# Patient Record
Sex: MALE | Race: WHITE | NOT HISPANIC OR LATINO | Employment: UNEMPLOYED | URBAN - METROPOLITAN AREA
[De-identification: names, ages, dates, MRNs, and addresses within clinical notes are randomized per-mention and may not be internally consistent; named-entity substitution may affect disease eponyms.]

---

## 2017-03-23 RX ORDER — AMLODIPINE BESYLATE 10 MG/1
TABLET ORAL
Qty: 30 TABLET | Refills: 2 | Status: SHIPPED | OUTPATIENT
Start: 2017-03-23 | End: 2017-08-20 | Stop reason: SDUPTHER

## 2017-04-10 ENCOUNTER — HOSPITAL ENCOUNTER (OUTPATIENT)
Dept: RADIOLOGY | Facility: HOSPITAL | Age: 57
Discharge: HOME | End: 2017-04-10
Attending: FAMILY MEDICINE
Payer: COMMERCIAL

## 2017-04-10 ENCOUNTER — OFFICE VISIT (OUTPATIENT)
Dept: INTERNAL MEDICINE | Facility: CLINIC | Age: 57
End: 2017-04-10
Payer: COMMERCIAL

## 2017-04-10 VITALS
BODY MASS INDEX: 25.83 KG/M2 | HEART RATE: 57 BPM | OXYGEN SATURATION: 98 % | DIASTOLIC BLOOD PRESSURE: 74 MMHG | WEIGHT: 190.69 LBS | HEIGHT: 72 IN | SYSTOLIC BLOOD PRESSURE: 124 MMHG | TEMPERATURE: 97 F

## 2017-04-10 DIAGNOSIS — M25.562 PAIN IN BOTH KNEES, UNSPECIFIED CHRONICITY: ICD-10-CM

## 2017-04-10 DIAGNOSIS — M77.11 RIGHT TENNIS ELBOW: ICD-10-CM

## 2017-04-10 DIAGNOSIS — K21.9 GASTROESOPHAGEAL REFLUX DISEASE WITHOUT ESOPHAGITIS: ICD-10-CM

## 2017-04-10 DIAGNOSIS — Z00.00 WELLNESS EXAMINATION: ICD-10-CM

## 2017-04-10 DIAGNOSIS — M25.562 PAIN IN BOTH KNEES, UNSPECIFIED CHRONICITY: Primary | ICD-10-CM

## 2017-04-10 DIAGNOSIS — M25.561 PAIN IN BOTH KNEES, UNSPECIFIED CHRONICITY: Primary | ICD-10-CM

## 2017-04-10 DIAGNOSIS — M25.561 PAIN IN BOTH KNEES, UNSPECIFIED CHRONICITY: ICD-10-CM

## 2017-04-10 PROCEDURE — 3074F SYST BP LT 130 MM HG: CPT | Mod: S$GLB,,, | Performed by: PHYSICIAN ASSISTANT

## 2017-04-10 PROCEDURE — 3078F DIAST BP <80 MM HG: CPT | Mod: S$GLB,,, | Performed by: PHYSICIAN ASSISTANT

## 2017-04-10 PROCEDURE — 90715 TDAP VACCINE 7 YRS/> IM: CPT | Mod: S$GLB,,, | Performed by: PHYSICIAN ASSISTANT

## 2017-04-10 PROCEDURE — 73562 X-RAY EXAM OF KNEE 3: CPT | Mod: 26,50,, | Performed by: RADIOLOGY

## 2017-04-10 PROCEDURE — 90471 IMMUNIZATION ADMIN: CPT | Mod: S$GLB,,, | Performed by: INTERNAL MEDICINE

## 2017-04-10 PROCEDURE — 90472 IMMUNIZATION ADMIN EACH ADD: CPT | Mod: S$GLB,,, | Performed by: PHYSICIAN ASSISTANT

## 2017-04-10 PROCEDURE — 73080 X-RAY EXAM OF ELBOW: CPT | Mod: TC,RT

## 2017-04-10 PROCEDURE — 73562 X-RAY EXAM OF KNEE 3: CPT | Mod: TC,50

## 2017-04-10 PROCEDURE — 73080 X-RAY EXAM OF ELBOW: CPT | Mod: 26,RT,, | Performed by: RADIOLOGY

## 2017-04-10 PROCEDURE — 99214 OFFICE O/P EST MOD 30 MIN: CPT | Mod: 25,S$GLB,, | Performed by: PHYSICIAN ASSISTANT

## 2017-04-10 PROCEDURE — 90688 IIV4 VACCINE SPLT 0.5 ML IM: CPT | Mod: S$GLB,,, | Performed by: INTERNAL MEDICINE

## 2017-04-10 PROCEDURE — 99999 PR PBB SHADOW E&M-EST. PATIENT-LVL IV: CPT | Mod: PBBFAC,,, | Performed by: PHYSICIAN ASSISTANT

## 2017-04-10 PROCEDURE — 1160F RVW MEDS BY RX/DR IN RCRD: CPT | Mod: S$GLB,,, | Performed by: PHYSICIAN ASSISTANT

## 2017-04-10 RX ORDER — ESOMEPRAZOLE MAGNESIUM 40 MG/1
40 CAPSULE, DELAYED RELEASE ORAL
Qty: 90 CAPSULE | Refills: 3 | Status: SHIPPED | OUTPATIENT
Start: 2017-04-10 | End: 2018-03-09

## 2017-04-10 RX ORDER — CELECOXIB 200 MG/1
200 CAPSULE ORAL 2 TIMES DAILY
Qty: 60 CAPSULE | Refills: 2 | Status: SHIPPED | OUTPATIENT
Start: 2017-04-10 | End: 2018-04-04

## 2017-04-10 NOTE — PATIENT INSTRUCTIONS
Hypertension Goal Care Plan        Controlling High Blood Pressure  High blood pressure (hypertension) is often called the silent killer. This is because many people who have it dont know it. High blood pressure is defined as 140/90 mm Hg or higher. Know your blood pressure and remember to check it regularly. Doing so can save your life. Here are some things you can do to help control your blood pressure.    Choose heart-healthy foods  · Select low-salt, low-fat foods. Limit sodium intake to 2,400 mg per day or the amount suggested by your healthcare provider.  · Limit canned, dried, cured, packaged, and fast foods. These can contain a lot of salt.  · Eat 8 to 10 servings of fruits and vegetables every day.  · Choose lean meats, fish, or chicken.  · Eat whole-grain pasta, brown rice, and beans.  · Eat 2 to 3 servings of low-fat or fat-free dairy products.  · Ask your doctor about the DASH eating plan. This plan helps reduce blood pressure.  · When you go to a restaurant, ask that your meal be prepared with no added salt.  Maintain a healthy weight  · Ask your healthcare provider how many calories to eat a day. Then stick to that number.  · Ask your healthcare provider what weight range is healthiest for you. If you are overweight, a weight loss of only 3% to 5% of your body weight can help lower blood pressure. Generally, a good weight loss goal is to lose 10% of your body weight in a year.  · Limit snacks and sweets.  · Get regular exercise.  Get up and get active  · Choose activities you enjoy. Find ones you can do with friends or family. This includes bicycling, dancing, walking, and jogging.  · Park farther away from building entrances.  · Use stairs instead of the elevator.  · When you can, walk or bike instead of driving.  · Epping leaves, garden, or do household repairs.  · Be active at a moderate to vigorous level of physical activity for at least 40 minutes for a minimum of 3 to 4 days a week.   Manage  stress  · Make time to relax and enjoy life. Find time to laugh.  · Communicate your concerns with your loved ones and your healthcare provider.  · Visit with family and friends, and keep up with hobbies.  Limit alcohol and quit smoking  · Men should have no more than 2 drinks per day.  · Women should have no more than 1 drink per day.  · Talk with your healthcare provider about quitting smoking. Smoking significantly increases your risk for heart disease and stroke. Ask your healthcare provider about community smoking cessation programs and other options.  Medicines  If lifestyle changes arent enough, your healthcare provider may prescribe high blood pressure medicine. Take all medicines as prescribed. If you have any questions about your medicines, ask your healthcare provider before stopping or changing them.   © 7880-1887 [employer]. [address] [place] [address], [place] [address]. All rights reserved. This information is not intended as a substitute for professional medical care. Always follow your healthcare professional's instructions.          Long-Term Complications of Diabetes  Diabetes can cause health problems over time. These are called complications. They are more likely to occur if your blood sugar is often too high. Over time, high blood sugar can damage blood vessels in your body. It is important to keep your blood sugar in your target range. This can help prevent or delay complications from diabetes.    Possible complications  Complications of diabetes include:  · Eye problems, including damage to the blood vessels in the eyes (retinopathy), pressure in the eye (glaucoma), and clouding of the eyes lens (a cataract). Eye problems can eventually lead to irreversible blindness.   · Tooth and gum problems (periodontal disease), causing loss of teeth and bone  · Blood vessel (vascular) disease leading to circulation problems, heart attack or stroke, or a need for amputation of a  limb   · Problems with sexual function leading to erectile dysfunction in men and sexual discomfort in women   · Kidney disease (nephropathy) can eventually lead to kidney failure, which may require dialysis or kidney transplant   · Nerve problems (neuropathy), causing pain or loss of feeling in your feet and other parts of your body, potentially leading to an amputation of a limb   · High blood pressure (hypertension), putting strain on your heart and blood vessels  · Serious infections, possibly leading to loss of toes, feet, or limbs  How to avoid complications  The serious consequences of these complications are completely avoidable for most people with diabetes by managing your blood glucose, blood pressure, and cholesterol levels. This can help you feel better and stay healthy. You can manage diabetes by tracking your blood sugar. You can also eat healthy and exercise. And you should take medication if directed by your health care provider.  © 9948-0483 [employer]. [address] [place] [address], [place] [address]. All rights reserved. This information is not intended as a substitute for professional medical care. Always follow your healthcare professional's instructions.

## 2017-04-10 NOTE — PROGRESS NOTES
Subjective:       Patient ID: Vishnu Dougherty Jr. is a 57 y.o. male.    Chief Complaint: Knee Pain and right elbow pain    Knee Pain    The incident occurred more than 1 week ago. The incident occurred at work. There was no injury mechanism. The pain is present in the left knee and right knee. The quality of the pain is described as aching and shooting. The pain is moderate. The pain has been fluctuating since onset. Associated symptoms include a loss of motion. Pertinent negatives include no inability to bear weight, loss of sensation, muscle weakness, numbness or tingling. The symptoms are aggravated by weight bearing, palpation and movement. He has tried NSAIDs and acetaminophen for the symptoms. Improvement on treatment: Can not tolerate NSAIDS due to GI up set.    Elbow Injury   This is a new problem. The current episode started more than 1 month ago. The problem occurs daily. The problem has been waxing and waning. Associated symptoms include arthralgias, joint swelling and weakness. Pertinent negatives include no abdominal pain, chest pain, chills, coughing, fatigue, fever, headaches, nausea, numbness or vomiting. The symptoms are aggravated by twisting (working at his job as a [employer] ). He has tried acetaminophen and NSAIDs for the symptoms. The treatment provided no relief.     Past Medical History:   Diagnosis Date    Dilated cardiomyopathy 9/24/2015    Enlarged prostate     Essential hypertension 9/24/2015    Gastroesophageal reflux disease without esophagitis 10/7/2015    Shortness of breath 9/24/2015       Past Surgical History:   Procedure Laterality Date    COLONOSCOPY N/A 5/25/2016    Procedure: COLONOSCOPY;  Surgeon: Demetrio Spicer MD;  Location: [facility];  Service: Endoscopy;  Laterality: N/A;       History reviewed. No pertinent family history.    Social History     Social History    Marital status:      Spouse name: N/A    Number of children: N/A    Years of education: N/A      Occupational History    Not on file.     Social History Main Topics    Smoking status: Former Smoker     Quit date: 9/24/1995    Smokeless tobacco: Never Used    Alcohol use No    Drug use: No    Sexual activity: Not Currently     Other Topics Concern    Not on file     Social History Narrative       Review of patient's allergies indicates:  No Known Allergies      Current Outpatient Prescriptions:     amlodipine (NORVASC) 10 MG tablet, TAKE 1 TABLET (10 MG TOTAL) BY MOUTH ONCE DAILY., Disp: 30 tablet, Rfl: 2    escitalopram oxalate (LEXAPRO) 20 MG tablet, TAKE 1 TABLET (20 MG TOTAL) BY MOUTH ONCE DAILY., Disp: 30 tablet, Rfl: 5    celecoxib (CELEBREX) 200 MG capsule, Take 1 capsule (200 mg total) by mouth 2 (two) times daily., Disp: 60 capsule, Rfl: 2    esomeprazole (NEXIUM) 40 MG capsule, Take 1 capsule (40 mg total) by mouth before breakfast., Disp: 90 capsule, Rfl: 3    /74 (BP Location: Right arm, Patient Position: Sitting, BP Method: Manual)  Pulse (!) 57  Temp 96.8 °F (36 °C) (Tympanic)   Ht 6' (1.829 m)  Wt 86.5 kg (190 lb 11.2 oz)  SpO2 98%  BMI 25.86 kg/m2  Review of Systems   Constitutional: Negative for chills, fatigue and fever.   HENT: Negative.    Eyes: Negative.    Respiratory: Negative for cough, chest tightness, shortness of breath and wheezing.    Cardiovascular: Negative for chest pain, palpitations and leg swelling.   Gastrointestinal: Negative for abdominal pain, blood in stool, constipation, nausea and vomiting.   Endocrine: Negative.    Genitourinary: Negative.    Musculoskeletal: Positive for arthralgias, gait problem and joint swelling. Negative for back pain.   Skin: Negative.    Neurological: Positive for weakness. Negative for dizziness, tingling, seizures, numbness and headaches.   Hematological: Negative for adenopathy. Does not bruise/bleed easily.   Psychiatric/Behavioral: Negative.        Objective:      Physical Exam   Constitutional: He is oriented to  person, place, and time. He appears well-developed and well-nourished. No distress.   HENT:   Head: Normocephalic and atraumatic.   Eyes: Pupils are equal, round, and reactive to light.   Neck: Neck supple. No thyromegaly present.   Cardiovascular: Normal rate, regular rhythm and normal heart sounds.  Exam reveals no gallop and no friction rub.    No murmur heard.  Pulmonary/Chest: Effort normal and breath sounds normal. No respiratory distress. He has no wheezes. He has no rales. He exhibits no tenderness.   Abdominal: Soft. He exhibits no distension and no mass. There is no tenderness. There is no rebound and no guarding. No hernia.   Musculoskeletal:        Right elbow: Tenderness found. Lateral epicondyle tenderness noted.        Right knee: He exhibits decreased range of motion. Tenderness found.        Left knee: He exhibits decreased range of motion. Tenderness found.   Lymphadenopathy:     He has no cervical adenopathy.   Neurological: He is alert and oriented to person, place, and time.   Skin: Skin is warm and dry. He is not diaphoretic.   Psychiatric: He has a normal mood and affect.   Nursing note and vitals reviewed.      Assessment:       1. Pain in both knees, unspecified chronicity    2. Right tennis elbow    3. Gastroesophageal reflux disease without esophagitis    4. Wellness examination        Plan:       Pain in both knees, unspecified chronicity  -     celecoxib (CELEBREX) 200 MG capsule; Take 1 capsule (200 mg total) by mouth 2 (two) times daily.  Dispense: 60 capsule; Refill: 2  -     Cancel: X-Ray Knee 3 View Bilateral; Future; Expected date: 4/10/17  -     Ambulatory referral to Orthopedics  -     X-ray Knee Ortho Bilateral; Future; Expected date: 4/10/17    Right tennis elbow  -     celecoxib (CELEBREX) 200 MG capsule; Take 1 capsule (200 mg total) by mouth 2 (two) times daily.  Dispense: 60 capsule; Refill: 2  -     X-Ray Elbow Complete 3 views Right; Future; Expected date: 4/10/17  -      Ambulatory referral to Orthopedics    Gastroesophageal reflux disease without esophagitis  -     esomeprazole (NEXIUM) 40 MG capsule; Take 1 capsule (40 mg total) by mouth before breakfast.  Dispense: 90 capsule; Refill: 3  -     celecoxib (CELEBREX) 200 MG capsule; Take 1 capsule (200 mg total) by mouth 2 (two) times daily.  Dispense: 60 capsule; Refill: 2    Wellness examination  -     Tdap Vaccine  -     Comprehensive metabolic panel; Future; Expected date: 4/10/17  -     CBC auto differential; Future; Expected date: 4/10/17  -     TSH; Future; Expected date: 4/10/17  -     Lipid panel; Future; Expected date: 4/10/17  -     HEPATITIS C ANTIBODY; Future; Expected date: 4/10/17  -     PSA, Screening; Future; Expected date: 4/10/17    Other orders  -     Influenza - Quadrivalent

## 2017-04-10 NOTE — MR AVS SNAPSHOT
[facility]  [address]  [place] [address]  Phone: [phone]  Fax: [phone]                  Vishnu Dougherty Jr.   4/10/2017 10:00 AM   Office Visit    Description:  Male : 1960   Provider:  Ant Mejia III, PA-C   [facility]           Reason for Visit     Knee Pain     right elbow pain           Diagnoses this Visit        Comments    Pain in both knees, unspecified chronicity    -  Primary     Right tennis elbow         Gastroesophageal reflux disease without esophagitis         Wellness examination                To Do List           Future Appointments        Provider Department Dept Phone    2017 8:30 AM Kirill Rosario PA-C [facility] Orth[facility] [phone]    2017 10:10 AM LABORATORY, [address] [facility] [phone]      Goals (5 Years of Data)     None       These Medications        Disp Refills Start End    esomeprazole (NEXIUM) 40 MG capsule 90 capsule 3 4/10/2017 4/10/2018    Take 1 capsule (40 mg total) by mouth before breakfast. - Oral    Pharmacy: [facility]/pharmacy #[id number] - [place] - [address] AT [facility] Ph #: [phone]       celecoxib (CELEBREX) 200 MG capsule 60 capsule 2 4/10/2017     Take 1 capsule (200 mg total) by mouth 2 (two) times daily. - Oral    Pharmacy: [facility]/pharmacy #[id number] - [place], [place] - [address] AT [facility] Ph #: [phone]         [facility]     [facility] Nurse Care Line - 24/ Assistance  Unless otherwise directed by your provider, please contact [facility], our nurse care line that is available for 24/7 assistance.     Registered nurses in the [facility] provide: appointment scheduling, clinical advisement, health education, and other advisory services.  Call: [phone] (toll free)               Medications           Message regarding  Medications     Verify the changes and/or additions to your medication regime listed below are the same as discussed with your clinician today.  If any of these changes or additions are incorrect, please notify your healthcare provider.        START taking these NEW medications        Refills    celecoxib (CELEBREX) 200 MG capsule 2    Sig: Take 1 capsule (200 mg total) by mouth 2 (two) times daily.    Class: Normal    Route: Oral      CHANGE how you are taking these medications     Start Taking Instead of    esomeprazole (NEXIUM) 40 MG capsule esomeprazole (NEXIUM) 20 MG capsule    Dosage:  Take 1 capsule (40 mg total) by mouth before breakfast. Dosage:  Take 2 capsules (40 mg total) by mouth before breakfast.    Reason for Change:  Reorder       STOP taking these medications     polyethylene glycol (GLYCOLAX) 17 gram PwPk Take 17 g by mouth once daily.    docusate sodium (COLACE) 100 MG capsule Take 1 capsule (100 mg total) by mouth 2 (two) times daily.    alprazolam (XANAX) 0.5 MG tablet Take 1 tablet (0.5 mg total) by mouth daily as needed for Anxiety.           Verify that the below list of medications is an accurate representation of the medications you are currently taking.  If none reported, the list may be blank. If incorrect, please contact your healthcare provider. Carry this list with you in case of emergency.           Current Medications     amlodipine (NORVASC) 10 MG tablet TAKE 1 TABLET (10 MG TOTAL) BY MOUTH ONCE DAILY.    escitalopram oxalate (LEXAPRO) 20 MG tablet TAKE 1 TABLET (20 MG TOTAL) BY MOUTH ONCE DAILY.    celecoxib (CELEBREX) 200 MG capsule Take 1 capsule (200 mg total) by mouth 2 (two) times daily.    esomeprazole (NEXIUM) 40 MG capsule Take 1 capsule (40 mg total) by mouth before breakfast.           Clinical Reference Information           Your Vitals Were     BP Pulse Temp Height    124/74 (BP Location: Right arm, Patient Position: Sitting, BP Method: Manual) 57 96.8 °F (36 °C)  (Tympanic) 6' (1.829 m)    Weight SpO2 BMI    86.5 kg (190 lb 11.2 oz) 98% 25.86 kg/m2      Blood Pressure          Most Recent Value    BP  124/74      Allergies as of 4/10/2017     No Known Allergies      Immunizations Administered on Date of Encounter - 4/10/2017     Name Date Dose VIS Date Route    TDAP  Incomplete 0.5 mL 2/24/2015 Intramuscular      Orders Placed During Today's Visit      Normal Orders This Visit    Ambulatory referral to Orthopedics     Tdap Vaccine     Future Labs/Procedures Expected by Expires    CBC auto differential  4/10/2017 6/9/2018    Comprehensive metabolic panel  4/10/2017 6/9/2018    HEPATITIS C ANTIBODY  4/10/2017 6/9/2018    Lipid panel  4/10/2017 6/9/2018    PSA, Screening  4/10/2017 6/9/2018    TSH  4/10/2017 6/9/2018    X-Ray Elbow Complete 3 views Right  4/10/2017 4/10/2018    X-ray Knee Ortho Bilateral  4/10/2017 4/10/2018      Instructions    Hypertension Goal Care Plan        Controlling High Blood Pressure  High blood pressure (hypertension) is often called the silent killer. This is because many people who have it dont know it. High blood pressure is defined as 140/90 mm Hg or higher. Know your blood pressure and remember to check it regularly. Doing so can save your life. Here are some things you can do to help control your blood pressure.    Choose heart-healthy foods  · Select low-salt, low-fat foods. Limit sodium intake to 2,400 mg per day or the amount suggested by your healthcare provider.  · Limit canned, dried, cured, packaged, and fast foods. These can contain a lot of salt.  · Eat 8 to 10 servings of fruits and vegetables every day.  · Choose lean meats, fish, or chicken.  · Eat whole-grain pasta, brown rice, and beans.  · Eat 2 to 3 servings of low-fat or fat-free dairy products.  · Ask your doctor about the DASH eating plan. This plan helps reduce blood pressure.  · When you go to a restaurant, ask that your meal be prepared with no added salt.  Maintain a  healthy weight  · Ask your healthcare provider how many calories to eat a day. Then stick to that number.  · Ask your healthcare provider what weight range is healthiest for you. If you are overweight, a weight loss of only 3% to 5% of your body weight can help lower blood pressure. Generally, a good weight loss goal is to lose 10% of your body weight in a year.  · Limit snacks and sweets.  · Get regular exercise.  Get up and get active  · Choose activities you enjoy. Find ones you can do with friends or family. This includes bicycling, dancing, walking, and jogging.  · Park farther away from building entrances.  · Use stairs instead of the elevator.  · When you can, walk or bike instead of driving.  · Sproul leaves, garden, or do household repairs.  · Be active at a moderate to vigorous level of physical activity for at least 40 minutes for a minimum of 3 to 4 days a week.   Manage stress  · Make time to relax and enjoy life. Find time to laugh.  · Communicate your concerns with your loved ones and your healthcare provider.  · Visit with family and friends, and keep up with hobbies.  Limit alcohol and quit smoking  · Men should have no more than 2 drinks per day.  · Women should have no more than 1 drink per day.  · Talk with your healthcare provider about quitting smoking. Smoking significantly increases your risk for heart disease and stroke. Ask your healthcare provider about community smoking cessation programs and other options.  Medicines  If lifestyle changes arent enough, your healthcare provider may prescribe high blood pressure medicine. Take all medicines as prescribed. If you have any questions about your medicines, ask your healthcare provider before stopping or changing them.   © 1795-9219 [employer]. [address] [place] [address], [place] [address]. All rights reserved. This information is not intended as a substitute for professional medical care. Always follow your healthcare professional's  instructions.          Long-Term Complications of Diabetes  Diabetes can cause health problems over time. These are called complications. They are more likely to occur if your blood sugar is often too high. Over time, high blood sugar can damage blood vessels in your body. It is important to keep your blood sugar in your target range. This can help prevent or delay complications from diabetes.    Possible complications  Complications of diabetes include:  · Eye problems, including damage to the blood vessels in the eyes (retinopathy), pressure in the eye (glaucoma), and clouding of the eyes lens (a cataract). Eye problems can eventually lead to irreversible blindness.   · Tooth and gum problems (periodontal disease), causing loss of teeth and bone  · Blood vessel (vascular) disease leading to circulation problems, heart attack or stroke, or a need for amputation of a limb   · Problems with sexual function leading to erectile dysfunction in men and sexual discomfort in women   · Kidney disease (nephropathy) can eventually lead to kidney failure, which may require dialysis or kidney transplant   · Nerve problems (neuropathy), causing pain or loss of feeling in your feet and other parts of your body, potentially leading to an amputation of a limb   · High blood pressure (hypertension), putting strain on your heart and blood vessels  · Serious infections, possibly leading to loss of toes, feet, or limbs  How to avoid complications  The serious consequences of these complications are completely avoidable for most people with diabetes by managing your blood glucose, blood pressure, and cholesterol levels. This can help you feel better and stay healthy. You can manage diabetes by tracking your blood sugar. You can also eat healthy and exercise. And you should take medication if directed by your health care provider.  © 1176-1241 [employer]. [address] [place] [address], [place] [address]. All rights reserved.  This information is not intended as a substitute for professional medical care. Always follow your healthcare professional's instructions.         Language Assistance Services     ATTENTION: Language assistance services are available, free of charge. Please call [phone].      ATENCIÓN: Si aleena nowak, tiene a navarro disposición servicios gratuitos de asistencia lingüística. Llame al [phone].     CHÚ Ý: N?u b?n nói Ti?ng Vi?t, có các d?ch v? h? tr? ngôn ng? mi?n phí dành cho b?n. G?i s? [phone].         [facility] complies with applicable Federal civil rights laws and does not discriminate on the basis of race, color, national origin, age, disability, or sex.

## 2017-04-12 ENCOUNTER — HOSPITAL ENCOUNTER (OUTPATIENT)
Dept: RADIOLOGY | Facility: HOSPITAL | Age: 57
Discharge: HOME | End: 2017-04-12
Attending: ORTHOPAEDIC SURGERY
Payer: COMMERCIAL

## 2017-04-12 ENCOUNTER — OFFICE VISIT (OUTPATIENT)
Dept: ORTHOPEDICS | Facility: CLINIC | Age: 57
End: 2017-04-12
Payer: COMMERCIAL

## 2017-04-12 ENCOUNTER — TELEPHONE (OUTPATIENT)
Dept: INTERNAL MEDICINE | Facility: CLINIC | Age: 57
End: 2017-04-12

## 2017-04-12 VITALS
DIASTOLIC BLOOD PRESSURE: 81 MMHG | WEIGHT: 189.5 LBS | HEIGHT: 72 IN | BODY MASS INDEX: 25.67 KG/M2 | SYSTOLIC BLOOD PRESSURE: 162 MMHG | HEART RATE: 55 BPM

## 2017-04-12 DIAGNOSIS — M79.18 MYOFACIAL MUSCLE PAIN: ICD-10-CM

## 2017-04-12 DIAGNOSIS — M77.11 LATERAL EPICONDYLITIS OF RIGHT ELBOW: ICD-10-CM

## 2017-04-12 DIAGNOSIS — M62.89 HAMSTRING TIGHTNESS, UNSPECIFIED LATERALITY: ICD-10-CM

## 2017-04-12 DIAGNOSIS — M62.81 MUSCLE WEAKNESS OF EXTREMITY: ICD-10-CM

## 2017-04-12 DIAGNOSIS — M17.0 PRIMARY OSTEOARTHRITIS OF BOTH KNEES: Primary | ICD-10-CM

## 2017-04-12 PROCEDURE — 3077F SYST BP >= 140 MM HG: CPT | Mod: S$GLB,,, | Performed by: PHYSICIAN ASSISTANT

## 2017-04-12 PROCEDURE — 72110 X-RAY EXAM L-2 SPINE 4/>VWS: CPT | Mod: TC

## 2017-04-12 PROCEDURE — 1160F RVW MEDS BY RX/DR IN RCRD: CPT | Mod: S$GLB,,, | Performed by: PHYSICIAN ASSISTANT

## 2017-04-12 PROCEDURE — 99999 PR PBB SHADOW E&M-EST. PATIENT-LVL III: CPT | Mod: PBBFAC,,, | Performed by: PHYSICIAN ASSISTANT

## 2017-04-12 PROCEDURE — 3079F DIAST BP 80-89 MM HG: CPT | Mod: S$GLB,,, | Performed by: PHYSICIAN ASSISTANT

## 2017-04-12 PROCEDURE — 99204 OFFICE O/P NEW MOD 45 MIN: CPT | Mod: S$GLB,,, | Performed by: PHYSICIAN ASSISTANT

## 2017-04-12 PROCEDURE — 72110 X-RAY EXAM L-2 SPINE 4/>VWS: CPT | Mod: 26,,, | Performed by: RADIOLOGY

## 2017-04-12 NOTE — LETTER
April 12, 2017      Luna Fuller MD  [address] [place] [address] [address] [place] [id number]           [facility]  [address] [place] [address] [address] [place] [address]  Phone: [phone]  Fax: [phone]          Patient: Vishnu Dougherty Jr.   MR Number: [id number]   YOB: 1960   Date of Visit: 4/12/2017       Dear Dr. Luna Fuller:    Thank you for referring Vishnu Dougherty to me for evaluation. Attached you will find relevant portions of my assessment and plan of care.    If you have questions, please do not hesitate to call me. I look forward to following Vishnu Dougherty along with you.    Sincerely,    Kirill Rosario PA-C    Enclosure  CC:  No Recipients    If you would like to receive this communication electronically, please contact [email or web address] or [phone] to request more information on [employer] access.    For providers and/or their staff who would like to refer a patient to [facility], please contact us through our one-stop-shop provider referral line, [facility], at [phone].    If you feel you have received this communication in error or would no longer like to receive these types of communications, please e-mail [email or web address]

## 2017-04-12 NOTE — TELEPHONE ENCOUNTER
----- Message from Kirill Rosario PA-C sent at 4/12/2017  9:48 AM CDT -----  See my note on him. I'm a bit perplexed by his presentation- I hope this is very simple-- muscle deconditioning in the presence of mild OA. The fear in the back of my mind is that it is more complex than it appears, though. I hope I'm wrong.   Kirill

## 2017-04-12 NOTE — PROGRESS NOTES
Subjective:      Patient ID: Vishnu Dougherty Jr. is a 57 y.o. male.    Chief Complaint: Pain of the Left Knee and Pain of the Right Knee    HPI Comments: Body part: Bilateral Knee    Occupation: / [employer]    Dominant hand: Right    Referred by: Luna Fuller MD    Date of Injury: None    Patient's visit goal: To find out what is wrong    Problem Description: Bilateral Knee Pain since January 2017.  He knows of no injury.  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  There is no extreme heavy lifting.  At times, he does have to pull on a mechanical crane.  He has no change in shoe wear, but he does wear steel toed boots.  He primarily has pain from midday going forward.  There is no swelling.  He describes it as a mild toothache that worsens through the day.  He is unable to take high doses of prescriptive NSAIDs.  He was just started on Celebrex and this appears to be helping some.  He has tried no bracing, injections, or PT.  Eyes any back or hip discomfort.  No numbness or tingling.          Pain   This is a new problem. The current episode started more than 1 month ago (pain since January 2017). The problem occurs constantly. The problem has been gradually worsening. Associated symptoms include abdominal pain. Pertinent negatives include no chest pain, chills, congestion, coughing, fever, joint swelling, nausea, numbness, rash or vomiting. The symptoms are aggravated by standing. He has tried rest for the symptoms. The treatment provided no relief.       Review of Systems   Constitution: Negative for chills, fever and weight loss.   HENT: Negative for congestion and hearing loss.    Eyes: Negative for double vision and pain.   Cardiovascular: Negative for chest pain and irregular heartbeat.   Respiratory: Positive for shortness of breath. Negative for cough.    Endocrine: Positive for cold intolerance and heat intolerance. Negative for  polyuria.   Hematologic/Lymphatic: Does not bruise/bleed easily.   Skin: Negative for poor wound healing, rash and suspicious lesions.   Musculoskeletal: Positive for joint pain. Negative for arthritis and joint swelling.   Gastrointestinal: Positive for abdominal pain. Negative for nausea and vomiting.   Genitourinary: Negative for bladder incontinence and frequency.   Neurological: Positive for tremors. Negative for loss of balance, numbness, paresthesias and sensory change.   Psychiatric/Behavioral: Positive for depression. The patient is nervous/anxious.    Allergic/Immunologic: Negative for hives.         Objective:            General    Nursing note and vitals reviewed.  Constitutional: He is oriented to person, place, and time. He appears well-developed and well-nourished. No distress.   Neck: Normal range of motion.   Neurological: He is alert and oriented to person, place, and time.   Psychiatric: He has a normal mood and affect. His behavior is normal. Judgment and thought content normal.     General Musculoskeletal Exam   Gait: normal   Pelvic Obliquity: mild    Right Ankle/Foot Exam     Range of Motion   The patient has normal right ankle ROM.    Alignment   Knee Alignment: varus    Left Ankle/Foot Exam     Range of Motion   The patient has normal left ankle ROM.     Alignment   Knee Alignment: varus    Right Knee Exam     Inspection   Erythema: absent  Scars: absent  Swelling: absent  Effusion: effusion  Deformity: deformity  Bruising: absent    Tenderness   The patient is experiencing no tenderness.         Range of Motion   The patient has normal right knee ROM.    Tests   Meniscus   Jeffrey:  Medial - negative Lateral - negative  Ligament Examination   MCL - Valgus: normal (0 to 2mm)  LCL - Varus: normal  Patella   Patellar Apprehension: negative  Patellar Grind: negative    Other   Muscle Tightness: hamstring tightness and quadriceps tightness  Sensation: normal    Left Knee Exam     Inspection    Erythema: absent  Scars: absent  Swelling: absent  Effusion: absent  Deformity: deformity  Bruising: absent    Tenderness   The patient is experiencing no tenderness.         Range of Motion   The patient has normal left knee ROM.    Tests   Meniscus   Jeffrey:  Medial - negative Lateral - negative  Stability   MCL - Valgus: normal (0 to 2mm)  LCL - Varus: normal (0 to 2mm)  Patella   Patellar Apprehension: negative  Patellar Grind: negative    Other   Muscle Tightness: hamstring tightness and quadriceps tightness  Sensation: normal    Comments:  He stands slouched and slightly hunched.    Right Hip Exam     Range of Motion   Flexion: abnormal   Internal Rotation: abnormal   External Rotation: abnormal   Abduction: abnormal     Tests   Pain w/ forced internal rotation (GABBY): absent  Pain w/ forced external rotation (FADIR): absent  Left Hip Exam     Range of Motion   Flexion: abnormal   Internal Rotation: abnormal   External Rotation: abnormal   Abduction: abnormal     Tests   Pain w/ forced internal rotation (GABBY): absent  Pain w/ forced external rotation (FADIR): absent      Back (L-Spine & T-Spine) / Neck (C-Spine) Exam     Back (L-Spine & T-Spine) Range of Motion   The patient has abnormal back ROM.  Extension: normal   Flexion: abnormal   Lateral Bend Right: normal   Lateral Bend Left: normal   Rotation Right: normal   Rotation Left: normal     Neck (C-Spine) Range of Motion   Flexion:     Normal  Extension: Normal  Right Lateral Bend: normal  Left Lateral Bend: normal  Right Rotation: normal  Left Rotation: normal      Muscle Strength   Right Lower Extremity   Hip Abduction: 4/5   Hip Adduction: 4/5   Hip Flexion: 4/5 (-)   Quadriceps:  4/5   Hamstrin/5 (-)   EHL:  5/5  FDL: 5/5  EDL: 5/5  FHL: 5/5  Left Lower Extremity   Hip Abduction: 4/5   Hip Adduction: 4/5   Hip Flexion: 4/5 (-)   Hamstrin/5 (-)   EHL:  5/5  FDL: 5/5  EDL: 5/5  FHL: 5/5    Reflexes     Left Side  Quadriceps:   3+  Achilles:  2+    Right Side   Quadriceps:  3+  Achilles:  2+    Vascular Exam       Capillary Refill  Right Hand: normal capillary refill  Left Hand: normal capillary refill    Edema  Right Lower Leg: absent  Left Lower Leg: absent      I have reviewed the films and report. I agree with the radiologist interpretation of the radiographic findings:  Minimal bilateral degenerative change without fracture, dislocation or definite effusion.  Bilateral vascular calcifications noted.  There slight increased narrowing of the medial compartments bilaterally.        Assessment:       Encounter Diagnoses   Name Primary?    Primary osteoarthritis of both knees Yes    Muscle weakness of extremity- core, hips, knees     Hamstring tightness, unspecified laterality     Myofacial muscle pain     Lateral epicondylitis of right elbow           Plan:       Vishnu was seen today for pain and pain.    Diagnoses and all orders for this visit:    Primary osteoarthritis of both knees  -     Ambulatory Referral to Physical/Occupational Therapy    Muscle weakness of extremity  -     X-Ray Lumbar Spine Complete 5 View; Future  -     Ambulatory Referral to Physical/Occupational Therapy    Hamstring tightness, unspecified laterality  -     Ambulatory Referral to Physical/Occupational Therapy    Myofacial muscle pain  -     X-Ray Lumbar Spine Complete 5 View; Future  -     Ambulatory Referral to Physical/Occupational Therapy    Lateral epicondylitis of right elbow  -     Ambulatory Referral to Physical/Occupational Therapy    He will participate in physical therapy at [facility] to work on core/hip, knee, and lower extremity conditioning.  He will also address back range of motion and the tennis elbow. [place] recommended.  He was surprised today at the limited motion and flexibility that he currently has as well as the limited/reduced strengths of the lower extremities.  While he is slightly hyperreflexic, there is no sign of  neurologic compromise on examination today.  We may consider further lumbar workup in the future given his vague symptomology and minimal degenerative findings on x-ray.  He will have an lumbar x-ray completed today.  Blood work is still in progress from primary care.  He is currently on Celebrex and does feel somewhat better with this.    I will see him in 7 weeks for reevaluation.  At that time, we will assess for degenerative knee symptomology and findings versus consider further lumbar versus rheumatologic/neurologic evaluation.    The patient understands, chooses and consents to this plan and accepts all   the risks which include but are not limited to the risks mentioned above.   Pt understands the alternative of having no testing, intervention or       treatment at this time. Pt left content and without questions.     Disclaimer: This note was prepared using a voice recognition system and is likely to have sound alike errors within the text.

## 2017-04-18 RX ORDER — ESCITALOPRAM OXALATE 20 MG/1
TABLET ORAL
Qty: 30 TABLET | Refills: 5 | Status: SHIPPED | OUTPATIENT
Start: 2017-04-18 | End: 2017-11-21 | Stop reason: SDUPTHER

## 2017-05-26 ENCOUNTER — TELEPHONE (OUTPATIENT)
Dept: ORTHOPEDICS | Facility: CLINIC | Age: 57
End: 2017-05-26

## 2017-05-26 NOTE — TELEPHONE ENCOUNTER
Phoned patient to reschedule their appointment due to Kirill Rosario PA-C leaving the clinic. No voicemail to leave a message for patient to call back to reschedule

## 2017-05-29 ENCOUNTER — TELEPHONE (OUTPATIENT)
Dept: ORTHOPEDICS | Facility: CLINIC | Age: 57
End: 2017-05-29

## 2017-05-30 ENCOUNTER — TELEPHONE (OUTPATIENT)
Dept: ORTHOPEDICS | Facility: CLINIC | Age: 57
End: 2017-05-30

## 2017-06-15 ENCOUNTER — PATIENT OUTREACH (OUTPATIENT)
Dept: ADMINISTRATIVE | Facility: HOSPITAL | Age: 57
End: 2017-06-15

## 2017-06-15 NOTE — PROGRESS NOTES
I have attempted without success to contact this patient by phone to schedule annual exam. Patient not available, unable to leave voicemail.

## 2017-06-20 ENCOUNTER — PATIENT OUTREACH (OUTPATIENT)
Dept: ADMINISTRATIVE | Facility: HOSPITAL | Age: 57
End: 2017-06-20

## 2017-07-21 ENCOUNTER — PATIENT OUTREACH (OUTPATIENT)
Dept: ADMINISTRATIVE | Facility: HOSPITAL | Age: 57
End: 2017-07-21

## 2017-08-21 RX ORDER — AMLODIPINE BESYLATE 10 MG/1
TABLET ORAL
Qty: 30 TABLET | Refills: 2 | Status: SHIPPED | OUTPATIENT
Start: 2017-08-21 | End: 2017-09-19 | Stop reason: SDUPTHER

## 2017-09-19 RX ORDER — AMLODIPINE BESYLATE 10 MG/1
10 TABLET ORAL DAILY
Qty: 90 TABLET | Refills: 2 | Status: SHIPPED | OUTPATIENT
Start: 2017-09-19 | End: 2018-03-16 | Stop reason: SDUPTHER

## 2017-11-06 RX ORDER — ALPRAZOLAM 0.5 MG/1
TABLET ORAL
Qty: 30 TABLET | Refills: 3 | Status: SHIPPED | OUTPATIENT
Start: 2017-11-06 | End: 2017-11-17

## 2017-11-09 ENCOUNTER — TELEPHONE (OUTPATIENT)
Dept: INTERNAL MEDICINE | Facility: CLINIC | Age: 57
End: 2017-11-09

## 2017-11-09 NOTE — TELEPHONE ENCOUNTER
Spoke with sister. She states that patient's boss called the family last night and stated that his memory loss has worsened and is affecting his job. He is almost unable to work because he can not remember how to get to work some days. Sister also states that nodules were found in his lungs sometime in the past though she can not remember what doctor was caring for him when they were found. She would like advice on what she should do to handle this situation.

## 2017-11-09 NOTE — TELEPHONE ENCOUNTER
----- Message from Pepe Mayfield sent at 11/9/2017  8:02 AM CST -----  Contact: [phone]  Would like to consult with nurse about memory loss of brother; unable to remember things at work; forgets his name; she needs to know what to do. Please call shaylee at [phone]. Thx lj

## 2017-11-16 ENCOUNTER — OFFICE VISIT (OUTPATIENT)
Dept: PULMONOLOGY | Facility: CLINIC | Age: 57
End: 2017-11-16
Payer: COMMERCIAL

## 2017-11-16 VITALS
HEIGHT: 72 IN | WEIGHT: 180.31 LBS | HEART RATE: 57 BPM | BODY MASS INDEX: 24.42 KG/M2 | OXYGEN SATURATION: 97 % | RESPIRATION RATE: 19 BRPM | SYSTOLIC BLOOD PRESSURE: 124 MMHG | DIASTOLIC BLOOD PRESSURE: 72 MMHG

## 2017-11-16 DIAGNOSIS — R06.02 SOB (SHORTNESS OF BREATH): ICD-10-CM

## 2017-11-16 DIAGNOSIS — R91.1 LUNG NODULE: Primary | ICD-10-CM

## 2017-11-16 PROCEDURE — 99999 PR PBB SHADOW E&M-EST. PATIENT-LVL III: CPT | Mod: PBBFAC,,, | Performed by: NURSE PRACTITIONER

## 2017-11-16 PROCEDURE — 99214 OFFICE O/P EST MOD 30 MIN: CPT | Mod: S$GLB,,, | Performed by: NURSE PRACTITIONER

## 2017-11-16 NOTE — PROGRESS NOTES
Subjective:      Patient ID: Vishnu Dougherty Jr. is a 57 y.o. male.    Chief Complaint: review ct    Patient presents to the office today for evaluation of lung nodules.  Patient canceled his most recent CT scan 8/4/16.  He states he is short of breath.  No wheezing.  No cough.  He is having difficulty performing job duties. 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  He has to stop and rest more often.  He does not feel as physically strongly more as well.  He quit smoking over 20 years ago.  Smoked for 20 years.  No wheezing.  No chest pain.    He has been having some short term memory loss and has appt scheduled with Mr. Mejia.     Patient Active Problem List:     Essential hypertension     Dilated cardiomyopathy     Gastroesophageal reflux disease without esophagitis     Anxiety                    /72   Pulse (!) 57   Resp 19   Ht 6' (1.829 m)   Wt 81.8 kg (180 lb 5.4 oz)   SpO2 97%   BMI 24.46 kg/m²   Body mass index is 24.46 kg/m².    Review of Systems   Constitutional: Negative.    HENT: Negative.    Respiratory: Positive for dyspnea on extertion.    Cardiovascular: Negative.    Genitourinary: Negative.    Musculoskeletal: Negative.    Gastrointestinal: Negative.    Psychiatric/Behavioral: The patient is nervous/anxious.      Objective:      Physical Exam   Constitutional: He is oriented to person, place, and time. He appears well-developed and well-nourished.   HENT:   Head: Normocephalic and atraumatic.   Nose: Nose normal.   Mouth/Throat: Uvula is midline and oropharynx is clear and moist.   Neck: Trachea normal and normal range of motion. Neck supple. No thyroid mass and no thyromegaly present.   Cardiovascular: Normal rate, regular rhythm and normal heart sounds.    Pulmonary/Chest: Effort normal and breath sounds normal. He has no wheezes. He has no rhonchi. He has no rales. Chest wall is not dull to percussion.   Abdominal: Soft. He  exhibits no mass. There is no hepatosplenomegaly or splenomegaly. There is no tenderness.   Musculoskeletal: Normal range of motion. He exhibits no edema.   Neurological: He is alert and oriented to person, place, and time.   Skin: Skin is warm and dry.   Psychiatric: He has a normal mood and affect.     Personal Diagnostic Review  10/2015  6-mm pleural-based nodule involving the left lower lung corresponding to the finding on the recent CT scan.  There is a 3-mm pleural based nodule within the right upper lobe.  Biapical pleural and septal thickening probably due to scarring.  4-mm pleural-based nodule in the right lower lobe.  A 3.5-mm nodular opacity along the right major fissure.  Dependent changes within the right lower lobe which may be due to atelectasis or fibrosis.  Lungs otherwise appear clear.    Assessment:       1. Lung nodule    2. SOB (shortness of breath)        Outpatient Encounter Prescriptions as of 11/16/2017   Medication Sig Dispense Refill    amlodipine (NORVASC) 10 MG tablet Take 1 tablet (10 mg total) by mouth once daily. 90 tablet 2    escitalopram oxalate (LEXAPRO) 20 MG tablet TAKE 1 TABLET (20 MG TOTAL) BY MOUTH ONCE DAILY. 30 tablet 5    ALPRAZolam (XANAX) 0.5 MG tablet TAKE 1 TABLET EVERY DAY AS NEEDED FOR ANXIETY 30 tablet 3    celecoxib (CELEBREX) 200 MG capsule Take 1 capsule (200 mg total) by mouth 2 (two) times daily. 60 capsule 2    esomeprazole (NEXIUM) 40 MG capsule Take 1 capsule (40 mg total) by mouth before breakfast. 90 capsule 3     No facility-administered encounter medications on file as of 11/16/2017.      Orders Placed This Encounter   Procedures    CT Chest Without Contrast     Standing Status:   Future     Standing Expiration Date:   11/16/2018     Order Specific Question:   May the Radiologist modify the order per protocol to meet the clinical needs of the patient?     Answer:   Yes    Complete PFT with bronchodilator     Standing Status:   Future     Standing  Expiration Date:   11/16/2018     Plan:      CT of chest.  PFT and follow-up.

## 2017-11-17 ENCOUNTER — LAB VISIT (OUTPATIENT)
Dept: LAB | Facility: HOSPITAL | Age: 57
End: 2017-11-17
Attending: INTERNAL MEDICINE
Payer: COMMERCIAL

## 2017-11-17 ENCOUNTER — OFFICE VISIT (OUTPATIENT)
Dept: INTERNAL MEDICINE | Facility: CLINIC | Age: 57
End: 2017-11-17
Payer: COMMERCIAL

## 2017-11-17 VITALS
SYSTOLIC BLOOD PRESSURE: 122 MMHG | OXYGEN SATURATION: 99 % | DIASTOLIC BLOOD PRESSURE: 76 MMHG | WEIGHT: 181 LBS | HEART RATE: 60 BPM | HEIGHT: 72 IN | BODY MASS INDEX: 24.52 KG/M2 | TEMPERATURE: 96 F

## 2017-11-17 DIAGNOSIS — F41.9 ANXIETY: ICD-10-CM

## 2017-11-17 DIAGNOSIS — R41.3 MEMORY LOSS: ICD-10-CM

## 2017-11-17 DIAGNOSIS — R41.3 MEMORY LOSS: Primary | ICD-10-CM

## 2017-11-17 LAB
T3FREE SERPL-MCNC: 2.4 PG/ML
T4 FREE SERPL-MCNC: 1.09 NG/DL
TSH SERPL DL<=0.005 MIU/L-ACNC: 1.28 UIU/ML
VIT B12 SERPL-MCNC: 225 PG/ML

## 2017-11-17 PROCEDURE — 36415 COLL VENOUS BLD VENIPUNCTURE: CPT

## 2017-11-17 PROCEDURE — 82607 VITAMIN B-12: CPT

## 2017-11-17 PROCEDURE — 84439 ASSAY OF FREE THYROXINE: CPT

## 2017-11-17 PROCEDURE — 99214 OFFICE O/P EST MOD 30 MIN: CPT | Mod: S$GLB,,, | Performed by: PHYSICIAN ASSISTANT

## 2017-11-17 PROCEDURE — 84443 ASSAY THYROID STIM HORMONE: CPT

## 2017-11-17 PROCEDURE — 99999 PR PBB SHADOW E&M-EST. PATIENT-LVL IV: CPT | Mod: PBBFAC,,, | Performed by: PHYSICIAN ASSISTANT

## 2017-11-17 PROCEDURE — 84481 FREE ASSAY (FT-3): CPT

## 2017-11-17 RX ORDER — CLONAZEPAM 0.5 MG/1
0.5 TABLET ORAL NIGHTLY
Qty: 30 TABLET | Refills: 3 | Status: SHIPPED | OUTPATIENT
Start: 2017-11-17 | End: 2018-03-16

## 2017-11-17 NOTE — PROGRESS NOTES
Subjective:       Patient ID: Vishnu Dougherty Jr. is a 57 y.o. male.    Chief Complaint: Memory Loss    Neurologic Problem   The patient's primary symptoms include memory loss. This is a new problem. Episode onset: 6 months. The neurological problem developed insidiously. The problem has been gradually worsening since onset. There was no focality noted. Pertinent negatives include no abdominal pain, chest pain, confusion, dizziness, fatigue, fever, headaches, light-headedness or shortness of breath. Past treatments include nothing.     Past Medical History:   Diagnosis Date    Dilated cardiomyopathy 9/24/2015    Enlarged prostate     Essential hypertension 9/24/2015    Gastroesophageal reflux disease without esophagitis 10/7/2015    Shortness of breath 9/24/2015       Past Surgical History:   Procedure Laterality Date    COLONOSCOPY N/A 5/25/2016    Procedure: COLONOSCOPY;  Surgeon: Demetrio Spicer MD;  Location: [facility];  Service: Endoscopy;  Laterality: N/A;       History reviewed. No pertinent family history.    Social History     Social History    Marital status:      Spouse name: N/A    Number of children: N/A    Years of education: N/A     Occupational History    Not on file.     Social History Main Topics    Smoking status: Former Smoker     Quit date: 9/24/1995    Smokeless tobacco: Never Used    Alcohol use No    Drug use: No    Sexual activity: Not Currently     Other Topics Concern    Not on file     Social History Narrative    No narrative on file       Review of patient's allergies indicates:  No Known Allergies      Current Outpatient Prescriptions:     amlodipine (NORVASC) 10 MG tablet, Take 1 tablet (10 mg total) by mouth once daily., Disp: 90 tablet, Rfl: 2    escitalopram oxalate (LEXAPRO) 20 MG tablet, TAKE 1 TABLET (20 MG TOTAL) BY MOUTH ONCE DAILY., Disp: 30 tablet, Rfl: 5    celecoxib (CELEBREX) 200 MG capsule, Take 1 capsule (200 mg total) by mouth 2 (two) times  daily., Disp: 60 capsule, Rfl: 2    clonazePAM (KLONOPIN) 0.5 MG tablet, Take 1 tablet (0.5 mg total) by mouth every evening., Disp: 30 tablet, Rfl: 3    esomeprazole (NEXIUM) 40 MG capsule, Take 1 capsule (40 mg total) by mouth before breakfast., Disp: 90 capsule, Rfl: 3    /76 (BP Location: Left arm, Patient Position: Sitting, BP Method: Medium (Manual))   Pulse 60   Temp 96.4 °F (35.8 °C) (Tympanic)   Ht 6' (1.829 m)   Wt 82.1 kg (181 lb)   SpO2 99%   BMI 24.55 kg/m²   Review of Systems   Constitutional: Negative for chills, fatigue and fever.   HENT: Negative.    Respiratory: Negative for chest tightness and shortness of breath.    Cardiovascular: Negative for chest pain.   Gastrointestinal: Negative for abdominal pain.   Neurological: Negative for dizziness, light-headedness and headaches.   Psychiatric/Behavioral: Positive for memory loss. Negative for confusion.       Objective:      Physical Exam   Constitutional: He is oriented to person, place, and time. He appears well-developed and well-nourished. No distress.   Cardiovascular: Normal rate and regular rhythm.    Pulmonary/Chest: Effort normal and breath sounds normal.   Abdominal: Soft. There is no tenderness.   Neurological: He is alert and oriented to person, place, and time. He displays normal reflexes. No cranial nerve deficit or sensory deficit. He exhibits normal muscle tone. Coordination normal.   Skin: He is not diaphoretic.   Psychiatric: His speech is normal and behavior is normal. Thought content normal. His mood appears not anxious. His affect is not angry, not blunt, not labile and not inappropriate. He is not actively hallucinating. He exhibits a depressed mood. He is attentive.   Nursing note and vitals reviewed.      Assessment:       1. Memory loss    2. Anxiety        Plan:       Memory loss  -     Ambulatory referral to Neurology  -     Vitamin B12; Future; Expected date: 11/17/2017  -     T3, free; Future; Expected date:  11/17/2017  -     T4, free; Future; Expected date: 11/17/2017  -     TSH; Future; Expected date: 11/17/2017    Anxiety    Other orders  -     clonazePAM (KLONOPIN) 0.5 MG tablet; Take 1 tablet (0.5 mg total) by mouth every evening.  Dispense: 30 tablet; Refill: 3

## 2017-11-20 ENCOUNTER — TELEPHONE (OUTPATIENT)
Dept: INTERNAL MEDICINE | Facility: CLINIC | Age: 57
End: 2017-11-20

## 2017-11-20 NOTE — TELEPHONE ENCOUNTER
----- Message from Ant Mejia III, PA-C sent at 11/20/2017  7:39 AM CST -----  The blood work looks ok

## 2017-11-21 ENCOUNTER — TELEPHONE (OUTPATIENT)
Dept: RADIOLOGY | Facility: HOSPITAL | Age: 57
End: 2017-11-21

## 2017-11-21 RX ORDER — ESCITALOPRAM OXALATE 20 MG/1
20 TABLET ORAL DAILY
Qty: 30 TABLET | Refills: 5 | Status: SHIPPED | OUTPATIENT
Start: 2017-11-21 | End: 2017-11-27 | Stop reason: SDUPTHER

## 2017-11-22 ENCOUNTER — HOSPITAL ENCOUNTER (OUTPATIENT)
Dept: RADIOLOGY | Facility: HOSPITAL | Age: 57
Discharge: HOME | End: 2017-11-22
Attending: NURSE PRACTITIONER
Payer: COMMERCIAL

## 2017-11-22 ENCOUNTER — PROCEDURE VISIT (OUTPATIENT)
Dept: PULMONOLOGY | Facility: CLINIC | Age: 57
End: 2017-11-22
Payer: COMMERCIAL

## 2017-11-22 DIAGNOSIS — R91.1 LUNG NODULE: ICD-10-CM

## 2017-11-22 DIAGNOSIS — R06.02 SOB (SHORTNESS OF BREATH): ICD-10-CM

## 2017-11-22 LAB
POST FEF 25 75: 2.89 L/S (ref 2.29–3.92)
POST FET 100: 11.71 S
POST FEV1 FVC: 77 %
POST FEV1: 3.05 L (ref 3.35–4.16)
POST FIF 50: 5.23 L/S
POST FVC: 3.95 L (ref 4.47–5.43)
POST PEF: 5.6 L/S (ref 8.3–10.68)
PRE DLCO: 22.68 ML/MMHG/MIN (ref 20.02–28.31)
PRE ERV: 1.48 L
PRE FEF 25 75: 2.33 L/S (ref 2.29–3.92)
PRE FET 100: 13.95 S
PRE FEV1 FVC: 75 %
PRE FEV1: 2.9 L (ref 3.35–4.16)
PRE FIF 50: 2.77 L/S
PRE FRC PL: 4.27 L (ref 3.28–4.49)
PRE FVC: 3.86 L (ref 4.47–5.43)
PRE KROGHS K: 3.64 1/MIN
PRE PEF: 5.61 L/S (ref 8.3–10.68)
PRE RV: 2.67 L (ref 2.03–2.82)
PRE SVC: 3.89 L
PRE TLC: 6.56 L (ref 6.41–7.41)
PREDICTED DLCO: 24.16 ML/MMHG/MIN (ref 20.02–28.31)
PREDICTED FEV1 FVC: 75.88 % (ref 71.04–80.72)
PREDICTED FEV1: 3.75 L (ref 3.35–4.16)
PREDICTED FRC N2: 3.89 L (ref 3.28–4.49)
PREDICTED FRC PL: 3.89 L (ref 3.28–4.49)
PREDICTED FVC: 4.95 L (ref 4.47–5.43)
PREDICTED RV: 2.42 L (ref 2.03–2.82)
PREDICTED SVC: 4.65 L
PREDICTED TLC: 6.91 L (ref 6.41–7.41)
PROVOCATION PROTOCOL: ABNORMAL

## 2017-11-22 PROCEDURE — 94726 PLETHYSMOGRAPHY LUNG VOLUMES: CPT | Mod: S$GLB,,, | Performed by: INTERNAL MEDICINE

## 2017-11-22 PROCEDURE — 71250 CT THORAX DX C-: CPT | Mod: 26,,, | Performed by: RADIOLOGY

## 2017-11-22 PROCEDURE — 94729 DIFFUSING CAPACITY: CPT | Mod: S$GLB,,, | Performed by: INTERNAL MEDICINE

## 2017-11-22 PROCEDURE — 71250 CT THORAX DX C-: CPT | Mod: TC,PO

## 2017-11-22 PROCEDURE — 94060 EVALUATION OF WHEEZING: CPT | Mod: S$GLB,,, | Performed by: INTERNAL MEDICINE

## 2017-11-27 ENCOUNTER — OFFICE VISIT (OUTPATIENT)
Dept: PULMONOLOGY | Facility: CLINIC | Age: 57
End: 2017-11-27
Payer: COMMERCIAL

## 2017-11-27 VITALS
OXYGEN SATURATION: 98 % | WEIGHT: 185.44 LBS | HEIGHT: 72 IN | RESPIRATION RATE: 17 BRPM | DIASTOLIC BLOOD PRESSURE: 84 MMHG | SYSTOLIC BLOOD PRESSURE: 136 MMHG | HEART RATE: 64 BPM | BODY MASS INDEX: 25.12 KG/M2

## 2017-11-27 DIAGNOSIS — R06.02 SOB (SHORTNESS OF BREATH): Primary | ICD-10-CM

## 2017-11-27 DIAGNOSIS — R91.1 LUNG NODULE: ICD-10-CM

## 2017-11-27 PROCEDURE — 99214 OFFICE O/P EST MOD 30 MIN: CPT | Mod: 25,S$GLB,, | Performed by: NURSE PRACTITIONER

## 2017-11-27 PROCEDURE — 99999 PR PBB SHADOW E&M-EST. PATIENT-LVL III: CPT | Mod: PBBFAC,,, | Performed by: NURSE PRACTITIONER

## 2017-11-27 RX ORDER — ESCITALOPRAM OXALATE 20 MG/1
20 TABLET ORAL DAILY
Qty: 90 TABLET | Refills: 1 | Status: SHIPPED | OUTPATIENT
Start: 2017-11-27 | End: 2018-03-16

## 2017-11-27 NOTE — PROGRESS NOTES
Subjective:      Patient ID: Vishnu Dougherty Jr. is a 57 y.o. male.    Chief Complaint: lung nodule    Patient presents to the office today for evaluation of lung nodules.  He states he is short of breath.  No wheezing.  No cough.  He is having difficulty performing job duties. 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  He has to stop and rest more often.  He does not feel as physically strongly anymore as well.  He quit smoking over 20 years ago.  Smoked for 20 years.  No wheezing.  No chest pain.    Patient Active Problem List:     Essential hypertension     Dilated cardiomyopathy     Gastroesophageal reflux disease without esophagitis     Anxiety                    /84   Pulse 64   Resp 17   Ht 6' (1.829 m)   Wt 84.1 kg (185 lb 6.5 oz)   SpO2 98%   BMI 25.15 kg/m²   Body mass index is 25.15 kg/m².    Review of Systems   Constitutional: Positive for fatigue.   HENT: Negative.    Respiratory: Positive for dyspnea on extertion.    Cardiovascular: Negative.    Musculoskeletal: Negative.    Gastrointestinal: Negative.    Neurological: Negative.    Psychiatric/Behavioral: Negative.      Objective:      Physical Exam   Constitutional: He is oriented to person, place, and time. He appears well-developed and well-nourished.   HENT:   Head: Normocephalic and atraumatic.   Neck: Normal range of motion. Neck supple.   Cardiovascular: Normal rate and regular rhythm.    Pulmonary/Chest: Effort normal.   Musculoskeletal: Normal range of motion. He exhibits no edema.   Neurological: He is alert and oriented to person, place, and time.   Skin: Skin is warm and dry.   Psychiatric: He has a normal mood and affect.     Personal Diagnostic Review  Reviewed CT with patient. Lung nodules less prominent.   PFT normal    Assessment:       1. SOB (shortness of breath)    2. Lung nodule        Outpatient Encounter Prescriptions as of 11/27/2017   Medication Sig Dispense Refill     amlodipine (NORVASC) 10 MG tablet Take 1 tablet (10 mg total) by mouth once daily. 90 tablet 2    celecoxib (CELEBREX) 200 MG capsule Take 1 capsule (200 mg total) by mouth 2 (two) times daily. 60 capsule 2    clonazePAM (KLONOPIN) 0.5 MG tablet Take 1 tablet (0.5 mg total) by mouth every evening. 30 tablet 3    escitalopram oxalate (LEXAPRO) 20 MG tablet Take 1 tablet (20 mg total) by mouth once daily. 30 tablet 5    esomeprazole (NEXIUM) 40 MG capsule Take 1 capsule (40 mg total) by mouth before breakfast. 90 capsule 3     No facility-administered encounter medications on file as of 11/27/2017.      No orders of the defined types were placed in this encounter.    Plan:      Patient feels rejuvenated after a vacation. He is ready to return to work. PFT normal and reassured patient.  No follow up needed for lung nodules.

## 2018-03-08 ENCOUNTER — TELEPHONE (OUTPATIENT)
Dept: INTERNAL MEDICINE | Facility: CLINIC | Age: 58
End: 2018-03-08

## 2018-03-08 NOTE — TELEPHONE ENCOUNTER
Patient request appt with Mr. Mejia for continued memory loss. Appt booked for 1 pm tomorrow 3/9/18 at 1 pm.

## 2018-03-08 NOTE — TELEPHONE ENCOUNTER
----- Message from Grace Magana sent at 3/8/2018  2:45 PM CST -----  Contact: pt  Calling  with health concerns and questions and please advise [phone] (home)

## 2018-03-09 ENCOUNTER — OFFICE VISIT (OUTPATIENT)
Dept: INTERNAL MEDICINE | Facility: CLINIC | Age: 58
End: 2018-03-09
Payer: COMMERCIAL

## 2018-03-09 VITALS
BODY MASS INDEX: 24.79 KG/M2 | TEMPERATURE: 98 F | SYSTOLIC BLOOD PRESSURE: 146 MMHG | HEART RATE: 63 BPM | OXYGEN SATURATION: 98 % | HEIGHT: 72 IN | DIASTOLIC BLOOD PRESSURE: 82 MMHG | WEIGHT: 183 LBS

## 2018-03-09 DIAGNOSIS — R41.3 MEMORY LOSS, SHORT TERM: Primary | ICD-10-CM

## 2018-03-09 PROCEDURE — 3074F SYST BP LT 130 MM HG: CPT | Mod: S$GLB,,, | Performed by: PHYSICIAN ASSISTANT

## 2018-03-09 PROCEDURE — 99214 OFFICE O/P EST MOD 30 MIN: CPT | Mod: S$GLB,,, | Performed by: PHYSICIAN ASSISTANT

## 2018-03-09 PROCEDURE — 3079F DIAST BP 80-89 MM HG: CPT | Mod: S$GLB,,, | Performed by: PHYSICIAN ASSISTANT

## 2018-03-09 PROCEDURE — 99999 PR PBB SHADOW E&M-EST. PATIENT-LVL IV: CPT | Mod: PBBFAC,,, | Performed by: PHYSICIAN ASSISTANT

## 2018-03-09 NOTE — MEDICAL/APP STUDENT
Subjective:       Patient ID: Vishnu Dougherty Jr. is a 58 y.o. male.    Chief Complaint: Memory Loss    57 yo male with PMHx of HTN that presents to the clinic with c/o short-term memory loss. Patient states that it is a chronic problem that has been going on for about a year that affects his ADL and work. He has trouble remembering to take his medications, even using the pill organizer. He is having to make more lists to remember things and is more stressed at work due to this problem. He is scared that he may get fired as his boss has started noticing and asking him to go to the doctor for the memory loss. He states he is not ready to retire because he cannot afford to. Patient states that his father and paternal grandmother had memory problems. Patient states that he can remember faces and has a good long term memory. Patient is taking clonazepam for depression. Patient denies hx of CVA, Mi, chest pain, shortness of breath, neurologic changes. Patient was seen previously for this problem and was referred to neurology in January; however, he forgot his appointment.      Review of Systems   Constitutional: Negative for chills, diaphoresis, fatigue and fever.   Respiratory: Negative for shortness of breath and wheezing.    Cardiovascular: Negative for chest pain and palpitations.   Gastrointestinal: Positive for diarrhea (small amount 3x yesterday. non-bloody, no mucus. attributes to stress). Negative for abdominal distention, abdominal pain, nausea and vomiting.   Neurological: Negative for dizziness, numbness and headaches.       Past Medical History:   Diagnosis Date    Dilated cardiomyopathy 9/24/2015    Enlarged prostate     Essential hypertension 9/24/2015    Gastroesophageal reflux disease without esophagitis 10/7/2015    Shortness of breath 9/24/2015       Past Surgical History:   Procedure Laterality Date    COLONOSCOPY N/A 5/25/2016    Procedure: COLONOSCOPY;  Surgeon: Demetrio Spicer MD;  Location: [facility]  ENDO;  Service: Endoscopy;  Laterality: N/A;       History reviewed. No pertinent family history.    Social History     Social History    Marital status:      Spouse name: N/A    Number of children: N/A    Years of education: N/A     Occupational History    Not on file.     Social History Main Topics    Smoking status: Former Smoker     Quit date: 9/24/1995    Smokeless tobacco: Never Used    Alcohol use No    Drug use: No    Sexual activity: Not Currently     Other Topics Concern    Not on file     Social History Narrative    No narrative on file       Review of patient's allergies indicates:  No Known Allergies      Current Outpatient Prescriptions:     amlodipine (NORVASC) 10 MG tablet, Take 1 tablet (10 mg total) by mouth once daily., Disp: 90 tablet, Rfl: 2    clonazePAM (KLONOPIN) 0.5 MG tablet, Take 1 tablet (0.5 mg total) by mouth every evening., Disp: 30 tablet, Rfl: 3    escitalopram oxalate (LEXAPRO) 20 MG tablet, Take 1 tablet (20 mg total) by mouth once daily., Disp: 90 tablet, Rfl: 1    celecoxib (CELEBREX) 200 MG capsule, Take 1 capsule (200 mg total) by mouth 2 (two) times daily., Disp: 60 capsule, Rfl: 2    BP (!) 146/82 (BP Location: Right arm, Patient Position: Sitting, BP Method: Medium (Manual))   Pulse 63   Temp 97.5 °F (36.4 °C) (Tympanic)   Ht 6' (1.829 m)   Wt 83 kg (182 lb 15.7 oz)   SpO2 98%   BMI 24.82 kg/m²       Objective:      Physical Exam   Constitutional: He is oriented to person, place, and time. He appears well-developed and well-nourished. No distress.   HENT:   Head: Normocephalic and atraumatic.   Right Ear: External ear normal.   Left Ear: External ear normal.   Eyes: Conjunctivae and EOM are normal.   Neck: Normal range of motion.   Cardiovascular: Normal rate and regular rhythm.  Exam reveals no gallop and no friction rub.    Murmur (systolic murmur heard best at right sternal border) heard.  Pulmonary/Chest: Effort normal and breath sounds  normal. No respiratory distress. He has no wheezes.   Abdominal: Soft. Bowel sounds are normal. He exhibits no distension. There is no tenderness. There is no guarding.   Neurological: He is alert and oriented to person, place, and time. GCS eye subscore is 4. GCS verbal subscore is 5. GCS motor subscore is 6.   Mini Mental Exam:     3 word recall: can recall directly after telling patient words. After about 5 minutes, remembered 0/3 words. After about 15 minutes remembered 2/3 words.    Clock: able to draw clock with all numbers and hands correctly pointing at 3:00   Skin: Skin is warm and dry.   Psychiatric: He has a normal mood and affect. His behavior is normal. Judgment and thought content normal.   Slightly depressed       Assessment:       1. Memory loss, short term        Plan:       - consult neurology  - will obtain influenza and shingles vaccine today  - RTC in April for wellness check    Scribed by AZIZA DuvalS for Ant Mejia PA-C

## 2018-03-09 NOTE — PROGRESS NOTES
Patient ID: Vishnu Dougherty Jr. is a 58 y.o. male.     Chief Complaint: Memory Loss     59 yo male with PMHx of HTN that presents to the clinic with c/o short-term memory loss. Patient states that it is a chronic problem that has been going on for about a year that affects his ADL and work. He has trouble remembering to take his medications, even using the pill organizer. He is having to make more lists to remember things and is more stressed at work due to this problem. He is scared that he may get fired as his boss has started noticing and asking him to go to the doctor for the memory loss. He states he is not ready to retire because he cannot afford to. Patient states that his father and paternal grandmother had memory problems. Patient states that he can remember faces and has a good long term memory. Patient is taking clonazepam for depression. Patient denies hx of CVA, Mi, chest pain, shortness of breath, neurologic changes. Patient was seen previously for this problem and was referred to neurology in January; however, he forgot his appointment.     Review of Systems   Constitutional: Negative for chills, diaphoresis, fatigue and fever.   Respiratory: Negative for shortness of breath and wheezing.    Cardiovascular: Negative for chest pain and palpitations.   Gastrointestinal: Positive for diarrhea (small amount 3x yesterday. non-bloody, no mucus. attributes to stress). Negative for abdominal distention, abdominal pain, nausea and vomiting.   Neurological: Negative for dizziness, numbness and headaches.            Past Medical History:   Diagnosis Date    Dilated cardiomyopathy 9/24/2015    Enlarged prostate      Essential hypertension 9/24/2015    Gastroesophageal reflux disease without esophagitis 10/7/2015    Shortness of breath 9/24/2015               Past Surgical History:   Procedure Laterality Date    COLONOSCOPY N/A 5/25/2016     Procedure: COLONOSCOPY;  Surgeon: Demetrio Spicer MD;  Location: [facility]  ENDO;  Service: Endoscopy;  Laterality: N/A;         History reviewed. No pertinent family history.     Social History   Social History            Social History    Marital status:        Spouse name: N/A    Number of children: N/A    Years of education: N/A          Occupational History    Not on file.            Social History Main Topics    Smoking status: Former Smoker       Quit date: 9/24/1995    Smokeless tobacco: Never Used    Alcohol use No    Drug use: No    Sexual activity: Not Currently           Other Topics Concern    Not on file          Social History Narrative    No narrative on file            Review of patient's allergies indicates:  No Known Allergies        Current Outpatient Prescriptions:     amlodipine (NORVASC) 10 MG tablet, Take 1 tablet (10 mg total) by mouth once daily., Disp: 90 tablet, Rfl: 2    clonazePAM (KLONOPIN) 0.5 MG tablet, Take 1 tablet (0.5 mg total) by mouth every evening., Disp: 30 tablet, Rfl: 3    escitalopram oxalate (LEXAPRO) 20 MG tablet, Take 1 tablet (20 mg total) by mouth once daily., Disp: 90 tablet, Rfl: 1    celecoxib (CELEBREX) 200 MG capsule, Take 1 capsule (200 mg total) by mouth 2 (two) times daily., Disp: 60 capsule, Rfl: 2     BP (!) 146/82 (BP Location: Right arm, Patient Position: Sitting, BP Method: Medium (Manual))   Pulse 63   Temp 97.5 °F (36.4 °C) (Tympanic)   Ht 6' (1.829 m)   Wt 83 kg (182 lb 15.7 oz)   SpO2 98%   BMI 24.82 kg/m²         Objective:   Physical Exam   Constitutional: He is oriented to person, place, and time. He appears well-developed and well-nourished. No distress.   HENT:   Head: Normocephalic and atraumatic.   Right Ear: External ear normal.   Left Ear: External ear normal.   Eyes: Conjunctivae and EOM are normal.   Neck: Normal range of motion.   Cardiovascular: Normal rate and regular rhythm.  Exam reveals no gallop and no friction rub.    Murmur (systolic murmur heard best at right sternal border)  heard.  Pulmonary/Chest: Effort normal and breath sounds normal. No respiratory distress. He has no wheezes.   Abdominal: Soft. Bowel sounds are normal. He exhibits no distension. There is no tenderness. There is no guarding.   Neurological: He is alert and oriented to person, place, and time. GCS eye subscore is 4. GCS verbal subscore is 5. GCS motor subscore is 6.   Mini Mental Exam:     3 word recall: can recall directly after telling patient words. After about 5 minutes, remembered 0/3 words. After about 15 minutes remembered 2/3 words.    Clock: able to draw clock with all numbers and hands correctly pointing at 3:00   Skin: Skin is warm and dry.   Psychiatric: He has a normal mood and affect. His behavior is normal. Judgment and thought content normal.   Slightly depressed       Assessment:       1. Memory loss, short term        Plan:       - consult neurology  - will obtain influenza and shingles vaccine today  - RTC in April for wellness check   Given flu and shingrx today

## 2018-03-13 ENCOUNTER — TELEPHONE (OUTPATIENT)
Dept: INTERNAL MEDICINE | Facility: CLINIC | Age: 58
End: 2018-03-13

## 2018-03-13 NOTE — TELEPHONE ENCOUNTER
----- Message from Ammy Mohamud sent at 3/13/2018 12:18 PM CDT -----  Contact: Patient  Patient called to speak with the nurse; he stated he needs to speak about his memory loss. He can be contacted at [phone].    Thanks,  Ammy

## 2018-03-15 ENCOUNTER — TELEPHONE (OUTPATIENT)
Dept: INTERNAL MEDICINE | Facility: CLINIC | Age: 58
End: 2018-03-15

## 2018-03-15 NOTE — TELEPHONE ENCOUNTER
----- Message from Enrique Bains sent at 3/15/2018  9:44 AM CDT -----  Contact: [phone]   Pt would like to consult with the nurse about Rx medication.  Please call back at [phone].  Thx-

## 2018-03-16 ENCOUNTER — OFFICE VISIT (OUTPATIENT)
Dept: INTERNAL MEDICINE | Facility: CLINIC | Age: 58
End: 2018-03-16
Payer: COMMERCIAL

## 2018-03-16 VITALS
HEIGHT: 72 IN | DIASTOLIC BLOOD PRESSURE: 86 MMHG | WEIGHT: 183 LBS | BODY MASS INDEX: 24.79 KG/M2 | TEMPERATURE: 98 F | OXYGEN SATURATION: 98 % | HEART RATE: 65 BPM | SYSTOLIC BLOOD PRESSURE: 140 MMHG

## 2018-03-16 DIAGNOSIS — R41.3 MEMORY LOSS OF UNKNOWN CAUSE: ICD-10-CM

## 2018-03-16 DIAGNOSIS — I10 ESSENTIAL HYPERTENSION: Primary | ICD-10-CM

## 2018-03-16 PROCEDURE — 3079F DIAST BP 80-89 MM HG: CPT | Mod: CPTII,S$GLB,, | Performed by: PHYSICIAN ASSISTANT

## 2018-03-16 PROCEDURE — 99999 PR PBB SHADOW E&M-EST. PATIENT-LVL III: CPT | Mod: PBBFAC,,, | Performed by: PHYSICIAN ASSISTANT

## 2018-03-16 PROCEDURE — 3077F SYST BP >= 140 MM HG: CPT | Mod: CPTII,S$GLB,, | Performed by: PHYSICIAN ASSISTANT

## 2018-03-16 PROCEDURE — 99214 OFFICE O/P EST MOD 30 MIN: CPT | Mod: S$GLB,,, | Performed by: PHYSICIAN ASSISTANT

## 2018-03-16 RX ORDER — ZOSTER VACCINE RECOMBINANT, ADJUVANTED 50 MCG/0.5
KIT INTRAMUSCULAR
COMMUNITY
Start: 2018-03-09 | End: 2018-04-04

## 2018-03-16 RX ORDER — AMLODIPINE BESYLATE 10 MG/1
10 TABLET ORAL DAILY
Qty: 90 TABLET | Refills: 2 | Status: SHIPPED | OUTPATIENT
Start: 2018-03-16 | End: 2018-04-18

## 2018-03-16 NOTE — PROGRESS NOTES
"Patient ID: Vishnu Dougherty Jr. is a 58 y.o. male.     Chief Complaint: Memory Loss     Patient with PMHx of memory loss presents today after throwing all his medications in the trash about 1 week ago. Patient previously came to the office on 3/9/18 for memory loss and a neurology appointment was set up for 5/9/18. Since, people in his life have researched the side effects of his current medication and found that it could induce memory loss. He got upset and threw all medications away including his blood pressure medications. Patient states he has not had a change in mood but is still upset about his memory loss and worried about his job status as his boss is putting pressure on him to get his memory fixed. Patient states his family is trying to help by bringing over the grandkids more often and spending time with him. Patient admits to some "shakes" since stopping medication.      Review of Systems   Constitutional: Negative for activity change, appetite change, fatigue and fever.   Respiratory: Negative for shortness of breath.    Cardiovascular: Negative for chest pain.   Neurological:        Memory loss            Past Medical History:   Diagnosis Date    Dilated cardiomyopathy 9/24/2015    Enlarged prostate      Essential hypertension 9/24/2015    Gastroesophageal reflux disease without esophagitis 10/7/2015    Shortness of breath 9/24/2015               Past Surgical History:   Procedure Laterality Date    COLONOSCOPY N/A 5/25/2016     Procedure: COLONOSCOPY;  Surgeon: Demetrio Spicer MD;  Location: [facility];  Service: Endoscopy;  Laterality: N/A;         History reviewed. No pertinent family history.     Social History   Social History            Social History    Marital status:        Spouse name: N/A    Number of children: N/A    Years of education: N/A          Occupational History    Not on file.            Social History Main Topics    Smoking status: Former Smoker       Quit date: " 9/24/1995    Smokeless tobacco: Never Used    Alcohol use No    Drug use: No    Sexual activity: Not Currently           Other Topics Concern    Not on file          Social History Narrative    No narrative on file            Review of patient's allergies indicates:  No Known Allergies        Current Outpatient Prescriptions:     amLODIPine (NORVASC) 10 MG tablet, Take 1 tablet (10 mg total) by mouth once daily., Disp: 90 tablet, Rfl: 2    celecoxib (CELEBREX) 200 MG capsule, Take 1 capsule (200 mg total) by mouth 2 (two) times daily., Disp: 60 capsule, Rfl: 2    FLUZONE QUAD [id number], PF, 60 mcg (15 mcg x 4)/0.5 mL vaccine, , Disp: , Rfl:     SHINGRIX, PF, 50 mcg/0.5 mL injection, , Disp: , Rfl:      BP (!) 140/86 (BP Location: Left arm, Patient Position: Sitting, BP Method: Medium (Manual))   Pulse 65   Temp 97.8 °F (36.6 °C) (Tympanic)   Ht 6' (1.829 m)   Wt 83 kg (182 lb 15.7 oz)   SpO2 98%   BMI 24.82 kg/m²         Objective:   Physical Exam   Constitutional: He is oriented to person, place, and time. He appears well-developed and well-nourished.   HENT:   Head: Normocephalic and atraumatic.   Right Ear: External ear normal.   Left Ear: External ear normal.   Eyes: Conjunctivae and EOM are normal.   Cardiovascular: Normal rate, regular rhythm and normal heart sounds.    Pulmonary/Chest: Effort normal and breath sounds normal.   Neurological: He is alert and oriented to person, place, and time.   Psychiatric: He has a normal mood and affect. His behavior is normal. Judgment and thought content normal.       Assessment:       No diagnosis found.    Plan:       Memory Loss  HTN  - refilled amlodipine 10 mg for blood pressure  - did not refill celecoxib because patient states he did not throw it away and has some more at home to take prn  - Did not refill clonazepam 0.5 or Lexapro 20mg because patient wished to stop these medications secondary to their side effect of memory loss. Patient has  exhibited no side effects of withdrawal of medications. Patient states his mood has not changed.   - if patient does have more anxiety/depression, consider adding clonazepam 0.5 mg back  - encouraged patient to join a gym to release some stress and to stay busy with family.   - RTC in 3 weeks.  - will continue to call neurology to see if there is an earlier appointment. Neuro appointment now at 5/9/18.

## 2018-03-16 NOTE — MEDICAL/APP STUDENT
"Subjective:       Patient ID: Vishnu Dougherty Jr. is a 58 y.o. male.    Chief Complaint: Memory Loss    Patient with PMHx of memory loss presents today after throwing all his medications in the trash about 1 week ago. Patient previously came to the office on 3/9/18 for memory loss and a neurology appointment was set up for 5/9/18. Since, people in his life have researched the side effects of his current medication and found that it could induce memory loss. He got upset and threw all medications away including his blood pressure medications. Patient states he has not had a change in mood but is still upset about his memory loss and worried about his job status as his boss is putting pressure on him to get his memory fixed. Patient states his family is trying to help by bringing over the grandkids more often and spending time with him. Patient admits to some "shakes" since stopping medication.       Review of Systems   Constitutional: Negative for activity change, appetite change, fatigue and fever.   Respiratory: Negative for shortness of breath.    Cardiovascular: Negative for chest pain.   Neurological:        Memory loss       Past Medical History:   Diagnosis Date    Dilated cardiomyopathy 9/24/2015    Enlarged prostate     Essential hypertension 9/24/2015    Gastroesophageal reflux disease without esophagitis 10/7/2015    Shortness of breath 9/24/2015       Past Surgical History:   Procedure Laterality Date    COLONOSCOPY N/A 5/25/2016    Procedure: COLONOSCOPY;  Surgeon: Demetrio Spicer MD;  Location: [facility];  Service: Endoscopy;  Laterality: N/A;       History reviewed. No pertinent family history.    Social History     Social History    Marital status:      Spouse name: N/A    Number of children: N/A    Years of education: N/A     Occupational History    Not on file.     Social History Main Topics    Smoking status: Former Smoker     Quit date: 9/24/1995    Smokeless tobacco: Never Used    " Alcohol use No    Drug use: No    Sexual activity: Not Currently     Other Topics Concern    Not on file     Social History Narrative    No narrative on file       Review of patient's allergies indicates:  No Known Allergies      Current Outpatient Prescriptions:     amLODIPine (NORVASC) 10 MG tablet, Take 1 tablet (10 mg total) by mouth once daily., Disp: 90 tablet, Rfl: 2    celecoxib (CELEBREX) 200 MG capsule, Take 1 capsule (200 mg total) by mouth 2 (two) times daily., Disp: 60 capsule, Rfl: 2    FLUZONE QUAD [id number], PF, 60 mcg (15 mcg x 4)/0.5 mL vaccine, , Disp: , Rfl:     SHINGRIX, PF, 50 mcg/0.5 mL injection, , Disp: , Rfl:     BP (!) 140/86 (BP Location: Left arm, Patient Position: Sitting, BP Method: Medium (Manual))   Pulse 65   Temp 97.8 °F (36.6 °C) (Tympanic)   Ht 6' (1.829 m)   Wt 83 kg (182 lb 15.7 oz)   SpO2 98%   BMI 24.82 kg/m²       Objective:      Physical Exam   Constitutional: He is oriented to person, place, and time. He appears well-developed and well-nourished.   HENT:   Head: Normocephalic and atraumatic.   Right Ear: External ear normal.   Left Ear: External ear normal.   Eyes: Conjunctivae and EOM are normal.   Cardiovascular: Normal rate, regular rhythm and normal heart sounds.    Pulmonary/Chest: Effort normal and breath sounds normal.   Neurological: He is alert and oriented to person, place, and time.   Psychiatric: He has a normal mood and affect. His behavior is normal. Judgment and thought content normal.       Assessment:       No diagnosis found.    Plan:       Memory Loss  HTN  - refilled amlodipine 10 mg for blood pressure  - did not refill celecoxib because patient states he did not throw it away and has some more at home to take prn  - Did not refill clonazepam 0.5 or Lexapro 20mg because patient wished to stop these medications secondary to their side effect of memory loss. Patient has exhibited no side effects of withdrawal of medications. Patient states  his mood has not changed.   - if patient does have more anxiety/depression, consider adding clonazepam 0.5 mg back  - encouraged patient to join a gym to release some stress and to stay busy with family.   - RTC in 3 weeks.  - will continue to call neurology to see if there is an earlier appointment. Neuro appointment now at 5/9/18.

## 2018-03-26 ENCOUNTER — TELEPHONE (OUTPATIENT)
Dept: INTERNAL MEDICINE | Facility: CLINIC | Age: 58
End: 2018-03-26

## 2018-03-26 NOTE — TELEPHONE ENCOUNTER
----- Message from Rach Bradshaw sent at 3/26/2018  9:13 AM CDT -----  Contact: pt  Pt concerned about his memory loss.

## 2018-03-26 NOTE — TELEPHONE ENCOUNTER
msg left earliest appt with Dr. Clemente is after his in May. Call if he needs to talk about this.

## 2018-03-26 NOTE — TELEPHONE ENCOUNTER
----- Message from Chito Foreman sent at 3/26/2018 12:06 PM CDT -----  Contact: pt  He's calling in regards to his scheduled appt, [phone] (home)

## 2018-03-27 ENCOUNTER — PATIENT OUTREACH (OUTPATIENT)
Dept: ADMINISTRATIVE | Facility: HOSPITAL | Age: 58
End: 2018-03-27

## 2018-03-27 ENCOUNTER — TELEPHONE (OUTPATIENT)
Dept: INTERNAL MEDICINE | Facility: CLINIC | Age: 58
End: 2018-03-27

## 2018-03-27 NOTE — TELEPHONE ENCOUNTER
Pt called stating he needs a sooner appointment with dr. Clemente due to his memory loss. Pt states his memory loss is affecting his work and needs something to be done. Pt states he does not want disability because he needs to work but when he does work he forgets things and his chest gets tight and arm numb. Advised pt I would forward that to Mr. Mejia. Advised pt provider is out of office this week and will be back Monday. Pt vocalized understanding.

## 2018-04-02 ENCOUNTER — OFFICE VISIT (OUTPATIENT)
Dept: INTERNAL MEDICINE | Facility: CLINIC | Age: 58
End: 2018-04-02
Payer: COMMERCIAL

## 2018-04-02 VITALS
SYSTOLIC BLOOD PRESSURE: 146 MMHG | HEIGHT: 72 IN | DIASTOLIC BLOOD PRESSURE: 82 MMHG | HEART RATE: 66 BPM | TEMPERATURE: 98 F | WEIGHT: 182.31 LBS | BODY MASS INDEX: 24.69 KG/M2 | OXYGEN SATURATION: 99 %

## 2018-04-02 DIAGNOSIS — R41.3 MEMORY LOSS OF UNKNOWN CAUSE: Primary | ICD-10-CM

## 2018-04-02 DIAGNOSIS — R07.89 ATYPICAL CHEST PAIN: ICD-10-CM

## 2018-04-02 PROCEDURE — 99999 PR PBB SHADOW E&M-EST. PATIENT-LVL III: CPT | Mod: PBBFAC,,, | Performed by: PHYSICIAN ASSISTANT

## 2018-04-02 PROCEDURE — 99213 OFFICE O/P EST LOW 20 MIN: CPT | Mod: S$GLB,,, | Performed by: PHYSICIAN ASSISTANT

## 2018-04-02 PROCEDURE — 3079F DIAST BP 80-89 MM HG: CPT | Mod: CPTII,S$GLB,, | Performed by: PHYSICIAN ASSISTANT

## 2018-04-02 PROCEDURE — 3077F SYST BP >= 140 MM HG: CPT | Mod: CPTII,S$GLB,, | Performed by: PHYSICIAN ASSISTANT

## 2018-04-02 RX ORDER — DONEPEZIL HYDROCHLORIDE 10 MG/1
10 TABLET, FILM COATED ORAL NIGHTLY
Qty: 30 TABLET | Refills: 11 | Status: SHIPPED | OUTPATIENT
Start: 2018-04-02 | End: 2019-04-02 | Stop reason: SDUPTHER

## 2018-04-02 NOTE — PROGRESS NOTES
Subjective:       Patient ID: Vishnu Dougherty Jr. is a 58 y.o. male.    Chief Complaint: Memory Loss    Patient is here for a follow up on memory loss. We stopped the lexapro and klonopin 3 weeks ago. His employer at [employer]  is concerned that his is not any better. He desires to work, but he feels that his memory loss could jeopardized his job and the company.       Past Medical History:   Diagnosis Date    Dilated cardiomyopathy 9/24/2015    Enlarged prostate     Essential hypertension 9/24/2015    Gastroesophageal reflux disease without esophagitis 10/7/2015    Shortness of breath 9/24/2015       Past Surgical History:   Procedure Laterality Date    COLONOSCOPY N/A 5/25/2016    Procedure: COLONOSCOPY;  Surgeon: Demetrio Spicer MD;  Location: [facility];  Service: Endoscopy;  Laterality: N/A;       History reviewed. No pertinent family history.    Social History     Social History    Marital status:      Spouse name: N/A    Number of children: N/A    Years of education: N/A     Occupational History    Not on file.     Social History Main Topics    Smoking status: Former Smoker     Quit date: 9/24/1995    Smokeless tobacco: Never Used    Alcohol use No    Drug use: No    Sexual activity: Not Currently     Other Topics Concern    Not on file     Social History Narrative    No narrative on file       Review of patient's allergies indicates:  No Known Allergies      Current Outpatient Prescriptions:     amLODIPine (NORVASC) 10 MG tablet, Take 1 tablet (10 mg total) by mouth once daily., Disp: 90 tablet, Rfl: 2    celecoxib (CELEBREX) 200 MG capsule, Take 1 capsule (200 mg total) by mouth 2 (two) times daily., Disp: 60 capsule, Rfl: 2    donepezil (ARICEPT) 10 MG tablet, Take 1 tablet (10 mg total) by mouth every evening., Disp: 30 tablet, Rfl: 11    FLUZONE QUAD [id number], PF, 60 mcg (15 mcg x 4)/0.5 mL vaccine, , Disp: , Rfl:     SHINGRIX, PF, 50 mcg/0.5 mL injection, , Disp: , Rfl:     BP  (!) 146/82 (BP Location: Left arm, Patient Position: Sitting, BP Method: Medium (Manual))   Pulse 66   Temp 97.9 °F (36.6 °C) (Tympanic)   Ht 6' (1.829 m)   Wt 82.7 kg (182 lb 5.1 oz)   SpO2 99%   BMI 24.73 kg/m²   Review of Systems   Constitutional: Negative for chills and fever.   Respiratory: Negative for chest tightness and shortness of breath.    Cardiovascular: Negative for chest pain.   Gastrointestinal: Negative for abdominal pain.   Neurological: Negative for dizziness, tremors, seizures, syncope, facial asymmetry, speech difficulty, weakness, light-headedness, numbness and headaches.   Psychiatric/Behavioral: Positive for confusion. Negative for agitation, dysphoric mood, sleep disturbance and suicidal ideas.       Objective:      Physical Exam   Constitutional: He appears well-developed and well-nourished. No distress.   Neck: Neck supple.   Cardiovascular: Normal rate and regular rhythm.  Exam reveals no gallop and no friction rub.    No murmur heard.  Pulmonary/Chest: Effort normal and breath sounds normal. No respiratory distress. He has no wheezes. He has no rales. He exhibits no tenderness.   Lymphadenopathy:     He has no cervical adenopathy.   Skin: He is not diaphoretic.   Nursing note and vitals reviewed.      Assessment:       1. Memory loss of unknown cause        Plan:       Memory loss of unknown cause    Other orders  -     donepezil (ARICEPT) 10 MG tablet; Take 1 tablet (10 mg total) by mouth every evening.  Dispense: 30 tablet; Refill: 11       Patient is not seeing neurology until may 9th. I will give him a work excuse until may 15th.

## 2018-04-04 ENCOUNTER — OFFICE VISIT (OUTPATIENT)
Dept: INTERNAL MEDICINE | Facility: CLINIC | Age: 58
End: 2018-04-04
Payer: COMMERCIAL

## 2018-04-04 VITALS
BODY MASS INDEX: 24.63 KG/M2 | HEIGHT: 72 IN | OXYGEN SATURATION: 97 % | DIASTOLIC BLOOD PRESSURE: 86 MMHG | SYSTOLIC BLOOD PRESSURE: 140 MMHG | WEIGHT: 181.88 LBS | TEMPERATURE: 97 F | HEART RATE: 63 BPM

## 2018-04-04 DIAGNOSIS — F32.A DEPRESSION, UNSPECIFIED DEPRESSION TYPE: ICD-10-CM

## 2018-04-04 DIAGNOSIS — R41.3 MEMORY LOSS OF UNKNOWN CAUSE: Primary | ICD-10-CM

## 2018-04-04 DIAGNOSIS — G30.9 DEMENTIA IN ALZHEIMER'S DISEASE: ICD-10-CM

## 2018-04-04 DIAGNOSIS — F02.80 DEMENTIA IN ALZHEIMER'S DISEASE: ICD-10-CM

## 2018-04-04 DIAGNOSIS — I10 ESSENTIAL HYPERTENSION: ICD-10-CM

## 2018-04-04 PROCEDURE — 3079F DIAST BP 80-89 MM HG: CPT | Mod: CPTII,S$GLB,, | Performed by: PHYSICIAN ASSISTANT

## 2018-04-04 PROCEDURE — 3077F SYST BP >= 140 MM HG: CPT | Mod: CPTII,S$GLB,, | Performed by: PHYSICIAN ASSISTANT

## 2018-04-04 PROCEDURE — 99999 PR PBB SHADOW E&M-EST. PATIENT-LVL IV: CPT | Mod: PBBFAC,,, | Performed by: PHYSICIAN ASSISTANT

## 2018-04-04 PROCEDURE — 99214 OFFICE O/P EST MOD 30 MIN: CPT | Mod: S$GLB,,, | Performed by: PHYSICIAN ASSISTANT

## 2018-04-04 RX ORDER — BUPROPION HYDROCHLORIDE 150 MG/1
150 TABLET, EXTENDED RELEASE ORAL 2 TIMES DAILY
Qty: 60 TABLET | Refills: 5 | Status: SHIPPED | OUTPATIENT
Start: 2018-04-04 | End: 2018-07-28 | Stop reason: SDUPTHER

## 2018-04-04 RX ORDER — LISINOPRIL 10 MG/1
10 TABLET ORAL DAILY
Qty: 30 TABLET | Refills: 5 | Status: SHIPPED | OUTPATIENT
Start: 2018-04-04 | End: 2018-07-28 | Stop reason: SDUPTHER

## 2018-04-04 NOTE — PATIENT INSTRUCTIONS
Hypertension Goals/Individual Care Plan    Hypertension Goal Care Plan    1. Blood pressure goal is to be below 140/90. Normal Blood pressure is 120/80.  Patient's blood pressure is at goal today. (No)    2. Goal for self management is to check Blood Pressure daily. Record on Individual log. Patient is agreeable to self management plan. declined.   Patient will bring logs at next office visit, or communicate to /Care Management team for review for interval follow up.     3. Moderately intense physical activity, such as brisk walking, is beneficial when done regularly. Aim for a total of 40 minutes of moderate to vigorous activity three to four times a week to help lower blood pressure. Exercise of patients choice was recommended at this office visit. walking    4. Eating Healthy Diet is important in Blood Pressure control. As its name implies, the DASH (Dietary Approaches to Stop Hypertension) eating plan is designed to help you manage blood pressure. Emphasizing healthy food sources, it also limits:  Red meat   Sodium (salt)   Sweets, added sugars and sugar-containing beverages.     5. Barriers to goals reviewed and addressed with patient at visit. (Yes)    6. Adherence and Medication Side effects discussed (Yes)

## 2018-04-04 NOTE — PROGRESS NOTES
Subjective:       Patient ID: Vishnu Dougherty Jr. is a 58 y.o. male.    Chief Complaint: Hospital Follow Up    Patient is here for a ER follow up . He presented to [facility] ER for chest pain. His work up was negative, but he is scheduled to see cardiology. His family is here who are concerned about his memory loss. The CT performed at the ER was normal. He is scheduled to see Neurology within the month.     Neurologic Problem   The patient's primary symptoms include memory loss. The patient's pertinent negatives include no syncope or weakness. This is a new problem. The current episode started more than 1 month ago. The neurological problem developed insidiously. The problem has been gradually worsening since onset. There was no focality noted. Associated symptoms include confusion. Pertinent negatives include no abdominal pain, chest pain, dizziness, fatigue, fever, headaches, light-headedness or shortness of breath. Past treatments include nothing. There is no history of a CVA, head trauma or seizures.     Past Medical History:   Diagnosis Date    Dilated cardiomyopathy 9/24/2015    Enlarged prostate     Essential hypertension 9/24/2015    Gastroesophageal reflux disease without esophagitis 10/7/2015    Shortness of breath 9/24/2015       Past Surgical History:   Procedure Laterality Date    COLONOSCOPY N/A 5/25/2016    Procedure: COLONOSCOPY;  Surgeon: Demetrio Spicer MD;  Location: [facility];  Service: Endoscopy;  Laterality: N/A;       History reviewed. No pertinent family history.    Social History     Social History    Marital status:      Spouse name: N/A    Number of children: N/A    Years of education: N/A     Occupational History    Not on file.     Social History Main Topics    Smoking status: Former Smoker     Quit date: 9/24/1995    Smokeless tobacco: Never Used    Alcohol use No    Drug use: No    Sexual activity: Not Currently     Other Topics Concern    Not on file     Social History  Narrative    No narrative on file       Review of patient's allergies indicates:  No Known Allergies      Current Outpatient Prescriptions:     amLODIPine (NORVASC) 10 MG tablet, Take 1 tablet (10 mg total) by mouth once daily., Disp: 90 tablet, Rfl: 2    donepezil (ARICEPT) 10 MG tablet, Take 1 tablet (10 mg total) by mouth every evening., Disp: 30 tablet, Rfl: 11    buPROPion (WELLBUTRIN SR) 150 MG TBSR 12 hr tablet, Take 1 tablet (150 mg total) by mouth 2 (two) times daily., Disp: 60 tablet, Rfl: 5    lisinopril 10 MG tablet, Take 1 tablet (10 mg total) by mouth once daily., Disp: 30 tablet, Rfl: 5    BP (!) 140/86 (BP Location: Left arm, Patient Position: Sitting, BP Method: Medium (Manual))   Pulse 63   Temp 96.9 °F (36.1 °C) (Tympanic)   Ht 6' (1.829 m)   Wt 82.5 kg (181 lb 14.1 oz)   SpO2 97%   BMI 24.67 kg/m²   Review of Systems   Constitutional: Negative for chills, fatigue and fever.   HENT: Negative for congestion.    Respiratory: Negative for chest tightness and shortness of breath.    Cardiovascular: Negative for chest pain.   Gastrointestinal: Negative for abdominal pain.   Neurological: Negative for dizziness, tremors, seizures, syncope, facial asymmetry, speech difficulty, weakness, light-headedness, numbness and headaches.   Psychiatric/Behavioral: Positive for confusion and memory loss.       Objective:      Physical Exam   Constitutional: He appears well-developed and well-nourished. No distress.   HENT:   Head: Normocephalic and atraumatic.   Neck: Neck supple.   Cardiovascular: Normal rate and regular rhythm.  Exam reveals no gallop and no friction rub.    No murmur heard.  Pulmonary/Chest: Effort normal and breath sounds normal. No respiratory distress. He has no wheezes. He has no rales. He exhibits no tenderness.   Abdominal: Soft. There is no tenderness.   Musculoskeletal: Normal range of motion. He exhibits no edema or deformity.   Lymphadenopathy:     He has no cervical  adenopathy.   Neurological: He is alert.   Patient was asking repeating questions during  the exam.    Skin: He is not diaphoretic.   Nursing note and vitals reviewed.      Assessment:       1. Memory loss of unknown cause    2. Depression, unspecified depression type    3. Dementia in Alzheimer's disease    4. Essential hypertension        Plan:       Memory loss of unknown cause  -     Ambulatory referral to Psychiatry    Depression, unspecified depression type  -     Ambulatory referral to Psychiatry    Dementia in Alzheimer's disease  -     MRI Brain W WO Contrast; Future; Expected date: 04/04/2018  -     Ambulatory referral to Psychiatry    Essential hypertension  -     Creatinine, serum; Future; Expected date: 04/04/2018    Other orders  -     lisinopril 10 MG tablet; Take 1 tablet (10 mg total) by mouth once daily.  Dispense: 30 tablet; Refill: 5  -     buPROPion (WELLBUTRIN SR) 150 MG TBSR 12 hr tablet; Take 1 tablet (150 mg total) by mouth 2 (two) times daily.  Dispense: 60 tablet; Refill: 5

## 2018-04-10 ENCOUNTER — TELEPHONE (OUTPATIENT)
Dept: INTERNAL MEDICINE | Facility: CLINIC | Age: 58
End: 2018-04-10

## 2018-04-10 ENCOUNTER — HOSPITAL ENCOUNTER (OUTPATIENT)
Dept: RADIOLOGY | Facility: HOSPITAL | Age: 58
Discharge: HOME | End: 2018-04-10
Attending: PHYSICIAN ASSISTANT
Payer: COMMERCIAL

## 2018-04-10 DIAGNOSIS — G30.9 DEMENTIA IN ALZHEIMER'S DISEASE: Primary | ICD-10-CM

## 2018-04-10 DIAGNOSIS — G30.9 DEMENTIA IN ALZHEIMER'S DISEASE: ICD-10-CM

## 2018-04-10 DIAGNOSIS — F02.80 DEMENTIA IN ALZHEIMER'S DISEASE: ICD-10-CM

## 2018-04-10 DIAGNOSIS — F02.80 DEMENTIA IN ALZHEIMER'S DISEASE: Primary | ICD-10-CM

## 2018-04-10 PROCEDURE — 70553 MRI BRAIN STEM W/O & W/DYE: CPT | Mod: TC

## 2018-04-10 PROCEDURE — 25500020 PHARM REV CODE 255: Performed by: PHYSICIAN ASSISTANT

## 2018-04-10 PROCEDURE — A9585 GADOBUTROL INJECTION: HCPCS | Performed by: PHYSICIAN ASSISTANT

## 2018-04-10 RX ORDER — GADOBUTROL 604.72 MG/ML
8 INJECTION INTRAVENOUS
Status: COMPLETED | OUTPATIENT
Start: 2018-04-10 | End: 2018-04-10

## 2018-04-10 RX ADMIN — GADOBUTROL 8 ML: 604.72 INJECTION INTRAVENOUS at 02:04

## 2018-04-10 NOTE — TELEPHONE ENCOUNTER
Daughter spoke with his insurance and they told her disability is not in his insurance plan. What is next? Should they maybe call Social Security Disability or do you have another route for them to go?

## 2018-04-10 NOTE — TELEPHONE ENCOUNTER
----- Message from Xena Marroquin sent at 4/10/2018  2:25 PM CDT -----  Contact: Pt daughter - margret martinez   States she's calling rg having some questions about pt's disability paperwork and can be reached at [phone]//ector/wing

## 2018-04-11 ENCOUNTER — TELEPHONE (OUTPATIENT)
Dept: INTERNAL MEDICINE | Facility: CLINIC | Age: 58
End: 2018-04-11

## 2018-04-11 NOTE — TELEPHONE ENCOUNTER
Results and recommendations to daughter with verbalized understanding. Reviewed appt with cardio on 4/17/18 and Dr. Clemente on 5/1/18

## 2018-04-11 NOTE — TELEPHONE ENCOUNTER
----- Message from Gallo Stewart sent at 4/11/2018  1:36 PM CDT -----  Contact: pt daughter  Call pt at [phone] (home)   Regarding MRI results

## 2018-04-11 NOTE — TELEPHONE ENCOUNTER
----- Message from Tomasa Marcelo sent at 4/11/2018  3:46 PM CDT -----  Contact: Pt/Daughter ( margret)  Please give Margret a call at [phone] regarding the pt MRI results.

## 2018-04-17 ENCOUNTER — OFFICE VISIT (OUTPATIENT)
Dept: CARDIOLOGY | Facility: CLINIC | Age: 58
End: 2018-04-17
Payer: COMMERCIAL

## 2018-04-17 ENCOUNTER — LAB VISIT (OUTPATIENT)
Dept: LAB | Facility: HOSPITAL | Age: 58
End: 2018-04-17
Attending: PHYSICIAN ASSISTANT
Payer: COMMERCIAL

## 2018-04-17 VITALS
WEIGHT: 178 LBS | HEART RATE: 51 BPM | BODY MASS INDEX: 24.11 KG/M2 | DIASTOLIC BLOOD PRESSURE: 80 MMHG | HEIGHT: 72 IN | SYSTOLIC BLOOD PRESSURE: 160 MMHG

## 2018-04-17 DIAGNOSIS — R07.89 OTHER CHEST PAIN: Primary | ICD-10-CM

## 2018-04-17 DIAGNOSIS — E78.5 DYSLIPIDEMIA: ICD-10-CM

## 2018-04-17 DIAGNOSIS — I50.32 CHRONIC DIASTOLIC HF (HEART FAILURE): ICD-10-CM

## 2018-04-17 DIAGNOSIS — I10 ESSENTIAL HYPERTENSION: ICD-10-CM

## 2018-04-17 DIAGNOSIS — R07.89 OTHER CHEST PAIN: ICD-10-CM

## 2018-04-17 DIAGNOSIS — R06.09 DYSPNEA ON EXERTION: Primary | ICD-10-CM

## 2018-04-17 LAB
CREAT SERPL-MCNC: 0.9 MG/DL
EST. GFR  (AFRICAN AMERICAN): >60 ML/MIN/1.73 M^2
EST. GFR  (NON AFRICAN AMERICAN): >60 ML/MIN/1.73 M^2

## 2018-04-17 PROCEDURE — 99214 OFFICE O/P EST MOD 30 MIN: CPT | Mod: S$GLB,,, | Performed by: NUCLEAR MEDICINE

## 2018-04-17 PROCEDURE — 99999 PR PBB SHADOW E&M-EST. PATIENT-LVL III: CPT | Mod: PBBFAC,,, | Performed by: NUCLEAR MEDICINE

## 2018-04-17 PROCEDURE — 82565 ASSAY OF CREATININE: CPT

## 2018-04-17 PROCEDURE — 3077F SYST BP >= 140 MM HG: CPT | Mod: CPTII,S$GLB,, | Performed by: NUCLEAR MEDICINE

## 2018-04-17 PROCEDURE — 36415 COLL VENOUS BLD VENIPUNCTURE: CPT

## 2018-04-17 PROCEDURE — 3079F DIAST BP 80-89 MM HG: CPT | Mod: CPTII,S$GLB,, | Performed by: NUCLEAR MEDICINE

## 2018-04-17 PROCEDURE — 93000 ELECTROCARDIOGRAM COMPLETE: CPT | Mod: S$GLB,,, | Performed by: INTERNAL MEDICINE

## 2018-04-17 NOTE — LETTER
April 17, 2018      Ant Mejia III, PA-C  [address] [place] [address] [address] [place] [address]           [facility]  [address] [place] [address] [address] [place] [address]  Phone: [phone]  Fax: [phone]          Patient: Vishnu Dougherty Jr.   MR Number: [id number]   YOB: 1960   Date of Visit: 4/17/2018       Dear Ant Mejia III:    Thank you for referring Vishnu Dougherty to me for evaluation. Attached you will find relevant portions of my assessment and plan of care.    If you have questions, please do not hesitate to call me. I look forward to following Vishnu Dougherty along with you.    Sincerely,    Ernesto Tejeda MD    Enclosure  CC:  No Recipients    If you would like to receive this communication electronically, please contact [email or web address] or [phone] to request more information on [employer] access.    For providers and/or their staff who would like to refer a patient to [facility], please contact us through our one-stop-shop provider referral line, [facility], at [phone].    If you feel you have received this communication in error or would no longer like to receive these types of communications, please e-mail [email or web address]

## 2018-04-17 NOTE — PROGRESS NOTES
Subjective:   Patient ID:  Vishnu Dougherty Jr. is a 58 y.o. male who presents for follow-up of Hypertension and Congestive Heart Failure (HFPEF- DD)      HPI PRESENTS TO [facility], LAST SEEN BY   2015-  HX OF ESSENTIAL HTN WITH HFPEF- DD,  AND DYSLIPIDEMIA  FAMILY MEMBERS HAVE NOTICED FOR SEVERAL MONTHS, PROGRESSIVE CHANGES IN HIS MENTAL STATUS- LACK OF MEMORY,  NO COMPLIANCE WITH HIS CARD MED- HOW LONG ?  ACCORDING TO PATIENT HIS MAIN COMPLAINS ARE- PROGRESSIVE WORSENING OF ENERGY, INCREASE LUGO WITH MODERATE EXERTION. GENERAL MALAISE AND WEAKNESS  NO CHEST DISCOMFORT. NO PALPITATIONS. NO EDEMA. NO INTERMITTENT CLAUDICATION  NO ORTHOPNEA OR PND  NO FOCAL CNS SYMPTOMS OR SIGNS TO SUGGEST TIA OR STROKE  ECG - TODAY- SR, NS ST T CHANGES  LAST LIPID LEVELS  ABOUT ONE YEAR AGO  PATIENT WAS RECENTLY STARTED ON ACE AND CCBS    Review of Systems   Constitution: Positive for weakness and malaise/fatigue. Negative for chills, fever, night sweats, weight gain and weight loss.   HENT: Negative for nosebleeds.    Eyes: Negative for blurred vision, double vision and visual disturbance.   Cardiovascular: Positive for dyspnea on exertion. Negative for chest pain, irregular heartbeat, leg swelling, orthopnea, palpitations, paroxysmal nocturnal dyspnea and syncope.   Respiratory: Negative for cough, hemoptysis and wheezing.    Endocrine: Negative for polydipsia and polyuria.   Hematologic/Lymphatic: Does not bruise/bleed easily.   Skin: Negative for rash.   Musculoskeletal: Negative for joint pain, joint swelling, muscle weakness and myalgias.   Gastrointestinal: Negative for abdominal pain, hematemesis, jaundice and melena.   Genitourinary: Negative for dysuria, hematuria and nocturia.   Neurological: Negative for dizziness, focal weakness, headaches and sensory change.   Psychiatric/Behavioral: Positive for memory loss. Negative for depression. The patient does not have insomnia and is not nervous/anxious.       History reviewed. No pertinent family history.  Past Medical History:   Diagnosis Date    Dilated cardiomyopathy 9/24/2015    Enlarged prostate     Essential hypertension 9/24/2015    Gastroesophageal reflux disease without esophagitis 10/7/2015    Hyperlipidemia     Shortness of breath 9/24/2015     Current Outpatient Prescriptions on File Prior to Visit   Medication Sig Dispense Refill    buPROPion (WELLBUTRIN SR) 150 MG TBSR 12 hr tablet Take 1 tablet (150 mg total) by mouth 2 (two) times daily. 60 tablet 5    donepezil (ARICEPT) 10 MG tablet Take 1 tablet (10 mg total) by mouth every evening. 30 tablet 11    lisinopril 10 MG tablet Take 1 tablet (10 mg total) by mouth once daily. 30 tablet 5    amLODIPine (NORVASC) 10 MG tablet Take 1 tablet (10 mg total) by mouth once daily. 90 tablet 2     No current facility-administered medications on file prior to visit.      Review of patient's allergies indicates:  No Known Allergies    Objective:     Physical Exam   Constitutional: He is oriented to person, place, and time. He appears well-developed. No distress.   HENT:   Head: Normocephalic.   Eyes: Conjunctivae are normal. Pupils are equal, round, and reactive to light.   Neck: Neck supple. No JVD present. No thyromegaly present.   Cardiovascular: Normal rate, regular rhythm, normal heart sounds and intact distal pulses.  Exam reveals no gallop and no friction rub.    No murmur heard.  Pulses:       Carotid pulses are 2+ on the right side, and 2+ on the left side.       Radial pulses are 2+ on the right side, and 2+ on the left side.        Femoral pulses are 2+ on the right side, and 2+ on the left side.       Popliteal pulses are 2+ on the right side, and 2+ on the left side.        Dorsalis pedis pulses are 2+ on the right side, and 2+ on the left side.        Posterior tibial pulses are 2+ on the right side, and 2+ on the left side.   Pulmonary/Chest: Breath sounds normal. He has no wheezes. He has  no rales. He exhibits no tenderness.   Abdominal: Soft. Bowel sounds are normal. He exhibits no mass. There is no hepatosplenomegaly. There is no tenderness.   Musculoskeletal: He exhibits no edema or tenderness.        Cervical back: Normal.        Thoracic back: Normal.        Lumbar back: Normal.   Lymphadenopathy:     He has no cervical adenopathy.     He has no axillary adenopathy.        Right: No supraclavicular adenopathy present.        Left: No supraclavicular adenopathy present.   Neurological: He is alert and oriented to person, place, and time. He has normal strength. No sensory deficit. Gait normal.   Skin: Skin is warm. No cyanosis. No pallor. Nails show no clubbing.   Psychiatric: He has a normal mood and affect. His speech is normal and behavior is normal. Cognition and memory are normal.       Assessment:     1. Dyspnea on exertion    2. Essential hypertension    3. Chronic diastolic HF (heart failure)    4. Dyslipidemia      WE NEED TO EVALUATE FOR STRUCTURAL AND FUNCTIONAL HEART STATUS AND PRESENCE OF CAD  AND LIPID LEVELS  Plan:     Dyspnea on exertion  -     2D echo with color flow doppler; Future  -     Lipid panel; Future; Expected date: 04/17/2018  -     NM Myocardial Perfusion Spect Multi Pharmacologic; Future; Expected date: 04/17/2018  -     NM Multi Pharm Stress Cardiac Component; Future    Essential hypertension  -     2D echo with color flow doppler; Future  -     Lipid panel; Future; Expected date: 04/17/2018  -     NM Myocardial Perfusion Spect Multi Pharmacologic; Future; Expected date: 04/17/2018  -     NM Multi Pharm Stress Cardiac Component; Future    Chronic diastolic HF (heart failure)  -     2D echo with color flow doppler; Future  -     Lipid panel; Future; Expected date: 04/17/2018  -     NM Myocardial Perfusion Spect Multi Pharmacologic; Future; Expected date: 04/17/2018  -     NM Multi Pharm Stress Cardiac Component; Future    Dyslipidemia  -     Lipid panel; Future;  Expected date: 04/17/2018    1- PHONE CALL TO REVIEW RESULTS AND FURTHER RECOMMENDATIONS

## 2018-04-18 ENCOUNTER — LAB VISIT (OUTPATIENT)
Dept: LAB | Facility: HOSPITAL | Age: 58
End: 2018-04-18
Attending: NUCLEAR MEDICINE
Payer: COMMERCIAL

## 2018-04-18 ENCOUNTER — OFFICE VISIT (OUTPATIENT)
Dept: NEUROLOGY | Facility: CLINIC | Age: 58
End: 2018-04-18
Payer: COMMERCIAL

## 2018-04-18 ENCOUNTER — CLINICAL SUPPORT (OUTPATIENT)
Dept: INTERNAL MEDICINE | Facility: CLINIC | Age: 58
End: 2018-04-18
Payer: COMMERCIAL

## 2018-04-18 VITALS — SYSTOLIC BLOOD PRESSURE: 122 MMHG | DIASTOLIC BLOOD PRESSURE: 76 MMHG

## 2018-04-18 VITALS
RESPIRATION RATE: 20 BRPM | SYSTOLIC BLOOD PRESSURE: 120 MMHG | BODY MASS INDEX: 24.13 KG/M2 | WEIGHT: 178.13 LBS | HEART RATE: 60 BPM | HEIGHT: 72 IN | DIASTOLIC BLOOD PRESSURE: 70 MMHG

## 2018-04-18 DIAGNOSIS — E78.5 DYSLIPIDEMIA: ICD-10-CM

## 2018-04-18 DIAGNOSIS — G31.84 MILD COGNITIVE IMPAIRMENT WITH MEMORY LOSS: Primary | ICD-10-CM

## 2018-04-18 DIAGNOSIS — I10 ESSENTIAL HYPERTENSION: ICD-10-CM

## 2018-04-18 DIAGNOSIS — R06.09 DYSPNEA ON EXERTION: ICD-10-CM

## 2018-04-18 DIAGNOSIS — I50.32 CHRONIC DIASTOLIC HF (HEART FAILURE): ICD-10-CM

## 2018-04-18 PROCEDURE — 99999 PR PBB SHADOW E&M-EST. PATIENT-LVL III: CPT | Mod: PBBFAC,,, | Performed by: PSYCHIATRY & NEUROLOGY

## 2018-04-18 PROCEDURE — 99215 OFFICE O/P EST HI 40 MIN: CPT | Mod: S$GLB,,, | Performed by: PSYCHIATRY & NEUROLOGY

## 2018-04-18 PROCEDURE — 99999 PR PBB SHADOW E&M-EST. PATIENT-LVL I: CPT | Mod: PBBFAC,,,

## 2018-04-18 PROCEDURE — 80061 LIPID PANEL: CPT

## 2018-04-18 PROCEDURE — 3078F DIAST BP <80 MM HG: CPT | Mod: CPTII,S$GLB,, | Performed by: PSYCHIATRY & NEUROLOGY

## 2018-04-18 PROCEDURE — 3074F SYST BP LT 130 MM HG: CPT | Mod: CPTII,S$GLB,, | Performed by: PSYCHIATRY & NEUROLOGY

## 2018-04-18 PROCEDURE — 36415 COLL VENOUS BLD VENIPUNCTURE: CPT

## 2018-04-18 NOTE — LETTER
April 18, 2018      Ant Mejia III, PA-C  [address] [place] [address] [address] [place] [id number]           [facility]  [address] [place] [address] [address] [place] [address]  Phone: [phone]  Fax: [phone]          Patient: Vishnu Dougherty Jr.   MR Number: [id number]   YOB: 1960   Date of Visit: 4/18/2018       Dear Ant Mejia III:    Thank you for referring Vishnu Dougherty to me for evaluation. Attached you will find relevant portions of my assessment and plan of care.    If you have questions, please do not hesitate to call me. I look forward to following Vishnu Dougherty along with you.    Sincerely,    Bernabe Clemente MD    Enclosure  CC:  No Recipients    If you would like to receive this communication electronically, please contact [email or web address] or [phone] to request more information on [employer] Link access.    For providers and/or their staff who would like to refer a patient to [facility], please contact us through our one-stop-shop provider referral line, [facility] , at [phone].    If you feel you have received this communication in error or would no longer like to receive these types of communications, please e-mail [email or web address]

## 2018-04-18 NOTE — PROGRESS NOTES
2 week blood pressure check. His pressure is great today  122/76. They say it was good for Dr. Clemente appt today and cardio appt yesterday. No problems and feeling okay. Will continue with the current plan unless they hear differently from Mr. Mejia.

## 2018-04-18 NOTE — PROGRESS NOTES
This is a 58-year-old right-handed patient who is accompanied to the office today by his son who is able to provide history as well as from the patient.    According to the son, the patient and the family have noted the patient to be experiencing a loss of recent memory which is been present and slowly progressing over the past 2 years.  The patient's family has noted for example that he will call and ask the same questions repetitively or that he will make comments repetitively.  The patient's son states that he is taking calls multiple times with the patient asking the same question over and over again.    The patient is aware of the fact that his recent memory is impaired.  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  The patient states that he'll have to think very hard to remember if he is perform certain activities while working.  The patient states that he frequently misplaces his tools when working.  However, he is living alone and independently.  The patient states that he is able to do simple meal preparation and to keep his home clean as well as performing simple household tasks without difficulty.  The patient's son indicates that he is managing his own financial affairs, paying his bills effectively.    However, the patient states that he has been working in small engine repair, but the patient's son indicates that he has not gone to work for the past month and that he only does simple work around the house and in his garage.    The patient's family has not noticed there to be any obvious paralysis of the arms or legs.  He seems to have intact speech function as he is able to converse his basic wants and needs without difficulty.  The family has not noticed any word finding difficulty or word blocking.  The patient denies any change in vision.  Family has noted a very slight weight loss although this is not been quantified.  The patient  denies any headache or dizziness.  He is not had any seizure or unexplained loss of consciousness.  The patient and the family have not noticed any active hallucinations of any type.    The patient has had diagnostic workup including MRI of the brain, B12 level, and thyroid functions.  It should be noted that his thyroid functions are normal but the B12 level is somewhat low.      ROS:  GENERAL: No fever, chills, fatigability   SKIN: No rashes, itching or changes in color or texture of skin.  HEAD: No headaches or recent head trauma.  EYES: Visual acuity fine. No photophobia, ocular pain or diplopia.  EARS: Denies ear pain, discharge or vertigo.  NOSE: No loss of smell, no epistaxis or postnasal drip.  MOUTH & THROAT: No hoarseness or change in voice. No excessive gum bleeding.  NODES: Denies swollen glands.  CHEST: Denies LUGO, cyanosis, wheezing, cough and sputum production.  CARDIOVASCULAR: Denies chest pain, PND, orthopnea or reduced exercise tolerance.  ABDOMEN: Appetite fine. No weight loss. Denies diarrhea, abdominal pain, hematemesis or blood in stool.  URINARY: No flank pain, dysuria or hematuria.  PERIPHERAL VASCULAR: No claudication or cyanosis.  MUSCULOSKELETAL: No joint stiffness or swelling. Denies back pain.  NEUROLOGIC: No history of seizures, paralysis, alteration of gait or coordination.    PAST HISTORY:  Surgery: Tonsillectomy colonoscopy  Medical: Essential hypertension, hyperlipidemia, GERD  ALLERGIES: NO KNOWN DRUG ALLERGIES    FAMILY HISTORY:  The patient's father  in his 70s of a brain tumor.  His mother is living in her late 70s and in good health.  He has 3 sisters who are in good health.  There is no known family history of dementia.    SOCIAL HISTORY:  The patient is a former smoker who quit smoking about 25 years ago.  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  He denies alcohol use.  He is living alone and independently.    PE:   VITAL SIGNS: Blood pressure 120/70,  pulse 68 and regular, weight 80.8 kg, height 6 foot, BMI 24.16  APPEARANCE: Well nourished, well developed, in no acute distress.    HEAD: Normocephalic, atraumatic.  EYES: PERRL. EOMI.  Non-icteric sclerae.    EARS: TM's intact. Light reflex normal. No retraction or perforation.    NOSE: Mucosa pink. Airway clear.   MOUTH & THROAT: No tonsillar enlargement. No pharyngeal erythema or exudate. No stridor.  NECK: Supple. No bruits.  CHEST: Lungs clear to auscultation.  CARDIOVASCULAR: Regular rhythm without significant murmurs.  ABDOMEN: Bowel sounds normal. Not distended. Soft. No tenderness or masses.  MUSCULOSKELETAL:  No bony deformity seen.  Muscle tone and muscle mass are normal in both upper and both lower extremities.  NEUROLOGIC:   Mental Status:  The patient is well oriented to person, time, place, and situation.  The patient scored 22 out of 30 on the [place] cognitive assessment.  He lost 5 points on delayed recall.  He lost one point in visual spatial function.  He lost one point in language function.  He was able to perform serial 7 subtractions.  He was able to repeat 5 digits forwards and 3 digits backwards.  The patient is attentive to the environment and cooperative for the exam.  Cranial Nerves: II-XII grossly intact. Fundoscopic exam is normal.  No hemorrhage, exudate or papilledema is present. The extraocular muscles are intact in the cardinal directions of gaze.  No ptosis is present. Facial features are symmetrical.  Speech is normal in fluency, diction, and phrasing.  Tongue protrudes in the midline.    Gait and Station:  Romberg is negative.  Good alternate armswing with normal gait.  Motor:  No downdrift of either arm when held at shoulder level.  Manual muscle testing of proximal and distal muscles of both upper and lower extremities is normal.   Sensory:  Intact both upper and lower extremities to pin prick, touch, and vibration.  Cerebellar:  Finger to nose done well.  Alternating  movements intact.  No involuntary movements or tremor seen.  Reflexes:  Stretch reflexes are 2+ both upper and lower extremities.  Plantar stimulation is flexor bilaterally and no pathological reflexes are seen     ASSESSMENT:  1.  Mild cognitive impairment with memory loss    DISCUSSION:  The patient has had very adequate workup to this point.  His MRI was reviewed with him today and demonstrated to him personally.  The MRI is really normal.  He is normal thyroid functions but has a slightly low B12 level.  He is already on Aricept at therapeutic levels.    RECOMMENDATIONS:  1.  I would suggest adding B12 to his treatment regimen.  This should be the sublingual form of the medication or if this is not reliable, 1000 µg of B12 once a month intramuscularly.  2.  Discussion was held with the patient's son and the patient regarding future management including preparation of will, power of , medical power of  etc.  3.  Return to neurology as needed.     This note is generated with speech recognition software and is subject to transcription error and sound alike phrases that may be missed by proofreading.    This was a 60 minute visit with the patient and his son with over 50% of time spent counseling the patient and his son regarding the diagnosis of minimal cognitive impairment and its relationship to development of dementia.

## 2018-04-19 LAB
CHOLEST SERPL-MCNC: 189 MG/DL
CHOLEST/HDLC SERPL: 4.4 {RATIO}
HDLC SERPL-MCNC: 43 MG/DL
HDLC SERPL: 22.8 %
LDLC SERPL CALC-MCNC: 124.8 MG/DL
NONHDLC SERPL-MCNC: 146 MG/DL
TRIGL SERPL-MCNC: 106 MG/DL

## 2018-04-27 ENCOUNTER — HOSPITAL ENCOUNTER (OUTPATIENT)
Dept: RADIOLOGY | Facility: HOSPITAL | Age: 58
Discharge: HOME | End: 2018-04-27
Attending: NUCLEAR MEDICINE
Payer: COMMERCIAL

## 2018-04-27 ENCOUNTER — CLINICAL SUPPORT (OUTPATIENT)
Dept: CARDIOLOGY | Facility: CLINIC | Age: 58
End: 2018-04-27
Attending: NUCLEAR MEDICINE
Payer: COMMERCIAL

## 2018-04-27 DIAGNOSIS — I50.32 CHRONIC DIASTOLIC HF (HEART FAILURE): ICD-10-CM

## 2018-04-27 DIAGNOSIS — R06.09 DYSPNEA ON EXERTION: ICD-10-CM

## 2018-04-27 DIAGNOSIS — I10 ESSENTIAL HYPERTENSION: ICD-10-CM

## 2018-04-27 LAB
DIASTOLIC DYSFUNCTION: NO
ESTIMATED PA SYSTOLIC PRESSURE: 25.6
RETIRED EF AND QEF - SEE NOTES: 60 (ref 55–65)

## 2018-04-27 PROCEDURE — 93015 CV STRESS TEST SUPVJ I&R: CPT | Mod: S$GLB,,, | Performed by: INTERNAL MEDICINE

## 2018-04-27 PROCEDURE — 78452 HT MUSCLE IMAGE SPECT MULT: CPT | Mod: 26,,, | Performed by: INTERNAL MEDICINE

## 2018-04-27 PROCEDURE — 93306 TTE W/DOPPLER COMPLETE: CPT | Mod: S$GLB,,, | Performed by: INTERNAL MEDICINE

## 2018-04-27 PROCEDURE — A9502 TC99M TETROFOSMIN: HCPCS | Mod: PO

## 2018-04-28 LAB — DIASTOLIC DYSFUNCTION: NO

## 2018-05-04 ENCOUNTER — TELEPHONE (OUTPATIENT)
Dept: INTERNAL MEDICINE | Facility: CLINIC | Age: 58
End: 2018-05-04

## 2018-05-04 NOTE — TELEPHONE ENCOUNTER
----- Message from Juliet Bailey sent at 5/4/2018  2:36 PM CDT -----  Contact: Faviola Conrad/daughter  States she's returning a nurse call, she's not sure who called. Please call Faviloa Conrad at [phone]. Thank you

## 2018-05-04 NOTE — TELEPHONE ENCOUNTER
----- Message from Gallo Stewart sent at 5/4/2018  3:47 PM CDT -----  Contact: Faviola Romero pt at [phone] regarding pt

## 2018-05-09 ENCOUNTER — TELEPHONE (OUTPATIENT)
Dept: INTERNAL MEDICINE | Facility: CLINIC | Age: 58
End: 2018-05-09

## 2018-05-09 NOTE — TELEPHONE ENCOUNTER
----- Message from Amberly Oquendo sent at 5/9/2018  1:02 PM CDT -----  Contact: Rach (pt's sister)   Rach called and stated that the paperwork the pt received is not what was requested. She can be reached at  [phone].    Thanks,  TF

## 2018-06-12 RX ORDER — AMLODIPINE BESYLATE 10 MG/1
TABLET ORAL
Qty: 90 TABLET | Refills: 2 | Status: SHIPPED | OUTPATIENT
Start: 2018-06-12 | End: 2019-04-08 | Stop reason: SDUPTHER

## 2018-07-30 RX ORDER — BUPROPION HYDROCHLORIDE 150 MG/1
TABLET, EXTENDED RELEASE ORAL
Qty: 60 TABLET | Refills: 1 | Status: SHIPPED | OUTPATIENT
Start: 2018-07-30 | End: 2019-02-03 | Stop reason: SDUPTHER

## 2018-07-30 RX ORDER — LISINOPRIL 10 MG/1
TABLET ORAL
Qty: 30 TABLET | Refills: 1 | Status: SHIPPED | OUTPATIENT
Start: 2018-07-30 | End: 2018-09-17

## 2018-09-14 ENCOUNTER — NURSE TRIAGE (OUTPATIENT)
Dept: ADMINISTRATIVE | Facility: CLINIC | Age: 58
End: 2018-09-14

## 2018-09-15 NOTE — TELEPHONE ENCOUNTER
"    Reason for Disposition   Caller has medication question only, adult not sick, and triager answers question    Protocols used: ST MEDICATION QUESTION CALL-A-AH    Vishnu 's sister Rach (also POA) called, concerned because she does not think he is taking his medications appropriately.  Went over all meds with her, and he is taking the welbutrin just once daily instead of the ordered twice daily, and is not taking the lisinopril at all. No home BP cuff; encouraged her to take him to [facility] or [facility] to get BP taken, and record what it is.  She does not know how long it has been since he took it, "but he has a lot of pills."  Takes the aricept at night appropriately, and the norvasc in the AM appropriately.  Rach is concerned that he is much more confused, and mornings are the worse.  She requests an appt with Ant Mejia MD, pcp.  Appointment made 08:00 Monday with Dr Mejia, and encouraged her to also follow up with neurology, Dr Bernabe Clemente.  Also provided her with Vishnu's cardiologists name, Dr Alisson Tejeda. Message to Ant Mejia MD, pcp.  Please contact caller directly with any additional care advice.      "

## 2018-09-17 ENCOUNTER — OFFICE VISIT (OUTPATIENT)
Dept: INTERNAL MEDICINE | Facility: CLINIC | Age: 58
End: 2018-09-17

## 2018-09-17 VITALS
WEIGHT: 170 LBS | SYSTOLIC BLOOD PRESSURE: 128 MMHG | DIASTOLIC BLOOD PRESSURE: 70 MMHG | TEMPERATURE: 97 F | BODY MASS INDEX: 23.03 KG/M2 | OXYGEN SATURATION: 99 % | HEIGHT: 72 IN | HEART RATE: 54 BPM

## 2018-09-17 DIAGNOSIS — Z56.0 LOSS OF JOB: ICD-10-CM

## 2018-09-17 DIAGNOSIS — G31.84 MILD COGNITIVE IMPAIRMENT WITH MEMORY LOSS: Primary | ICD-10-CM

## 2018-09-17 DIAGNOSIS — F32.A DEPRESSION, UNSPECIFIED DEPRESSION TYPE: ICD-10-CM

## 2018-09-17 PROCEDURE — 99999 PR PBB SHADOW E&M-EST. PATIENT-LVL IV: CPT | Mod: PBBFAC,,, | Performed by: PHYSICIAN ASSISTANT

## 2018-09-17 PROCEDURE — 99214 OFFICE O/P EST MOD 30 MIN: CPT | Mod: PBBFAC | Performed by: PHYSICIAN ASSISTANT

## 2018-09-17 PROCEDURE — 99211 OFF/OP EST MAY X REQ PHY/QHP: CPT | Mod: S$PBB,,, | Performed by: PHYSICIAN ASSISTANT

## 2018-09-17 SDOH — SOCIAL DETERMINANTS OF HEALTH (SDOH): UNEMPLOYMENT, UNSPECIFIED: Z56.0

## 2018-09-17 NOTE — PROGRESS NOTES
Subjective:       Patient ID: Vishnu Dougherty Jr. is a 58 y.o. male.    Chief Complaint: Medication Refill (with sister - Rach Myers)    Patient is a 59 yo male coming in today with his sister. His sister is with him today who his POA due to dementia. He recently lost his job and insurance with [employer].       Neurologic Problem   The patient's primary symptoms include an altered mental status. This is a recurrent problem. The current episode started more than 1 month ago. The neurological problem developed insidiously. The problem has been waxing and waning since onset. There was no focality noted. Associated symptoms include confusion. Pertinent negatives include no chest pain or shortness of breath. (Depression and decreased po intake) Treatments tried: followed by neurology. The treatment provided mild relief. His past medical history is significant for dementia.     Past Medical History:   Diagnosis Date    Dilated cardiomyopathy 9/24/2015    Enlarged prostate     Essential hypertension 9/24/2015    Gastroesophageal reflux disease without esophagitis 10/7/2015    Hyperlipidemia     Shortness of breath 9/24/2015       Review of Systems   Constitutional: Positive for activity change, appetite change and unexpected weight change.   Respiratory: Negative for chest tightness and shortness of breath.    Cardiovascular: Negative for chest pain.   Psychiatric/Behavioral: Positive for confusion, decreased concentration, dysphoric mood and suicidal ideas. Negative for agitation, behavioral problems and hallucinations. The patient is not nervous/anxious and is not hyperactive.         Has no plan       Objective:      Physical Exam   Constitutional: He is oriented to person, place, and time. He appears well-developed and well-nourished. No distress.   Neck: Neck supple.   Cardiovascular: Normal rate, regular rhythm and normal heart sounds. Exam reveals no gallop and no friction rub.   No murmur  heard.  Pulmonary/Chest: Effort normal and breath sounds normal. No stridor. No respiratory distress. He has no wheezes. He has no rales. He exhibits no tenderness.   Lymphadenopathy:     He has no cervical adenopathy.   Neurological: He is alert and oriented to person, place, and time.   Skin: He is not diaphoretic.   Psychiatric: His speech is normal. He is withdrawn. He is not actively hallucinating. Cognition and memory are impaired. He exhibits a depressed mood. He expresses no suicidal plans and no homicidal plans. He is attentive.   Nursing note and vitals reviewed.      Assessment:       1. Mild cognitive impairment with memory loss    2. Depression, unspecified depression type    3. Loss of job        Plan:       Mild cognitive impairment with memory loss  -     Cancel: Ambulatory Referral to Social Work  -     Ambulatory referral to Outpatient Case Management    Depression, unspecified depression type  -     Cancel: Ambulatory Referral to Social Work  -     Ambulatory referral to Outpatient Case Management    Loss of job  -     Cancel: Ambulatory Referral to Social Work  -     Ambulatory referral to Outpatient Case Management

## 2018-09-18 ENCOUNTER — TELEPHONE (OUTPATIENT)
Dept: INTERNAL MEDICINE | Facility: CLINIC | Age: 58
End: 2018-09-18

## 2018-09-18 ENCOUNTER — OUTPATIENT CASE MANAGEMENT (OUTPATIENT)
Dept: ADMINISTRATIVE | Facility: OTHER | Age: 58
End: 2018-09-18

## 2018-09-18 NOTE — TELEPHONE ENCOUNTER
----- Message from Hallie Lopez sent at 9/18/2018 10:00 AM CDT -----  Thank you for the referral.  Patient has been assigned to Leticia Bingham LMSW for low risk screening for Outpatient Case Management.     Reason for referral:   Mild cognitive impairment with memory loss  Depression, unspecified depression type  Loss of job    Please contact [facility] at [phone] with any questions.    Thank you,    Hallie Lopez    [facility]

## 2018-09-18 NOTE — PROGRESS NOTES
Thank you for the referral.  Patient has been assigned to Leticia Bingham LMSW for low risk screening for Outpatient Case Management.     Reason for referral:   Mild cognitive impairment with memory loss  Depression, unspecified depression type  Loss of job    Please contact [facility] at ext. [phone] with any questions.    Thank you,    Hallie Lopez    [facility]

## 2018-09-19 ENCOUNTER — OUTPATIENT CASE MANAGEMENT (OUTPATIENT)
Dept: ADMINISTRATIVE | Facility: OTHER | Age: 58
End: 2018-09-19

## 2018-09-20 ENCOUNTER — TELEPHONE (OUTPATIENT)
Dept: INTERNAL MEDICINE | Facility: CLINIC | Age: 58
End: 2018-09-20

## 2018-09-20 ENCOUNTER — OUTPATIENT CASE MANAGEMENT (OUTPATIENT)
Dept: ADMINISTRATIVE | Facility: OTHER | Age: 58
End: 2018-09-20

## 2018-09-20 NOTE — PROGRESS NOTES
This LMSW contacted patient or caregiver regarding referral.  Patient/Caregiver stated there were no needs at this time. LMSW spoke to patient's sister/POA regarding referral. Patient's sister reports that she has been provided with information to apply for Medicaid benefits on behalf of patient. Patient's sister reports that she plans on completing Medicaid application as soon as possible. LMSW asked patient's sister if patient needed help applying for disability and she reported that patient previously completed disability application and was approved for disability benefits to start this November. Patient's sister denies patient needing additional resources at this time and reports on call nurse provided her with information for community health clinics in patient's area. Low risk referral will be closed at this time. Referral source notified.

## 2018-10-01 ENCOUNTER — CLINICAL SUPPORT (OUTPATIENT)
Dept: INTERNAL MEDICINE | Facility: CLINIC | Age: 58
End: 2018-10-01
Payer: MEDICAID

## 2018-10-01 VITALS — OXYGEN SATURATION: 97 % | DIASTOLIC BLOOD PRESSURE: 70 MMHG | SYSTOLIC BLOOD PRESSURE: 138 MMHG | HEART RATE: 61 BPM

## 2018-10-01 PROCEDURE — 99212 OFFICE O/P EST SF 10 MIN: CPT | Mod: PBBFAC

## 2018-10-01 PROCEDURE — 99999 PR PBB SHADOW E&M-EST. PATIENT-LVL II: CPT | Mod: PBBFAC,,,

## 2018-10-01 NOTE — PROGRESS NOTES
Patient in today for blood pressure check. Pressure is 138/70 today. He is feeling fine with no problems. He is taking his medications as prescribed. He will continue with his current plan unless he hears different from Mr. Mejia.

## 2018-10-08 ENCOUNTER — PATIENT OUTREACH (OUTPATIENT)
Dept: ADMINISTRATIVE | Facility: HOSPITAL | Age: 58
End: 2018-10-08

## 2018-10-15 ENCOUNTER — LAB VISIT (OUTPATIENT)
Dept: LAB | Facility: HOSPITAL | Age: 58
End: 2018-10-15
Attending: INTERNAL MEDICINE

## 2018-10-15 ENCOUNTER — OFFICE VISIT (OUTPATIENT)
Dept: INTERNAL MEDICINE | Facility: CLINIC | Age: 58
End: 2018-10-15
Attending: INTERNAL MEDICINE

## 2018-10-15 VITALS
HEART RATE: 60 BPM | TEMPERATURE: 97 F | WEIGHT: 169.31 LBS | DIASTOLIC BLOOD PRESSURE: 70 MMHG | SYSTOLIC BLOOD PRESSURE: 122 MMHG | BODY MASS INDEX: 22.93 KG/M2 | OXYGEN SATURATION: 98 % | HEIGHT: 72 IN

## 2018-10-15 DIAGNOSIS — E83.51 HYPOCALCEMIA: ICD-10-CM

## 2018-10-15 DIAGNOSIS — T78.40XA ALLERGIC REACTION, INITIAL ENCOUNTER: Primary | ICD-10-CM

## 2018-10-15 DIAGNOSIS — H10.9 CONJUNCTIVITIS, UNSPECIFIED CONJUNCTIVITIS TYPE, UNSPECIFIED LATERALITY: ICD-10-CM

## 2018-10-15 DIAGNOSIS — F03.90 DEMENTIA WITHOUT BEHAVIORAL DISTURBANCE, UNSPECIFIED DEMENTIA TYPE: ICD-10-CM

## 2018-10-15 LAB
ALBUMIN SERPL BCP-MCNC: 3.9 G/DL
ALP SERPL-CCNC: 78 U/L
ALT SERPL W/O P-5'-P-CCNC: 12 U/L
ANION GAP SERPL CALC-SCNC: 7 MMOL/L
AST SERPL-CCNC: 17 U/L
BILIRUB SERPL-MCNC: 0.3 MG/DL
BUN SERPL-MCNC: 14 MG/DL
CALCIUM SERPL-MCNC: 9.4 MG/DL
CHLORIDE SERPL-SCNC: 103 MMOL/L
CO2 SERPL-SCNC: 29 MMOL/L
CREAT SERPL-MCNC: 1 MG/DL
EST. GFR  (AFRICAN AMERICAN): >60 ML/MIN/1.73 M^2
EST. GFR  (NON AFRICAN AMERICAN): >60 ML/MIN/1.73 M^2
GLUCOSE SERPL-MCNC: 90 MG/DL
POTASSIUM SERPL-SCNC: 3.8 MMOL/L
PROT SERPL-MCNC: 7.2 G/DL
SODIUM SERPL-SCNC: 139 MMOL/L

## 2018-10-15 PROCEDURE — 99214 OFFICE O/P EST MOD 30 MIN: CPT | Mod: PBBFAC | Performed by: PHYSICIAN ASSISTANT

## 2018-10-15 PROCEDURE — 36415 COLL VENOUS BLD VENIPUNCTURE: CPT

## 2018-10-15 PROCEDURE — 80053 COMPREHEN METABOLIC PANEL: CPT

## 2018-10-15 PROCEDURE — 99214 OFFICE O/P EST MOD 30 MIN: CPT | Mod: S$PBB,,, | Performed by: PHYSICIAN ASSISTANT

## 2018-10-15 PROCEDURE — 99999 PR PBB SHADOW E&M-EST. PATIENT-LVL IV: CPT | Mod: PBBFAC,,, | Performed by: PHYSICIAN ASSISTANT

## 2018-10-15 RX ORDER — LEVOCETIRIZINE DIHYDROCHLORIDE 5 MG/1
5 TABLET, FILM COATED ORAL NIGHTLY
Qty: 30 TABLET | Refills: 0 | Status: SHIPPED | OUTPATIENT
Start: 2018-10-15 | End: 2019-11-22

## 2018-10-15 NOTE — PATIENT INSTRUCTIONS
Local Allergic Reaction, Other  You are having an allergic reaction. Almost anything can cause one. Different people are allergic to different things. It is usually something that you ate or swallowed, came into contact with by getting or putting it on your skin or clothes, or something you breathed in the air. This can be very annoying and sometimes scary.  Symptoms of an allergic reaction can include:  · Rash, hives, redness, welts, blisters  · Itching, burning, stinging, pain  · Dry, flaky, cracking, scaly skin  · Swelling of the face, lips or other parts of the body  Sometimes the cause of an allergic reaction may be obvious. To help identify your allergen, remember:  · When it started  · What you were doing at the time or just before that  · Any activities you were involved in  · Any new products or contacts  Here are some common causes, but remember almost anything can cause a reaction, and you may not even be aware that you came into contact with one of these things.  · Dust, mold, pollen  · Plants such as poison ivy and poison oak are common ones, but there are many others  · Animals  · Foods such as shrimp, shellfish, peanuts, milk products, gluten, eggs; also colorings, flavorings, additives  · Insect bites or stings such as bees, mosquitos, flees, ticks  · Medicines such as penicillin, sulfa drugs, amoxicillin, aspirin, ibuprofen; any medicine can cause a reaction  · Jewelry such as nickel, gold  (new, or something youve worn for a while including zippers, and  buttons)  · Latex such as in gloves, clothes, toys, balloons, or some tapes (some people allergic to latex may also have problems with foods like bananas, avocados, kiwi, papaya, or chestnuts)  · Lotions, perfumes, cosmetics, soaps, shampoos, skincare products, nail products  · Chemicals or dyes in clothing, linen, , hair dyes, soaps, iodine  Home care    The goal of our treatment is to help relieve the symptoms, and get you feeling  better. The rash will usually fade over several days, but can sometimes last a couple of weeks. Over the next couple of days, there may be times when it is gets a little worse, and then better again. Here are some things to do:  · If you know what you are allergic to, avoid it because future reactions could be worse than this one.  · Avoid tight clothing and anything that heats up your skin (hot showers/baths, direct sunlight) since heat will make itching worse.  · An ice pack will relieve local areas of intense itching and redness. Dont put the ice directly on the skin, because it can damage the skin. You can also ice put it in a plastic bag. Wrap it in something like a towel, sherry shirt, or cloth.  · Oral Benadryl (diphenhydramine) is an antihistamine available at drug and grocery stores. Unless a prescription antihistamine was given, Benadryl may be used to reduce itching if large areas of the skin are involved. It may make you sleepy, so be careful using it in the daytime or when going to school, working, or driving. [NOTE: Do not use Benadryl if you have glaucoma or if you are a man with trouble urinating due to an enlarged prostate.] There are antihistamines that causes less drowsiness and is a good alternatives for daytime use. Ask your pharmacist for suggestions.  · Do not use Benadryl cream on your skin, because in some people it can cause a further reaction, and make you allergic to Benadryl.  · Try not to scratch. This can tear the skin and cause an infection.  · Using heat-steam to clean your home, using high-efficiency particulate (HEPA) vacuums and filters, avoiding food and pet triggers, exterminating cockroaches, and frequent house cleaning are a few of the strategies used to decrease allergic reactions.  Follow-up care  Follow up with your healthcare provider, or as advised if your symptoms do not continue to improve or get worse.  Call 911  Call 911 if any of these occur:  · Trouble breathing or  swallowing, wheezing  · New or worsening swelling in the mouth, throat, or tongue  · Hoarse voice or trouble speaking  · Confused   · Very drowsy or trouble awakening  · Fainting or loss of consciousness  · Rapid heart rate  · Low blood pressure  · Feeling of doom  · Nausea, vomiting, abdominal pain, diarrhea  · Vomiting blood, or large amounts of blood in stool  · Seizure  When to seek medical advice  Call your healthcare provider right away if any of the following occur:  · Spreading areas of itching, redness or swelling  · New or worse swelling in the face, eyelids, or  lips  · Dizziness, weakness  · Signs of infection:  ¨ Spreading redness  ¨ Increased pain or swelling  ¨ Fever of 100.4ºF (38ºC) or higher, or as directed by your healthcare provider  ¨ Colored fluid draining from the inflamed areas  Date Last Reviewed: 7/30/2015  © 3775-3796 [employer] [address] [place] [address]. All rights reserved. This information is not intended as a substitute for professional medical care. Always follow your healthcare professional's instructions.

## 2018-10-15 NOTE — PROGRESS NOTES
Subjective:       Patient ID: Vishnu Dougherty Jr. is a 58 y.o. male.    Chief Complaint: skin tingles and burns x's 1 wk ( with daughter  - Nighat)    Rash   This is a new problem. The current episode started 1 to 4 weeks ago. The problem has been waxing and waning since onset. The affected locations include the neck, face, left eye, right eye, left arm and right arm. The rash is characterized by burning. It is unknown if there was an exposure to a precipitant. Pertinent negatives include no fatigue, fever or shortness of breath. Past treatments include nothing.     Past Medical History:   Diagnosis Date    Dilated cardiomyopathy 9/24/2015    Enlarged prostate     Essential hypertension 9/24/2015    Gastroesophageal reflux disease without esophagitis 10/7/2015    Hyperlipidemia     Shortness of breath 9/24/2015       Review of Systems   Constitutional: Negative for chills, fatigue and fever.   Respiratory: Negative for chest tightness and shortness of breath.    Cardiovascular: Negative for chest pain.   Gastrointestinal: Negative for abdominal pain.   Endocrine:        Recently was seen in the ER a change in his mental status. His calcium was found to be low   Skin: Positive for rash.   Psychiatric/Behavioral:        Family member with him states that he continues to miss meals through the day       Objective:      Physical Exam   Constitutional: He appears well-developed and well-nourished. No distress.   HENT:   Head: Normocephalic and atraumatic.   Neck: Neck supple.   Cardiovascular: Normal rate and regular rhythm. Exam reveals no gallop and no friction rub.   No murmur heard.  Pulmonary/Chest: Effort normal and breath sounds normal. No stridor. No respiratory distress. He has no wheezes. He has no rales. He exhibits no tenderness.   Lymphadenopathy:     He has no cervical adenopathy.   Skin: He is not diaphoretic.   Nursing note and vitals reviewed.      Assessment:       1. Allergic reaction, initial  encounter    2. Conjunctivitis, unspecified conjunctivitis type, unspecified laterality    3. Hypocalcemia    4. Dementia without behavioral disturbance, unspecified dementia type        Plan:       Allergic reaction, initial encounter    Conjunctivitis, unspecified conjunctivitis type, unspecified laterality    Hypocalcemia  -     Comprehensive metabolic panel; Future; Expected date: 10/15/2018    Dementia without behavioral disturbance, unspecified dementia type  -     Ambulatory referral to Outpatient Case Management    Other orders  -     levocetirizine (XYZAL) 5 MG tablet; Take 1 tablet (5 mg total) by mouth every evening.  Dispense: 30 tablet; Refill: 0  -     nedocromil (ALOCRIL) 2 % ophthalmic solution; Place 1 drop into both eyes 2 (two) times daily as needed.  Dispense: 5 mL; Refill: 0

## 2018-10-30 ENCOUNTER — OUTPATIENT CASE MANAGEMENT (OUTPATIENT)
Dept: ADMINISTRATIVE | Facility: OTHER | Age: 58
End: 2018-10-30

## 2018-10-30 LAB — HIV AG/AB 4TH GEN: NON REACTIVE

## 2018-10-30 NOTE — PROGRESS NOTES
Please note the following patient's information was forwarded to Medicaid for case management and/or  on 10/18/18.    Please see the media section of patient's chart for additional details.    Please contact Ext. [phone] with any questions.    Thank you,    Hallie Lopez    Outpatient Case Management

## 2019-01-09 RX ORDER — CLONAZEPAM 0.5 MG/1
TABLET ORAL
Qty: 30 TABLET | Refills: 2 | Status: SHIPPED | OUTPATIENT
Start: 2019-01-09 | End: 2019-02-20 | Stop reason: SDUPTHER

## 2019-02-05 RX ORDER — BUPROPION HYDROCHLORIDE 150 MG/1
TABLET, EXTENDED RELEASE ORAL
Qty: 60 TABLET | Refills: 1 | Status: SHIPPED | OUTPATIENT
Start: 2019-02-05 | End: 2019-03-12 | Stop reason: SDUPTHER

## 2019-02-20 RX ORDER — CLONAZEPAM 0.5 MG/1
0.5 TABLET ORAL 2 TIMES DAILY
Qty: 60 TABLET | Refills: 1 | Status: SHIPPED | OUTPATIENT
Start: 2019-02-20 | End: 2019-05-13 | Stop reason: SDUPTHER

## 2019-02-20 NOTE — TELEPHONE ENCOUNTER
Spoke with Rach. She will increase his Klonopin to twice a day per written order by Mr. Mejia. She will check back in within a couple of days or sooner if needed.

## 2019-02-20 NOTE — TELEPHONE ENCOUNTER
----- Message from Linda Mike sent at 2/20/2019 11:39 AM CST -----  Contact: Rach sister  Type:  Needs Medical Advice    Who Called: Rach  Symptoms (please be specific): anxiety panic attacks, chest pain, numbness Dementia   How long has patient had these symptoms:  A week every am  Pharmacy name and phone #:    [id number] - [place], [place] - [address] [address]  Phone: [phone] Fax: [phone]      Would the patient rather a call back or a response via [facility]? Call  Best Call Back Number: [phone]  Additional Information: Please call sister today ASAP URGENT!!. States she gave him a Clonopin today. Thanks, clement

## 2019-03-12 RX ORDER — BUPROPION HYDROCHLORIDE 150 MG/1
TABLET, EXTENDED RELEASE ORAL
Qty: 60 TABLET | Refills: 1 | Status: SHIPPED | OUTPATIENT
Start: 2019-03-12 | End: 2019-05-12 | Stop reason: SDUPTHER

## 2019-04-02 RX ORDER — DONEPEZIL HYDROCHLORIDE 10 MG/1
TABLET, FILM COATED ORAL
Qty: 30 TABLET | Refills: 4 | Status: SHIPPED | OUTPATIENT
Start: 2019-04-02 | End: 2020-03-13 | Stop reason: SDUPTHER

## 2019-04-08 RX ORDER — AMLODIPINE BESYLATE 10 MG/1
TABLET ORAL
Qty: 90 TABLET | Refills: 2 | Status: SHIPPED | OUTPATIENT
Start: 2019-04-08 | End: 2020-05-07

## 2019-05-13 RX ORDER — CLONAZEPAM 0.5 MG/1
TABLET ORAL
Qty: 60 TABLET | Refills: 0 | Status: SHIPPED | OUTPATIENT
Start: 2019-05-13 | End: 2019-07-31 | Stop reason: SDUPTHER

## 2019-05-13 RX ORDER — BUPROPION HYDROCHLORIDE 150 MG/1
TABLET, EXTENDED RELEASE ORAL
Qty: 60 TABLET | Refills: 1 | Status: SHIPPED | OUTPATIENT
Start: 2019-05-13 | End: 2019-10-10 | Stop reason: SDUPTHER

## 2019-05-15 ENCOUNTER — TELEPHONE (OUTPATIENT)
Dept: INTERNAL MEDICINE | Facility: CLINIC | Age: 59
End: 2019-05-15

## 2019-05-15 NOTE — TELEPHONE ENCOUNTER
----- Message from Rocío Whitlock sent at 5/15/2019 11:12 AM CDT -----  Contact: self/[phone]  Type:  Patient Returning Call    Who Called:Vishnu Dougherty Jr.    Who Left Message for Patient:nurse  Does the patient know what this is regarding?:no  Would the patient rather a call back or a response via [facility]? Call back   Best Call Back Number:[phone]  Additional Information:

## 2019-08-01 RX ORDER — CLONAZEPAM 0.5 MG/1
TABLET ORAL
Qty: 60 TABLET | Refills: 0 | Status: SHIPPED | OUTPATIENT
Start: 2019-08-01 | End: 2019-11-22

## 2019-09-26 ENCOUNTER — TELEPHONE (OUTPATIENT)
Dept: INTERNAL MEDICINE | Facility: CLINIC | Age: 59
End: 2019-09-26

## 2019-09-26 NOTE — TELEPHONE ENCOUNTER
----- Message from Joe Anguiano sent at 9/26/2019 11:28 AM CDT -----  Contact: Faviola (daughter)  Name of Who is Calling: Faviola (daughter)      What is the request in detail: Would like for staff to give her a call, no other information giving.    Can the clinic reply by [facility]: no      What [facility]: [phone]

## 2019-09-26 NOTE — TELEPHONE ENCOUNTER
Patient says he was seeing things this morning and it took him a while to pull himself together. Daughter Faviola wants to know what you recommend. Should he come in to see you or what?

## 2019-10-02 ENCOUNTER — TELEPHONE (OUTPATIENT)
Dept: INTERNAL MEDICINE | Facility: CLINIC | Age: 59
End: 2019-10-02

## 2019-10-02 ENCOUNTER — HOSPITAL ENCOUNTER (OUTPATIENT)
Facility: HOSPITAL | Age: 59
Discharge: HOME | End: 2019-10-03
Attending: EMERGENCY MEDICINE | Admitting: INTERNAL MEDICINE

## 2019-10-02 DIAGNOSIS — E87.6 HYPOKALEMIA: ICD-10-CM

## 2019-10-02 DIAGNOSIS — R20.2 PARESTHESIA: ICD-10-CM

## 2019-10-02 DIAGNOSIS — R47.1 DYSARTHRIA: ICD-10-CM

## 2019-10-02 DIAGNOSIS — G45.9 TIA (TRANSIENT ISCHEMIC ATTACK): ICD-10-CM

## 2019-10-02 DIAGNOSIS — I10 HYPERTENSION, UNSPECIFIED TYPE: ICD-10-CM

## 2019-10-02 PROBLEM — F41.9 ANXIETY AND DEPRESSION: Status: ACTIVE | Noted: 2018-04-04

## 2019-10-02 PROBLEM — I50.9 CHRONIC CHF: Status: ACTIVE | Noted: 2019-10-02

## 2019-10-02 PROBLEM — I50.32 CHRONIC DIASTOLIC HF (HEART FAILURE): Status: RESOLVED | Noted: 2018-04-17 | Resolved: 2019-10-02

## 2019-10-02 LAB
ALBUMIN SERPL BCP-MCNC: 3.6 G/DL (ref 3.5–5.2)
ALP SERPL-CCNC: 71 U/L (ref 55–135)
ALT SERPL W/O P-5'-P-CCNC: 12 U/L (ref 10–44)
ANION GAP SERPL CALC-SCNC: 9 MMOL/L (ref 8–16)
AST SERPL-CCNC: 17 U/L (ref 10–40)
BASOPHILS # BLD AUTO: 0.06 K/UL (ref 0–0.2)
BASOPHILS NFR BLD: 1 % (ref 0–1.9)
BILIRUB SERPL-MCNC: 0.3 MG/DL (ref 0.1–1)
BUN SERPL-MCNC: 9 MG/DL (ref 8–23)
CALCIUM SERPL-MCNC: 9.8 MG/DL (ref 8.7–10.5)
CHLORIDE SERPL-SCNC: 103 MMOL/L (ref 95–110)
CHOLEST SERPL-MCNC: 203 MG/DL (ref 120–199)
CHOLEST/HDLC SERPL: 5.2 {RATIO} (ref 2–5)
CO2 SERPL-SCNC: 30 MMOL/L (ref 23–29)
CREAT SERPL-MCNC: 0.9 MG/DL (ref 0.5–1.4)
DIASTOLIC DYSFUNCTION: NO
DIFFERENTIAL METHOD: ABNORMAL
EOSINOPHIL # BLD AUTO: 0.1 K/UL (ref 0–0.5)
EOSINOPHIL NFR BLD: 2 % (ref 0–8)
ERYTHROCYTE [DISTWIDTH] IN BLOOD BY AUTOMATED COUNT: 13.1 % (ref 11.5–14.5)
EST. GFR  (AFRICAN AMERICAN): >60 ML/MIN/1.73 M^2
EST. GFR  (NON AFRICAN AMERICAN): >60 ML/MIN/1.73 M^2
ESTIMATED PA SYSTOLIC PRESSURE: 27.8
GLUCOSE SERPL-MCNC: 95 MG/DL (ref 70–110)
HCT VFR BLD AUTO: 36.4 % (ref 40–54)
HDLC SERPL-MCNC: 39 MG/DL (ref 40–75)
HDLC SERPL: 19.2 % (ref 20–50)
HGB BLD-MCNC: 12.3 G/DL (ref 14–18)
IMM GRANULOCYTES # BLD AUTO: 0.03 K/UL (ref 0–0.04)
IMM GRANULOCYTES NFR BLD AUTO: 0.5 % (ref 0–0.5)
LDLC SERPL CALC-MCNC: 128 MG/DL (ref 63–159)
LYMPHOCYTES # BLD AUTO: 1.7 K/UL (ref 1–4.8)
LYMPHOCYTES NFR BLD: 27.1 % (ref 18–48)
MCH RBC QN AUTO: 30.5 PG (ref 27–31)
MCHC RBC AUTO-ENTMCNC: 33.8 G/DL (ref 32–36)
MCV RBC AUTO: 90 FL (ref 82–98)
MONOCYTES # BLD AUTO: 0.4 K/UL (ref 0.3–1)
MONOCYTES NFR BLD: 6.4 % (ref 4–15)
NEUTROPHILS # BLD AUTO: 3.8 K/UL (ref 1.8–7.7)
NEUTROPHILS NFR BLD: 63 % (ref 38–73)
NONHDLC SERPL-MCNC: 164 MG/DL
NRBC BLD-RTO: 0 /100 WBC
PLATELET # BLD AUTO: 210 K/UL (ref 150–350)
PMV BLD AUTO: 11.2 FL (ref 9.2–12.9)
POCT GLUCOSE: 96 MG/DL (ref 70–110)
POTASSIUM SERPL-SCNC: 3.1 MMOL/L (ref 3.5–5.1)
PROT SERPL-MCNC: 6.6 G/DL (ref 6–8.4)
RBC # BLD AUTO: 4.03 M/UL (ref 4.6–6.2)
RETIRED EF AND QEF - SEE NOTES: 60 (ref 55–65)
SODIUM SERPL-SCNC: 142 MMOL/L (ref 136–145)
TRIGL SERPL-MCNC: 180 MG/DL (ref 30–150)
TROPONIN I SERPL DL<=0.01 NG/ML-MCNC: <0.006 NG/ML (ref 0–0.03)
WBC # BLD AUTO: 6.08 K/UL (ref 3.9–12.7)

## 2019-10-02 PROCEDURE — 25000003 PHARM REV CODE 250: Performed by: NURSE PRACTITIONER

## 2019-10-02 PROCEDURE — 25000003 PHARM REV CODE 250: Performed by: EMERGENCY MEDICINE

## 2019-10-02 PROCEDURE — 36415 COLL VENOUS BLD VENIPUNCTURE: CPT

## 2019-10-02 PROCEDURE — G0378 HOSPITAL OBSERVATION PER HR: HCPCS

## 2019-10-02 PROCEDURE — 93010 ELECTROCARDIOGRAM REPORT: CPT | Mod: ,,, | Performed by: INTERNAL MEDICINE

## 2019-10-02 PROCEDURE — 99285 EMERGENCY DEPT VISIT HI MDM: CPT | Mod: 25

## 2019-10-02 PROCEDURE — 93306 TTE W/DOPPLER COMPLETE: CPT

## 2019-10-02 PROCEDURE — 93005 ELECTROCARDIOGRAM TRACING: CPT

## 2019-10-02 PROCEDURE — 84484 ASSAY OF TROPONIN QUANT: CPT

## 2019-10-02 PROCEDURE — 80061 LIPID PANEL: CPT

## 2019-10-02 PROCEDURE — 82962 GLUCOSE BLOOD TEST: CPT

## 2019-10-02 PROCEDURE — 80053 COMPREHEN METABOLIC PANEL: CPT

## 2019-10-02 PROCEDURE — 63600175 PHARM REV CODE 636 W HCPCS: Performed by: INTERNAL MEDICINE

## 2019-10-02 PROCEDURE — 93010 EKG 12-LEAD: ICD-10-PCS | Mod: ,,, | Performed by: INTERNAL MEDICINE

## 2019-10-02 PROCEDURE — 85025 COMPLETE CBC W/AUTO DIFF WBC: CPT

## 2019-10-02 PROCEDURE — 93306 TTE W/DOPPLER COMPLETE: CPT | Mod: 26,,, | Performed by: INTERNAL MEDICINE

## 2019-10-02 PROCEDURE — 93306 2D ECHO WITH COLOR FLOW DOPPLER: ICD-10-PCS | Mod: 26,,, | Performed by: INTERNAL MEDICINE

## 2019-10-02 RX ORDER — HYDRALAZINE HYDROCHLORIDE 20 MG/ML
10 INJECTION INTRAMUSCULAR; INTRAVENOUS EVERY 8 HOURS PRN
Status: DISCONTINUED | OUTPATIENT
Start: 2019-10-02 | End: 2019-10-03 | Stop reason: HOSPADM

## 2019-10-02 RX ORDER — ACETAMINOPHEN 325 MG/1
650 TABLET ORAL EVERY 4 HOURS PRN
Status: DISCONTINUED | OUTPATIENT
Start: 2019-10-02 | End: 2019-10-03 | Stop reason: HOSPADM

## 2019-10-02 RX ORDER — ONDANSETRON 2 MG/ML
4 INJECTION INTRAMUSCULAR; INTRAVENOUS EVERY 8 HOURS PRN
Status: DISCONTINUED | OUTPATIENT
Start: 2019-10-02 | End: 2019-10-03 | Stop reason: HOSPADM

## 2019-10-02 RX ORDER — IBUPROFEN 200 MG
16 TABLET ORAL
Status: DISCONTINUED | OUTPATIENT
Start: 2019-10-02 | End: 2019-10-03 | Stop reason: HOSPADM

## 2019-10-02 RX ORDER — HYDRALAZINE HYDROCHLORIDE 20 MG/ML
10 INJECTION INTRAMUSCULAR; INTRAVENOUS
Status: ACTIVE | OUTPATIENT
Start: 2019-10-02 | End: 2019-10-03

## 2019-10-02 RX ORDER — BUPROPION HYDROCHLORIDE 150 MG/1
150 TABLET, EXTENDED RELEASE ORAL 2 TIMES DAILY
Status: DISCONTINUED | OUTPATIENT
Start: 2019-10-02 | End: 2019-10-03 | Stop reason: HOSPADM

## 2019-10-02 RX ORDER — ASPIRIN 325 MG
325 TABLET ORAL ONCE
Status: COMPLETED | OUTPATIENT
Start: 2019-10-02 | End: 2019-10-02

## 2019-10-02 RX ORDER — SODIUM CHLORIDE 0.9 % (FLUSH) 0.9 %
10 SYRINGE (ML) INJECTION
Status: DISCONTINUED | OUTPATIENT
Start: 2019-10-02 | End: 2019-10-03 | Stop reason: HOSPADM

## 2019-10-02 RX ORDER — POTASSIUM CHLORIDE 20 MEQ/1
20 TABLET, EXTENDED RELEASE ORAL
Status: COMPLETED | OUTPATIENT
Start: 2019-10-02 | End: 2019-10-02

## 2019-10-02 RX ORDER — NAPROXEN SODIUM 220 MG/1
81 TABLET, FILM COATED ORAL DAILY
Status: DISCONTINUED | OUTPATIENT
Start: 2019-10-03 | End: 2019-10-03 | Stop reason: HOSPADM

## 2019-10-02 RX ORDER — GLUCAGON 1 MG
1 KIT INJECTION
Status: DISCONTINUED | OUTPATIENT
Start: 2019-10-02 | End: 2019-10-03 | Stop reason: HOSPADM

## 2019-10-02 RX ORDER — DONEPEZIL HYDROCHLORIDE 5 MG/1
10 TABLET, FILM COATED ORAL NIGHTLY
Status: DISCONTINUED | OUTPATIENT
Start: 2019-10-02 | End: 2019-10-03 | Stop reason: HOSPADM

## 2019-10-02 RX ORDER — IBUPROFEN 200 MG
24 TABLET ORAL
Status: DISCONTINUED | OUTPATIENT
Start: 2019-10-02 | End: 2019-10-03 | Stop reason: HOSPADM

## 2019-10-02 RX ORDER — ONDANSETRON 8 MG/1
8 TABLET, ORALLY DISINTEGRATING ORAL EVERY 8 HOURS PRN
Status: DISCONTINUED | OUTPATIENT
Start: 2019-10-02 | End: 2019-10-03 | Stop reason: HOSPADM

## 2019-10-02 RX ORDER — ATORVASTATIN CALCIUM 10 MG/1
20 TABLET, FILM COATED ORAL NIGHTLY
Status: DISCONTINUED | OUTPATIENT
Start: 2019-10-02 | End: 2019-10-03 | Stop reason: HOSPADM

## 2019-10-02 RX ORDER — POTASSIUM CHLORIDE 20 MEQ/15ML
20 SOLUTION ORAL ONCE
Status: COMPLETED | OUTPATIENT
Start: 2019-10-02 | End: 2019-10-02

## 2019-10-02 RX ADMIN — ASPIRIN 325 MG ORAL TABLET 325 MG: 325 PILL ORAL at 04:10

## 2019-10-02 RX ADMIN — BUPROPION HYDROCHLORIDE 150 MG: 150 TABLET, EXTENDED RELEASE ORAL at 08:10

## 2019-10-02 RX ADMIN — DONEPEZIL HYDROCHLORIDE 10 MG: 5 TABLET, FILM COATED ORAL at 08:10

## 2019-10-02 RX ADMIN — LORAZEPAM 1 MG: 2 INJECTION INTRAMUSCULAR; INTRAVENOUS at 09:10

## 2019-10-02 RX ADMIN — ATORVASTATIN CALCIUM 20 MG: 10 TABLET, FILM COATED ORAL at 08:10

## 2019-10-02 RX ADMIN — POTASSIUM CHLORIDE 20 MEQ: 20 SOLUTION ORAL at 04:10

## 2019-10-02 RX ADMIN — POTASSIUM CHLORIDE 20 MEQ: 20 TABLET, EXTENDED RELEASE ORAL at 01:10

## 2019-10-02 NOTE — PLAN OF CARE
Received to room 545 from ED. AAO x3. Denies numbness or tingling. Ambulated in room without difficulty. Will monitor.

## 2019-10-02 NOTE — NURSING
Received to room 545 from ED. Patient denies numbness/tingling. hand strength strong and equal. ambulated in room, from stretcher to bed without difficulty.

## 2019-10-02 NOTE — SUBJECTIVE & OBJECTIVE
Past Medical History:   Diagnosis Date    Chronic diastolic HF (heart failure) 2018    Dilated cardiomyopathy 2015    Enlarged prostate     Essential hypertension 2015    Gastroesophageal reflux disease without esophagitis 10/7/2015    Gastroesophageal reflux disease without esophagitis 10/7/2015    Hyperlipidemia     Shortness of breath 2015       Past Surgical History:   Procedure Laterality Date    COLONOSCOPY N/A 2016    Procedure: COLONOSCOPY;  Surgeon: Demetrio Spicer MD;  Location[facility];  Service: Endoscopy;  Laterality: N/A;       Review of patient's allergies indicates:  No Known Allergies    No current facility-administered medications on file prior to encounter.      Current Outpatient Medications on File Prior to Encounter   Medication Sig    amLODIPine (NORVASC) 10 MG tablet TAKE 1 TABLET BY MOUTH EVERY DAY    buPROPion (WELLBUTRIN SR) 150 MG TBSR 12 hr tablet TAKE 1 TABLET BY MOUTH TWICE A DAY    clonazePAM (KLONOPIN) 0.5 MG tablet TAKE 1 TABLET BY MOUTH TWICE A DAY    donepezil (ARICEPT) 10 MG tablet TAKE 1 TABLET BY MOUTH EVERY EVENING    levocetirizine (XYZAL) 5 MG tablet Take 1 tablet (5 mg total) by mouth every evening.     Family History     None        Tobacco Use    Smoking status: Former Smoker     Last attempt to quit: 1995     Years since quittin.0    Smokeless tobacco: Never Used   Substance and Sexual Activity    Alcohol use: No     Alcohol/week: 0.0 standard drinks    Drug use: No    Sexual activity: Not Currently     Review of Systems   Constitutional: Positive for fatigue. Negative for chills, diaphoresis and fever.   HENT: Negative for hearing loss, mouth sores, sore throat, tinnitus and trouble swallowing.    Eyes: Negative for pain, discharge and redness.   Respiratory: Negative for apnea, cough, choking, chest tightness, shortness of breath, wheezing and stridor.    Cardiovascular: Positive for palpitations. Negative for chest  pain and leg swelling.   Gastrointestinal: Negative for abdominal distention, abdominal pain, blood in stool, constipation, diarrhea, nausea, rectal pain and vomiting.   Endocrine: Negative for cold intolerance, heat intolerance, polydipsia, polyphagia and polyuria.   Genitourinary: Negative for difficulty urinating, dysuria, flank pain, frequency, hematuria and urgency.   Musculoskeletal: Negative for arthralgias, back pain, gait problem, joint swelling, neck pain and neck stiffness.   Skin: Negative for color change, rash and wound.   Allergic/Immunologic: Negative for food allergies.   Neurological: Positive for weakness and numbness. Negative for dizziness, tremors, seizures, syncope, speech difficulty, light-headedness and headaches.        Reports paraesthesias to lips, left side of face, and LUE PTA that has now resolved.   Hematological: Negative for adenopathy. Does not bruise/bleed easily.   Psychiatric/Behavioral: Negative for agitation and confusion. The patient is not nervous/anxious.    All other systems reviewed and are negative.    Objective:     Vital Signs (Most Recent):  Temp: 98.6 °F (37 °C) (10/02/19 1304)  Pulse: (!) 50 (10/02/19 1400)  Resp: 19 (10/02/19 1400)  BP: (!) 184/84 (10/02/19 1400)  SpO2: 97 % (10/02/19 1400) Vital Signs (24h Range):  Temp:  [98.6 °F (37 °C)] 98.6 °F (37 °C)  Pulse:  [50-63] 50  Resp:  [2-19] 19  SpO2:  [95 %-98 %] 97 %  BP: (184-214)/() 184/84     Weight: 80.9 kg (178 lb 4.8 oz)  Body mass index is 24.18 kg/m².    Physical Exam   Constitutional: He is oriented to person, place, and time. He appears well-developed and well-nourished. No distress.   HENT:   Head: Normocephalic and atraumatic.   Mouth/Throat: Oropharynx is clear and moist.   Eyes: Pupils are equal, round, and reactive to light. Conjunctivae and EOM are normal.   Neck: Normal range of motion. Neck supple. No JVD present.   Cardiovascular: Normal rate, regular rhythm, normal heart sounds and intact  distal pulses. Exam reveals no gallop and no friction rub.   No murmur heard.  Pulmonary/Chest: Effort normal and breath sounds normal. No stridor. No respiratory distress. He has no wheezes. He has no rales. He exhibits no tenderness.   Abdominal: Soft. Bowel sounds are normal. He exhibits no distension and no mass. There is no tenderness. There is no rebound and no guarding.   Musculoskeletal: Normal range of motion. He exhibits no edema or tenderness.   Neurological: He is alert and oriented to person, place, and time. He has normal strength. No cranial nerve deficit or sensory deficit. Coordination normal. GCS eye subscore is 4. GCS verbal subscore is 5. GCS motor subscore is 6.   Skin: Skin is warm and dry. No rash noted. He is not diaphoretic. No erythema.   Psychiatric: He has a normal mood and affect. His behavior is normal. Judgment and thought content normal.   Nursing note and vitals reviewed.        CRANIAL NERVES     CN III, IV, VI   Pupils are equal, round, and reactive to light.  Extraocular motions are normal.        Significant Labs:   CBC:   Recent Labs   Lab 10/02/19  1252   WBC 6.08   HGB 12.3*   HCT 36.4*        CMP:   Recent Labs   Lab 10/02/19  1252      K 3.1*      CO2 30*   GLU 95   BUN 9   CREATININE 0.9   CALCIUM 9.8   PROT 6.6   ALBUMIN 3.6   BILITOT 0.3   ALKPHOS 71   AST 17   ALT 12   ANIONGAP 9   EGFRNONAA >60     Troponin:   Recent Labs   Lab 10/02/19  1252   TROPONINI <0.006       Significant Imaging: I have reviewed all pertinent imaging results/findings within the past 24 hours.

## 2019-10-02 NOTE — TELEPHONE ENCOUNTER
----- Message from Tomasa Marcelo sent at 10/2/2019 12:40 PM CDT -----  Contact: Pt/Daughter (Faviola)  Please give Faviola a call at [phone] regarding the pt who is in the ER and she needs the medication list.

## 2019-10-02 NOTE — HPI
Vishnu Dougherty Jr. is a 59 y.o. male patient with a PMHx of Memory loss, HTN, Depression, and HLD who presented to the Emergency Department today for evaluation of L arm and face/lip tingling which onset gradually 2-3 weeks ago.  Patient reports slight improvement during the week, but said it worsened today.  Symptoms are constant and moderate in severity.  No mitigating or exacerbating factors reported.  Associated sxs include palpitations and mild generalized weakness.  Patient denies any SOB, fever, chills, arm pain, CP, dizziness, and all other sxs at this time.  No prior Tx reported.  No further complaints or concerns at this time.  ER workup showed:  Markedly elevated BP of 214/100, otherwise stable VS.  CBC showed Hgb 12.3, Hct 36.4, otherwise unremarkable.  CMP showed KCL 3.1, otherwise unremarkable.  Initial troponin negative.  CXR showed no acute findings.  CT head showed no evidence of acute intracranial abnormality.  12 lead EKG showed SB with no acute changes.  Cervical spine CT showed no acute fracture or subluxation.  Degenerative disc disease at C5-6.  Hospital Medicine called for admission.  Patient is a Full Code.  His SDM is his daughter Faviola who can be reached at [phone].

## 2019-10-02 NOTE — NURSING
Spoke with Diane in ED, she stated the ER doc conferred with the Tele stroke doctor and determined that the the consult to telemedicine stroke was not needed, thus not done.

## 2019-10-02 NOTE — ED PROVIDER NOTES
SCRIBE #1 NOTE: I, Winsome Canales/Yassine Porter, am scribing for, and in the presence of, Joshua Dobbs MD. I have scribed the entire note.       History     Chief Complaint   Patient presents with    Tingling     Pt now states tingling to L side that started 1 week ago. States some improvement but worsened this morning. Slurred speech per family      Review of patient's allergies indicates:  No Known Allergies      History of Present Illness     HPI    10/2/2019, 12:40 PM  History obtained from the patient      History of Present Illness: Vishnu Dougherty Jr. is a 59 y.o. male patient with a PMHx of GERD and hyperlipidemia who presents to the Emergency Department for evaluation of L arm tingling which onset gradually one week ago. Pt reports slight improvement within the week, but says it has worsened today. Symptoms are constant and moderate in severity. No mitigating or exacerbating factors reported. Associated sxs include mild generalized weakness. Patient denies any SOB, fever, chills, arm pain, CP, dizziness, and all other sxs at this time. No prior Tx reported. No further complaints or concerns at this time.     Arrival mode: Personal vehicle     PCP: Ant Mejia MD        Past Medical History:  Past Medical History:   Diagnosis Date    Dilated cardiomyopathy 2015    Enlarged prostate     Essential hypertension 2015    Gastroesophageal reflux disease without esophagitis 10/7/2015    Hyperlipidemia     Shortness of breath 2015       Past Surgical History:  Past Surgical History:   Procedure Laterality Date    COLONOSCOPY N/A 2016    Procedure: COLONOSCOPY;  Surgeon: Demetrio Spicer MD;  Location: [facility];  Service: Endoscopy;  Laterality: N/A;         Family History:  History reviewed. No pertinent family history.       Social History:  Social History     Tobacco Use    Smoking status: Former Smoker     Last attempt to quit: 1995     Years since quittin.0     Smokeless tobacco: Never Used   Substance and Sexual Activity    Alcohol use: No     Alcohol/week: 0.0 standard drinks    Drug use: No    Sexual activity: Not Currently        Review of Systems     Review of Systems   Constitutional: Negative for chills and fever.   HENT: Negative for sore throat.    Respiratory: Negative for shortness of breath.    Cardiovascular: Negative for chest pain.   Gastrointestinal: Negative for nausea.   Genitourinary: Negative for dysuria.   Musculoskeletal: Negative for back pain.   Skin: Negative for rash.   Neurological: Positive for weakness (mild, generalized) and numbness (LUE tingling). Negative for dizziness.   Hematological: Does not bruise/bleed easily.   All other systems reviewed and are negative.     Physical Exam     Initial Vitals   BP Pulse Resp Temp SpO2   10/02/19 1233 10/02/19 1233 10/02/19 1233 10/02/19 1304 10/02/19 1233   (!) 214/100 63 19 98.6 °F (37 °C) 98 %      MAP       --                 Physical Exam  Nursing Notes and Vital Signs Reviewed.  Constitutional: Patient is in no acute distress. Well-developed and well-nourished.  Head: Atraumatic. Normocephalic.  Eyes: PERRL. EOM intact. Conjunctivae are not pale. No scleral icterus.  ENT: Mucous membranes are moist. Oropharynx is clear and symmetric.    Neck: Supple. Full ROM. No lymphadenopathy.  Cardiovascular: Regular rate. Regular rhythm. No murmurs, rubs, or gallops. Distal pulses are 2+ and symmetric.  Pulmonary/Chest: No respiratory distress. Clear to auscultation bilaterally. No wheezing or rales.  Abdominal: Soft and non-distended.  There is no tenderness.  No rebound, guarding, or rigidity. Good bowel sounds.  Musculoskeletal: Moves all extremities. No obvious deformities. No edema.   Skin: Warm and dry.  Neurological: Patient is alert and oriented to person, place and time. Pupils ERRL and EOM normal. Cranial nerves II-XII are intact. Strength is full bilaterally; it is equal and 5/5 in bilateral  upper and lower extremities. There is no pronator drift of outstretched arms. Light touch sense is intact. Speech is clear and normal. No acute focal neurological deficits noted.  Psychiatric: Normal affect. Good eye contact. Appropriate in content.     ED Course   Procedures  ED Vital Signs:  Vitals:    10/02/19 1233 10/02/19 1236 10/02/19 1237 10/02/19 1301   BP: (!) 214/100  (!) 214/97    Pulse: 63  (!) 57 (!) 51   Resp: 19  (!) 2    Temp:       TempSrc:       SpO2: 98%  97%    Weight: 80.9 kg (178 lb 4.8 oz)      Height:  6' (1.829 m)      10/02/19 1304   BP: (!) 186/84   Pulse: (!) 50   Resp: 19   Temp: 98.6 °F (37 °C)   TempSrc: Oral   SpO2: 95%   Weight:    Height:        Abnormal Lab Results:  Labs Reviewed   CBC W/ AUTO DIFFERENTIAL - Abnormal; Notable for the following components:       Result Value    RBC 4.03 (*)     Hemoglobin 12.3 (*)     Hematocrit 36.4 (*)     All other components within normal limits   COMPREHENSIVE METABOLIC PANEL - Abnormal; Notable for the following components:    Potassium 3.1 (*)     CO2 30 (*)     All other components within normal limits   TROPONIN I   POCT GLUCOSE        All Lab Results:  Results for orders placed or performed during the hospital encounter of 10/02/19   CBC auto differential   Result Value Ref Range    WBC 6.08 3.90 - 12.70 K/uL    RBC 4.03 (L) 4.60 - 6.20 M/uL    Hemoglobin 12.3 (L) 14.0 - 18.0 g/dL    Hematocrit 36.4 (L) 40.0 - 54.0 %    Mean Corpuscular Volume 90 82 - 98 fL    Mean Corpuscular Hemoglobin 30.5 27.0 - 31.0 pg    Mean Corpuscular Hemoglobin Conc 33.8 32.0 - 36.0 g/dL    RDW 13.1 11.5 - 14.5 %    Platelets 210 150 - 350 K/uL    MPV 11.2 9.2 - 12.9 fL    Immature Granulocytes 0.5 0.0 - 0.5 %    Gran # (ANC) 3.8 1.8 - 7.7 K/uL    Immature Grans (Abs) 0.03 0.00 - 0.04 K/uL    Lymph # 1.7 1.0 - 4.8 K/uL    Mono # 0.4 0.3 - 1.0 K/uL    Eos # 0.1 0.0 - 0.5 K/uL    Baso # 0.06 0.00 - 0.20 K/uL    nRBC 0 0 /100 WBC    Gran% 63.0 38.0 - 73.0 %     Lymph% 27.1 18.0 - 48.0 %    Mono% 6.4 4.0 - 15.0 %    Eosinophil% 2.0 0.0 - 8.0 %    Basophil% 1.0 0.0 - 1.9 %    Differential Method Automated    Comprehensive metabolic panel   Result Value Ref Range    Sodium 142 136 - 145 mmol/L    Potassium 3.1 (L) 3.5 - 5.1 mmol/L    Chloride 103 95 - 110 mmol/L    CO2 30 (H) 23 - 29 mmol/L    Glucose 95 70 - 110 mg/dL    BUN, Bld 9 8 - 23 mg/dL    Creatinine 0.9 0.5 - 1.4 mg/dL    Calcium 9.8 8.7 - 10.5 mg/dL    Total Protein 6.6 6.0 - 8.4 g/dL    Albumin 3.6 3.5 - 5.2 g/dL    Total Bilirubin 0.3 0.1 - 1.0 mg/dL    Alkaline Phosphatase 71 55 - 135 U/L    AST 17 10 - 40 U/L    ALT 12 10 - 44 U/L    Anion Gap 9 8 - 16 mmol/L    eGFR if African American >60 >60 mL/min/1.73 m^2    eGFR if non African American >60 >60 mL/min/1.73 m^2   Troponin I   Result Value Ref Range    Troponin I <0.006 0.000 - 0.026 ng/mL   POCT glucose   Result Value Ref Range    POCT Glucose 96 70 - 110 mg/dL         Imaging Results:  Imaging Results          X-Ray Chest 1 View (Final result)  Result time 10/02/19 12:57:26    Final result by Jake Lawson MD (10/02/19 12:57:26)                 Impression:      No acute findings.      Electronically signed by: Jake Lawson MD  Date:    10/02/2019  Time:    12:57             Narrative:    EXAMINATION:  XR CHEST 1 VIEW    CLINICAL HISTORY:  Paresthesia of skin    TECHNIQUE:  AP view of the chest was performed.    COMPARISON:  10/06/2015    FINDINGS:  The cardiac and mediastinal silhouettes appear within normal limits.   The lungs are clear bilaterally.  No acute osseous findings demonstrated.                               CT Cervical Spine Without Contrast (Final result)  Result time 10/02/19 12:57:09    Final result by Jake Lawson MD (10/02/19 12:57:09)                 Impression:      No acute fracture or subluxation.    Degenerative disc disease at C5-6.    All CT scans at this facility use dose modulation, iterative reconstruction and/or weight based  dosing when appropriate to reduce radiation dose to as low as reasonably achievable.      Electronically signed by: Jake Lawson MD  Date:    10/02/2019  Time:    12:57             Narrative:    EXAMINATION:  CT CERVICAL SPINE WITHOUT CONTRAST    CLINICAL HISTORY:  paresthesia;    TECHNIQUE:  Axial CT imaging was performed through the cervical spine without intravenous contrast. Multiplanar reformats were reviewed and interpreted.    COMPARISON:  None    FINDINGS:  Vertebral body heights are normal.Alignment is normal.  Moderate degenerative disc disease at C5-6 with associated vacuum disc phenomenon and uncovertebral joint hypertrophy that contributes to mild bilateral neural foraminal narrowing.  No soft tissue abnormality detected.                               CT Head Without Contrast (Final result)  Result time 10/02/19 12:48:39    Final result by Jake Lawson MD (10/02/19 12:48:39)                 Impression:      No CT evidence of acute intracranial abnormality.    COMMUNICATION  This critical result was discovered/received at 1245.  The critical information above was relayed directly by me by telephone to Dr. Dobbs at 12:48 hours on 10/2/19.    All CT scans at this facility are performed  using dose modulation techniques as appropriate to performed exam including the following:  automated exposure control; adjustment of mA and/or kV according to the patients size (this includes techniques or standardized protocols for targeted exams where dose is matched to indication/reason for exam: i.e. extremities or head);  iterative reconstruction technique.      Electronically signed by: Jake Lawson MD  Date:    10/02/2019  Time:    12:48             Narrative:    EXAMINATION:  CT HEAD WITHOUT CONTRAST    CLINICAL HISTORY:  Paresthesia of skin,paresthesia;    TECHNIQUE:  Axial CT imaging was performed through the head without intravenous contrast.    COMPARISON:  MRI brain on 04/10/2018    FINDINGS:  No  hydrocephalus, midline shift, mass effect, or acute intracranial hemorrhage. Cortical sulcal pattern and gray-white matter differentiation is normal. Basilar cisterns and posterior fossa are normal. The visualized paranasal sinuses and mastoid air cells are clear. The skull is intact.                                 The EKG was ordered, reviewed, and independently interpreted by the ED provider.  Interpretation time: 12:53  Rate: 50 BPM  Rhythm: sinus bradycardia  Interpretation: No acute ST changes. No STEMI.             The Emergency Provider reviewed the vital signs and test results, which are outlined above.     ED Discussion     1:08 PM: Discussed pt's case with Dr. Harrell (Neurology via Tele Stroke consult) who evaluated the pt at bedside and does not believe the pt is a candidate for tPA at this time. Dr. Harrell recommends admission for regular stroke workup.    1:56 PM: Discussed case with UBALDO Rodríguez ([facility]). Dr. Massey agrees with current care and management of pt and accepts admission.   Admitting Service: [facility]  Admitting Physician: Dr. Massey  Admit to: Obs Med Tele    1:57 PM: Re-evaluated pt. I have discussed test results, shared treatment plan, and the need for admission with patient and family at bedside. Pt and family express understanding at this time and agree with all information. All questions answered. Pt and family have no further questions or concerns at this time. Pt is ready for admit.         Medical Decision Making:   Clinical Tests:   Lab Tests: Ordered and Reviewed  Radiological Study: Ordered and Reviewed  Medical Tests: Ordered and Reviewed           ED Medication(s):  Medications   hydrALAZINE injection 10 mg ( Intravenous Canceled Entry 10/2/19 1300)   potassium chloride SA CR tablet 20 mEq (20 mEq Oral Given 10/2/19 1346)         New Prescriptions    No medications on file               Scribe Attestation:   Scribe #1: I performed the above scribed  service and the documentation accurately describes the services I performed. I attest to the accuracy of the note.     Attending:   Physician Attestation Statement for Scribe #1: I, Joshua Dobbs MD, personally performed the services described in this documentation, as scribed by Winsome Canales/Yassine Porter, in my presence, and it is both accurate and complete.           Clinical Impression       ICD-10-CM ICD-9-CM   1. Paresthesia R20.2 782.0   2. Dysarthria R47.1 784.51   3. Hypertension, unspecified type I10 401.9   4. Hypokalemia E87.6 276.8       Disposition:   Disposition: Placed in Observation  Condition: Fair         Joshua Dobbs MD  10/08/19 0616

## 2019-10-02 NOTE — PLAN OF CARE
Pt presented to the emergency department due to tingling sensation and weakness that began at home.  The pt is alert and oriented to person, place, and time, and his daughter is present at the bedside.  The pt reports that he lives at home and is independent with ADLs, but he does have family available to assist him if needed.  He was diagnosed with dementia approximately two years ago and was unable to work anymore, and has been on disability since then.  SW inquired about uninsured status and pt confirmed he is currently without insurance and is interested in applying for medicaid.  CM will continue to follow.      D/C plan: home        10/02/19 8941   Discharge Assessment   Assessment Type Discharge Planning Assessment   Confirmed/corrected address and phone number on facesheet? Yes   Assessment information obtained from? Patient;Medical Record;Caregiver   Expected Length of Stay (days)   (TBD)   Communicated expected length of stay with patient/caregiver yes   Prior to hospitilization cognitive status: Alert/Oriented   Prior to hospitalization functional status: Independent   Current cognitive status: Alert/Oriented   Current Functional Status: Independent   Facility Arrived From: home    Lives With alone   Able to Return to Prior Arrangements yes   Is patient able to care for self after discharge? Yes   Who are your caregiver(s) and their phone number(s)? Faviola Conrad, daughter: [phone]; Rach Myers, sister: [phone]   Patient's perception of discharge disposition home or selfcare   Readmission Within the Last 30 Days no previous admission in last 30 days   Patient currently being followed by outpatient case management? No   Patient currently receives any other outside agency services? No   Equipment Currently Used at Home none   Do you have any problems affording any of your prescribed medications? No   Is the patient taking medications as prescribed? yes   Does the patient have transportation  home? Yes   Transportation Anticipated family or friend will provide   Does the patient receive services at the [facility]? No   Discharge Plan A Home   Discharge Plan B Home   DME Needed Upon Discharge  none   Patient/Family in Agreement with Plan yes

## 2019-10-02 NOTE — H&P
[facility] Hospital Medicine  History & Physical    Patient Name: Vishnu Dougherty Jr.  MRN: [id number]  Admission Date: 10/2/2019  Attending Physician: Ashu Jacob, *   Primary Care Provider: Ant Mejia MD         Patient information was obtained from patient, past medical records and ER records.     Subjective:     Principal Problem:TIA (transient ischemic attack)    Chief Complaint:   Chief Complaint   Patient presents with    Tingling     Pt now states tingling to L side that started 1 week ago. States some improvement but worsened this morning. Slurred speech per family         HPI: Vishnu Dougherty Jr. is a 59 y.o. male patient with a PMHx of Memory loss, HTN, Depression, and HLD who presented to the Emergency Department today for evaluation of L arm and face/lip tingling which onset gradually  2-3 weeks ago.  Patient reports slight improvement during the week, but said it worsened today.  Symptoms are constant and moderate in severity.  No mitigating or exacerbating factors reported.  Associated sxs include palpitations and mild generalized weakness.  Patient denies any SOB, fever, chills, arm pain, CP, dizziness, and all other sxs at this time.  No prior Tx reported.  No further complaints or concerns at this time.  ER workup showed:  Markedly elevated BP of 214/100, otherwise stable VS.  CBC showed Hgb 12.3, Hct 36.4, otherwise unremarkable.  CMP showed KCL 3.1, otherwise unremarkable.  Initial troponin negative.  CXR showed no acute findings.  CT head showed no evidence of acute intracranial abnormality.  12 lead EKG showed SB with no acute changes.  Cervical spine CT showed no acute fracture or subluxation.  Degenerative disc disease at C5-6.  Hospital Medicine called for admission.  Patient is a Full Code.  His SDM is his daughter Faviola who can be reached at [phone].    Past Medical History:   Diagnosis Date    Chronic diastolic HF (heart failure) 4/17/2018     Dilated cardiomyopathy 2015    Enlarged prostate     Essential hypertension 2015    Gastroesophageal reflux disease without esophagitis 10/7/2015    Gastroesophageal reflux disease without esophagitis 10/7/2015    Hyperlipidemia     Shortness of breath 2015       Past Surgical History:   Procedure Laterality Date    COLONOSCOPY N/A 2016    Procedure: COLONOSCOPY;  Surgeon: Demetrio Spicer MD;  Location: [facility];  Service: Endoscopy;  Laterality: N/A;       Review of patient's allergies indicates:  No Known Allergies    No current facility-administered medications on file prior to encounter.      Current Outpatient Medications on File Prior to Encounter   Medication Sig    amLODIPine (NORVASC) 10 MG tablet TAKE 1 TABLET BY MOUTH EVERY DAY    buPROPion (WELLBUTRIN SR) 150 MG TBSR 12 hr tablet TAKE 1 TABLET BY MOUTH TWICE A DAY    clonazePAM (KLONOPIN) 0.5 MG tablet TAKE 1 TABLET BY MOUTH TWICE A DAY    donepezil (ARICEPT) 10 MG tablet TAKE 1 TABLET BY MOUTH EVERY EVENING    levocetirizine (XYZAL) 5 MG tablet Take 1 tablet (5 mg total) by mouth every evening.     Family History     None        Tobacco Use    Smoking status: Former Smoker     Last attempt to quit: 1995     Years since quittin.0    Smokeless tobacco: Never Used   Substance and Sexual Activity    Alcohol use: No     Alcohol/week: 0.0 standard drinks    Drug use: No    Sexual activity: Not Currently     Review of Systems   Constitutional: Positive for fatigue. Negative for chills, diaphoresis and fever.   HENT: Negative for hearing loss, mouth sores, sore throat, tinnitus and trouble swallowing.    Eyes: Negative for pain, discharge and redness.   Respiratory: Negative for apnea, cough, choking, chest tightness, shortness of breath, wheezing and stridor.    Cardiovascular: Positive for palpitations. Negative for chest pain and leg swelling.   Gastrointestinal: Negative for abdominal distention, abdominal pain,  blood in stool, constipation, diarrhea, nausea, rectal pain and vomiting.   Endocrine: Negative for cold intolerance, heat intolerance, polydipsia, polyphagia and polyuria.   Genitourinary: Negative for difficulty urinating, dysuria, flank pain, frequency, hematuria and urgency.   Musculoskeletal: Negative for arthralgias, back pain, gait problem, joint swelling, neck pain and neck stiffness.   Skin: Negative for color change, rash and wound.   Allergic/Immunologic: Negative for food allergies.   Neurological: Positive for weakness and numbness. Negative for dizziness, tremors, seizures, syncope, speech difficulty, light-headedness and headaches.        Reports paraesthesias to lips, left side of face, and LUE PTA that has now resolved.   Hematological: Negative for adenopathy. Does not bruise/bleed easily.   Psychiatric/Behavioral: Negative for agitation and confusion. The patient is not nervous/anxious.    All other systems reviewed and are negative.    Objective:     Vital Signs (Most Recent):  Temp: 98.6 °F (37 °C) (10/02/19 1304)  Pulse: (!) 50 (10/02/19 1400)  Resp: 19 (10/02/19 1400)  BP: (!) 184/84 (10/02/19 1400)  SpO2: 97 % (10/02/19 1400) Vital Signs (24h Range):  Temp:  [98.6 °F (37 °C)] 98.6 °F (37 °C)  Pulse:  [50-63] 50  Resp:  [2-19] 19  SpO2:  [95 %-98 %] 97 %  BP: (184-214)/() 184/84     Weight: 80.9 kg (178 lb 4.8 oz)  Body mass index is 24.18 kg/m².    Physical Exam   Constitutional: He is oriented to person, place, and time. He appears well-developed and well-nourished. No distress.   HENT:   Head: Normocephalic and atraumatic.   Mouth/Throat: Oropharynx is clear and moist.   Eyes: Pupils are equal, round, and reactive to light. Conjunctivae and EOM are normal.   Neck: Normal range of motion. Neck supple. No JVD present.   Cardiovascular: Bradycardia. Regular rhythm, normal heart sounds and intact distal pulses. Exam reveals no gallop and no friction rub.   No murmur  heard.  Pulmonary/Chest: Effort normal and breath sounds normal. No stridor. No respiratory distress. He has no wheezes. He has no rales. He exhibits no tenderness.   Abdominal: Soft. Bowel sounds are normal. He exhibits no distension and no mass. There is no tenderness. There is no rebound and no guarding.   Musculoskeletal: Normal range of motion. He exhibits no edema or tenderness.   Neurological: He is alert and oriented to person, place, and time. He has normal strength. No cranial nerve deficit or sensory deficit. Coordination normal. GCS eye subscore is 4. GCS verbal subscore is 5. GCS motor subscore is 6.   Skin: Skin is warm and dry. No rash noted. He is not diaphoretic. No erythema.   Psychiatric: He has a normal mood and affect. His behavior is normal. Judgment and thought content normal.   Nursing note and vitals reviewed.        CRANIAL NERVES     CN III, IV, VI   Pupils are equal, round, and reactive to light.  Extraocular motions are normal.        Significant Labs:   CBC:   Recent Labs   Lab 10/02/19  1252   WBC 6.08   HGB 12.3*   HCT 36.4*        CMP:   Recent Labs   Lab 10/02/19  1252      K 3.1*      CO2 30*   GLU 95   BUN 9   CREATININE 0.9   CALCIUM 9.8   PROT 6.6   ALBUMIN 3.6   BILITOT 0.3   ALKPHOS 71   AST 17   ALT 12   ANIONGAP 9   EGFRNONAA >60     Troponin:   Recent Labs   Lab 10/02/19  1252   TROPONINI <0.006       Significant Imaging: I have reviewed all pertinent imaging results/findings within the past 24 hours.    Assessment/Plan:     * TIA (transient ischemic attack)  - Place in OBS  - CT head negative for acute findings  - MRI brain, ECHO, and Carotid US pending  - Give  mg x 1 now, continue with 81 mg daily  - Lipid panel pending  - Start Lipitor   - Neuro checks q 4 hours  - Monitor        Paresthesia  - Currently asymptomatic  - See plan as per above      Hypokalemia  - KCL 3.1  - Replete with a total of 40 meq po today  - Monitor with daily  BMP      Essential hypertension  - BP elevated  - Allow for permissive HTN until MRI results  - Hydralazine prn SBP >220  - Monitor      Chronic CHF  - last ECHO in 4/2018 showed normal EF  - Appears compensated  - Currently not on any home meds  - Recommend outpatient f/u with Cardiology  - Tele monitoring      Anxiety and depression  - Continue home Wellbutrin      Memory loss of unknown cause  - Continue home Aricept  - Supportive care      VTE Risk Mitigation (From admission, onward)         Ordered     Place sequential compression device  Until discontinued      10/02/19 1456     IP VTE HIGH RISK PATIENT  Once      10/02/19 1456                   Camilla Mcghee, DNP, ACNP-BC  Department of Hospital Medicine   [facility]

## 2019-10-02 NOTE — ASSESSMENT & PLAN NOTE
- last ECHO in 4/2018 showed normal EF  - Appears compensated  - Currently not on any home meds  - Recommend outpatient f/u with Cardiology  - Tele monitoring

## 2019-10-02 NOTE — ASSESSMENT & PLAN NOTE
- BP elevated  - Allow for permissive HTN until MRI results  - Hydralazine prn SBP >220  - Monitor

## 2019-10-02 NOTE — ASSESSMENT & PLAN NOTE
- Place in OBS  - CT head negative for acute findings  - MRI brain, ECHO, and Carotid US pending  - Give  mg x 1 now, continue with 81 mg daily  - Lipid panel pending  - Start Lipitor   - Neuro checks q 4 hours  - Monitor

## 2019-10-03 VITALS
OXYGEN SATURATION: 97 % | SYSTOLIC BLOOD PRESSURE: 180 MMHG | HEIGHT: 72 IN | WEIGHT: 178.31 LBS | TEMPERATURE: 98 F | BODY MASS INDEX: 24.15 KG/M2 | HEART RATE: 49 BPM | DIASTOLIC BLOOD PRESSURE: 78 MMHG | RESPIRATION RATE: 14 BRPM

## 2019-10-03 PROBLEM — R20.2 PARESTHESIA: Status: RESOLVED | Noted: 2019-10-02 | Resolved: 2019-10-03

## 2019-10-03 LAB
ANION GAP SERPL CALC-SCNC: 9 MMOL/L (ref 8–16)
BASOPHILS # BLD AUTO: 0.08 K/UL (ref 0–0.2)
BASOPHILS NFR BLD: 1.3 % (ref 0–1.9)
BUN SERPL-MCNC: 9 MG/DL (ref 8–23)
CALCIUM SERPL-MCNC: 9 MG/DL (ref 8.7–10.5)
CHLORIDE SERPL-SCNC: 104 MMOL/L (ref 95–110)
CO2 SERPL-SCNC: 29 MMOL/L (ref 23–29)
CREAT SERPL-MCNC: 0.8 MG/DL (ref 0.5–1.4)
DIFFERENTIAL METHOD: ABNORMAL
EOSINOPHIL # BLD AUTO: 0.2 K/UL (ref 0–0.5)
EOSINOPHIL NFR BLD: 3 % (ref 0–8)
ERYTHROCYTE [DISTWIDTH] IN BLOOD BY AUTOMATED COUNT: 13.2 % (ref 11.5–14.5)
EST. GFR  (AFRICAN AMERICAN): >60 ML/MIN/1.73 M^2
EST. GFR  (NON AFRICAN AMERICAN): >60 ML/MIN/1.73 M^2
GLUCOSE SERPL-MCNC: 85 MG/DL (ref 70–110)
HCT VFR BLD AUTO: 37.9 % (ref 40–54)
HGB BLD-MCNC: 12.3 G/DL (ref 14–18)
IMM GRANULOCYTES # BLD AUTO: 0.01 K/UL (ref 0–0.04)
IMM GRANULOCYTES NFR BLD AUTO: 0.2 % (ref 0–0.5)
LYMPHOCYTES # BLD AUTO: 1.8 K/UL (ref 1–4.8)
LYMPHOCYTES NFR BLD: 29.5 % (ref 18–48)
MCH RBC QN AUTO: 30.1 PG (ref 27–31)
MCHC RBC AUTO-ENTMCNC: 32.5 G/DL (ref 32–36)
MCV RBC AUTO: 93 FL (ref 82–98)
MONOCYTES # BLD AUTO: 0.5 K/UL (ref 0.3–1)
MONOCYTES NFR BLD: 8.4 % (ref 4–15)
NEUTROPHILS # BLD AUTO: 3.5 K/UL (ref 1.8–7.7)
NEUTROPHILS NFR BLD: 57.6 % (ref 38–73)
NRBC BLD-RTO: 0 /100 WBC
PLATELET # BLD AUTO: 204 K/UL (ref 150–350)
PMV BLD AUTO: 11.5 FL (ref 9.2–12.9)
POCT GLUCOSE: 180 MG/DL (ref 70–110)
POTASSIUM SERPL-SCNC: 3.1 MMOL/L (ref 3.5–5.1)
RBC # BLD AUTO: 4.08 M/UL (ref 4.6–6.2)
SODIUM SERPL-SCNC: 142 MMOL/L (ref 136–145)
WBC # BLD AUTO: 6.07 K/UL (ref 3.9–12.7)

## 2019-10-03 PROCEDURE — 25000003 PHARM REV CODE 250: Performed by: NURSE PRACTITIONER

## 2019-10-03 PROCEDURE — 92610 EVALUATE SWALLOWING FUNCTION: CPT

## 2019-10-03 PROCEDURE — 36415 COLL VENOUS BLD VENIPUNCTURE: CPT

## 2019-10-03 PROCEDURE — 92523 SPEECH SOUND LANG COMPREHEN: CPT

## 2019-10-03 PROCEDURE — 85025 COMPLETE CBC W/AUTO DIFF WBC: CPT

## 2019-10-03 PROCEDURE — 80048 BASIC METABOLIC PNL TOTAL CA: CPT

## 2019-10-03 PROCEDURE — G0378 HOSPITAL OBSERVATION PER HR: HCPCS

## 2019-10-03 PROCEDURE — 99900037 HC PT THERAPY SCREENING (STAT)

## 2019-10-03 PROCEDURE — 99900038 HC OT GENERIC THERAPY SCREENING (STAT)

## 2019-10-03 RX ORDER — NAPROXEN SODIUM 220 MG/1
81 TABLET, FILM COATED ORAL DAILY
Refills: 0 | COMMUNITY
Start: 2019-10-04 | End: 2022-10-05

## 2019-10-03 RX ORDER — ATORVASTATIN CALCIUM 20 MG/1
20 TABLET, FILM COATED ORAL NIGHTLY
Qty: 90 TABLET | Refills: 1 | Status: SHIPPED | OUTPATIENT
Start: 2019-10-03 | End: 2020-05-07

## 2019-10-03 RX ORDER — POTASSIUM CHLORIDE 20 MEQ/1
40 TABLET, EXTENDED RELEASE ORAL ONCE
Status: COMPLETED | OUTPATIENT
Start: 2019-10-03 | End: 2019-10-03

## 2019-10-03 RX ADMIN — POTASSIUM CHLORIDE 40 MEQ: 20 TABLET, EXTENDED RELEASE ORAL at 09:10

## 2019-10-03 RX ADMIN — BUPROPION HYDROCHLORIDE 150 MG: 150 TABLET, EXTENDED RELEASE ORAL at 08:10

## 2019-10-03 RX ADMIN — ASPIRIN 81 MG CHEWABLE TABLET 81 MG: 81 TABLET CHEWABLE at 08:10

## 2019-10-03 NOTE — NURSING
Pt's HR consistently staying 42-45. Pt asleep. Awakes easily, has no complaints, and all other VSS. Lilly NP notified. States she is fine with that and to monitor.

## 2019-10-03 NOTE — PT/OT/SLP PROGRESS
Occupational Therapy      Patient Name:  Vishnu Dougherty Jr.   MRN:  [id number]    eval initiated via chart review. Medicare application in progress. Will attempt  Complete eval at later time.  Neyda Stewart OT  10/3/2019   1030

## 2019-10-03 NOTE — NURSING
Patient stable for discharge patient per MD. IV removed. Given discharge summary. Patient states he will follow up with his primary care doctor by next week.

## 2019-10-03 NOTE — PT/OT/SLP EVAL
Speech Language Pathology Evaluation  Cognitive/Bedside Swallow    Patient Name:  Vishnu Dougherty Jr.   MRN:  [id number]  Admitting Diagnosis: TIA (transient ischemic attack)    Recommendations:                  General Recommendations:  Outpatient Speech Therapy  Diet recommendations:  Regular, Thin   Aspiration Precautions: HOB to 90 degrees and Remain upright 30 minutes post meal   General Precautions: Standard, fall    History:     Past Medical History:   Diagnosis Date    Chronic CHF 10/2/2019    Chronic diastolic HF (heart failure) 4/17/2018    Dilated cardiomyopathy 9/24/2015    Enlarged prostate     Essential hypertension 9/24/2015    Gastroesophageal reflux disease without esophagitis 10/7/2015    Gastroesophageal reflux disease without esophagitis 10/7/2015    Hyperlipidemia     Shortness of breath 9/24/2015       Past Surgical History:   Procedure Laterality Date    COLONOSCOPY N/A 5/25/2016    Procedure: COLONOSCOPY;  Surgeon: Demetrio Spicer MD;  Location: [facility];  Service: Endoscopy;  Laterality: N/A;       Social History: Patient lives by himself and reports being Independent with all ADLs as well as continues to drive.  He was diagnosed with dementia approximately 2 years ago and to quit working.      Subjective     Pt was seen lying in bed with no family present.    Patient goals: Pt wants to go home    Pain/Comfort:  · Pain Rating 1: 0/10    Objective:     Cognitive Status:    - Mod deficits with memory  - Min-mod deficits with problem solving  - Min-mod safety deficits   - Min deficits with organization       Receptive Language:   Comprehension:   - Pt able to answer simple yes/no questions and 2 step directions  - min deficits     Pragmatics:    topic maintenance deficits    Expressive Language:  Verbal:    Pt is able to express wants and needs without difficulty      Motor Speech:  Dysarthria with mildly slurred speech    Voice:   WFL    Visual-Spatial:  WFL    Oral Musculature  Evaluation  · Oral Musculature: WFL  · Dentition: scattered dentition    Bedside Swallow Eval:   Consistencies Assessed:  · Thin liquids and solids      Oral Phase:   · WFL    Pharyngeal Phase:   · no overt clinical signs/symptoms of aspiration  · no overt clinical signs/symptoms of pharyngeal dysphagia    Assessment:     Vishnu Dougherty Jr. is a 59 y.o. male with an SLP diagnosis of Cognitive-Linguistic Impairment.  He presents with decreased safety awareness, problem solving, sequencing, and memory.  Pt is aware of his deficits, however he is aware of the severity and continues to drive.  He states that he at his baseline when considering his dementia diagnosis.  Pt will benefit from outpatient speech therapy for cognitive training.  No further inpatient ST warranted at this time.       Plan:     · Plan of Care reviewed with:  patient   · SLP Follow-Up:     Outpatient ST     Discharge recommendations:  Discharge Facility/Level of Care Needs: outpatient speech therapy   Barriers to Discharge:  None    Time Tracking:     SLP Treatment Date:   10/03/19  Speech Start Time:  1015  Speech Stop Time:  1045     Speech Total Time (min):  30 min    Billable Minutes: Eval 15  and Eval Swallow and Oral Function 15    Sheryl Simpson CCC-SLP  10/03/2019

## 2019-10-03 NOTE — DISCHARGE SUMMARY
[facility] Hospital Medicine  Discharge Summary      Patient Name: Vishnu Dougherty Jr.  MRN: [id number]  Admission Date: 10/2/2019  Hospital Length of Stay: 0 days  Discharge Date and Time: 10/3/2019 11:44 AM  Attending Physician: No att. providers found   Discharging Provider: Jose L Lowe NP  Primary Care Provider: Ant Mejia MD      HPI:   Vishnu Dougherty Jr. is a 59 y.o. male patient with a PMHx of Memory loss, HTN, Depression, and HLD who presented to the Emergency Department today for evaluation of L arm and face/lip tingling which onset gradually  2-3 weeks ago.  Patient reports slight improvement during the week, but said it worsened today.  Symptoms are constant and moderate in severity.  No mitigating or exacerbating factors reported.  Associated sxs include palpitations and mild generalized weakness.  Patient denies any SOB, fever, chills, arm pain, CP, dizziness, and all other sxs at this time.  No prior Tx reported.  No further complaints or concerns at this time.  ER workup showed:  Markedly elevated BP of 214/100, otherwise stable VS.  CBC showed Hgb 12.3, Hct 36.4, otherwise unremarkable.  CMP showed KCL 3.1, otherwise unremarkable.  Initial troponin negative.  CXR showed no acute findings.  CT head showed no evidence of acute intracranial abnormality.  12 lead EKG showed SB with no acute changes.  Cervical spine CT showed no acute fracture or subluxation.  Degenerative disc disease at C5-6.  Hospital Medicine called for admission.  Patient is a Full Code.  His SDM is his daughter Faviola who can be reached at [phone].    * No surgery found *      Hospital Course:   10/3/19 The patient reports improvement in symptoms overnight. MRI was negative for an acute CVA. No focal weakness or slurred speech noted on exam. Speech therapy evaluated the patient and recommended outpatient ST for cognitive training. The patient was seen and examined today and deemed stable for discharge.  The patient will follow up with his PCP.      Consults:     No new Assessment & Plan notes have been filed under this hospital service since the last note was generated.  Service: Hospital Medicine    Final Active Diagnoses:    Diagnosis Date Noted POA    PRINCIPAL PROBLEM:  TIA (transient ischemic attack) [G45.9] 10/02/2019 Yes    Hypokalemia [E87.6] 10/02/2019 Yes    Chronic CHF [I50.9] 10/02/2019 Yes    Anxiety and depression [F41.9, F32.9] 04/04/2018 Yes    Memory loss of unknown cause [R41.3] 03/16/2018 Yes    Essential hypertension [I10] 09/24/2015 Yes      Problems Resolved During this Admission:    Diagnosis Date Noted Date Resolved POA    Paresthesia [R20.2] 10/02/2019 10/03/2019 Yes    Chronic diastolic HF (heart failure) [I50.32] 04/17/2018 10/02/2019 Yes    Gastroesophageal reflux disease without esophagitis [K21.9] 10/07/2015 10/02/2019 Yes       Discharged Condition: stable    Disposition: Home or Self Care    Follow Up:  Follow-up Information     Ant Mejia MD. Schedule an appointment as soon as possible for a visit in 3 days.    Specialty:  Physical Medicine and Rehabilitation  Contact information:  [address]  [facility] [address] [phone]                 Patient Instructions:      Referral to OutPatient Speech Therapy   Referral Priority: Routine Referral Type: Speech Therapy   Referral Reason: Specialty Services Required   Requested Specialty: Speech Pathology   Number of Visits Requested: 1       Significant Diagnostic Studies:   Imaging Results          US Carotid Bilateral (Final result)  Result time 10/02/19 16:28:30    Final result by HERNÁN Carlin Sr., MD (10/02/19 16:28:30)                 Impression:      Normal study    Validated velocity measurements with angiographic measurements, velocity criteria are extrapolated from diameter data as defined by the Society of Radiologists in Ultrasound Consensus Conference      Electronically signed by: Tiago Calrin  MD  Date:    10/02/2019  Time:    16:28             Narrative:    EXAMINATION:  US CAROTID BILATERAL    CLINICAL HISTORY:  TIA;    TECHNIQUE:  Multiple static ultrasound images are submitted for interpretation with color flow and spectral doppler imaging.    COMPARISON:  None    FINDINGS:  The following pertains to the right side of the neck. The common carotid artery has a normal arterial waveform with peak systolic velocity of 81 cm/sec and a diastolic velocity of 9 cm/sec. The internal carotid artery has a normal arterial waveform with a peak systolic velocity of 85 cm/sec and a diastolic velocity of 17 cm/sec. The external carotid artery has a peak systolic velocity of 80 cm/sec. The vertebral artery has normal antegrade flow.    The following pertains to the left side of the neck. The common carotid artery has a normal arterial waveform with peak systolic velocity of 74 cm/sec and a diastolic velocity of 11 cm/sec. The internal carotid artery has a normal arterial waveform with a peak systolic velocity of 67 cm/sec and a diastolic velocity of 11 cm/sec. The external carotid artery has a peak systolic velocity of 98 cm/sec. The vertebral artery has normal antegrade flow.                               X-Ray Chest 1 View (Final result)  Result time 10/02/19 12:57:26    Final result by Jake Lawson MD (10/02/19 12:57:26)                 Impression:      No acute findings.      Electronically signed by: Jake Lawson MD  Date:    10/02/2019  Time:    12:57             Narrative:    EXAMINATION:  XR CHEST 1 VIEW    CLINICAL HISTORY:  Paresthesia of skin    TECHNIQUE:  AP view of the chest was performed.    COMPARISON:  10/06/2015    FINDINGS:  The cardiac and mediastinal silhouettes appear within normal limits.   The lungs are clear bilaterally.  No acute osseous findings demonstrated.                               CT Cervical Spine Without Contrast (Final result)  Result time 10/02/19 12:57:09    Final result by Jake  RACHANA Lawson MD (10/02/19 12:57:09)                 Impression:      No acute fracture or subluxation.    Degenerative disc disease at C5-6.    All CT scans at this facility use dose modulation, iterative reconstruction and/or weight based dosing when appropriate to reduce radiation dose to as low as reasonably achievable.      Electronically signed by: Jake Lawson MD  Date:    10/02/2019  Time:    12:57             Narrative:    EXAMINATION:  CT CERVICAL SPINE WITHOUT CONTRAST    CLINICAL HISTORY:  paresthesia;    TECHNIQUE:  Axial CT imaging was performed through the cervical spine without intravenous contrast. Multiplanar reformats were reviewed and interpreted.    COMPARISON:  None    FINDINGS:  Vertebral body heights are normal.Alignment is normal.  Moderate degenerative disc disease at C5-6 with associated vacuum disc phenomenon and uncovertebral joint hypertrophy that contributes to mild bilateral neural foraminal narrowing.  No soft tissue abnormality detected.                               CT Head Without Contrast (Final result)  Result time 10/02/19 12:48:39    Final result by Jake Lawson MD (10/02/19 12:48:39)                 Impression:      No CT evidence of acute intracranial abnormality.    COMMUNICATION  This critical result was discovered/received at 1245.  The critical information above was relayed directly by me by telephone to Dr. Dobbs at 12:48 hours on 10/2/19.    All CT scans at this facility are performed  using dose modulation techniques as appropriate to performed exam including the following:  automated exposure control; adjustment of mA and/or kV according to the patients size (this includes techniques or standardized protocols for targeted exams where dose is matched to indication/reason for exam: i.e. extremities or head);  iterative reconstruction technique.      Electronically signed by: Jake Lawson MD  Date:    10/02/2019  Time:    12:48             Narrative:    EXAMINATION:  CT HEAD  WITHOUT CONTRAST    CLINICAL HISTORY:  Paresthesia of skin,paresthesia;    TECHNIQUE:  Axial CT imaging was performed through the head without intravenous contrast.    COMPARISON:  MRI brain on 04/10/2018    FINDINGS:  No hydrocephalus, midline shift, mass effect, or acute intracranial hemorrhage. Cortical sulcal pattern and gray-white matter differentiation is normal. Basilar cisterns and posterior fossa are normal. The visualized paranasal sinuses and mastoid air cells are clear. The skull is intact.                                  Pending Diagnostic Studies:     None         Medications:  Reconciled Home Medications:      Medication List      START taking these medications    aspirin 81 MG Chew  Take 1 tablet (81 mg total) by mouth once daily.  Start taking on:  October 4, 2019     atorvastatin 20 MG tablet  Commonly known as:  LIPITOR  Take 1 tablet (20 mg total) by mouth every evening.        CONTINUE taking these medications    amLODIPine 10 MG tablet  Commonly known as:  NORVASC  TAKE 1 TABLET BY MOUTH EVERY DAY     buPROPion 150 MG TBSR 12 hr tablet  Commonly known as:  WELLBUTRIN SR  TAKE 1 TABLET BY MOUTH TWICE A DAY     clonazePAM 0.5 MG tablet  Commonly known as:  KLONOPIN  TAKE 1 TABLET BY MOUTH TWICE A DAY     donepezil 10 MG tablet  Commonly known as:  ARICEPT  TAKE 1 TABLET BY MOUTH EVERY EVENING     levocetirizine 5 MG tablet  Commonly known as:  XYZAL  Take 1 tablet (5 mg total) by mouth every evening.            Indwelling Lines/Drains at time of discharge:   Lines/Drains/Airways     None                 Time spent on the discharge of patient: > 35 minutes  Patient was seen and examined on the date of discharge and determined to be suitable for discharge.         Jose L Lowe NP  Department of Hospital Medicine  [facility]

## 2019-10-03 NOTE — PT/OT/SLP PROGRESS
Physical Therapy      Patient Name:  Vishnu Dougherty Jr.   MRN:  [id number]    P.T. COMPLETED CHART REVIEW. PT MET IN  HOWEVER MEDICAID APPLICATION WAS TAKING PLACE. P.T. RETURNED AT LATER TIME AND PT WASN'T IN . PT WAS D/C FROM HOSPITAL.   Bianka Bullock, PT,10/3/2019

## 2019-10-03 NOTE — PLAN OF CARE
Pt went for MRI brain this shift. Denies any numbness or tingling. Meuro checks WDL throughout the shift. SB on the monitor. Oriented x4. Ambulating independently. Chart reviewed. Will monitor.

## 2019-10-03 NOTE — HOSPITAL COURSE
10/3/19 The patient reports improvement in symptoms overnight. MRI was negative for an acute CVA. No focal weakness or slurred speech noted on exam. Speech therapy evaluated the patient and recommended outpatient ST for cognitive training. The patient was seen and examined today and deemed stable for discharge. The patient will follow up with his PCP.

## 2019-10-10 RX ORDER — BUPROPION HYDROCHLORIDE 150 MG/1
TABLET, EXTENDED RELEASE ORAL
Qty: 60 TABLET | Refills: 0 | Status: SHIPPED | OUTPATIENT
Start: 2019-10-10 | End: 2020-01-23 | Stop reason: SDUPTHER

## 2019-10-31 RX ORDER — CLONAZEPAM 0.5 MG/1
TABLET ORAL
Qty: 60 TABLET | Refills: 0 | OUTPATIENT
Start: 2019-10-31

## 2019-11-01 RX ORDER — DONEPEZIL HYDROCHLORIDE 10 MG/1
10 TABLET, FILM COATED ORAL NIGHTLY
Qty: 30 TABLET | Refills: 0 | OUTPATIENT
Start: 2019-11-01

## 2019-11-01 RX ORDER — DONEPEZIL HYDROCHLORIDE 10 MG/1
TABLET, FILM COATED ORAL
Qty: 90 TABLET | Refills: 1 | OUTPATIENT
Start: 2019-11-01

## 2019-11-01 RX ORDER — CLONAZEPAM 0.5 MG/1
0.5 TABLET ORAL 2 TIMES DAILY
Qty: 60 TABLET | Refills: 0 | OUTPATIENT
Start: 2019-11-01

## 2019-11-01 NOTE — TELEPHONE ENCOUNTER
Ant we are trying to get him in with one of the doctors to est care since you are gone. Would you give him 30 days on his  donepezil 10 mg and  Clonazepam 0.5 mg  The appt will be at least 2 weeks out.

## 2019-11-04 RX ORDER — BUPROPION HYDROCHLORIDE 150 MG/1
TABLET, EXTENDED RELEASE ORAL
Qty: 60 TABLET | Refills: 0 | OUTPATIENT
Start: 2019-11-04

## 2019-11-07 ENCOUNTER — HOSPITAL ENCOUNTER (EMERGENCY)
Facility: HOSPITAL | Age: 59
Discharge: HOME | End: 2019-11-08
Attending: FAMILY MEDICINE
Payer: MEDICAID

## 2019-11-07 DIAGNOSIS — R94.31 PROLONGED Q-T INTERVAL ON ECG: ICD-10-CM

## 2019-11-07 DIAGNOSIS — K52.9 GASTROENTERITIS: Primary | ICD-10-CM

## 2019-11-07 DIAGNOSIS — R55 SYNCOPE: ICD-10-CM

## 2019-11-07 LAB
ALBUMIN SERPL BCP-MCNC: 4 G/DL (ref 3.5–5.2)
ALP SERPL-CCNC: 88 U/L (ref 55–135)
ALT SERPL W/O P-5'-P-CCNC: 11 U/L (ref 10–44)
AMPHET+METHAMPHET UR QL: NEGATIVE
ANION GAP SERPL CALC-SCNC: 15 MMOL/L (ref 8–16)
APAP SERPL-MCNC: <3 UG/ML (ref 10–20)
APTT BLDCRRT: 23.2 SEC (ref 21–32)
AST SERPL-CCNC: 15 U/L (ref 10–40)
BARBITURATES UR QL SCN>200 NG/ML: NEGATIVE
BASOPHILS # BLD AUTO: 0.13 K/UL (ref 0–0.2)
BASOPHILS NFR BLD: 1.1 % (ref 0–1.9)
BENZODIAZ UR QL SCN>200 NG/ML: NEGATIVE
BILIRUB SERPL-MCNC: 0.6 MG/DL (ref 0.1–1)
BILIRUB UR QL STRIP: NEGATIVE
BNP SERPL-MCNC: 24 PG/ML (ref 0–99)
BUN SERPL-MCNC: 9 MG/DL (ref 8–23)
BZE UR QL SCN: NEGATIVE
CALCIUM SERPL-MCNC: 9.7 MG/DL (ref 8.7–10.5)
CANNABINOIDS UR QL SCN: NEGATIVE
CHLORIDE SERPL-SCNC: 102 MMOL/L (ref 95–110)
CK SERPL-CCNC: 45 U/L (ref 20–200)
CLARITY UR: CLEAR
CO2 SERPL-SCNC: 21 MMOL/L (ref 23–29)
COLOR UR: YELLOW
CREAT SERPL-MCNC: 1 MG/DL (ref 0.5–1.4)
CREAT UR-MCNC: 94.6 MG/DL (ref 23–375)
DIFFERENTIAL METHOD: ABNORMAL
EOSINOPHIL # BLD AUTO: 0.4 K/UL (ref 0–0.5)
EOSINOPHIL NFR BLD: 3.7 % (ref 0–8)
ERYTHROCYTE [DISTWIDTH] IN BLOOD BY AUTOMATED COUNT: 12.9 % (ref 11.5–14.5)
EST. GFR  (AFRICAN AMERICAN): >60 ML/MIN/1.73 M^2
EST. GFR  (NON AFRICAN AMERICAN): >60 ML/MIN/1.73 M^2
ETHANOL SERPL-MCNC: <10 MG/DL
GLUCOSE SERPL-MCNC: 127 MG/DL (ref 70–110)
GLUCOSE UR QL STRIP: NEGATIVE
HCT VFR BLD AUTO: 37.3 % (ref 40–54)
HGB BLD-MCNC: 12.9 G/DL (ref 14–18)
HGB UR QL STRIP: NEGATIVE
IMM GRANULOCYTES # BLD AUTO: 0.04 K/UL (ref 0–0.04)
IMM GRANULOCYTES NFR BLD AUTO: 0.3 % (ref 0–0.5)
INR PPP: 1 (ref 0.8–1.2)
KETONES UR QL STRIP: ABNORMAL
LACTATE SERPL-SCNC: 1.9 MMOL/L (ref 0.5–2.2)
LACTATE SERPL-SCNC: 4.4 MMOL/L (ref 0.5–2.2)
LEUKOCYTE ESTERASE UR QL STRIP: NEGATIVE
LYMPHOCYTES # BLD AUTO: 3.6 K/UL (ref 1–4.8)
LYMPHOCYTES NFR BLD: 30.6 % (ref 18–48)
MAGNESIUM SERPL-MCNC: 2 MG/DL (ref 1.6–2.6)
MCH RBC QN AUTO: 30.7 PG (ref 27–31)
MCHC RBC AUTO-ENTMCNC: 34.6 G/DL (ref 32–36)
MCV RBC AUTO: 89 FL (ref 82–98)
METHADONE UR QL SCN>300 NG/ML: NEGATIVE
MONOCYTES # BLD AUTO: 0.8 K/UL (ref 0.3–1)
MONOCYTES NFR BLD: 6.6 % (ref 4–15)
NEUTROPHILS # BLD AUTO: 6.8 K/UL (ref 1.8–7.7)
NEUTROPHILS NFR BLD: 57.7 % (ref 38–73)
NITRITE UR QL STRIP: NEGATIVE
NRBC BLD-RTO: 0 /100 WBC
OPIATES UR QL SCN: NEGATIVE
PCP UR QL SCN>25 NG/ML: NEGATIVE
PH UR STRIP: 8 [PH] (ref 5–8)
PLATELET # BLD AUTO: 226 K/UL (ref 150–350)
PMV BLD AUTO: 12.1 FL (ref 9.2–12.9)
POCT GLUCOSE: 115 MG/DL (ref 70–110)
POTASSIUM SERPL-SCNC: 2.8 MMOL/L (ref 3.5–5.1)
PROCALCITONIN SERPL IA-MCNC: 0.03 NG/ML
PROT SERPL-MCNC: 7.4 G/DL (ref 6–8.4)
PROT UR QL STRIP: ABNORMAL
PROTHROMBIN TIME: 10.7 SEC (ref 9–12.5)
RBC # BLD AUTO: 4.2 M/UL (ref 4.6–6.2)
SALICYLATES SERPL-MCNC: <5 MG/DL (ref 15–30)
SODIUM SERPL-SCNC: 138 MMOL/L (ref 136–145)
SP GR UR STRIP: 1.01 (ref 1–1.03)
TOXICOLOGY INFORMATION: NORMAL
TROPONIN I SERPL DL<=0.01 NG/ML-MCNC: <0.006 NG/ML (ref 0–0.03)
TSH SERPL DL<=0.005 MIU/L-ACNC: 1.92 UIU/ML (ref 0.4–4)
URN SPEC COLLECT METH UR: ABNORMAL
UROBILINOGEN UR STRIP-ACNC: 1 EU/DL
WBC # BLD AUTO: 11.74 K/UL (ref 3.9–12.7)

## 2019-11-07 PROCEDURE — 93010 EKG 12-LEAD: ICD-10-PCS | Mod: ,,, | Performed by: INTERNAL MEDICINE

## 2019-11-07 PROCEDURE — 25500020 PHARM REV CODE 255: Performed by: FAMILY MEDICINE

## 2019-11-07 PROCEDURE — 96375 TX/PRO/DX INJ NEW DRUG ADDON: CPT

## 2019-11-07 PROCEDURE — 83880 ASSAY OF NATRIURETIC PEPTIDE: CPT

## 2019-11-07 PROCEDURE — 83735 ASSAY OF MAGNESIUM: CPT

## 2019-11-07 PROCEDURE — 99285 EMERGENCY DEPT VISIT HI MDM: CPT | Mod: 25

## 2019-11-07 PROCEDURE — 84145 PROCALCITONIN (PCT): CPT

## 2019-11-07 PROCEDURE — 85610 PROTHROMBIN TIME: CPT

## 2019-11-07 PROCEDURE — 85730 THROMBOPLASTIN TIME PARTIAL: CPT

## 2019-11-07 PROCEDURE — 80307 DRUG TEST PRSMV CHEM ANLYZR: CPT

## 2019-11-07 PROCEDURE — 82550 ASSAY OF CK (CPK): CPT

## 2019-11-07 PROCEDURE — 93005 ELECTROCARDIOGRAM TRACING: CPT

## 2019-11-07 PROCEDURE — 81003 URINALYSIS AUTO W/O SCOPE: CPT | Mod: 59

## 2019-11-07 PROCEDURE — 83605 ASSAY OF LACTIC ACID: CPT | Mod: 91

## 2019-11-07 PROCEDURE — 80053 COMPREHEN METABOLIC PANEL: CPT

## 2019-11-07 PROCEDURE — 82962 GLUCOSE BLOOD TEST: CPT

## 2019-11-07 PROCEDURE — 80320 DRUG SCREEN QUANTALCOHOLS: CPT

## 2019-11-07 PROCEDURE — 96361 HYDRATE IV INFUSION ADD-ON: CPT

## 2019-11-07 PROCEDURE — 84484 ASSAY OF TROPONIN QUANT: CPT

## 2019-11-07 PROCEDURE — 25000003 PHARM REV CODE 250: Performed by: FAMILY MEDICINE

## 2019-11-07 PROCEDURE — 63600175 PHARM REV CODE 636 W HCPCS: Performed by: FAMILY MEDICINE

## 2019-11-07 PROCEDURE — 80329 ANALGESICS NON-OPIOID 1 OR 2: CPT

## 2019-11-07 PROCEDURE — 85025 COMPLETE CBC W/AUTO DIFF WBC: CPT

## 2019-11-07 PROCEDURE — 87040 BLOOD CULTURE FOR BACTERIA: CPT

## 2019-11-07 PROCEDURE — 93010 ELECTROCARDIOGRAM REPORT: CPT | Mod: ,,, | Performed by: INTERNAL MEDICINE

## 2019-11-07 PROCEDURE — 96365 THER/PROPH/DIAG IV INF INIT: CPT | Mod: 59

## 2019-11-07 PROCEDURE — 84443 ASSAY THYROID STIM HORMONE: CPT

## 2019-11-07 RX ORDER — ONDANSETRON 4 MG/1
4 TABLET, ORALLY DISINTEGRATING ORAL
Status: DISCONTINUED | OUTPATIENT
Start: 2019-11-07 | End: 2019-11-07

## 2019-11-07 RX ORDER — ONDANSETRON 2 MG/ML
4 INJECTION INTRAMUSCULAR; INTRAVENOUS
Status: COMPLETED | OUTPATIENT
Start: 2019-11-07 | End: 2019-11-07

## 2019-11-07 RX ORDER — POTASSIUM CHLORIDE 20 MEQ/1
40 TABLET, EXTENDED RELEASE ORAL ONCE
Status: COMPLETED | OUTPATIENT
Start: 2019-11-07 | End: 2019-11-07

## 2019-11-07 RX ADMIN — POTASSIUM CHLORIDE 40 MEQ: 1500 TABLET, EXTENDED RELEASE ORAL at 11:11

## 2019-11-07 RX ADMIN — IOHEXOL 100 ML: 350 INJECTION, SOLUTION INTRAVENOUS at 09:11

## 2019-11-07 RX ADMIN — ONDANSETRON 4 MG: 2 INJECTION, SOLUTION INTRAMUSCULAR; INTRAVENOUS at 08:11

## 2019-11-07 RX ADMIN — SODIUM CHLORIDE 2250 ML: 0.9 INJECTION, SOLUTION INTRAVENOUS at 10:11

## 2019-11-07 RX ADMIN — PROMETHAZINE HYDROCHLORIDE 12.5 MG: 25 INJECTION INTRAMUSCULAR; INTRAVENOUS at 09:11

## 2019-11-08 VITALS
TEMPERATURE: 98 F | WEIGHT: 165.38 LBS | BODY MASS INDEX: 23.68 KG/M2 | SYSTOLIC BLOOD PRESSURE: 148 MMHG | OXYGEN SATURATION: 97 % | HEART RATE: 67 BPM | RESPIRATION RATE: 27 BRPM | HEIGHT: 70 IN | DIASTOLIC BLOOD PRESSURE: 70 MMHG

## 2019-11-08 PROCEDURE — 93010 ELECTROCARDIOGRAM REPORT: CPT | Mod: ,,, | Performed by: INTERNAL MEDICINE

## 2019-11-08 PROCEDURE — 93010 EKG 12-LEAD: ICD-10-PCS | Mod: ,,, | Performed by: INTERNAL MEDICINE

## 2019-11-08 PROCEDURE — 93005 ELECTROCARDIOGRAM TRACING: CPT

## 2019-11-08 NOTE — ED PROVIDER NOTES
SCRIBE #1 NOTE: I, Germaine Knott, am scribing for, and in the presence of, Rebekah Farah MD. I have scribed the entire note.      History      Chief Complaint   Patient presents with    Altered Mental Status     pt was on the ground vomiting and altered. Pt arrived POV with his daughter        Review of patient's allergies indicates:  No Known Allergies     HPI   HPI    11/7/2019, 8:08 PM   History obtained from the daughter and nursing attendant   HPI Limited due to: Acuity of Condition      History of Present Illness: Vishnu Dougherty Jr. is a 59 y.o. male patient with a PMHx of Dementia, CHF, and HTN who presents to the Emergency Department with Altered Mental Staus. Patient was found slumped over actively vomiting in personal vehicle with daughter in the parking lot outside of the ED. Per daughter, her and pt were driving home when the pt began c/o of blurred vision and numbness. She reports pt also defecating on himself in the vehicle. No mitigating or exacerbating factors reported. No prior Tx. No further complaints or concerns at this time.       Arrival mode: Personal vehicle     PCP: Ant Mejia MD       Past Medical History:  Past Medical History:   Diagnosis Date    Chronic CHF 10/2/2019    Chronic diastolic HF (heart failure) 4/17/2018    Dilated cardiomyopathy 9/24/2015    Enlarged prostate     Essential hypertension 9/24/2015    Gastroesophageal reflux disease without esophagitis 10/7/2015    Gastroesophageal reflux disease without esophagitis 10/7/2015    Hyperlipidemia     Shortness of breath 9/24/2015       Past Surgical History:  Past Surgical History:   Procedure Laterality Date    COLONOSCOPY N/A 5/25/2016    Procedure: COLONOSCOPY;  Surgeon: Demetrio Spicer MD;  Location: [facility];  Service: Endoscopy;  Laterality: N/A;         Family History:  History reviewed. No pertinent family history.    Social History:  Social History     Tobacco Use    Smoking status: Former Smoker     Last  "attempt to quit: 1995     Years since quittin.1    Smokeless tobacco: Never Used   Substance and Sexual Activity    Alcohol use: No     Alcohol/week: 0.0 standard drinks    Drug use: No    Sexual activity: Not Currently       ROS   Review of Systems   Unable to perform ROS: Acuity of condition     Physical Exam      Initial Vitals   BP Pulse Resp Temp SpO2   19   (!) 148/69 72 13 97.5 °F (36.4 °C) 100 %      MAP       --                 Physical Exam  Nursing Notes and Vital Signs Reviewed.  Constitutional: Patient is in no acute distress. Well-developed and well-nourished. Patient appears somnolent.  Head: Atraumatic. Normocephalic.  Eyes: PERRL. EOM intact. Conjunctivae are not pale. No scleral icterus.  ENT: Mucous membranes are moist.   Neck: Supple. Full ROM. No lymphadenopathy.  Cardiovascular: Regular rate. Regular rhythm. No murmurs, rubs, or gallops. Distal pulses are 2+ and symmetric.  Pulmonary/Chest: No respiratory distress. Clear to auscultation bilaterally. No wheezing or rales.  Abdominal: Soft and non-distended.  There is no tenderness.  Genitourinary: No CVA tenderness  Musculoskeletal: Moves all extremities. No obvious deformities. No edema.  Skin: Warm and dry.  Neurological:  Alert, awake, and appropriate.  Normal speech.  No acute focal neurological deficits are appreciated.  Psychiatric: Normal affect. Good eye contact. Appropriate in content.    ED Course    Procedures  ED Vital Signs:  Vitals:    19   BP: (!) 148/69   (!) 154/74   Pulse: 72 75 66 63   Resp: 13   (!) 22   Temp:       TempSrc: Oral      SpO2: 100%   100%   Weight: 75 kg (165 lb 5.5 oz)      Height: 5' 10" (1.778 m)       19   BP: (!) 151/71 (!) 155/79 (!) 140/70    Pulse: 63 69 66    Resp: (!) 26 (!) 25 19    Temp:    97.5 °F (36.4 °C) "   TempSrc:    Axillary   SpO2: 100% 100% 98%    Weight:       Height:           Abnormal Lab Results:  Labs Reviewed   ACETAMINOPHEN LEVEL - Abnormal; Notable for the following components:       Result Value    Acetaminophen (Tylenol), Serum <3.0 (*)     All other components within normal limits   CBC W/ AUTO DIFFERENTIAL - Abnormal; Notable for the following components:    RBC 4.20 (*)     Hemoglobin 12.9 (*)     Hematocrit 37.3 (*)     All other components within normal limits   COMPREHENSIVE METABOLIC PANEL - Abnormal; Notable for the following components:    Potassium 2.8 (*)     CO2 21 (*)     Glucose 127 (*)     All other components within normal limits   SALICYLATE LEVEL - Abnormal; Notable for the following components:    Salicylate Lvl <5.0 (*)     All other components within normal limits   URINALYSIS, REFLEX TO URINE CULTURE - Abnormal; Notable for the following components:    Protein, UA Trace (*)     Ketones, UA 1+ (*)     All other components within normal limits    Narrative:     Preferred Collection Type->Urine, Clean Catch   LACTIC ACID, PLASMA - Abnormal; Notable for the following components:    Lactate (Lactic Acid) 4.4 (*)     All other components within normal limits    Narrative:     LA critical result(s) called and verbal readback obtained from   SAVANNA SCHRADER RN, 11/07/2019 20:55   POCT GLUCOSE - Abnormal; Notable for the following components:    POCT Glucose 115 (*)     All other components within normal limits   CULTURE, BLOOD   CULTURE, BLOOD   MAGNESIUM   APTT   B-TYPE NATRIURETIC PEPTIDE   CK   ALCOHOL,MEDICAL (ETHANOL)   PROTIME-INR   TROPONIN I   TSH   PROCALCITONIN   DRUG SCREEN PANEL, URINE EMERGENCY   DRUG SCREEN PANEL, URINE EMERGENCY    Narrative:     Preferred Collection Type->Urine, Clean Catch   LACTIC ACID, PLASMA        All Lab Results:  Results for orders placed or performed during the hospital encounter of 11/07/19   Acetaminophen level   Result Value Ref Range     Acetaminophen (Tylenol), Serum <3.0 (L) 10.0 - 20.0 ug/mL   Magnesium   Result Value Ref Range    Magnesium 2.0 1.6 - 2.6 mg/dL   APTT   Result Value Ref Range    aPTT 23.2 21.0 - 32.0 sec   Brain natriuretic peptide   Result Value Ref Range    BNP 24 0 - 99 pg/mL   CBC auto differential   Result Value Ref Range    WBC 11.74 3.90 - 12.70 K/uL    RBC 4.20 (L) 4.60 - 6.20 M/uL    Hemoglobin 12.9 (L) 14.0 - 18.0 g/dL    Hematocrit 37.3 (L) 40.0 - 54.0 %    Mean Corpuscular Volume 89 82 - 98 fL    Mean Corpuscular Hemoglobin 30.7 27.0 - 31.0 pg    Mean Corpuscular Hemoglobin Conc 34.6 32.0 - 36.0 g/dL    RDW 12.9 11.5 - 14.5 %    Platelets 226 150 - 350 K/uL    MPV 12.1 9.2 - 12.9 fL    Immature Granulocytes 0.3 0.0 - 0.5 %    Gran # (ANC) 6.8 1.8 - 7.7 K/uL    Immature Grans (Abs) 0.04 0.00 - 0.04 K/uL    Lymph # 3.6 1.0 - 4.8 K/uL    Mono # 0.8 0.3 - 1.0 K/uL    Eos # 0.4 0.0 - 0.5 K/uL    Baso # 0.13 0.00 - 0.20 K/uL    nRBC 0 0 /100 WBC    Gran% 57.7 38.0 - 73.0 %    Lymph% 30.6 18.0 - 48.0 %    Mono% 6.6 4.0 - 15.0 %    Eosinophil% 3.7 0.0 - 8.0 %    Basophil% 1.1 0.0 - 1.9 %    Differential Method Automated    CK   Result Value Ref Range    CPK 45 20 - 200 U/L   Comprehensive metabolic panel   Result Value Ref Range    Sodium 138 136 - 145 mmol/L    Potassium 2.8 (L) 3.5 - 5.1 mmol/L    Chloride 102 95 - 110 mmol/L    CO2 21 (L) 23 - 29 mmol/L    Glucose 127 (H) 70 - 110 mg/dL    BUN, Bld 9 8 - 23 mg/dL    Creatinine 1.0 0.5 - 1.4 mg/dL    Calcium 9.7 8.7 - 10.5 mg/dL    Total Protein 7.4 6.0 - 8.4 g/dL    Albumin 4.0 3.5 - 5.2 g/dL    Total Bilirubin 0.6 0.1 - 1.0 mg/dL    Alkaline Phosphatase 88 55 - 135 U/L    AST 15 10 - 40 U/L    ALT 11 10 - 44 U/L    Anion Gap 15 8 - 16 mmol/L    eGFR if African American >60 >60 mL/min/1.73 m^2    eGFR if non African American >60 >60 mL/min/1.73 m^2   Ethanol   Result Value Ref Range    Alcohol, Medical, Serum <10 <10 mg/dL   Protime-INR   Result Value Ref Range     Prothrombin Time 10.7 9.0 - 12.5 sec    INR 1.0 0.8 - 1.2   Salicylate level   Result Value Ref Range    Salicylate Lvl <5.0 (L) 15.0 - 30.0 mg/dL   Troponin I   Result Value Ref Range    Troponin I <0.006 0.000 - 0.026 ng/mL   TSH   Result Value Ref Range    TSH 1.916 0.400 - 4.000 uIU/mL   Urinalysis, Reflex to Urine Culture Urine, Clean Catch   Result Value Ref Range    Specimen UA Urine, Clean Catch     Color, UA Yellow Yellow, Straw, Marj    Appearance, UA Clear Clear    pH, UA 8.0 5.0 - 8.0    Specific Gravity, UA 1.015 1.005 - 1.030    Protein, UA Trace (A) Negative    Glucose, UA Negative Negative    Ketones, UA 1+ (A) Negative    Bilirubin (UA) Negative Negative    Occult Blood UA Negative Negative    Nitrite, UA Negative Negative    Urobilinogen, UA 1.0 <2.0 EU/dL    Leukocytes, UA Negative Negative   Procalcitonin   Result Value Ref Range    Procalcitonin 0.03 <0.25 ng/mL   Lactic acid, plasma   Result Value Ref Range    Lactate (Lactic Acid) 4.4 (HH) 0.5 - 2.2 mmol/L   Drug screen panel, emergency   Result Value Ref Range    Benzodiazepines Negative     Methadone metabolites Negative     Cocaine (Metab.) Negative     Opiate Scrn, Ur Negative     Barbiturate Screen, Ur Negative     Amphetamine Screen, Ur Negative     THC Negative     Phencyclidine Negative     Creatinine, Random Ur 94.6 23.0 - 375.0 mg/dL    Toxicology Information SEE COMMENT    Lactic acid, plasma   Result Value Ref Range    Lactate (Lactic Acid) 1.9 0.5 - 2.2 mmol/L   POCT glucose   Result Value Ref Range    POCT Glucose 115 (H) 70 - 110 mg/dL         Imaging Results:  Imaging Results          CT Chest Abdoment Pelvis With Contrast (Final result)  Result time 11/07/19 22:24:27    Final result by Ant Jimenez MD (11/07/19 22:24:27)                 Impression:      No acute findings in the chest, abdomen, or pelvis.    All CT scans at this facility are performed  using dose modulation techniques as appropriate to performed exam  including the following:  automated exposure control; adjustment of mA and/or kV according to the patients size (this includes techniques or standardized protocols for targeted exams where dose is matched to indication/reason for exam: i.e. extremities or head);  iterative reconstruction technique.      Electronically signed by: Ant Jimenez MD  Date:    11/07/2019  Time:    22:24             Narrative:    EXAMINATION:  CT CHEST ABDOMEN PELVIS WITH CONTRAST (XPD)    CLINICAL HISTORY:  syncope; elevated lactic acid;    TECHNIQUE:  Low dose axial images, sagittal and coronal reformations were obtained from the thoracic inlet to the pubic symphysis following intravenous contrast administration.  No oral contrast.    COMPARISON:  Abdomen and pelvis CT 05/06/2016.  CT chest 11/22/2017    FINDINGS:  CT chest:    Lungs are clear with no acute infiltrates or effusions.  Tiny stable 2 mm left lung nodule.  No mediastinal adenopathy.  No pericardial effusions.  Small amount of coronary arterial calcifications.    CT abdomen and pelvis:    Normal adrenals, kidneys, liver, spleen, pancreas, gallbladder.  The bowel is nonobstructed.  No inflammatory changes.  No fluid collections.  Large amount of calcified plaque in the aorta without evidence of aortic aneurysm.  Prostate mildly enlarged.  Bladder unremarkable.    Low-grade degenerative changes in the spine.                               X-Ray Chest AP Portable (Final result)  Result time 11/07/19 20:54:16    Final result by Ant Jimenez MD (11/07/19 20:54:16)                 Impression:      No acute findings.      Electronically signed by: Ant Jimenez MD  Date:    11/07/2019  Time:    20:54             Narrative:    EXAMINATION:  XR CHEST AP PORTABLE    CLINICAL HISTORY:  syncope;    TECHNIQUE:  Single frontal view of the chest was performed.    COMPARISON:  October 2, 2019    FINDINGS:  Heart size accentuated by technique appearing mildly enlarged.  Mild aortic  atherosclerosis.    The lungs are clear.    No effusion.                               CT Head Without Contrast (Final result)  Result time 11/07/19 20:56:18    Final result by Fran Avina MD (11/07/19 20:56:18)                 Impression:      Negative head CT.    All CT scans at this facility are performed  using dose modulation techniques as appropriate to performed exam including the following:  automated exposure control; adjustment of mA and/or kV according to the patients size (this includes techniques or standardized protocols for targeted exams where dose is matched to indication/reason for exam: i.e. extremities or head);  iterative reconstruction technique.      Electronically signed by: Fran Avina MD  Date:    11/07/2019  Time:    20:56             Narrative:    EXAMINATION:  CT HEAD WITHOUT CONTRAST    CLINICAL HISTORY:  Confusion/delirium, altered LOC, unexplained;    TECHNIQUE:  Routine noncontrast head CT.    COMPARISON:  10/02/2019.    FINDINGS:  No change.  There is no acute intracranial hemorrhage or abnormal extra-axial fluid collection.  There is no abnormal increased or decreased density within the brain parenchyma.  There is mild cerebral atrophy.  Stable ventricles.  There is no intracranial mass or mass effect.  Gray-white differentiation preserved.  The calvarium is intact.  Visualized paranasal sinuses and mastoids are well aerated other than mucosal thickening involving floor left maxillary sinus, maximal thickness 6 mm.                                    The EKG was ordered, reviewed, and independently interpreted by the ED provider.  Interpretation time: 20:05  Rate: 70 BPM  Rhythm: normal sinus rhythm  Interpretation: Prolonged QT. No STEMI.    The EKG was ordered, reviewed, and independently interpreted by the ED provider.  Interpretation time: 00:03  Rate: 64 BPM  Rhythm: normal sinus rhythm  Interpretation: No acute ST changes. No STEMI.      The Emergency Provider reviewed  the vital signs and test results, which are outlined above.    ED Discussion     11:35 PM: Re-evaluated pt. Pt is resting comfortably and is in no acute distress.  Pt states he is feeling better and would like to be discharged if possible.  No nausea or c/o pain at this time. D/w pt all pertinent results. D/w pt any concerns expressed at this time. Answered all questions. Pt expresses understanding. Awaiting repeat lactic at this time.    11:48 PM: Discharged delayed due to prolonged QTC on EKG. Will repeat EKG to determine admission or not. Discussed patient plan of care with Dr. Cheng at this time.    12:37 AM: Pt is A&O x3, appropriate and competent at this time. Pt voices desire to leave hospital. D/w pt in length need for further evaluation and treatment due to HPI and PEx. Pt declines any further evaluation or tx at this time. All risks, including worsening sx, permanent bodily harm and death, were discussed in length. Pt acknowledges all risk at this time and agrees to sign AMA form. Pt given RTER instructions. All questions and concerns addressed at this time. Pt leaving AMA.     ED Medication(s):  Medications   ondansetron injection 4 mg (4 mg Intravenous Given 11/7/19 2045)   potassium chloride SA CR tablet 40 mEq (40 mEq Oral Given 11/7/19 2327)   sodium chloride 0.9% bolus 2,250 mL (0 mL/kg × 75 kg Intravenous Stopped 11/7/19 2313)   iohexol (OMNIPAQUE 350) injection 100 mL (100 mLs Intravenous Given 11/7/19 2144)   promethazine (PHENERGAN) 12.5 mg in dextrose 5 % 50 mL IVPB (0 mg Intravenous Stopped 11/7/19 2204)             Medical Decision Making    Medical Decision Making:   Clinical Tests:   Lab Tests: Ordered and Reviewed  Radiological Study: Reviewed and Ordered  Medical Tests: Ordered and Reviewed         ED Course as of Nov 08 0038   Thu Nov 07, 2019   2130 Patient is unable to tolerate orthostatics vitals    [SMITH]   Fri Nov 08, 2019   0035 AtThis time I have repeated patient's EKG which does  "show normal sinus rhythm however QTC is at 478 which is slightly prolonged for males.  I have discussed with the patient that I recommend admission however patient states that he will follow up with primary care physician.  I have discussed the red flag signs of syncope and when to come back including loss of consciousness, shortness of breath chest pain any neurologic change and patient verbalizes understanding.  Patient is to sign AMA form.    [SMITH]      ED Course User Index  [SMITH] Rebekah Farah MD       Scribe Attestation:   Scribe #1: I performed the above scribed service and the documentation accurately describes the services I performed. I attest to the accuracy of the note.    Attending:   Physician Attestation Statement for Scribe #1: I, Rebekah Farah MD, personally performed the services described in this documentation, as scribed by Germaine Knott, in my presence, and it is both accurate and complete.          Clinical Impression       ICD-10-CM ICD-9-CM   1. Gastroenteritis K52.9 558.9   2. Syncope R55 780.2   3. Prolonged Q-T interval on ECG R94.31 794.31       Disposition:   Disposition: [facility] ED DISP CONDITION: Guarded.    Portions of this note may have been created with voice recognition software. Occasional "wrong-word" or "sound-a-like" substitutions may have occurred due to the inherent limitations of voice recognition software. Please, read the note carefully and recognize, using context, where substitutions have occurred.          Rebekah Farah MD  11/08/19 9875    "

## 2019-11-13 LAB
BACTERIA BLD CULT: NORMAL
BACTERIA BLD CULT: NORMAL

## 2019-11-13 RX ORDER — DONEPEZIL HYDROCHLORIDE 10 MG/1
10 TABLET, FILM COATED ORAL NIGHTLY
Qty: 30 TABLET | Refills: 4 | Status: CANCELLED | OUTPATIENT
Start: 2019-11-13

## 2019-11-15 RX ORDER — CLONAZEPAM 0.5 MG/1
TABLET ORAL
Qty: 60 TABLET | Refills: 0 | OUTPATIENT
Start: 2019-11-15

## 2019-11-18 RX ORDER — DONEPEZIL HYDROCHLORIDE 10 MG/1
TABLET, FILM COATED ORAL
Qty: 90 TABLET | Refills: 1 | OUTPATIENT
Start: 2019-11-18

## 2019-11-22 ENCOUNTER — OFFICE VISIT (OUTPATIENT)
Dept: INTERNAL MEDICINE | Facility: CLINIC | Age: 59
End: 2019-11-22
Payer: MEDICAID

## 2019-11-22 VITALS
BODY MASS INDEX: 23.95 KG/M2 | HEART RATE: 53 BPM | TEMPERATURE: 96 F | WEIGHT: 166.88 LBS | OXYGEN SATURATION: 96 % | SYSTOLIC BLOOD PRESSURE: 125 MMHG | DIASTOLIC BLOOD PRESSURE: 70 MMHG

## 2019-11-22 DIAGNOSIS — I10 ESSENTIAL HYPERTENSION: ICD-10-CM

## 2019-11-22 DIAGNOSIS — Z76.89 ENCOUNTER TO ESTABLISH CARE: ICD-10-CM

## 2019-11-22 DIAGNOSIS — F32.A ANXIETY AND DEPRESSION: Primary | ICD-10-CM

## 2019-11-22 DIAGNOSIS — F41.9 ANXIETY AND DEPRESSION: Primary | ICD-10-CM

## 2019-11-22 DIAGNOSIS — G31.84 MILD COGNITIVE IMPAIRMENT WITH MEMORY LOSS: ICD-10-CM

## 2019-11-22 PROCEDURE — 99213 OFFICE O/P EST LOW 20 MIN: CPT | Mod: PBBFAC | Performed by: FAMILY MEDICINE

## 2019-11-22 PROCEDURE — 99213 PR OFFICE/OUTPT VISIT, EST, LEVL III, 20-29 MIN: ICD-10-PCS | Mod: S$PBB,,, | Performed by: FAMILY MEDICINE

## 2019-11-22 PROCEDURE — 99213 OFFICE O/P EST LOW 20 MIN: CPT | Mod: S$PBB,,, | Performed by: FAMILY MEDICINE

## 2019-11-22 PROCEDURE — 99999 PR PBB SHADOW E&M-EST. PATIENT-LVL III: ICD-10-PCS | Mod: PBBFAC,,, | Performed by: FAMILY MEDICINE

## 2019-11-22 PROCEDURE — 99999 PR PBB SHADOW E&M-EST. PATIENT-LVL III: CPT | Mod: PBBFAC,,, | Performed by: FAMILY MEDICINE

## 2019-11-22 RX ORDER — DULOXETIN HYDROCHLORIDE 30 MG/1
30 CAPSULE, DELAYED RELEASE ORAL DAILY
Qty: 90 CAPSULE | Refills: 0 | Status: SHIPPED | OUTPATIENT
Start: 2019-11-22 | End: 2019-11-22

## 2019-11-22 RX ORDER — DULOXETIN HYDROCHLORIDE 30 MG/1
30 CAPSULE, DELAYED RELEASE ORAL DAILY
Qty: 30 CAPSULE | Refills: 11 | Status: SHIPPED | OUTPATIENT
Start: 2019-11-22 | End: 2020-04-07

## 2019-11-22 RX ORDER — ESCITALOPRAM OXALATE 10 MG/1
10 TABLET ORAL DAILY
Qty: 90 TABLET | Refills: 0 | Status: SHIPPED | OUTPATIENT
Start: 2019-11-22 | End: 2019-11-22

## 2019-11-22 NOTE — PROGRESS NOTES
Subjective:       Patient ID: Vishnu Dougherty Jr. is a 59 y.o. male.    Chief Complaint: No chief complaint on file.    HPI     60 yo M    PMH:    HTN  GERD  HLD  Dilated cardiomyopathy   BPH  Mild Cognitive Impairment with memory loss  Depression  Anxiety     Social History     Tobacco Use   Smoking Status Former Smoker    Last attempt to quit: 1995    Years since quittin.1   Smokeless Tobacco Never Used     Not a drinker      Daughter seeks to obtain psychiatry.  Had Appt set up, but was unable to do so.    Feels having to stop work impacted him mental wellbeing.    Lives in [place]  Lives alone  Frequently alone  Has a daughter that comes - but has work.      Significant concern of med. Management  Has no insurance  On disability.  Medicaid won't kick in - until 1.5 year till on disability.  Should be in April.        Review of Systems   Constitutional: Negative for activity change.   Eyes: Negative for discharge.   Respiratory: Negative for wheezing.    Cardiovascular: Negative for chest pain and palpitations.   Gastrointestinal: Negative for constipation, diarrhea and vomiting.   Genitourinary: Negative for difficulty urinating and hematuria.   Neurological: Negative for headaches.   Psychiatric/Behavioral: Positive for dysphoric mood.       Objective:       Vitals:    19 1423   BP: 125/70   Pulse:    Temp:        Physical Exam   Constitutional: He is oriented to person, place, and time. He appears well-developed and well-nourished. No distress.   HENT:   Head: Normocephalic and atraumatic.   Eyes: Conjunctivae are normal. Right eye exhibits no discharge. Left eye exhibits no discharge.   Neck: Trachea normal and full passive range of motion without pain.   Cardiovascular: Normal rate, regular rhythm and normal heart sounds.   Pulmonary/Chest: Effort normal and breath sounds normal. No respiratory distress. He has no decreased breath sounds. He has no wheezes.   Abdominal: Soft. Normal  appearance. There is no tenderness.   Musculoskeletal: He exhibits no edema or deformity.   Lymphadenopathy:     He has no cervical adenopathy.   Neurological: He is alert and oriented to person, place, and time.   Skin: Skin is intact. No pallor.   Psychiatric: He has a normal mood and affect. His speech is normal and behavior is normal. Thought content normal.       Assessment:       1. Anxiety and depression    2. Mild cognitive impairment with memory loss    3. Essential hypertension    4. Encounter to establish care        Plan:   Anxiety and depression  Was taking Klonopin - is out of this medication  Currently taking wellbutrin  Starting   Follow up in 4 weeks to assess improvement.      Mild cognitive impairment with memory loss  Aricept  Case management to get social work help  Home health to help with med management  Discussed cost concerns with daughter - will find out if possible to do.    Essential hypertension  Amlodipine 10 mg  controlled    HLD:  Lipitor listed - uncertain if taking    Patient is not certain on what he is or is not taking.  I am asking daughter to send me a list of what he is and isn't taking.  Let me know which needs refills on.    6 weeks

## 2019-11-26 DIAGNOSIS — G31.84 MILD COGNITIVE IMPAIRMENT WITH MEMORY LOSS: Primary | ICD-10-CM

## 2019-12-06 ENCOUNTER — OUTPATIENT CASE MANAGEMENT (OUTPATIENT)
Dept: ADMINISTRATIVE | Facility: OTHER | Age: 59
End: 2019-12-06

## 2019-12-06 NOTE — PROGRESS NOTES
Chart reviewed.  Message sent to [facility] rep Phoebe Post with request to check Medicaid system to determine status.  Per Phoebe, pt has [place] effective 11/1/19; Phoebe updated chart accordingly.  [employer] has  to provide care.  Requested [facility] contact insurance and request  to assist with needs.  LCSW will contact pt/daughter to inform of same.

## 2019-12-12 NOTE — PROGRESS NOTES
This LCSW received referral on the above patient.  Reason for referral: mild cognitive impairment with memory loss.      Spoke to daughter Faviola Conrad ([facility] on file).  Faviola is aware pt has [employer] Medicaid; informed her chart has been updated to reflect insurance.  Per Faviola, pt lives alone and is independent with ADL's, has some memory impairment.  Faviola takes pt to appointments and assists as able.  Faviola wants someone to check pt daily to make sure he's taking meds as prescribed.  PCP has ordered home health.  Informed Faviola [employer] Medicaid has  who can assist with needs; [facility]'s notified [employer] pt needs a .  Encouraged Faviola to call as well; provided number.  Provided contact number for [employer] transportation as well as [employer] re: possible Medicaid waivers.  PCP notified of above.

## 2019-12-17 ENCOUNTER — TELEPHONE (OUTPATIENT)
Dept: INTERNAL MEDICINE | Facility: CLINIC | Age: 59
End: 2019-12-17

## 2019-12-17 DIAGNOSIS — G31.84 MILD COGNITIVE IMPAIRMENT WITH MEMORY LOSS: Primary | ICD-10-CM

## 2019-12-17 NOTE — TELEPHONE ENCOUNTER
----- Message from Stephan Quiñones MD sent at 12/17/2019 10:46 AM CST -----  Contact: Archana Pineda LCSW  External home health ordered. Help arrange with daughter.    ----- Message -----  From: Mike Granger LPN  Sent: 12/12/2019  11:21 AM CST  To: Stephan Quiñones MD        ----- Message -----  From: Archana Pineda LCSW  Sent: 12/12/2019  11:00 AM CST  To: Nuria Rothman Staff    Good morning Dr. Quiñones:  This LCSW received a referral on the above patient.  Mr. Dougherty has [employer].  His chart has been updated to reflect his insurance.  [employer] has  who can assist with pts needs; the [facility] has notified insurance of need for case management assistance.  This LCSW spoke to patients daughter Faviola to inform her of same;provided Faviola with insurance contact number.  Additionally, this LCSW provided Faviola with the contact number to determine if pt is eligible for a Medicaid waiver. RE: the referral for home health- not all home health providers accept Medicaid and I am unsure which ones are in-network. A [employer]  should be able to assist.    Thank you for the referral,  Archana Pineda LCSW  [phone]

## 2019-12-17 NOTE — TELEPHONE ENCOUNTER
Called the patient daughter to find out if she found a HH company that accepts the patient insurance.  She says that she will call the clinic to give us the information later. /[email or web address]/

## 2020-01-23 RX ORDER — BUPROPION HYDROCHLORIDE 150 MG/1
150 TABLET, EXTENDED RELEASE ORAL 2 TIMES DAILY
Qty: 180 TABLET | Refills: 0 | Status: SHIPPED | OUTPATIENT
Start: 2020-01-23 | End: 2020-04-29

## 2020-03-13 RX ORDER — DONEPEZIL HYDROCHLORIDE 10 MG/1
10 TABLET, FILM COATED ORAL NIGHTLY
Qty: 90 TABLET | Refills: 1 | Status: SHIPPED | OUTPATIENT
Start: 2020-03-13 | End: 2020-08-27

## 2020-04-07 RX ORDER — DULOXETIN HYDROCHLORIDE 30 MG/1
CAPSULE, DELAYED RELEASE ORAL
Qty: 30 CAPSULE | Refills: 1 | Status: SHIPPED | OUTPATIENT
Start: 2020-04-07 | End: 2020-11-05 | Stop reason: SDUPTHER

## 2020-04-29 RX ORDER — BUPROPION HYDROCHLORIDE 150 MG/1
TABLET, EXTENDED RELEASE ORAL
Qty: 60 TABLET | Refills: 0 | Status: SHIPPED | OUTPATIENT
Start: 2020-04-29 | End: 2020-08-19

## 2020-05-07 RX ORDER — AMLODIPINE BESYLATE 10 MG/1
TABLET ORAL
Qty: 30 TABLET | Refills: 0 | Status: SHIPPED | OUTPATIENT
Start: 2020-05-07 | End: 2020-06-12

## 2020-05-07 RX ORDER — ATORVASTATIN CALCIUM 20 MG/1
TABLET, FILM COATED ORAL
Qty: 30 TABLET | Refills: 0 | Status: SHIPPED | OUTPATIENT
Start: 2020-05-07 | End: 2020-06-12

## 2020-06-20 ENCOUNTER — DOCUMENTATION ONLY (OUTPATIENT)
Dept: ADMINISTRATIVE | Facility: HOSPITAL | Age: 60
End: 2020-06-20

## 2020-06-23 ENCOUNTER — LAB VISIT (OUTPATIENT)
Dept: LAB | Facility: HOSPITAL | Age: 60
End: 2020-06-23
Attending: FAMILY MEDICINE

## 2020-06-23 ENCOUNTER — HOSPITAL ENCOUNTER (EMERGENCY)
Facility: HOSPITAL | Age: 60
Discharge: HOME | End: 2020-06-24
Attending: FAMILY MEDICINE

## 2020-06-23 ENCOUNTER — OFFICE VISIT (OUTPATIENT)
Dept: INTERNAL MEDICINE | Facility: CLINIC | Age: 60
End: 2020-06-23

## 2020-06-23 VITALS
DIASTOLIC BLOOD PRESSURE: 76 MMHG | WEIGHT: 155.63 LBS | SYSTOLIC BLOOD PRESSURE: 140 MMHG | OXYGEN SATURATION: 98 % | HEART RATE: 64 BPM | BODY MASS INDEX: 22.33 KG/M2 | TEMPERATURE: 98 F

## 2020-06-23 DIAGNOSIS — Z00.00 ANNUAL PHYSICAL EXAM: ICD-10-CM

## 2020-06-23 DIAGNOSIS — F41.9 ANXIETY: ICD-10-CM

## 2020-06-23 DIAGNOSIS — E87.6 HYPOKALEMIA: Primary | ICD-10-CM

## 2020-06-23 DIAGNOSIS — G31.84 MILD COGNITIVE IMPAIRMENT WITH MEMORY LOSS: Primary | ICD-10-CM

## 2020-06-23 LAB
ALBUMIN SERPL BCP-MCNC: 4 G/DL (ref 3.5–5.2)
ALP SERPL-CCNC: 79 U/L (ref 55–135)
ALT SERPL W/O P-5'-P-CCNC: 18 U/L (ref 10–44)
ANION GAP SERPL CALC-SCNC: 14 MMOL/L (ref 8–16)
AST SERPL-CCNC: 23 U/L (ref 10–40)
BASOPHILS # BLD AUTO: 0.08 K/UL (ref 0–0.2)
BASOPHILS # BLD AUTO: 0.08 K/UL (ref 0–0.2)
BASOPHILS NFR BLD: 1.2 % (ref 0–1.9)
BASOPHILS NFR BLD: 1.5 % (ref 0–1.9)
BILIRUB SERPL-MCNC: 0.5 MG/DL (ref 0.1–1)
BILIRUB UR QL STRIP: NEGATIVE
BUN SERPL-MCNC: 13 MG/DL (ref 8–23)
CALCIUM SERPL-MCNC: 9.1 MG/DL (ref 8.7–10.5)
CHLORIDE SERPL-SCNC: 102 MMOL/L (ref 95–110)
CLARITY UR: CLEAR
CO2 SERPL-SCNC: 22 MMOL/L (ref 23–29)
COLOR UR: YELLOW
CREAT SERPL-MCNC: 0.9 MG/DL (ref 0.5–1.4)
DIFFERENTIAL METHOD: ABNORMAL
DIFFERENTIAL METHOD: ABNORMAL
EOSINOPHIL # BLD AUTO: 0.2 K/UL (ref 0–0.5)
EOSINOPHIL # BLD AUTO: 0.3 K/UL (ref 0–0.5)
EOSINOPHIL NFR BLD: 3.7 % (ref 0–8)
EOSINOPHIL NFR BLD: 5 % (ref 0–8)
ERYTHROCYTE [DISTWIDTH] IN BLOOD BY AUTOMATED COUNT: 13.2 % (ref 11.5–14.5)
ERYTHROCYTE [DISTWIDTH] IN BLOOD BY AUTOMATED COUNT: 13.7 % (ref 11.5–14.5)
EST. GFR  (AFRICAN AMERICAN): >60 ML/MIN/1.73 M^2
EST. GFR  (NON AFRICAN AMERICAN): >60 ML/MIN/1.73 M^2
GLUCOSE SERPL-MCNC: 85 MG/DL (ref 70–110)
GLUCOSE UR QL STRIP: NEGATIVE
HCT VFR BLD AUTO: 36.9 % (ref 40–54)
HCT VFR BLD AUTO: 39.9 % (ref 40–54)
HCV AB SERPL QL IA: NEGATIVE
HGB BLD-MCNC: 12.5 G/DL (ref 14–18)
HGB BLD-MCNC: 12.7 G/DL (ref 14–18)
HGB UR QL STRIP: NEGATIVE
HIV 1+2 AB+HIV1 P24 AG SERPL QL IA: NEGATIVE
IMM GRANULOCYTES # BLD AUTO: 0.01 K/UL (ref 0–0.04)
IMM GRANULOCYTES # BLD AUTO: 0.02 K/UL (ref 0–0.04)
IMM GRANULOCYTES NFR BLD AUTO: 0.2 % (ref 0–0.5)
IMM GRANULOCYTES NFR BLD AUTO: 0.3 % (ref 0–0.5)
KETONES UR QL STRIP: NEGATIVE
LEUKOCYTE ESTERASE UR QL STRIP: ABNORMAL
LYMPHOCYTES # BLD AUTO: 1.5 K/UL (ref 1–4.8)
LYMPHOCYTES # BLD AUTO: 2.1 K/UL (ref 1–4.8)
LYMPHOCYTES NFR BLD: 27 % (ref 18–48)
LYMPHOCYTES NFR BLD: 31.7 % (ref 18–48)
MCH RBC QN AUTO: 30.7 PG (ref 27–31)
MCH RBC QN AUTO: 31.1 PG (ref 27–31)
MCHC RBC AUTO-ENTMCNC: 31.8 G/DL (ref 32–36)
MCHC RBC AUTO-ENTMCNC: 33.9 G/DL (ref 32–36)
MCV RBC AUTO: 91 FL (ref 82–98)
MCV RBC AUTO: 98 FL (ref 82–98)
MICROSCOPIC COMMENT: NORMAL
MONOCYTES # BLD AUTO: 0.4 K/UL (ref 0.3–1)
MONOCYTES # BLD AUTO: 0.4 K/UL (ref 0.3–1)
MONOCYTES NFR BLD: 6.2 % (ref 4–15)
MONOCYTES NFR BLD: 7.2 % (ref 4–15)
NEUTROPHILS # BLD AUTO: 3.2 K/UL (ref 1.8–7.7)
NEUTROPHILS # BLD AUTO: 3.7 K/UL (ref 1.8–7.7)
NEUTROPHILS NFR BLD: 56.9 % (ref 38–73)
NEUTROPHILS NFR BLD: 59.1 % (ref 38–73)
NITRITE UR QL STRIP: NEGATIVE
NRBC BLD-RTO: 0 /100 WBC
NRBC BLD-RTO: 0 /100 WBC
PH UR STRIP: 7 [PH] (ref 5–8)
PLATELET # BLD AUTO: 232 K/UL (ref 150–350)
PLATELET # BLD AUTO: 234 K/UL (ref 150–350)
PMV BLD AUTO: 10.9 FL (ref 9.2–12.9)
PMV BLD AUTO: 11.6 FL (ref 9.2–12.9)
POTASSIUM SERPL-SCNC: 2.4 MMOL/L (ref 3.5–5.1)
PROT SERPL-MCNC: 7.2 G/DL (ref 6–8.4)
PROT UR QL STRIP: NEGATIVE
RBC # BLD AUTO: 4.07 M/UL (ref 4.6–6.2)
RBC # BLD AUTO: 4.09 M/UL (ref 4.6–6.2)
SODIUM SERPL-SCNC: 138 MMOL/L (ref 136–145)
SP GR UR STRIP: 1.01 (ref 1–1.03)
SQUAMOUS #/AREA URNS HPF: 1 /HPF
URN SPEC COLLECT METH UR: ABNORMAL
UROBILINOGEN UR STRIP-ACNC: NEGATIVE EU/DL
WBC # BLD AUTO: 5.41 K/UL (ref 3.9–12.7)
WBC # BLD AUTO: 6.57 K/UL (ref 3.9–12.7)
WBC #/AREA URNS HPF: 2 /HPF (ref 0–5)

## 2020-06-23 PROCEDURE — 36000 PLACE NEEDLE IN VEIN: CPT

## 2020-06-23 PROCEDURE — 86803 HEPATITIS C AB TEST: CPT

## 2020-06-23 PROCEDURE — 99396 PR PREVENTIVE VISIT,EST,40-64: ICD-10-PCS | Mod: S$PBB,,, | Performed by: FAMILY MEDICINE

## 2020-06-23 PROCEDURE — 85025 COMPLETE CBC W/AUTO DIFF WBC: CPT | Mod: 91

## 2020-06-23 PROCEDURE — 99999 PR PBB SHADOW E&M-EST. PATIENT-LVL V: ICD-10-PCS | Mod: PBBFAC,,, | Performed by: FAMILY MEDICINE

## 2020-06-23 PROCEDURE — 82306 VITAMIN D 25 HYDROXY: CPT

## 2020-06-23 PROCEDURE — 81000 URINALYSIS NONAUTO W/SCOPE: CPT

## 2020-06-23 PROCEDURE — 99999 PR PBB SHADOW E&M-EST. PATIENT-LVL V: CPT | Mod: PBBFAC,,, | Performed by: FAMILY MEDICINE

## 2020-06-23 PROCEDURE — 25000003 PHARM REV CODE 250: Performed by: FAMILY MEDICINE

## 2020-06-23 PROCEDURE — 80053 COMPREHEN METABOLIC PANEL: CPT

## 2020-06-23 PROCEDURE — 85025 COMPLETE CBC W/AUTO DIFF WBC: CPT

## 2020-06-23 PROCEDURE — 82607 VITAMIN B-12: CPT

## 2020-06-23 PROCEDURE — 84443 ASSAY THYROID STIM HORMONE: CPT

## 2020-06-23 PROCEDURE — 36415 COLL VENOUS BLD VENIPUNCTURE: CPT

## 2020-06-23 PROCEDURE — 93010 ELECTROCARDIOGRAM REPORT: CPT | Mod: ,,, | Performed by: INTERNAL MEDICINE

## 2020-06-23 PROCEDURE — 99215 OFFICE O/P EST HI 40 MIN: CPT | Mod: PBBFAC,25 | Performed by: FAMILY MEDICINE

## 2020-06-23 PROCEDURE — 86703 HIV-1/HIV-2 1 RESULT ANTBDY: CPT

## 2020-06-23 PROCEDURE — 83036 HEMOGLOBIN GLYCOSYLATED A1C: CPT

## 2020-06-23 PROCEDURE — 99285 EMERGENCY DEPT VISIT HI MDM: CPT | Mod: 25,27

## 2020-06-23 PROCEDURE — 93010 EKG 12-LEAD: ICD-10-PCS | Mod: ,,, | Performed by: INTERNAL MEDICINE

## 2020-06-23 PROCEDURE — 80053 COMPREHEN METABOLIC PANEL: CPT | Mod: 91

## 2020-06-23 PROCEDURE — 93005 ELECTROCARDIOGRAM TRACING: CPT

## 2020-06-23 PROCEDURE — 99396 PREV VISIT EST AGE 40-64: CPT | Mod: S$PBB,,, | Performed by: FAMILY MEDICINE

## 2020-06-23 RX ORDER — POTASSIUM CHLORIDE 20 MEQ/1
40 TABLET, EXTENDED RELEASE ORAL
Status: COMPLETED | OUTPATIENT
Start: 2020-06-23 | End: 2020-06-23

## 2020-06-23 RX ADMIN — POTASSIUM CHLORIDE 40 MEQ: 1500 TABLET, EXTENDED RELEASE ORAL at 10:06

## 2020-06-23 NOTE — PROGRESS NOTES
"Subjective:       Patient ID: Vishnu Dougherty Jr. is a 60 y.o. male.    Chief Complaint: Annual Exam    HPI       60 M    PMH:    HTN  GERD  HLD  Dilated cardiomyopathy  BPH  Mild Cognitive impairment w memory loss  Depression   Anxiety    Social History     Tobacco Use   Smoking Status Former Smoker    Quit date: 1995    Years since quittin.7   Smokeless Tobacco Never Used     Past Surgical History:   Procedure Laterality Date    COLONOSCOPY N/A 2016    Procedure: COLONOSCOPY;  Surgeon: Demetrio Spicer MD;  Location: [facility];  Service: Endoscopy;  Laterality: N/A;       Daughter reports dementia is worsening.  She reports he doesn't remember when its bad    He is driving still.    We tried wellbutrin - planned a followup  Did not see him - suspect lost to follow up during covid times  Uncertain if taking wellbutrin.    Some med compliance concerns  Some days get "skipped"      Review of Systems   Constitutional: Negative for chills and fever.   HENT: Negative for trouble swallowing.    Eyes: Negative for visual disturbance.   Respiratory: Negative for shortness of breath.    Cardiovascular: Negative for chest pain.   Gastrointestinal: Negative for constipation and diarrhea.   Genitourinary: Negative for difficulty urinating.   Musculoskeletal: Negative for gait problem.   Skin: Negative for rash.   Neurological: Negative for dizziness and light-headedness.   Psychiatric/Behavioral: Negative for dysphoric mood.       Objective:       Vitals:    20 0948   BP: (!) 140/76   Pulse: 64   Temp: 98.4 °F (36.9 °C)    130 /80       Physical Exam  Constitutional:       General: He is not in acute distress.     Appearance: Normal appearance. He is well-developed.   HENT:      Head: Normocephalic and atraumatic.   Eyes:      General:         Right eye: No discharge.         Left eye: No discharge.      Conjunctiva/sclera: Conjunctivae normal.   Neck:      Musculoskeletal: Full passive range of motion " without pain.      Trachea: Trachea normal.   Cardiovascular:      Rate and Rhythm: Normal rate and regular rhythm.      Heart sounds: Normal heart sounds.   Pulmonary:      Effort: Pulmonary effort is normal. No respiratory distress.      Breath sounds: Normal breath sounds. No decreased breath sounds or wheezing.   Abdominal:      Palpations: Abdomen is soft.      Tenderness: There is no abdominal tenderness.   Musculoskeletal:         General: No deformity.   Lymphadenopathy:      Cervical: No cervical adenopathy.   Skin:     Coloration: Skin is not pale.   Neurological:      Mental Status: He is alert and oriented to person, place, and time.   Psychiatric:         Speech: Speech normal.         Behavior: Behavior normal.         Thought Content: Thought content normal.         Assessment:       1. Mild cognitive impairment with memory loss    2. Annual physical exam        Plan:   Mild cognitive impairment with memory loss  -     Ambulatory referral/consult to Neurology; Future; Expected date: 06/30/2020  Taking Aricept  Worsening - per daughter  Neurology consult  Discouraged driving.  Will try and get home health to help with medication management.  Will get case management to see if can get help from social work - help with programs / optimize his ability to do what he wants.    HTN  Amlodipine  Controlled    Annual exam  Labs      4 week f/u

## 2020-06-24 VITALS
BODY MASS INDEX: 22.19 KG/M2 | HEIGHT: 70 IN | HEART RATE: 61 BPM | DIASTOLIC BLOOD PRESSURE: 75 MMHG | WEIGHT: 155 LBS | RESPIRATION RATE: 18 BRPM | OXYGEN SATURATION: 100 % | TEMPERATURE: 99 F | SYSTOLIC BLOOD PRESSURE: 154 MMHG

## 2020-06-24 LAB
25(OH)D3+25(OH)D2 SERPL-MCNC: 22 NG/ML (ref 30–96)
ALBUMIN SERPL BCP-MCNC: 4 G/DL (ref 3.5–5.2)
ALP SERPL-CCNC: 80 U/L (ref 55–135)
ALT SERPL W/O P-5'-P-CCNC: 16 U/L (ref 10–44)
ANION GAP SERPL CALC-SCNC: 9 MMOL/L (ref 8–16)
AST SERPL-CCNC: 21 U/L (ref 10–40)
BILIRUB SERPL-MCNC: 0.4 MG/DL (ref 0.1–1)
BUN SERPL-MCNC: 11 MG/DL (ref 8–23)
CALCIUM SERPL-MCNC: 9.2 MG/DL (ref 8.7–10.5)
CHLORIDE SERPL-SCNC: 101 MMOL/L (ref 95–110)
CO2 SERPL-SCNC: 27 MMOL/L (ref 23–29)
CREAT SERPL-MCNC: 0.9 MG/DL (ref 0.5–1.4)
EST. GFR  (AFRICAN AMERICAN): >60 ML/MIN/1.73 M^2
EST. GFR  (NON AFRICAN AMERICAN): >60 ML/MIN/1.73 M^2
ESTIMATED AVG GLUCOSE: 105 MG/DL (ref 68–131)
GLUCOSE SERPL-MCNC: 81 MG/DL (ref 70–110)
HBA1C MFR BLD HPLC: 5.3 % (ref 4–5.6)
POTASSIUM SERPL-SCNC: 2.6 MMOL/L (ref 3.5–5.1)
POTASSIUM SERPL-SCNC: 3.7 MMOL/L (ref 3.5–5.1)
PROT SERPL-MCNC: 7.1 G/DL (ref 6–8.4)
SODIUM SERPL-SCNC: 137 MMOL/L (ref 136–145)
TSH SERPL DL<=0.005 MIU/L-ACNC: 0.97 UIU/ML (ref 0.4–4)
VIT B12 SERPL-MCNC: 208 PG/ML (ref 210–950)

## 2020-06-24 PROCEDURE — 25000003 PHARM REV CODE 250: Performed by: EMERGENCY MEDICINE

## 2020-06-24 PROCEDURE — 84132 ASSAY OF SERUM POTASSIUM: CPT

## 2020-06-24 RX ORDER — POTASSIUM CHLORIDE 20 MEQ/1
40 TABLET, EXTENDED RELEASE ORAL
Status: COMPLETED | OUTPATIENT
Start: 2020-06-24 | End: 2020-06-24

## 2020-06-24 RX ORDER — POTASSIUM CHLORIDE 20 MEQ/1
20 TABLET, EXTENDED RELEASE ORAL DAILY
Qty: 30 TABLET | Refills: 0 | Status: SHIPPED | OUTPATIENT
Start: 2020-06-24 | End: 2022-10-05

## 2020-06-24 RX ADMIN — POTASSIUM CHLORIDE 40 MEQ: 1500 TABLET, EXTENDED RELEASE ORAL at 12:06

## 2020-06-24 NOTE — ED PROVIDER NOTES
"SCRIBE #1 NOTE: I, Kathleen Lemus, am scribing for, and in the presence of, Triny Nowak MD. I have scribed the HPI, ROS, and PEx.     SCRIBE #2 NOTE: I, Nenita Kirkland, am scribing for, and in the presence of,  Kinga Camejo MD. I have scribed the remaining portions of the note not scribed by Scribe #1.      History     Chief Complaint   Patient presents with    Anxiety     dementia, pt having spasms in hands, walked over to neighbor's house and they called 911     Review of patient's allergies indicates:  No Known Allergies      History of Present Illness     HPI    6/23/2020, 11:36 PM  History obtained from the patient and EMS.  History limited secondary to dementia.      History of Present Illness: Vishnu Dougherty Jr. is a 60 y.o. male patient with a PMHx of chronic CHF, dilated cardiomyopathy, GERD, HLD, and dementia who presents to the Emergency Department for evaluation of anxiety. Per EMS, patient walked over to neighbor's house and they called 911. Patient states, "I don't know why I'm here." History limited secondary to dementia. Patient is a poor historian.      Arrival mode: EMS    PCP: Stephan Quiñones MD        Past Medical History:  Past Medical History:   Diagnosis Date    Chronic CHF 10/2/2019    Chronic diastolic HF (heart failure) 4/17/2018    Dilated cardiomyopathy 9/24/2015    Enlarged prostate     Essential hypertension 9/24/2015    Gastroesophageal reflux disease without esophagitis 10/7/2015    Gastroesophageal reflux disease without esophagitis 10/7/2015    Hyperlipidemia     Shortness of breath 9/24/2015       Past Surgical History:  Past Surgical History:   Procedure Laterality Date    COLONOSCOPY N/A 5/25/2016    Procedure: COLONOSCOPY;  Surgeon: Demetrio Spicer MD;  Location: [facility];  Service: Endoscopy;  Laterality: N/A;         Family History:  Family History   Problem Relation Age of Onset    Hypertension Mother     Hypertension Father     Stroke Father        Social " "History:  Social History     Tobacco Use    Smoking status: Former Smoker     Quit date: 1995     Years since quittin.7    Smokeless tobacco: Never Used   Substance and Sexual Activity    Alcohol use: No     Alcohol/week: 0.0 standard drinks    Drug use: No    Sexual activity: Not Currently        Review of Systems     Review of Systems   Unable to perform ROS: Dementia      Physical Exam     Initial Vitals [20]   BP Pulse Resp Temp SpO2   (!) 182/80 82 16 98.7 °F (37.1 °C) 100 %      MAP       --          Physical Exam  Nursing Notes and Vital Signs Reviewed.  Constitutional: Patient is in no acute distress. Well-developed and well-nourished.  Head: Atraumatic. Normocephalic.  Eyes: PERRL. EOM intact. Conjunctivae are not pale. No scleral icterus.  ENT: Mucous membranes are moist. Oropharynx is clear and symmetric.    Neck: Supple. Full ROM. No lymphadenopathy.  Cardiovascular: Regular rate. Regular rhythm. No murmurs, rubs, or gallops. Distal pulses are 2+ and symmetric.  Pulmonary/Chest: No respiratory distress. Clear to auscultation bilaterally. No wheezing or rales.  Abdominal: Soft and non-distended.  There is no tenderness.  No rebound, guarding, or rigidity. Good bowel sounds.  Genitourinary: No CVA tenderness  Musculoskeletal: Moves all extremities. No obvious deformities. No edema. No calf tenderness.  Skin: Warm and dry.  Neurological:  Awake but confused.  Normal speech.  No acute focal neurological deficits are appreciated.  Psychiatric: Normal affect. Good eye contact. Appropriate in content.     ED Course   Procedures  ED Vital Signs:  Vitals:    20 2116 20 2131 20 2200 20 2300   BP: (!) 182/80  (!) 151/73 (!) 166/80   Pulse: 82 70 (!) 59 61   Resp: 16  20 16   Temp: 98.7 °F (37.1 °C)      TempSrc: Oral      SpO2: 100%  99% 98%   Weight: 70.3 kg (155 lb)      Height: 5' 10" (1.778 m)       20 0030 20 0119   BP: (!) 183/89 (!) 154/75 "   Pulse: 63 61   Resp: 18 18   Temp:     TempSrc:     SpO2: 98% 100%   Weight:     Height:         Abnormal Lab Results:  Labs Reviewed   CBC W/ AUTO DIFFERENTIAL - Abnormal; Notable for the following components:       Result Value    RBC 4.07 (*)     Hemoglobin 12.5 (*)     Hematocrit 36.9 (*)     All other components within normal limits   COMPREHENSIVE METABOLIC PANEL - Abnormal; Notable for the following components:    Potassium 2.4 (*)     CO2 22 (*)     All other components within normal limits    Narrative:       K critical result(s) called and verbal readback obtained from GABE ZAVALETA RN by CD5 06/23/2020 22:22   URINALYSIS - Abnormal; Notable for the following components:    Leukocytes, UA Trace (*)     All other components within normal limits   POTASSIUM - Abnormal; Notable for the following components:    Potassium 2.6 (*)     All other components within normal limits    Narrative:        K critical result(s) called and verbal readback obtained from   LIA WILKES RN by CD5 06/24/2020 00:45   HIV 1 / 2 ANTIBODY   HEPATITIS C ANTIBODY   URINALYSIS MICROSCOPIC        All Lab Results:  Results for orders placed or performed during the hospital encounter of 06/23/20   HIV 1/2 Ag/Ab (4th Gen)   Result Value Ref Range    HIV 1/2 Ag/Ab Negative Negative   Hepatitis C antibody   Result Value Ref Range    Hepatitis C Ab Negative Negative   CBC auto differential   Result Value Ref Range    WBC 6.57 3.90 - 12.70 K/uL    RBC 4.07 (L) 4.60 - 6.20 M/uL    Hemoglobin 12.5 (L) 14.0 - 18.0 g/dL    Hematocrit 36.9 (L) 40.0 - 54.0 %    Mean Corpuscular Volume 91 82 - 98 fL    Mean Corpuscular Hemoglobin 30.7 27.0 - 31.0 pg    Mean Corpuscular Hemoglobin Conc 33.9 32.0 - 36.0 g/dL    RDW 13.2 11.5 - 14.5 %    Platelets 234 150 - 350 K/uL    MPV 10.9 9.2 - 12.9 fL    Immature Granulocytes 0.3 0.0 - 0.5 %    Gran # (ANC) 3.7 1.8 - 7.7 K/uL    Immature Grans (Abs) 0.02 0.00 - 0.04 K/uL    Lymph # 2.1 1.0 - 4.8 K/uL     Mono # 0.4 0.3 - 1.0 K/uL    Eos # 0.2 0.0 - 0.5 K/uL    Baso # 0.08 0.00 - 0.20 K/uL    nRBC 0 0 /100 WBC    Gran% 56.9 38.0 - 73.0 %    Lymph% 31.7 18.0 - 48.0 %    Mono% 6.2 4.0 - 15.0 %    Eosinophil% 3.7 0.0 - 8.0 %    Basophil% 1.2 0.0 - 1.9 %    Differential Method Automated    Comprehensive metabolic panel   Result Value Ref Range    Sodium 138 136 - 145 mmol/L    Potassium 2.4 (LL) 3.5 - 5.1 mmol/L    Chloride 102 95 - 110 mmol/L    CO2 22 (L) 23 - 29 mmol/L    Glucose 85 70 - 110 mg/dL    BUN, Bld 13 8 - 23 mg/dL    Creatinine 0.9 0.5 - 1.4 mg/dL    Calcium 9.1 8.7 - 10.5 mg/dL    Total Protein 7.2 6.0 - 8.4 g/dL    Albumin 4.0 3.5 - 5.2 g/dL    Total Bilirubin 0.5 0.1 - 1.0 mg/dL    Alkaline Phosphatase 79 55 - 135 U/L    AST 23 10 - 40 U/L    ALT 18 10 - 44 U/L    Anion Gap 14 8 - 16 mmol/L    eGFR if African American >60 >60 mL/min/1.73 m^2    eGFR if non African American >60 >60 mL/min/1.73 m^2   Urinalysis - Clean Catch   Result Value Ref Range    Specimen UA Urine, Clean Catch     Color, UA Yellow Yellow, Straw, Marj    Appearance, UA Clear Clear    pH, UA 7.0 5.0 - 8.0    Specific Gravity, UA 1.010 1.005 - 1.030    Protein, UA Negative Negative    Glucose, UA Negative Negative    Ketones, UA Negative Negative    Bilirubin (UA) Negative Negative    Occult Blood UA Negative Negative    Nitrite, UA Negative Negative    Urobilinogen, UA Negative <2.0 EU/dL    Leukocytes, UA Trace (A) Negative   Urinalysis Microscopic   Result Value Ref Range    WBC, UA 2 0 - 5 /hpf    Squam Epithel, UA 1 /hpf    Microscopic Comment SEE COMMENT    Potassium   Result Value Ref Range    Potassium 2.6 (LL) 3.5 - 5.1 mmol/L         Imaging Results:  Imaging Results          X-Ray Chest AP Portable (Final result)  Result time 06/23/20 22:26:41    Final result by Ant Jimenez MD (06/23/20 22:26:41)                 Impression:      No acute findings.      Electronically signed by: Ant Jimenez,  MD  Date:    06/23/2020  Time:    22:26             Narrative:    EXAMINATION:  XR CHEST AP PORTABLE    CLINICAL HISTORY:  Chest Pain;    TECHNIQUE:  Single frontal view of the chest was performed.    COMPARISON:  11/07/2019    FINDINGS:  The cardiomediastinal silhouette is normal.    The lungs are clear.  No pleural effusions.    Bones are unremarkable.                                 The EKG was ordered, reviewed, and independently interpreted by the ED provider.  Interpretation time: 21:30  Rate: 69 BPM  Rhythm: normal sinus rhythm  Interpretation: Cannot r/o inferior infarct. No STEMI.           The Emergency Provider reviewed the vital signs and test results, which are outlined above.     ED Discussion     11:47 PM: Dr. Nowak transfers care of pt to Dr. Camejo pending lab results.    1:08 AM: Dr. Camejo assessed pt at this time. Discussed with pt's family to f/u with PCP.  Discussed with pt's family all pertinent ED information and results. Discussed pt dx and plan of tx. Gave pt's mother all f/u and return to the ED instructions. All questions and concerns were addressed at this time. Pt's mother expresses understanding of information and instructions, and is comfortable with plan to discharge. Pt is stable for discharge.    I discussed with patient and/or family/caretaker that evaluation in the ED does not suggest any emergent or life threatening medical conditions requiring immediate intervention beyond what was provided in the ED, and I believe patient is safe for discharge.  Regardless, an unremarkable evaluation in the ED does not preclude the development or presence of a serious of life threatening condition. As such, patient was instructed to return immediately for any worsening or change in current symptoms.       Medical Decision Making:   Clinical Tests:   Lab Tests: Ordered and Reviewed  Radiological Study: Ordered and Reviewed  Medical Tests: Ordered and Reviewed           ED  Medication(s):  Medications   potassium chloride SA CR tablet 40 mEq (40 mEq Oral Given 6/23/20 2259)   potassium chloride SA CR tablet 40 mEq (40 mEq Oral Given 6/24/20 0056)       Discharge Medication List as of 6/24/2020  1:07 AM      START taking these medications    Details   potassium chloride SA (K-DUR,KLOR-CON) 20 MEQ tablet Take 1 tablet (20 mEq total) by mouth once daily., Starting Wed 6/24/2020, Print             Follow-up Information     Stephan Quiñones MD In 2 days.    Specialty: Family Medicine  Contact information:  [address] [address] [id number] [phone]             [facility]    Specialty: Emergency Medicine  Why: As needed, If symptoms worsen  Contact information:  [address] [address] [phone]                     Scribe Attestation:   Scribe #1: I performed the above scribed service and the documentation accurately describes the services I performed. I attest to the accuracy of the note.     Attending:   Physician Attestation Statement for Scribe #1: I, Triny Nowak MD, personally performed the services described in this documentation, as scribed by Kathleen Lemus, in my presence, and it is both accurate and complete.       Scribe Attestation:   Scribe #2: I performed the above scribed service and the documentation accurately describes the services I performed. I attest to the accuracy of the note.    Attending Attestation:           Physician Attestation for Scribe:    Physician Attestation Statement for Scribe #2: I, Kinga Camejo MD, reviewed documentation, as scribed by Nenita Kirkland in my presence, and it is both accurate and complete. I also acknowledge and confirm the content of the note done by Lalaibe #1.           Clinical Impression       ICD-10-CM ICD-9-CM   1. Hypokalemia  E87.6 276.8   2. Anxiety  F41.9 300.00       Disposition:   Disposition: Discharged  Condition: Stable         Triny HANDY  MD She  06/24/20 1530

## 2020-06-30 ENCOUNTER — OUTPATIENT CASE MANAGEMENT (OUTPATIENT)
Dept: ADMINISTRATIVE | Facility: OTHER | Age: 60
End: 2020-06-30

## 2020-06-30 NOTE — PROGRESS NOTES
Attempt #:  1  This LCSW attempted to reach patient/caregiver to provide resource and left a message requesting a return call.

## 2020-06-30 NOTE — LETTER
July 6, 2020    Vishnu Dougherty Jr.  [address] [place] [address]             [facility] [address] [place] [address] Dear Mr. Lockwoode:    I am writing from the Outpatient Complex Care Management Department at [facility]. I received a referral from Dr. Quiñones to contact you or your caregiver regarding any needs you may have. I have attempted to contact you or your cargeiver by phone two times unsuccessfully.  Please contact the Outpatient Complex Care Management Department at [phone] if you would like to discuss your needs.      Sincerely,         Leticia Bingham, [facility]

## 2020-07-01 NOTE — PROGRESS NOTES
Attempt #:  2  This LCSW attempted to reach patient/caregiver to provide resource and patient's daughter states she is not available. Pt's daughter reports she is at work and will not be available until after 6:00PM. She requested for LCSW to call her back tomorrow before 9:00AM.

## 2020-07-06 ENCOUNTER — TELEPHONE (OUTPATIENT)
Dept: INTERNAL MEDICINE | Facility: CLINIC | Age: 60
End: 2020-07-06

## 2020-07-06 NOTE — TELEPHONE ENCOUNTER
----- Message from Stephan Quiñones MD sent at 7/1/2020  1:06 PM CDT -----  Please call the patient regarding his labs  Noted B12 is low - advise patient to start taking oral B12 - and we will recheck this at next visit. It can impact memory. Also vit D is low - start daily vit D otc 1000 units/day .    No other acute concerns    If b12 stays low will start injections

## 2020-07-06 NOTE — PROGRESS NOTES
Attempt #: 3  This LCSW attempted to reach patient/caregiver to provide resource and was not able to leave voice message.  Letter with contact information was sent via US mail to patient/caregiver. Referral source notified.

## 2020-07-27 ENCOUNTER — TELEPHONE (OUTPATIENT)
Dept: INTERNAL MEDICINE | Facility: CLINIC | Age: 60
End: 2020-07-27

## 2020-07-27 NOTE — TELEPHONE ENCOUNTER
----- Message from Genevieve Douglas sent at 7/27/2020  9:33 AM CDT -----  Regarding: same day appt  Contact: Patients daughter, Faviola  Patient has a rash all over and swollen eyes, needs to be seen today, but there are no openings with any provider, please call Ms Salmeron back at [phone]

## 2020-08-03 ENCOUNTER — TELEPHONE (OUTPATIENT)
Dept: INTERNAL MEDICINE | Facility: CLINIC | Age: 60
End: 2020-08-03

## 2020-08-03 NOTE — TELEPHONE ENCOUNTER
----- Message from Kalli Siegel sent at 8/3/2020 12:07 PM CDT -----  Regarding: med list  Contact: Malaika cazares/[facility] care coordinator called to consult with nurse about getting pt medication list sent to her. Please call back to  or fax to . Thanks tp

## 2020-08-06 ENCOUNTER — TELEPHONE (OUTPATIENT)
Dept: INTERNAL MEDICINE | Facility: CLINIC | Age: 60
End: 2020-08-06

## 2020-08-06 RX ORDER — ATORVASTATIN CALCIUM 20 MG/1
20 TABLET, FILM COATED ORAL NIGHTLY
Qty: 30 TABLET | Refills: 0 | Status: SHIPPED | OUTPATIENT
Start: 2020-08-06 | End: 2020-08-21 | Stop reason: SDUPTHER

## 2020-08-06 RX ORDER — AMLODIPINE BESYLATE 10 MG/1
10 TABLET ORAL DAILY
Qty: 30 TABLET | Refills: 0 | Status: SHIPPED | OUTPATIENT
Start: 2020-08-06 | End: 2020-08-21 | Stop reason: SDUPTHER

## 2020-08-06 NOTE — TELEPHONE ENCOUNTER
----- Message from Sharath White sent at 8/6/2020 11:08 AM CDT -----  Regarding: f/u  Contact: patricia SCOTT  Caller called in regard to scheduling the pt for a w+1 hospital f/u. Pt can be reached at [phone] (home) or [phone](sister negrito)

## 2020-08-06 NOTE — TELEPHONE ENCOUNTER
Called the patient to schedule hospital follow.  Patient states that his sister will call back to schedule. /sl/

## 2020-08-12 ENCOUNTER — TELEPHONE (OUTPATIENT)
Dept: INTERNAL MEDICINE | Facility: CLINIC | Age: 60
End: 2020-08-12

## 2020-08-14 ENCOUNTER — TELEPHONE (OUTPATIENT)
Dept: NEUROLOGY | Facility: CLINIC | Age: 60
End: 2020-08-14

## 2020-08-14 NOTE — TELEPHONE ENCOUNTER
Spoke with pt sister . She stated that pt needed antidepressant  Refilled , advised her that Dr. Cameron does not write rx for anti depressant but we can refill the dementia medication  And schedule him for NP appt . She stated she will start over and appreciate me for calling .

## 2020-08-14 NOTE — TELEPHONE ENCOUNTER
----- Message from Meli Blackman sent at 8/14/2020  9:02 AM CDT -----  Regarding: pts sister  .Type:  Sooner Apoointment Request    Caller is requesting a sooner appointment.  Caller declined first available appointment listed below.  Caller will not accept being placed on the waitlist and is requesting a message be sent to doctor.  Name of Caller:pts sister   When is the first available appointment?12/9  Symptoms:Hospital f/u --- early onset dementia diagnosed 2 yrs ago //pt was sent to the hospital on Aug 2,2020 and was released on 8/6// stated pt has been off two medication that he is supposed to take everyday for 35days .   Would the patient rather a call back or a response via [facility]? Call back   Best Call Back Number:[phone]  Additional Information:       Thank You,   Meli Blackman

## 2020-08-18 DIAGNOSIS — I10 ESSENTIAL HYPERTENSION: Primary | ICD-10-CM

## 2020-08-18 NOTE — TELEPHONE ENCOUNTER
Care Due:                  Date            Visit Type   Department     Provider  --------------------------------------------------------------------------------                                             [facility]  Last Visit: 06-      None         Medicine       Stephan Quiñones  Next Visit: None Scheduled  None         None Found                                                            Last  Test          Frequency    Reason                     Performed    Due Date  --------------------------------------------------------------------------------    HDL.........  12 months..  atorvastatin.............  10-   09-    LDL.........  12 months..  atorvastatin.............  10-   09-    Total         12 months..  atorvastatin.............  10-   09-  Cholesterol.    Triglyceride  12 months..  atorvastatin.............  10-   09-  s...........    Powered by [employer]. Reference number: [id number]. 8/18/2020 7:20:28 AM CDT

## 2020-08-19 ENCOUNTER — TELEPHONE (OUTPATIENT)
Dept: INTERNAL MEDICINE | Facility: CLINIC | Age: 60
End: 2020-08-19

## 2020-08-19 RX ORDER — BUPROPION HYDROCHLORIDE 150 MG/1
TABLET, EXTENDED RELEASE ORAL
Qty: 180 TABLET | Refills: 0 | Status: SHIPPED | OUTPATIENT
Start: 2020-08-19 | End: 2020-08-21 | Stop reason: SDUPTHER

## 2020-08-19 NOTE — TELEPHONE ENCOUNTER
Provider Staff:     Please schedule patient for Labs (LIPIDS)    Please also check with your provider if any further labs need to be added and scheduled together.    Thanks!  [facility]     Appointments  past 12m or future 3m with PCP    Date Provider   Last Visit   6/23/2020 Stephan Quiñones MD   Next Visit   Visit date not found Stephan Quiñones MD     Note composed:11:09 AM 08/19/2020

## 2020-08-19 NOTE — PROGRESS NOTES
Refill Routing Note   Medication(s) are not appropriate for processing by [facility]:       - Patient has been hospitalized since the last PCP visit  - Medication is a new start (<3 months)     Will follow up with your staff to schedule labs after your decision.    Medication-related problems identified:   Non-adherence (knowledge deficit) non-intentional  Requires labs  Medication Therapy Plan: CDMF. NTBO(LIPIDS); HOSPITALIZATION(8/20/20-Hypokalemia); PER EPIC DATA 37% ADHERENCE; MEDICATION IS A NEW START, DEFER TO YOU  Medication reconciliation completed: No      Automatic Epic Generated Protocol Data:        Requested Prescriptions   Pending Prescriptions Disp Refills    buPROPion (WELLBUTRIN SR) 150 MG TBSR 12 hr tablet [Pharmacy Med Name: BUPROPION HCL  MG TABLET] 180 tablet 0     Sig: TAKE 1 TABLET BY MOUTH TWICE A DAY       Psychiatry: Antidepressants - bupropion Failed - 8/18/2020  7:20 AM        Failed - Last BP in normal range within 360 days.     BP Readings from Last 3 Encounters:   06/24/20 (!) 154/75   06/23/20 (!) 140/76   11/22/19 125/70              Passed - Patient is at least 18 years old        Passed - Office visit in past 6 months or future 90 days.     Recent Outpatient Visits            1 month ago Mild cognitive impairment with memory loss    [facility] Stephan Quiñones MD    9 months ago Anxiety and depression    [facility] Stephan Quiñones MD    1 year ago Allergic reaction, initial encounter    [facility] nAt Mejia III, PA-C    1 year ago Mild cognitive impairment with memory loss    [facility] Ant Mejia III, PA-C    2 years ago Mild cognitive impairment with memory loss    [facility] Bernabe Clemente MD                          Appointments  past 12m or future 3m with PCP    Date Provider   Last Visit   6/23/2020 Stephan Quiñones MD   Next Visit   Visit date not found Stephan PUGH  MD Nuria   ED visits in past 90 days: 1     Note composed:6:35 AM 08/19/2020

## 2020-08-19 NOTE — TELEPHONE ENCOUNTER
----- Message from Khalida Danielle sent at 8/19/2020 11:26 AM CDT -----  Contact: Sister      Type:  Sooner Apoointment Request    Caller is requesting a sooner appointment.  Caller declined first available appointment listed below.  Caller will not accept being placed on the waitlist and is requesting a message be sent to doctor.  Name of Caller: Rach Myers patient's sister   When is the first available appointment? 10/24/20  Symptoms: Hospital F/U & paperwork   Would the patient rather a call back or a response via [facility]? Call back   Best Call Back Number:[phone] (cell)   Additional Information: Patient's sister is stating that the patient  Vishnu Dougherty Jr. Was discharged from  the hospital on 8/6/20 and has requested an Hospital F/U appointment. Patient's sister is also stating that she also have paper work in which she can scan and submit it to Dr. Quiñones. If the Dr. Quiñones doesn't have to see the patient.   Patient's sister can be reached at the number listed above.

## 2020-08-21 ENCOUNTER — LAB VISIT (OUTPATIENT)
Dept: LAB | Facility: HOSPITAL | Age: 60
End: 2020-08-21
Attending: FAMILY MEDICINE
Payer: MEDICAID

## 2020-08-21 ENCOUNTER — OFFICE VISIT (OUTPATIENT)
Dept: INTERNAL MEDICINE | Facility: CLINIC | Age: 60
End: 2020-08-21
Payer: MEDICAID

## 2020-08-21 VITALS
OXYGEN SATURATION: 99 % | HEART RATE: 56 BPM | BODY MASS INDEX: 23.31 KG/M2 | SYSTOLIC BLOOD PRESSURE: 132 MMHG | DIASTOLIC BLOOD PRESSURE: 74 MMHG | TEMPERATURE: 98 F | RESPIRATION RATE: 18 BRPM | WEIGHT: 162.5 LBS

## 2020-08-21 DIAGNOSIS — F32.A ANXIETY AND DEPRESSION: ICD-10-CM

## 2020-08-21 DIAGNOSIS — E87.6 HYPOKALEMIA: ICD-10-CM

## 2020-08-21 DIAGNOSIS — F03.90 DEMENTIA WITHOUT BEHAVIORAL DISTURBANCE, UNSPECIFIED DEMENTIA TYPE: Primary | ICD-10-CM

## 2020-08-21 DIAGNOSIS — F41.9 ANXIETY AND DEPRESSION: ICD-10-CM

## 2020-08-21 DIAGNOSIS — R63.4 WEIGHT LOSS: ICD-10-CM

## 2020-08-21 DIAGNOSIS — E78.5 HYPERLIPIDEMIA, UNSPECIFIED HYPERLIPIDEMIA TYPE: ICD-10-CM

## 2020-08-21 DIAGNOSIS — Z23 IMMUNIZATION DUE: ICD-10-CM

## 2020-08-21 DIAGNOSIS — Z91.148 NON COMPLIANCE W MEDICATION REGIMEN: ICD-10-CM

## 2020-08-21 DIAGNOSIS — Z91.89 AT RISK FOR POLYPHARMACY: ICD-10-CM

## 2020-08-21 LAB
ALBUMIN SERPL BCP-MCNC: 4 G/DL (ref 3.5–5.2)
ALP SERPL-CCNC: 81 U/L (ref 55–135)
ALT SERPL W/O P-5'-P-CCNC: 14 U/L (ref 10–44)
ANION GAP SERPL CALC-SCNC: 6 MMOL/L (ref 8–16)
AST SERPL-CCNC: 21 U/L (ref 10–40)
BILIRUB SERPL-MCNC: 0.5 MG/DL (ref 0.1–1)
BUN SERPL-MCNC: 12 MG/DL (ref 8–23)
CALCIUM SERPL-MCNC: 9.4 MG/DL (ref 8.7–10.5)
CHLORIDE SERPL-SCNC: 104 MMOL/L (ref 95–110)
CHOLEST SERPL-MCNC: 130 MG/DL (ref 120–199)
CHOLEST/HDLC SERPL: 2.7 {RATIO} (ref 2–5)
CO2 SERPL-SCNC: 30 MMOL/L (ref 23–29)
CREAT SERPL-MCNC: 1 MG/DL (ref 0.5–1.4)
EST. GFR  (AFRICAN AMERICAN): >60 ML/MIN/1.73 M^2
EST. GFR  (NON AFRICAN AMERICAN): >60 ML/MIN/1.73 M^2
GLUCOSE SERPL-MCNC: 90 MG/DL (ref 70–110)
HDLC SERPL-MCNC: 48 MG/DL (ref 40–75)
HDLC SERPL: 36.9 % (ref 20–50)
LDLC SERPL CALC-MCNC: 70 MG/DL (ref 63–159)
NONHDLC SERPL-MCNC: 82 MG/DL
POTASSIUM SERPL-SCNC: 4 MMOL/L (ref 3.5–5.1)
PROT SERPL-MCNC: 7.1 G/DL (ref 6–8.4)
SODIUM SERPL-SCNC: 140 MMOL/L (ref 136–145)
TRIGL SERPL-MCNC: 60 MG/DL (ref 30–150)

## 2020-08-21 PROCEDURE — 99214 PR OFFICE/OUTPT VISIT, EST, LEVL IV, 30-39 MIN: ICD-10-PCS | Mod: S$PBB,,, | Performed by: FAMILY MEDICINE

## 2020-08-21 PROCEDURE — 99999 PR PBB SHADOW E&M-EST. PATIENT-LVL III: ICD-10-PCS | Mod: PBBFAC,,, | Performed by: FAMILY MEDICINE

## 2020-08-21 PROCEDURE — 36415 COLL VENOUS BLD VENIPUNCTURE: CPT

## 2020-08-21 PROCEDURE — 80053 COMPREHEN METABOLIC PANEL: CPT

## 2020-08-21 PROCEDURE — 99999 PR PBB SHADOW E&M-EST. PATIENT-LVL III: CPT | Mod: PBBFAC,,, | Performed by: FAMILY MEDICINE

## 2020-08-21 PROCEDURE — 99214 OFFICE O/P EST MOD 30 MIN: CPT | Mod: S$PBB,,, | Performed by: FAMILY MEDICINE

## 2020-08-21 PROCEDURE — 80061 LIPID PANEL: CPT

## 2020-08-21 PROCEDURE — 99213 OFFICE O/P EST LOW 20 MIN: CPT | Mod: PBBFAC | Performed by: FAMILY MEDICINE

## 2020-08-21 RX ORDER — BUPROPION HYDROCHLORIDE 150 MG/1
150 TABLET, EXTENDED RELEASE ORAL 2 TIMES DAILY
Qty: 180 TABLET | Refills: 3 | Status: SHIPPED | OUTPATIENT
Start: 2020-08-21 | End: 2021-08-16

## 2020-08-21 RX ORDER — AMLODIPINE BESYLATE 10 MG/1
10 TABLET ORAL DAILY
Qty: 90 TABLET | Refills: 3 | Status: SHIPPED | OUTPATIENT
Start: 2020-08-21 | End: 2021-08-16

## 2020-08-21 RX ORDER — ATORVASTATIN CALCIUM 20 MG/1
20 TABLET, FILM COATED ORAL NIGHTLY
Qty: 90 TABLET | Refills: 3 | Status: SHIPPED | OUTPATIENT
Start: 2020-08-21 | End: 2021-08-16

## 2020-08-21 NOTE — PROGRESS NOTES
Subjective:       Patient ID: Vishnu Dougherty Jr. is a 60 y.o. male.    Chief Complaint: Follow-up    HPI     59 yo  M    Past Medical History:   Diagnosis Date    Chronic CHF 10/2/2019    Chronic diastolic HF (heart failure) 4/17/2018    Dilated cardiomyopathy 9/24/2015    Enlarged prostate     Essential hypertension 9/24/2015    Gastroesophageal reflux disease without esophagitis 10/7/2015    Gastroesophageal reflux disease without esophagitis 10/7/2015    Hyperlipidemia     Shortness of breath 9/24/2015     Past Surgical History:   Procedure Laterality Date    COLONOSCOPY N/A 5/25/2016    Procedure: COLONOSCOPY;  Surgeon: Demetrio Spicer MD;  Location: [facility];  Service: Endoscopy;  Laterality: N/A;     Recent admission  Found confused  Hypokalemia noted in hospital    Daughter - who I previously met as caretaker is reported to have removed herself from situation.    Patient presents with sister - who seems to be stepping in a caretaker    Serious medication compliance issues/ confusion issue      Was taking aleve - 3-4x week    Sister noted aleve in his anxiety medications.    Home health and case management - did not go thru - uncertain why - suspect phone calls to patient are useless given memory deficits.  Sister will now take calls.      Sister reports he is not eating    Wt Readings from Last 10 Encounters:   08/21/20 73.7 kg (162 lb 7.7 oz)   06/23/20 70.3 kg (155 lb)   06/23/20 70.6 kg (155 lb 10.3 oz)   11/22/19 75.7 kg (166 lb 14.2 oz)   11/07/19 75 kg (165 lb 5.5 oz)   10/02/19 80.9 kg (178 lb 4.8 oz)   10/15/18 76.8 kg (169 lb 5 oz)   09/17/18 77.1 kg (169 lb 15.6 oz)   04/18/18 80.8 kg (178 lb 2.1 oz)   04/17/18 80.7 kg (178 lb)         Review of Systems   Constitutional: Negative for chills and fever.   HENT: Negative for trouble swallowing.    Respiratory: Negative for shortness of breath.    Gastrointestinal: Negative for abdominal pain.   Genitourinary: Negative for difficulty urinating.    Skin: Negative for pallor.   Neurological: Negative for dizziness.   Psychiatric/Behavioral: Positive for confusion.       Objective:       Vitals:    08/21/20 0826   BP: 132/74   Pulse: (!) 56   Resp: 18   Temp: 97.7 °F (36.5 °C)       Physical Exam  Constitutional:       General: He is not in acute distress.     Appearance: Normal appearance. He is well-developed.   HENT:      Head: Normocephalic and atraumatic.   Eyes:      General:         Right eye: No discharge.         Left eye: No discharge.      Conjunctiva/sclera: Conjunctivae normal.   Neck:      Musculoskeletal: Full passive range of motion without pain.      Trachea: Trachea normal.   Cardiovascular:      Rate and Rhythm: Normal rate and regular rhythm.      Heart sounds: Normal heart sounds.   Pulmonary:      Effort: Pulmonary effort is normal. No respiratory distress.      Breath sounds: Normal breath sounds. No decreased breath sounds or wheezing.   Abdominal:      Palpations: Abdomen is soft.      Tenderness: There is no abdominal tenderness.   Musculoskeletal:         General: No deformity.   Lymphadenopathy:      Cervical: No cervical adenopathy.   Skin:     Coloration: Skin is not pale.   Neurological:      Mental Status: He is alert and oriented to person, place, and time.   Psychiatric:         Speech: Speech normal.         Behavior: Behavior normal.         Thought Content: Thought content normal.         Assessment:       1. Dementia without behavioral disturbance, unspecified dementia type    2. Hypokalemia    3. Hyperlipidemia, unspecified hyperlipidemia type    4. Weight loss    5. Non compliance w medication regimen    6. At risk for polypharmacy    7. Anxiety and depression    8. Immunization due        Plan:     Dementia  Did not see neurologist. Did not want to see another doctor  Presents with sister.  Neither home health or case management has gone thru    Polypharmacy -  Medication management -   Home  heatlh    Dementia  Aricept  We had consulted neurology - did not go.  I would like to try and set up an appt for at least an evaluation to ensure accuracy of diagnosis and to help try and optimize things.  Case management        Anxiety / depression  History of benzo use  He has not had Cymbalta -  But is back on it  He is currently taking Cymbalta 30  and wellbutrin 150 BID  At present mood seems improved  He was off his medications for some time.  Sister credits worsening due to medications lack      Hypertension  Amlodipine 10 well controlled.    HLD  The 10-year ASCVD risk score (Chuck DÍAZ Jr., et al., 2013) is: 14.7%    Values used to calculate the score:      Age: 60 years      Sex: Male      Is Non- : No      Diabetic: No      Tobacco smoker: No      Systolic Blood Pressure: 132 mmHg      Is BP treated: Yes      HDL Cholesterol: 39 mg/dL      Total Cholesterol: 203 mg/dL  On statin  Lipid profile today       Hypokalemia -  Checking level today  Will deteremine need for continue K supplementation      Polypharmacy   Medication management concerns with compliance and dementia  Will arrange for home health to try and optimize med mngt    Shingles        Weight loss  Monitor closely  Suspect forgetting to eat    2 m f/u

## 2020-08-27 RX ORDER — DONEPEZIL HYDROCHLORIDE 10 MG/1
TABLET, FILM COATED ORAL
Qty: 90 TABLET | Refills: 1 | Status: SHIPPED | OUTPATIENT
Start: 2020-08-27 | End: 2020-11-05 | Stop reason: SDUPTHER

## 2020-08-27 NOTE — PROGRESS NOTES
Refill Routing Note   Medication(s) are not appropriate for processing by [facility]:       - Outside of protocol           Medication reconciliation completed: No      Automatic Epic Generated Protocol Data:        Requested Prescriptions   Pending Prescriptions Disp Refills    donepeziL (ARICEPT) 10 MG tablet [Pharmacy Med Name: DONEPEZIL HCL 10 MG TABLET] 90 tablet 1     Sig: TAKE 1 TABLET BY MOUTH EVERY DAY IN THE EVENING       There is no refill protocol information for this order           Appointments  past 12m or future 3m with PCP    Date Provider   Last Visit   8/21/2020 Stephan Quiñoens MD   Next Visit   11/24/2020 Stephan Quiñones MD   ED visits in past 90 days: 1     Note composed:11:11 AM 08/27/2020

## 2020-09-01 ENCOUNTER — TELEPHONE (OUTPATIENT)
Dept: INTERNAL MEDICINE | Facility: CLINIC | Age: 60
End: 2020-09-01

## 2020-09-01 NOTE — TELEPHONE ENCOUNTER
----- Message from Gini Hanna sent at 9/1/2020 11:00 AM CDT -----  [place]-Sister of pt is requesting to speak with someone in office regarding orders for pt. Please call back at [phone]

## 2020-09-02 NOTE — TELEPHONE ENCOUNTER
Spoke to patient sister and connected her to outpatient case management to help assist her with making patients appointments.

## 2020-09-03 ENCOUNTER — SSC ENCOUNTER (OUTPATIENT)
Dept: ADMINISTRATIVE | Facility: OTHER | Age: 60
End: 2020-09-03

## 2020-09-03 NOTE — PROGRESS NOTES
Please note the following patient's information was forwarded to the Medicaid ([place] Medicaid, [employer], [employer], [facility], [facility], [facility]) Case Management office.    Please contact Outpatient Care Management at [phone] (Ext. [phone]) with any questions.    Thank you,    Lori Dumont, [facility]  Outpatient Case Mgmnt  [phone]

## 2020-10-28 ENCOUNTER — HOSPITAL ENCOUNTER (EMERGENCY)
Facility: HOSPITAL | Age: 60
Discharge: HOME | End: 2020-10-28
Attending: EMERGENCY MEDICINE
Payer: MEDICARE

## 2020-10-28 VITALS
WEIGHT: 165.13 LBS | OXYGEN SATURATION: 96 % | SYSTOLIC BLOOD PRESSURE: 148 MMHG | BODY MASS INDEX: 25.03 KG/M2 | DIASTOLIC BLOOD PRESSURE: 68 MMHG | HEART RATE: 68 BPM | RESPIRATION RATE: 20 BRPM | HEIGHT: 68 IN | TEMPERATURE: 98 F

## 2020-10-28 DIAGNOSIS — S42.032A DISPLACED FRACTURE OF LATERAL END OF LEFT CLAVICLE, INITIAL ENCOUNTER FOR CLOSED FRACTURE: Primary | ICD-10-CM
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
## 2020-10-28 DIAGNOSIS — F03.90 DEMENTIA WITHOUT BEHAVIORAL DISTURBANCE, UNSPECIFIED DEMENTIA TYPE: ICD-10-CM

## 2020-10-28 DIAGNOSIS — M25.519 SHOULDER PAIN: ICD-10-CM

## 2020-10-28 LAB
ABO + RH BLD: NORMAL
ALBUMIN SERPL BCP-MCNC: 3.9 G/DL (ref 3.5–5.2)
ALP SERPL-CCNC: 74 U/L (ref 55–135)
ALT SERPL W/O P-5'-P-CCNC: 15 U/L (ref 10–44)
ANION GAP SERPL CALC-SCNC: 11 MMOL/L (ref 8–16)
APTT BLDCRRT: 24.1 SEC (ref 21–32)
AST SERPL-CCNC: 22 U/L (ref 10–40)
BASOPHILS # BLD AUTO: 0.08 K/UL (ref 0–0.2)
BASOPHILS NFR BLD: 1.3 % (ref 0–1.9)
BILIRUB SERPL-MCNC: 0.6 MG/DL (ref 0.1–1)
BLD GP AB SCN CELLS X3 SERPL QL: NORMAL
BUN SERPL-MCNC: 13 MG/DL (ref 8–23)
CALCIUM SERPL-MCNC: 9 MG/DL (ref 8.7–10.5)
CHLORIDE SERPL-SCNC: 103 MMOL/L (ref 95–110)
CO2 SERPL-SCNC: 24 MMOL/L (ref 23–29)
CREAT SERPL-MCNC: 1 MG/DL (ref 0.5–1.4)
DIFFERENTIAL METHOD: ABNORMAL
EOSINOPHIL # BLD AUTO: 0.1 K/UL (ref 0–0.5)
EOSINOPHIL NFR BLD: 2.2 % (ref 0–8)
ERYTHROCYTE [DISTWIDTH] IN BLOOD BY AUTOMATED COUNT: 12.8 % (ref 11.5–14.5)
EST. GFR  (AFRICAN AMERICAN): >60 ML/MIN/1.73 M^2
EST. GFR  (NON AFRICAN AMERICAN): >60 ML/MIN/1.73 M^2
GLUCOSE SERPL-MCNC: 142 MG/DL (ref 70–110)
HCT VFR BLD AUTO: 33.7 % (ref 40–54)
HGB BLD-MCNC: 11.5 G/DL (ref 14–18)
IMM GRANULOCYTES # BLD AUTO: 0.02 K/UL (ref 0–0.04)
IMM GRANULOCYTES NFR BLD AUTO: 0.3 % (ref 0–0.5)
INR PPP: 1 (ref 0.8–1.2)
LYMPHOCYTES # BLD AUTO: 1.5 K/UL (ref 1–4.8)
LYMPHOCYTES NFR BLD: 22.7 % (ref 18–48)
MCH RBC QN AUTO: 31.3 PG (ref 27–31)
MCHC RBC AUTO-ENTMCNC: 34.1 G/DL (ref 32–36)
MCV RBC AUTO: 92 FL (ref 82–98)
MONOCYTES # BLD AUTO: 0.4 K/UL (ref 0.3–1)
MONOCYTES NFR BLD: 5.9 % (ref 4–15)
NEUTROPHILS # BLD AUTO: 4.3 K/UL (ref 1.8–7.7)
NEUTROPHILS NFR BLD: 67.6 % (ref 38–73)
NRBC BLD-RTO: 0 /100 WBC
PLATELET # BLD AUTO: 229 K/UL (ref 150–350)
PMV BLD AUTO: 10.3 FL (ref 9.2–12.9)
POTASSIUM SERPL-SCNC: 3.2 MMOL/L (ref 3.5–5.1)
PROT SERPL-MCNC: 6.9 G/DL (ref 6–8.4)
PROTHROMBIN TIME: 10.5 SEC (ref 9–12.5)
RBC # BLD AUTO: 3.67 M/UL (ref 4.6–6.2)
SODIUM SERPL-SCNC: 138 MMOL/L (ref 136–145)
WBC # BLD AUTO: 6.4 K/UL (ref 3.9–12.7)

## 2020-10-28 PROCEDURE — 63600175 PHARM REV CODE 636 W HCPCS: Performed by: EMERGENCY MEDICINE

## 2020-10-28 PROCEDURE — 85730 THROMBOPLASTIN TIME PARTIAL: CPT

## 2020-10-28 PROCEDURE — 80053 COMPREHEN METABOLIC PANEL: CPT

## 2020-10-28 PROCEDURE — 93010 ELECTROCARDIOGRAM REPORT: CPT | Mod: ,,, | Performed by: INTERNAL MEDICINE

## 2020-10-28 PROCEDURE — 93005 ELECTROCARDIOGRAM TRACING: CPT

## 2020-10-28 PROCEDURE — 96374 THER/PROPH/DIAG INJ IV PUSH: CPT

## 2020-10-28 PROCEDURE — 96361 HYDRATE IV INFUSION ADD-ON: CPT

## 2020-10-28 PROCEDURE — 86901 BLOOD TYPING SEROLOGIC RH(D): CPT

## 2020-10-28 PROCEDURE — 85025 COMPLETE CBC W/AUTO DIFF WBC: CPT

## 2020-10-28 PROCEDURE — 96375 TX/PRO/DX INJ NEW DRUG ADDON: CPT

## 2020-10-28 PROCEDURE — 85610 PROTHROMBIN TIME: CPT

## 2020-10-28 PROCEDURE — 93010 EKG 12-LEAD: ICD-10-PCS | Mod: ,,, | Performed by: INTERNAL MEDICINE

## 2020-10-28 PROCEDURE — 99285 EMERGENCY DEPT VISIT HI MDM: CPT | Mod: 25

## 2020-10-28 PROCEDURE — 25000003 PHARM REV CODE 250: Performed by: EMERGENCY MEDICINE

## 2020-10-28 RX ORDER — HYDROCODONE BITARTRATE AND ACETAMINOPHEN 7.5; 325 MG/1; MG/1
1 TABLET ORAL EVERY 6 HOURS PRN
Qty: 12 TABLET | Refills: 0 | Status: SHIPPED | OUTPATIENT
Start: 2020-10-28 | End: 2020-11-05

## 2020-10-28 RX ORDER — ONDANSETRON 2 MG/ML
4 INJECTION INTRAMUSCULAR; INTRAVENOUS
Status: COMPLETED | OUTPATIENT
Start: 2020-10-28 | End: 2020-10-28

## 2020-10-28 RX ORDER — FENTANYL CITRATE 50 UG/ML
75 INJECTION, SOLUTION INTRAMUSCULAR; INTRAVENOUS
Status: COMPLETED | OUTPATIENT
Start: 2020-10-28 | End: 2020-10-28

## 2020-10-28 RX ADMIN — ONDANSETRON 4 MG: 2 INJECTION INTRAMUSCULAR; INTRAVENOUS at 12:10

## 2020-10-28 RX ADMIN — FENTANYL CITRATE 75 MCG: 50 INJECTION INTRAMUSCULAR; INTRAVENOUS at 12:10

## 2020-10-28 RX ADMIN — SODIUM CHLORIDE 500 ML: 0.9 INJECTION, SOLUTION INTRAVENOUS at 12:10

## 2020-10-28 NOTE — ED PROVIDER NOTES
"SCRIBE #1 NOTE: I, Yassine Charlotte, am scribing for, and in the presence of, Maggi Agarwal MD. I have scribed the entire note.      History      Chief Complaint   Patient presents with    Fall     Pt c/o of left shoulder pain after fall in yard today. Hx of dementia.        Review of patient's allergies indicates:  No Known Allergies     HPI   HPI    10/28/2020, 11:58 AM   History obtained from the patient      History of Present Illness: Vishnu Dougherty Jr. is a 60 y.o. male patient with a PMHx of CHF, HLD who presents to the Emergency Department for evaluation following a fall just PTA. Pt states that he tripped and fell while working in his yard, landing on his back. Pt states that he felt a "snap" in his L shoulder, and reports L shoulder pain. Symptoms are constant and moderate in severity. No mitigating or exacerbating factors reported. Associated sxs include nausea, LUE pain and L clavicle pain. Patient denies any LOC, fever, chills, vomiting, SOB, CP, weakness, numbness, dizziness, headache, and all other sxs at this time. No prior Tx reported. No further complaints or concerns at this time.     Arrival mode: Personal vehicle    PCP: Stephan Quiñones MD       Past Medical History:  Past Medical History:   Diagnosis Date    Chronic CHF 10/2/2019    Chronic diastolic HF (heart failure) 4/17/2018    Dilated cardiomyopathy 9/24/2015    Enlarged prostate     Essential hypertension 9/24/2015    Gastroesophageal reflux disease without esophagitis 10/7/2015    Gastroesophageal reflux disease without esophagitis 10/7/2015    Hyperlipidemia     Shortness of breath 9/24/2015       Past Surgical History:  Past Surgical History:   Procedure Laterality Date    COLONOSCOPY N/A 5/25/2016    Procedure: COLONOSCOPY;  Surgeon: Demetrio Spicer MD;  Location: [facility];  Service: Endoscopy;  Laterality: N/A;         Family History:  Family History   Problem Relation Age of Onset    Hypertension Mother     " Hypertension Father     Stroke Father        Social History:  Social History     Tobacco Use    Smoking status: Former Smoker     Quit date: 1995     Years since quittin.1    Smokeless tobacco: Never Used   Substance and Sexual Activity    Alcohol use: No     Alcohol/week: 0.0 standard drinks    Drug use: No    Sexual activity: Not Currently       ROS   Review of Systems   Constitutional: Negative for chills and fever.   HENT: Negative for sore throat.    Respiratory: Negative for shortness of breath.    Cardiovascular: Negative for chest pain.   Gastrointestinal: Positive for nausea. Negative for diarrhea and vomiting.   Genitourinary: Negative for dysuria.   Musculoskeletal: Positive for arthralgias (L shoulder) and myalgias (LUE, L clavicle). Negative for back pain.   Skin: Negative for rash.   Neurological: Negative for dizziness, weakness, light-headedness, numbness and headaches.   Hematological: Does not bruise/bleed easily.   All other systems reviewed and are negative.    Physical Exam      Initial Vitals   BP Pulse Resp Temp SpO2   10/28/20 1142 10/28/20 1142 10/28/20 1142 10/28/20 1152 10/28/20 1142   (!) 86/51 (!) 52 20 97.6 °F (36.4 °C) 97 %      MAP       --                 Physical Exam  Nursing Notes and Vital Signs Reviewed.  Constitutional: Patient is in mild distress. Appears older than stated age.  Head: Atraumatic. Normocephalic. No obvious head trauma.  Eyes: PERRL. EOM intact. Conjunctivae are not pale. No scleral icterus.  ENT: Mucous membranes are dry. Oropharynx is clear and symmetric.    Neck: Supple. Full ROM. No lymphadenopathy. No cervical midline bony tenderness, deformities, or step-offs.   Cardiovascular: Regular rate. Regular rhythm. No murmurs, rubs, or gallops. Distal pulses are 2+ and symmetric.  Pulmonary/Chest: No respiratory distress. Clear to auscultation bilaterally. No wheezing or rales.  Abdominal: Soft and non-distended.  There is no tenderness.  No  "rebound, guarding, or rigidity.  Musculoskeletal: Moves all extremities. Swelling, tenderness, and deformity to the lateral aspect of the L clavicle.  Back: No midline bony tenderness, deformities, or step-offs of the T-spine or L-spine. Skin appears normal without abrasions or bruising. No erythema, induration, or fluctuance.   Skin: Warm and dry. Pale.  Neurological:  Alert, awake, and appropriate.  Normal speech.  No acute focal neurological deficits are appreciated.  Psychiatric: Normal affect. Good eye contact. Appropriate in content.    ED Course    Procedures  ED Vital Signs:  Vitals:    10/28/20 1142 10/28/20 1152 10/28/20 1153 10/28/20 1155   BP: (!) 86/51   127/60   Pulse: (!) 52   61   Resp: 20   19   Temp:  97.6 °F (36.4 °C)     TempSrc:  Oral     SpO2: 97%   99%   Weight:   74.9 kg (165 lb 2 oz)    Height: 5' 8" (1.727 m)       10/28/20 1206 10/28/20 1207 10/28/20 1301   BP:   (!) 148/68   Pulse:  61 68   Resp: 20  20   Temp:      TempSrc:      SpO2:   96%   Weight:      Height:          Abnormal Lab Results:  Labs Reviewed   CBC W/ AUTO DIFFERENTIAL - Abnormal; Notable for the following components:       Result Value    RBC 3.67 (*)     Hemoglobin 11.5 (*)     Hematocrit 33.7 (*)     MCH 31.3 (*)     All other components within normal limits   COMPREHENSIVE METABOLIC PANEL - Abnormal; Notable for the following components:    Potassium 3.2 (*)     Glucose 142 (*)     All other components within normal limits   PROTIME-INR   APTT   TYPE & SCREEN        All Lab Results:  Results for orders placed or performed during the hospital encounter of 10/28/20   CBC auto differential   Result Value Ref Range    WBC 6.40 3.90 - 12.70 K/uL    RBC 3.67 (L) 4.60 - 6.20 M/uL    Hemoglobin 11.5 (L) 14.0 - 18.0 g/dL    Hematocrit 33.7 (L) 40.0 - 54.0 %    MCV 92 82 - 98 fL    MCH 31.3 (H) 27.0 - 31.0 pg    MCHC 34.1 32.0 - 36.0 g/dL    RDW 12.8 11.5 - 14.5 %    Platelets 229 150 - 350 K/uL    MPV 10.3 9.2 - 12.9 fL    " Immature Granulocytes 0.3 0.0 - 0.5 %    Gran # (ANC) 4.3 1.8 - 7.7 K/uL    Immature Grans (Abs) 0.02 0.00 - 0.04 K/uL    Lymph # 1.5 1.0 - 4.8 K/uL    Mono # 0.4 0.3 - 1.0 K/uL    Eos # 0.1 0.0 - 0.5 K/uL    Baso # 0.08 0.00 - 0.20 K/uL    nRBC 0 0 /100 WBC    Gran % 67.6 38.0 - 73.0 %    Lymph % 22.7 18.0 - 48.0 %    Mono % 5.9 4.0 - 15.0 %    Eosinophil % 2.2 0.0 - 8.0 %    Basophil % 1.3 0.0 - 1.9 %    Differential Method Automated    Comprehensive metabolic panel   Result Value Ref Range    Sodium 138 136 - 145 mmol/L    Potassium 3.2 (L) 3.5 - 5.1 mmol/L    Chloride 103 95 - 110 mmol/L    CO2 24 23 - 29 mmol/L    Glucose 142 (H) 70 - 110 mg/dL    BUN 13 8 - 23 mg/dL    Creatinine 1.0 0.5 - 1.4 mg/dL    Calcium 9.0 8.7 - 10.5 mg/dL    Total Protein 6.9 6.0 - 8.4 g/dL    Albumin 3.9 3.5 - 5.2 g/dL    Total Bilirubin 0.6 0.1 - 1.0 mg/dL    Alkaline Phosphatase 74 55 - 135 U/L    AST 22 10 - 40 U/L    ALT 15 10 - 44 U/L    Anion Gap 11 8 - 16 mmol/L    eGFR if African American >60 >60 mL/min/1.73 m^2    eGFR if non African American >60 >60 mL/min/1.73 m^2   Protime-INR   Result Value Ref Range    Prothrombin Time 10.5 9.0 - 12.5 sec    INR 1.0 0.8 - 1.2   APTT   Result Value Ref Range    aPTT 24.1 21.0 - 32.0 sec   Type & Screen   Result Value Ref Range    Group & Rh A POS     Indirect Trinity NEG      Imaging Results:  Imaging Results          CT Head Without Contrast (Final result)  Result time 10/28/20 12:59:52    Final result by Alonzo Graves MD (10/28/20 12:59:52)                 Impression:      Mild to moderate diffuse cerebral atrophy.  Intracranial atherosclerotic calcifications.  No acute intracranial abnormality.  No significant interval change compared to 11/07/2019.  No evidence of a skull fracture.  No evidence of intra or extra-axial hemorrhage.    All CT scans at this facility use dose modulation, iterative reconstruction and/or weight based dosing when appropriate to reduce radiation dose to as  low as reasonably achievable.      Electronically signed by: Alonzo Graves MD  Date:    10/28/2020  Time:    12:59             Narrative:    EXAMINATION:  CT HEAD WITHOUT CONTRAST    CLINICAL HISTORY:  Headache, post traumatic;    TECHNIQUE:  Axial CT images of the head were obtained without  contrast.    COMPARISON:  11/07/2019    FINDINGS:  Small amount of central atrophy. Moderate enlargement of cortical sulci..  No intra or extraaxial masses, hemorrhages, abnormal fluid collections or abnormal calcifications. The cranium and extracranial structures are unremarkable.  Mucoperiosteal thickening in scattered ethmoid air cells.                               X-Ray Lumbar Spine Ap And Lateral (Final result)  Result time 10/28/20 12:45:38    Final result by Yehuda Irizarry III, MD (10/28/20 12:45:38)                 Impression:      No acute abnormality identified in the lumbar spine.      Electronically signed by: Yehuda Irizarry MD  Date:    10/28/2020  Time:    12:45             Narrative:    EXAMINATION:  XR LUMBAR SPINE AP AND LATERAL    CLINICAL HISTORY:  Back pain or radiculopathy, trauma;    TECHNIQUE:  AP, lateral and spot images were performed of the lumbar spine.    COMPARISON:  04/12/2017    FINDINGS:  There is no evidence of fracture. Vertebral body alignment is within normal limits.  Stable multilevel disc type changes and mild multilevel lumbar degenerative disc disease..  The visualized sacrum and sacroiliac joints appear intact.                               X-Ray Shoulder Trauma Left (Final result)  Result time 10/28/20 12:44:48    Final result by Yehuda Irizarry III, MD (10/28/20 12:44:48)                 Impression:      See above      Electronically signed by: Yehuda Irizarry MD  Date:    10/28/2020  Time:    12:44             Narrative:    EXAMINATION:  XR SHOULDER TRAUMA 3 VIEW LEFT    CLINICAL HISTORY:  Pain in unspecified shoulder    FINDINGS:  There is an acute oblique fracture of the mid left  clavicle with mild inferior displacement of the distal fracture fragment.  No other fracture identified.  Joint alignment is anatomic.                               X-Ray Chest AP Portable (Final result)  Result time 10/28/20 12:41:58    Final result by Yehuda Irizarry III, MD (10/28/20 12:41:58)                 Impression:      No acute abnormality identified in the chest.      Electronically signed by: Yehuda Irizarry MD  Date:    10/28/2020  Time:    12:41             Narrative:    EXAMINATION:  XR CHEST AP PORTABLE    CLINICAL HISTORY:  Unspecified fall, initial encounter    COMPARISON:  06/23/2020    FINDINGS:  The cardiomediastinal silhouette is within normal limits for AP technique. The lungs appear clear of active disease. No acute appearing infiltrate, pleural effusion or pneumothorax identified.                               The EKG was ordered, reviewed, and independently interpreted by the ED provider.  Interpretation time: 11:52  Rate: 60 BPM  Rhythm: normal sinus rhythm  Interpretation: Prolonged QT. No STEMI.           The Emergency Provider reviewed the vital signs and test results, which are outlined above.    ED Discussion     1:10 PM: Reassessed pt at this time. Discussed with pt all pertinent ED information and results. Discussed pt dx and plan of tx. Gave pt all f/u and return to the ED instructions. All questions and concerns were addressed at this time. Pt expresses understanding of information and instructions, and is comfortable with plan to discharge. Pt is stable for discharge.    Trauma precautions were discussed with patient and/or family/caretaker; I do not specifically detect any abdominal, thoracic, CNS, orthopedic, or other emergent or life threatening condition and that patient is safe to be discharged.  It was also discussed that despite an unrevealing examination and negative radiographic examination for serious or life threatening injury, these conditions may still exist.  As  such, patient should return to ED immediately should they experience, severe or worsening pain, shortness of breath, abdominal pain, headache, vomiting, or any other concern.  It was also discussed that not infrequently, injuries may not be diagnosed during the initial ED visit (such as fractures) and that if the patient discovers a new area of concern, a new area of injury that was not evaluated in the ED, they should return for evaluation as they may have an injury that requires treatment.         ED Medication(s):  Medications   fentaNYL injection 75 mcg (75 mcg Intravenous Given 10/28/20 1206)   sodium chloride 0.9% bolus 500 mL (0 mLs Intravenous Stopped 10/28/20 1301)   ondansetron injection 4 mg (4 mg Intravenous Given 10/28/20 1206)       Follow-up Information     [facility]. Schedule an appointment as soon as possible for a visit in 3 days.    Specialty: Orthopedics  Why: Return to the Emergency Room, If symptoms worsen  Contact information:  [address] [place] [id number]  [phone]                Discharge Medication List as of 10/28/2020  1:11 PM      START taking these medications    Details   HYDROcodone-acetaminophen (NORCO) 7.5-325 mg per tablet Take 1 tablet by mouth every 6 (six) hours as needed for Pain., Starting Wed 10/28/2020, Print               Medical Decision Making    Medical Decision Making:   Clinical Tests:   Lab Tests: Ordered and Reviewed  Radiological Study: Ordered and Reviewed  Medical Tests: Ordered and Reviewed           Scribe Attestation:   Scribe #1: I performed the above scribed service and the documentation accurately describes the services I performed. I attest to the accuracy of the note.    Attending:   Physician Attestation Statement for Scribe #1: I, Maggi Agarwal MD, personally performed the services described in this documentation, as scribed by Yassine Porter, in my presence, and it is both accurate and complete.           Clinical Impression       ICD-10-CM ICD-9-CM   1. Displaced fracture of lateral end of left clavicle, initial encounter for closed fracture  S42.032A 810.03   2. Fall  W19.XXXA E888.9   3. Shoulder pain  M25.519 719.41   4. Dementia without behavioral disturbance, unspecified dementia type  F03.90 294.20       Disposition:   Disposition: Discharged  Condition: Stable         Maggi Agarwal MD  10/28/20 3319

## 2020-10-28 NOTE — ED NOTES
Pt c/o falling backward today at home - pt denies dizziness, just reports losing his footing. Pt fell to grassy ground in the yard, reports hitting his head and his left shoulder.    Patient moved to ED room 4, patient assisted onto stretcher and changed into a gown. Patient placed on cardiac monitor, continuous pulse oximetry and automatic blood pressure cuff. Bed placed in low locked position, side rails up x 2, call light is within reach of patient or family, orientation to room and explanation of wait provided to family and patient, alarms set and turned on for monitor and pulse ox, will continue to monitor.    Patient identifies self as Vishnu Dougherty Jr.      LOC: The patient is awake, alert and aware of environment with an appropriate affect, the patient is oriented x 3 and speaking appropriately.  APPEARANCE: Patient in mild distress lying with eyes closed and groaning in pain, patient is clean and well groomed, patient's clothing is properly fastened.  SKIN: The skin is cool and dry, color consistent with ethnicity but slightly pale, patient has normal skin turgor and moist mucus membranes, skin intact, no breakdown or bruising noted.  MUSCULOSKELETAL: Patient moving all extremities well, no obvious swelling or deformities noted.  RESPIRATORY: Airway is open and patent, respirations are spontaneous, patient has a normal effort and rate, no accessory muscle use noted.  CARDIAC: Patient has a normal rate and rhythm, no peripheral edema noted, capillary refill < 3 seconds.  ABDOMEN: Soft and non tender to palpation, no distention noted.  NEUROLOGIC: PERRL, eyes open spontaneously, behavior appropriate to situation, follows commands, facial expression symmetrical, purposeful motor response noted, normal sensation in all extremities when touched with a finger.

## 2020-11-03 ENCOUNTER — TELEPHONE (OUTPATIENT)
Dept: NEUROLOGY | Facility: CLINIC | Age: 60
End: 2020-11-03

## 2020-11-03 ENCOUNTER — TELEPHONE (OUTPATIENT)
Dept: INTERNAL MEDICINE | Facility: CLINIC | Age: 60
End: 2020-11-03

## 2020-11-03 NOTE — TELEPHONE ENCOUNTER
----- Message from Xochilt Denise sent at 11/3/2020 11:29 AM CST -----  Regarding: appt access  Contact: pt's sister/caregiver Rach El  Pt has been diagnosed with dementia/sundowner's. Pt's sister requests an appt as soon as possible. I attempted to schedule but was unable to. Please advise. She can be reached at [phone]

## 2020-11-03 NOTE — TELEPHONE ENCOUNTER
Spoke with sister , got pt scheduled and added to the waiting list / sister stated that she dropped some papers off and wanted to know the status of paper work . Please Advise

## 2020-11-03 NOTE — TELEPHONE ENCOUNTER
Pt's sister, Rach Sy, called, stating that pt broke his clavicle last Wednesday. She states that she doesn't think she can leave him alone anymore and believes he needs to be in an assisted living facility. Please advise. She can be reached at [phone]

## 2020-11-03 NOTE — TELEPHONE ENCOUNTER
----- Message from Xochilt Denise sent at 11/3/2020 11:22 AM CST -----  Regarding: pt advice  Contact: pt's sister, Rach Sy  Pt's sister, Rach Sy, called, stating that pt broke his clavicle last Wednesday. She states that she doesn't think she can leave him alone anymore and believes he needs to be in an assisted living facility. Please advise. She can be reached at [phone]

## 2020-11-04 ENCOUNTER — TELEPHONE (OUTPATIENT)
Dept: INTERNAL MEDICINE | Facility: CLINIC | Age: 60
End: 2020-11-04

## 2020-11-04 NOTE — TELEPHONE ENCOUNTER
----- Message from Xochilt Campos sent at 11/4/2020 12:50 PM CST -----  Contact: Rhoda with [facility]  Name of Who is Calling: Rhoda with [facility]     What is the request in detail: Rhoda would like update on paperwork dropped off by patient sister. Please call       Can the clinic reply by [facility]: no      What [facility] to [facility]: [phone]

## 2020-11-05 ENCOUNTER — OFFICE VISIT (OUTPATIENT)
Dept: INTERNAL MEDICINE | Facility: CLINIC | Age: 60
End: 2020-11-05
Payer: MEDICARE

## 2020-11-05 ENCOUNTER — TELEPHONE (OUTPATIENT)
Dept: INTERNAL MEDICINE | Facility: CLINIC | Age: 60
End: 2020-11-05

## 2020-11-05 VITALS
TEMPERATURE: 99 F | HEART RATE: 78 BPM | DIASTOLIC BLOOD PRESSURE: 51 MMHG | RESPIRATION RATE: 18 BRPM | OXYGEN SATURATION: 97 % | BODY MASS INDEX: 25.14 KG/M2 | WEIGHT: 165.38 LBS | SYSTOLIC BLOOD PRESSURE: 85 MMHG

## 2020-11-05 DIAGNOSIS — I10 HYPERTENSION, UNSPECIFIED TYPE: ICD-10-CM

## 2020-11-05 DIAGNOSIS — F03.90 DEMENTIA WITHOUT BEHAVIORAL DISTURBANCE, UNSPECIFIED DEMENTIA TYPE: ICD-10-CM

## 2020-11-05 DIAGNOSIS — F41.9 ANXIETY: ICD-10-CM

## 2020-11-05 DIAGNOSIS — F32.A DEPRESSION, UNSPECIFIED DEPRESSION TYPE: Primary | ICD-10-CM

## 2020-11-05 DIAGNOSIS — S42.002D CLOSED DISPLACED FRACTURE OF LEFT CLAVICLE WITH ROUTINE HEALING, UNSPECIFIED PART OF CLAVICLE, SUBSEQUENT ENCOUNTER: ICD-10-CM

## 2020-11-05 DIAGNOSIS — E87.6 HYPOKALEMIA: ICD-10-CM

## 2020-11-05 DIAGNOSIS — Z11.1 SCREENING FOR TUBERCULOSIS: ICD-10-CM

## 2020-11-05 DIAGNOSIS — Z02.9 ADMINISTRATIVE ENCOUNTER: ICD-10-CM

## 2020-11-05 PROCEDURE — 99214 OFFICE O/P EST MOD 30 MIN: CPT | Mod: S$PBB,,, | Performed by: FAMILY MEDICINE

## 2020-11-05 PROCEDURE — 99999 PR PBB SHADOW E&M-EST. PATIENT-LVL III: ICD-10-PCS | Mod: PBBFAC,,, | Performed by: FAMILY MEDICINE

## 2020-11-05 PROCEDURE — 99214 PR OFFICE/OUTPT VISIT, EST, LEVL IV, 30-39 MIN: ICD-10-PCS | Mod: S$PBB,,, | Performed by: FAMILY MEDICINE

## 2020-11-05 PROCEDURE — 99213 OFFICE O/P EST LOW 20 MIN: CPT | Mod: PBBFAC | Performed by: FAMILY MEDICINE

## 2020-11-05 PROCEDURE — 99999 PR PBB SHADOW E&M-EST. PATIENT-LVL III: CPT | Mod: PBBFAC,,, | Performed by: FAMILY MEDICINE

## 2020-11-05 RX ORDER — DULOXETIN HYDROCHLORIDE 30 MG/1
30 CAPSULE, DELAYED RELEASE ORAL 2 TIMES DAILY
Qty: 180 CAPSULE | Refills: 1 | Status: SHIPPED | OUTPATIENT
Start: 2020-11-05 | End: 2020-12-21

## 2020-11-05 RX ORDER — DONEPEZIL HYDROCHLORIDE 10 MG/1
10 TABLET, FILM COATED ORAL NIGHTLY
Qty: 90 TABLET | Refills: 1 | Status: SHIPPED | OUTPATIENT
Start: 2020-11-05 | End: 2021-05-14 | Stop reason: SDUPTHER

## 2020-11-05 NOTE — TELEPHONE ENCOUNTER
----- Message from Cassi Fuller sent at 11/5/2020  9:36 AM CST -----  Contact: Rach  PT's sister Rach is calling in regards to paperwork. Stated assisted living paperwork was brought in on Monday. Please send to [facility], fax number. [phone] attn: Rhoda. When completed, please call sister at [phone].

## 2020-11-05 NOTE — PROGRESS NOTES
Subjective:       Patient ID: Vishnu Dougherty Jr. is a 60 y.o. male.    Chief Complaint: Medication Refill (increase medication  )    HPI     60 M      PMH:  CHF   Cardiomyopathy  HTN  BPH  GERD  HLD      Past Surgical History:   Procedure Laterality Date    COLONOSCOPY N/A 2016    Procedure: COLONOSCOPY;  Surgeon: Demetrio Spicer MD;  Location: [facility];  Service: Endoscopy;  Laterality: N/A;   - Tonsillectomy as a child    Social History     Tobacco Use   Smoking Status Former Smoker    Quit date: 1995    Years since quittin.1   Smokeless Tobacco Never Used     No alcohol or drug use    Family History   Problem Relation Age of Onset    Hypertension Mother     Hypertension Father     Stroke Father          Presents for ER f/u and Assisted living paper work.    Fell and broke clavicle.  Variable stories -  He reports yard work - sister reports fell down steps.      He was home alone  Family is unable to care at home.    Dementia has progressed to point it is uncertain how he fell and broke clavicle.    He has been depressed.  Family increased Cymbalta  Reports mood improved.        Review of Systems   Constitutional: Negative for chills and fever.   HENT: Negative for trouble swallowing.    Eyes: Negative for visual disturbance.   Respiratory: Negative for shortness of breath.    Cardiovascular: Negative for chest pain.   Gastrointestinal: Negative for abdominal pain.   Genitourinary: Negative for difficulty urinating.   Musculoskeletal: Positive for arthralgias.   Skin: Negative for color change.   Neurological: Negative for dizziness and light-headedness.   Psychiatric/Behavioral: Hyperactive: anxiety.         Poor memory       Objective:       Vitals:    20 1543   BP: (!) 152/82   Pulse: 78   Resp: 18   Temp: 98.6 °F (37 °C)     86/51 ( 10/28/20 )  Vitals:    20 1618   BP: (!) 85/51   Pulse:    Resp:    Temp:            Physical Exam  Constitutional:       Appearance: Normal  appearance. He is not ill-appearing.   HENT:      Head: Normocephalic.   Eyes:      Conjunctiva/sclera: Conjunctivae normal.   Cardiovascular:      Rate and Rhythm: Normal rate and regular rhythm.   Pulmonary:      Effort: Pulmonary effort is normal. No respiratory distress.   Musculoskeletal:      Comments: L. Arm in sling   Skin:     Coloration: Skin is not pale.   Neurological:      General: No focal deficit present.      Mental Status: He is alert and oriented to person, place, and time.   Psychiatric:         Mood and Affect: Mood normal.         Thought Content: Thought content normal.         Assessment:       1. Depression, unspecified depression type    2. Anxiety    3. Administrative encounter    4. Hypertension, unspecified type    5. Dementia without behavioral disturbance, unspecified dementia type    6. Hypokalemia    7. Closed displaced fracture of left clavicle with routine healing, unspecified part of clavicle, subsequent encounter        Plan:     Depression / Anxiety  Doing better  Increased cymbalta to 30 BID   Will refill as such  wellbutrin 150 mg BID    Dementia  Aricept  His neurologist retired - we will try and arrange for another neurologist.    HLD  lipitor  20 mg    HTN  Amlodipine 10 mg  Variable  Very low in ER. Elevated somewhat today  Will keep at current med  Continue to monitor.    Hypokalemia  Has done well with supplementation  Suspected dietary related   Will continue for now    Clavicle fracture  Has ortho f/u  Pain tolerable  Finished opiate - caused some increased confusion  Advise OTC NSAID      Admin  Paperwork for assisted living completed  Needs TB testing.  Will arrange.    Cardiomyopathy  Asymptomatic  Last ECHO - EF normal

## 2020-11-05 NOTE — TELEPHONE ENCOUNTER
----- Message from Kimmy Costa sent at 11/5/2020 12:55 PM CST -----  Contact: Rhoda Dill/ [facility] from [facility] called in regarding the move-in paperwork that she needed. He direct fax number is [phone] and her cell phone is [phone]. She would like a call back as soon as possible.    Thanks,  Pb

## 2020-11-06 DIAGNOSIS — M89.8X1 PAIN OF LEFT CLAVICLE: Primary | ICD-10-CM

## 2020-11-07 ENCOUNTER — PATIENT OUTREACH (OUTPATIENT)
Dept: ADMINISTRATIVE | Facility: OTHER | Age: 60
End: 2020-11-07

## 2020-11-07 NOTE — PROGRESS NOTES
Health Maintenance Due   Topic Date Due    Shingles Vaccine (1 of 2) 02/25/2008    Influenza Vaccine (1) 08/01/2020     Updates were requested from care everywhere.  Chart was reviewed for overdue Proactive [facility] Encounters ([facility]) topics (CRS, Breast Cancer Screening, Eye exam)  Health Maintenance has been updated.  LINKS immunization registry triggered.  Immunizations were reconciled.

## 2020-11-09 ENCOUNTER — HOSPITAL ENCOUNTER (OUTPATIENT)
Dept: RADIOLOGY | Facility: HOSPITAL | Age: 60
Discharge: HOME | End: 2020-11-09
Attending: PHYSICIAN ASSISTANT
Payer: MEDICARE

## 2020-11-09 ENCOUNTER — OFFICE VISIT (OUTPATIENT)
Dept: ORTHOPEDICS | Facility: CLINIC | Age: 60
End: 2020-11-09
Payer: MEDICARE

## 2020-11-09 VITALS
DIASTOLIC BLOOD PRESSURE: 84 MMHG | BODY MASS INDEX: 25.01 KG/M2 | HEIGHT: 68 IN | HEART RATE: 70 BPM | SYSTOLIC BLOOD PRESSURE: 150 MMHG | WEIGHT: 165 LBS

## 2020-11-09 DIAGNOSIS — M89.8X1 PAIN OF LEFT CLAVICLE: ICD-10-CM

## 2020-11-09 DIAGNOSIS — S42.022A CLOSED DISPLACED FRACTURE OF SHAFT OF LEFT CLAVICLE, INITIAL ENCOUNTER: Primary | ICD-10-CM

## 2020-11-09 PROCEDURE — 99213 OFFICE O/P EST LOW 20 MIN: CPT | Mod: PBBFAC,25 | Performed by: PHYSICIAN ASSISTANT

## 2020-11-09 PROCEDURE — 99999 PR PBB SHADOW E&M-EST. PATIENT-LVL III: CPT | Mod: PBBFAC,,, | Performed by: PHYSICIAN ASSISTANT

## 2020-11-09 PROCEDURE — 99203 OFFICE O/P NEW LOW 30 MIN: CPT | Mod: S$PBB,,, | Performed by: PHYSICIAN ASSISTANT

## 2020-11-09 PROCEDURE — 73000 X-RAY EXAM OF COLLAR BONE: CPT | Mod: 26,LT,, | Performed by: RADIOLOGY

## 2020-11-09 PROCEDURE — 99999 PR PBB SHADOW E&M-EST. PATIENT-LVL III: ICD-10-PCS | Mod: PBBFAC,,, | Performed by: PHYSICIAN ASSISTANT

## 2020-11-09 PROCEDURE — 99203 PR OFFICE/OUTPT VISIT, NEW, LEVL III, 30-44 MIN: ICD-10-PCS | Mod: S$PBB,,, | Performed by: PHYSICIAN ASSISTANT

## 2020-11-09 PROCEDURE — 73000 X-RAY EXAM OF COLLAR BONE: CPT | Mod: TC,LT

## 2020-11-09 PROCEDURE — 73000 XR CLAVICLE LEFT: ICD-10-PCS | Mod: 26,LT,, | Performed by: RADIOLOGY

## 2020-11-09 NOTE — PROGRESS NOTES
Subjective:      Patient ID: iVshnu Dougherty Jr. is a 60 y.o. male.    Chief Complaint: Clavicle Injury (left)      HPI: Vishnu Dougherty Jr.  is a 60 y.o. male who c/o Clavicle Injury (left)   for duration of   Twelve days.  He had a fall and injured the left clavicle.  He was evaluated [facility] emergency department on 10/28/2020.  They diagnosed with a left clavicle fracture Gurinder Guerrier follow-up with orthopedics.  Pain level is 5/10 in severity.  He is relatively well controlled.  He complains of intermittent sharp pain along the clavicle.  Aggravating factors include nighttime and overhead use.  Alleviating factors include rest and Tylenol.  He denies associated numbness and tingling.    Past Medical History:   Diagnosis Date    Chronic CHF 10/2/2019    Chronic diastolic HF (heart failure) 4/17/2018    Dilated cardiomyopathy 9/24/2015    Enlarged prostate     Essential hypertension 9/24/2015    Gastroesophageal reflux disease without esophagitis 10/7/2015    Gastroesophageal reflux disease without esophagitis 10/7/2015    Hyperlipidemia     Shortness of breath 9/24/2015     Past Surgical History:   Procedure Laterality Date    COLONOSCOPY N/A 5/25/2016    Procedure: COLONOSCOPY;  Surgeon: Demetrio Spicer MD;  Location: [facility];  Service: Endoscopy;  Laterality: N/A;     Family History   Problem Relation Age of Onset    Hypertension Mother     Hypertension Father     Stroke Father      Social History     Socioeconomic History    Marital status:      Spouse name: Not on file    Number of children: Not on file    Years of education: Not on file    Highest education level: Not on file   Occupational History    Not on file   Social Needs    Financial resource strain: Not on file    Food insecurity     Worry: Not on file     Inability: Not on file    Transportation needs     Medical: Not on file     Non-medical: Not on file   Tobacco Use    Smoking status: Former Smoker     Quit date: 9/24/1995      Years since quittin.1    Smokeless tobacco: Never Used   Substance and Sexual Activity    Alcohol use: No     Alcohol/week: 0.0 standard drinks    Drug use: No    Sexual activity: Not Currently   Lifestyle    Physical activity     Days per week: Not on file     Minutes per session: Not on file    Stress: Not on file   Relationships    Social connections     Talks on phone: Not on file     Gets together: Not on file     Attends [employer] service: Not on file     Active member of club or organization: Not on file     Attends meetings of clubs or organizations: Not on file     Relationship status: Not on file   Other Topics Concern    Not on file   Social History Narrative    Not on file     Medication List with Changes/Refills   Current Medications    AMLODIPINE (NORVASC) 10 MG TABLET    Take 1 tablet (10 mg total) by mouth once daily.    ASPIRIN 81 MG CHEW    Take 1 tablet (81 mg total) by mouth once daily.    ATORVASTATIN (LIPITOR) 20 MG TABLET    Take 1 tablet (20 mg total) by mouth every evening.    BUPROPION (WELLBUTRIN SR) 150 MG TBSR 12 HR TABLET    Take 1 tablet (150 mg total) by mouth 2 (two) times daily.    DONEPEZIL (ARICEPT) 10 MG TABLET    Take 1 tablet (10 mg total) by mouth every evening.    DULOXETINE (CYMBALTA) 30 MG CAPSULE    Take 1 capsule (30 mg total) by mouth 2 (two) times daily.    POTASSIUM CHLORIDE SA (K-DUR,KLOR-CON) 20 MEQ TABLET    Take 1 tablet (20 mEq total) by mouth once daily.     Review of patient's allergies indicates:  No Known Allergies    Review of Systems   Constitution: Negative for fever.   Cardiovascular: Negative for chest pain.   Respiratory: Negative for cough and shortness of breath.    Skin: Negative for rash.   Musculoskeletal: Positive for joint pain and joint swelling. Negative for stiffness.   Gastrointestinal: Negative for heartburn.   Neurological: Negative for headaches and numbness.         Objective:        General    Nursing note and vitals  reviewed.  Constitutional: He is oriented to person, place, and time. He appears well-developed and well-nourished.   HENT:   Head: Normocephalic and atraumatic.   Eyes: EOM are normal.   Cardiovascular: Normal rate and regular rhythm.    Pulmonary/Chest: Effort normal.   Neurological: He is alert and oriented to person, place, and time.   Psychiatric: He has a normal mood and affect. His behavior is normal.             Left Shoulder Exam     Comments:  2+ radial pulse  Comp soft  Sensation intact  Fx deformity mid third clavicle  TTP fx site  Relatively well maintained ROM                Xray images and report were reviewed today.  I agree with the radiologist's interpretation.    X-Ray Clavicle Left  Narrative: EXAMINATION:  XR CLAVICLE LEFT    CLINICAL HISTORY:  Other specified disorders of bone, shoulder    TECHNIQUE:  Two views of the left clavicle are submitted for interpretation.    COMPARISON:  None    FINDINGS:  There is an acute comminuted fracture of the mid left clavicle.  There is approximately 1.2 cm inferior displacement of the dominant distal fracture fragment.  No evidence of sternoclavicular or acromioclavicular joint dislocation.  Mild left acromioclavicular joint osteoarthritis.  Soft tissue swelling is seen adjacent to the clavicle fracture.  Impression: Acute, comminuted, displaced mid left clavicle fracture.    Electronically signed by: Darvin Sy  Date:    11/09/2020  Time:    08:43        Assessment:       Encounter Diagnosis   Name Primary?    Closed displaced fracture of shaft of left clavicle, initial encounter Yes          Plan:       Vishnu was seen today for clavicle injury.    Diagnoses and all orders for this visit:    Closed displaced fracture of shaft of left clavicle, initial encounter        Vishnu Dougherty Jr. is a new pt who comes in today for the above problems.    Fracture was relatively nondisplaced initially.  Unfortunately in the last 10 days, it has displaced  significantly.  He has substantial comorbidities including dementia as well as congestive heart failure.  He has relatively well-maintained range of motion is relatively comfortable.  I suspect he could ultimately be treated non operatively.  However I would like him to see Dr. Arango to discuss definitive management.  I would defer treatment recommendations to him.  I will have the patient see him tomorrow.  They verbalized understanding and agree.    Follow up for consult with Dr. Arango to discuss definitive management.          The patient understands, chooses and consents to this plan and accepts all   the risks which include but are not limited to the risks mentioned above.     Disclaimer: This note was prepared using a voice recognition system and is likely to have sound alike errors within the text.

## 2020-11-09 NOTE — Clinical Note
Seen in ER on 10/28 w NDP Clavicle fx.  F/u today and now slightly shortened and displaced about 100%.  Has dementia and h/o CHF.  Pain is relatively well controlled and has decent ROM.  But he is going to see you tomorrow to discuss if op or non op mgmt is best option.  Thanks.  Charline

## 2020-11-10 ENCOUNTER — OFFICE VISIT (OUTPATIENT)
Dept: ORTHOPEDICS | Facility: CLINIC | Age: 60
End: 2020-11-10
Payer: MEDICARE

## 2020-11-10 VITALS
HEART RATE: 70 BPM | DIASTOLIC BLOOD PRESSURE: 80 MMHG | WEIGHT: 165 LBS | SYSTOLIC BLOOD PRESSURE: 150 MMHG | HEIGHT: 68 IN | BODY MASS INDEX: 25.01 KG/M2

## 2020-11-10 DIAGNOSIS — S42.022A CLOSED DISPLACED FRACTURE OF SHAFT OF LEFT CLAVICLE, INITIAL ENCOUNTER: Primary | ICD-10-CM

## 2020-11-10 PROCEDURE — 99213 OFFICE O/P EST LOW 20 MIN: CPT | Mod: PBBFAC | Performed by: STUDENT IN AN ORGANIZED HEALTH CARE EDUCATION/TRAINING PROGRAM

## 2020-11-10 PROCEDURE — 99999 PR PBB SHADOW E&M-EST. PATIENT-LVL III: ICD-10-PCS | Mod: PBBFAC,,, | Performed by: STUDENT IN AN ORGANIZED HEALTH CARE EDUCATION/TRAINING PROGRAM

## 2020-11-10 PROCEDURE — 99213 PR OFFICE/OUTPT VISIT, EST, LEVL III, 20-29 MIN: ICD-10-PCS | Mod: S$PBB,,, | Performed by: STUDENT IN AN ORGANIZED HEALTH CARE EDUCATION/TRAINING PROGRAM

## 2020-11-10 PROCEDURE — 99213 OFFICE O/P EST LOW 20 MIN: CPT | Mod: S$PBB,,, | Performed by: STUDENT IN AN ORGANIZED HEALTH CARE EDUCATION/TRAINING PROGRAM

## 2020-11-10 PROCEDURE — 99999 PR PBB SHADOW E&M-EST. PATIENT-LVL III: CPT | Mod: PBBFAC,,, | Performed by: STUDENT IN AN ORGANIZED HEALTH CARE EDUCATION/TRAINING PROGRAM

## 2020-11-11 NOTE — PROGRESS NOTES
Orthopaedics Sports Medicine     Shoulder Initial Visit         11/10/2020    Referring MD: No ref. provider found    Chief Complaint   Patient presents with    Left Shoulder - Pain         History of Present Illness:   Vishnu Dougherty Jr. is a 60 y.o. Right-hand dominant male who presents with Left shoulder pain and dysfunction that developed 10/28/2020.  He reports that he was walking backwards pulling a branch out of his yd when he fell onto his left outstretched arm.  However, this is 1 of 2 history accounts that has been given.  His sister is with him today and reports that he has significant dementia and is not always have a clear recollection of what transpired.  He has also told her that he fell off of his steps onto his shoulder.  He had immediate pain and dysfunction in the shoulder and arm.  He also reports bruising that occurred after this and tenderness to the clavicle area.  He was seen in the emergency department and placed into a sling.  He is not wearing the sling today as his shoulder is beginning to feel better.  He does report some soreness in the shoulder that is worse with movement.  He has tried rest, ice, activity modification, and anti-inflammatories.    Treatments to date: rest, NSAIDs, activity modification, ice    Past Medical History:   Past Medical History:   Diagnosis Date    Chronic CHF 10/2/2019    Chronic diastolic HF (heart failure) 4/17/2018    Dilated cardiomyopathy 9/24/2015    Enlarged prostate     Essential hypertension 9/24/2015    Gastroesophageal reflux disease without esophagitis 10/7/2015    Gastroesophageal reflux disease without esophagitis 10/7/2015    Hyperlipidemia     Shortness of breath 9/24/2015       Past Surgical History:   Past Surgical History:   Procedure Laterality Date    COLONOSCOPY N/A 5/25/2016    Procedure: COLONOSCOPY;  Surgeon: Demetrio Spicer MD;  Location: [facility];  Service: Endoscopy;  Laterality: N/A;       Medications:  Patient's  "Medications   New Prescriptions    No medications on file   Previous Medications    AMLODIPINE (NORVASC) 10 MG TABLET    Take 1 tablet (10 mg total) by mouth once daily.    ASPIRIN 81 MG CHEW    Take 1 tablet (81 mg total) by mouth once daily.    ATORVASTATIN (LIPITOR) 20 MG TABLET    Take 1 tablet (20 mg total) by mouth every evening.    BUPROPION (WELLBUTRIN SR) 150 MG TBSR 12 HR TABLET    Take 1 tablet (150 mg total) by mouth 2 (two) times daily.    DONEPEZIL (ARICEPT) 10 MG TABLET    Take 1 tablet (10 mg total) by mouth every evening.    DULOXETINE (CYMBALTA) 30 MG CAPSULE    Take 1 capsule (30 mg total) by mouth 2 (two) times daily.    POTASSIUM CHLORIDE SA (K-DUR,KLOR-CON) 20 MEQ TABLET    Take 1 tablet (20 mEq total) by mouth once daily.   Modified Medications    No medications on file   Discontinued Medications    No medications on file       Allergies: Review of patient's allergies indicates:  No Known Allergies    Social History:   Home town: [place]  Occupation: not currently working  Alcohol use: He reports no history of alcohol use.  Tobacco use: He reports that he quit smoking about 25 years ago. He has never used smokeless tobacco.    Review of systems:  History of recent illness, fevers, shakes, or chills: no  History of cardiac problems or chest pain: YES, CH  History of pulmonary problems or asthma: no  History of diabetes: no  History of prior dvt or clotting problems: no  History of sleep apnea: no      Physical Examination:  Estimated body mass index is 25.09 kg/m² as calculated from the following:    Height as of this encounter: 5' 8" (1.727 m).    Weight as of this encounter: 74.8 kg (165 lb).    General  Healthy appearing male in no acute distress  Alert and oriented, normal mood, appropriate affect    Shoulder Examination:  Patient is alert and oriented, no distress. Skin is intact. Neuro is normal with no focal motor or sensory findings.    Left shoulder:  Skin is intact with no " abrasions or erythema  He has tenderness overlying the left clavicle  He is able to demonstrate active range of motion of the shoulder in all planes, somewhat limited due to pain    Neurovascular examination  - Motor grossly intact bilaterally to shoulder abduction, elbow flexion and extension, wrist flexion and extension, and intrinsic hand musculature  - Sensation intact to light touch bilaterally in axillary, median, radial, and ulnar distributions  - Symmetrical radial pulses      Imaging:  XR left shoulder (11/9/2020):  There is an acute comminuted fracture of the mid left clavicle.  There is approximately 1.2 cm inferior displacement of the dominant distal fracture fragment.  No evidence of sternoclavicular or acromioclavicular joint dislocation.  Mild left acromioclavicular joint osteoarthritis.  Soft tissue swelling is seen adjacent to the clavicle fracture.    Physician Read: I agree with the above impression.      Impression:  60 y.o. male with left clavicle shaft fracture, closed, displaced, comminuted      Plan:  1. Discussed treatment options with him and his sister today including non operative and operative options.  2. He is now 2 weeks out from his injury and his symptoms are steadily improving.  He recently moved into assisted living facility due to his dementia and difficulty living independently.  His shoulder has been feeling better and he even stop wearing the sling today.  3. His imaging shows a comminuted displaced clavicle shaft fracture.  I discussed with him and his sister that this injury has a very high union rate, around 90%.  Even nonunion or malunion generally are asymptomatic in a lower demand population.   4. Given that he is low demand, his high rate of union, and that his symptoms are improving I recommend continuing to treat this non operatively.  I would like him to continue wearing the sling during the day for 2 more weeks.  While he is sedentary at home, such as on the couch  or sleeping he may remove it.  He should also be nonweightbearing to this left arm during that time.   5. I would like to see him back in 2 weeks with x-rays of the left clavicle.  6. Follow up 2 weeks           Joshua Arango MD

## 2020-11-16 ENCOUNTER — TELEPHONE (OUTPATIENT)
Dept: INTERNAL MEDICINE | Facility: CLINIC | Age: 60
End: 2020-11-16

## 2020-11-16 NOTE — TELEPHONE ENCOUNTER
It needed to be deleted.  The patient did not need the test because the facility accepted the chest x-ray.

## 2020-11-16 NOTE — TELEPHONE ENCOUNTER
----- Message from Stephan Quiñones MD sent at 11/12/2020 12:05 PM CST -----  Regarding: FW: Open Account - [facility] Declined/Done Queue  I don't see where to sign this. Can you sign this or send to me to sign  ----- Message -----  From: Juliet Giang  Sent: 11/12/2020  11:30 AM CST  To: Stephan Quiñones MD  Subject: Open Account - [facility] Declined/Done Queue           Good morning      Mike Granger LPN listed you as the authorizing provider on the order below.  Please sign this order so that this account can be closed.    POCT TB Skin Test Read ([id number]).    Linked to event: Ordering. Date and Time: 11/05/2020  4:27 PM

## 2020-11-16 NOTE — TELEPHONE ENCOUNTER
----- Message from Stephan Quiñones MD sent at 11/12/2020 12:05 PM CST -----  Regarding: FW: Open Account - [facility] Declined/Done Queue  I don't see where to sign this. Can you sign this or send to me to sign  ----- Message -----  From: Juliet Giang  Sent: 11/12/2020  11:30 AM CST  To: Stephan Quiñones MD  Subject: Open Account - [facility] Declined/Done Queue           Good morning      Mike Granger LPN listed you as the authorizing provider on the order below.  Please sign this order so that this account can be closed.    POCT TB Skin Test Read ([id number]).    Linked to event: Ordering. Date and Time: 11/05/2020  4:27 PM

## 2020-11-16 NOTE — TELEPHONE ENCOUNTER
----- Message from Stephan Quiñones MD sent at 11/12/2020 12:05 PM CST -----  Regarding: FW: Open Account - [facility] Declined/Done Queue  I don't see where to sign this. Can you sign this or send to me to sign  ----- Message -----  From: Juliet Giang  Sent: 11/12/2020  11:30 AM CST  To: Stephan Quiñones MD  Subject: Open Account - [facility] Declined/Done Queue           Good morning      Mike Granger LPN listed you as the authorizing provider on the order below.  Please sign this order so that this account can be closed.    POCT TB Skin Test Read ([id number]).    Linked to event: Ordering. Date and Time: 11/05/2020  4:27 PM

## 2020-11-24 ENCOUNTER — TELEPHONE (OUTPATIENT)
Dept: INTERNAL MEDICINE | Facility: CLINIC | Age: 60
End: 2020-11-24

## 2020-11-25 ENCOUNTER — HOSPITAL ENCOUNTER (OUTPATIENT)
Dept: RADIOLOGY | Facility: HOSPITAL | Age: 60
Discharge: HOME | End: 2020-11-25
Attending: STUDENT IN AN ORGANIZED HEALTH CARE EDUCATION/TRAINING PROGRAM
Payer: MEDICARE

## 2020-11-25 ENCOUNTER — OFFICE VISIT (OUTPATIENT)
Dept: ORTHOPEDICS | Facility: CLINIC | Age: 60
End: 2020-11-25
Payer: MEDICARE

## 2020-11-25 VITALS
DIASTOLIC BLOOD PRESSURE: 73 MMHG | HEIGHT: 68 IN | HEART RATE: 72 BPM | BODY MASS INDEX: 25.01 KG/M2 | WEIGHT: 165 LBS | SYSTOLIC BLOOD PRESSURE: 145 MMHG

## 2020-11-25 DIAGNOSIS — S42.022A CLOSED DISPLACED FRACTURE OF SHAFT OF LEFT CLAVICLE, INITIAL ENCOUNTER: ICD-10-CM

## 2020-11-25 DIAGNOSIS — S42.022D CLOSED DISPLACED FRACTURE OF SHAFT OF LEFT CLAVICLE WITH ROUTINE HEALING, SUBSEQUENT ENCOUNTER: Primary | ICD-10-CM

## 2020-11-25 PROCEDURE — 73000 X-RAY EXAM OF COLLAR BONE: CPT | Mod: 26,LT,, | Performed by: RADIOLOGY

## 2020-11-25 PROCEDURE — 99213 PR OFFICE/OUTPT VISIT, EST, LEVL III, 20-29 MIN: ICD-10-PCS | Mod: S$PBB,,, | Performed by: STUDENT IN AN ORGANIZED HEALTH CARE EDUCATION/TRAINING PROGRAM

## 2020-11-25 PROCEDURE — 99213 OFFICE O/P EST LOW 20 MIN: CPT | Mod: S$PBB,,, | Performed by: STUDENT IN AN ORGANIZED HEALTH CARE EDUCATION/TRAINING PROGRAM

## 2020-11-25 PROCEDURE — 99999 PR PBB SHADOW E&M-EST. PATIENT-LVL III: ICD-10-PCS | Mod: PBBFAC,,, | Performed by: STUDENT IN AN ORGANIZED HEALTH CARE EDUCATION/TRAINING PROGRAM

## 2020-11-25 PROCEDURE — 99999 PR PBB SHADOW E&M-EST. PATIENT-LVL III: CPT | Mod: PBBFAC,,, | Performed by: STUDENT IN AN ORGANIZED HEALTH CARE EDUCATION/TRAINING PROGRAM

## 2020-11-25 PROCEDURE — 73000 XR CLAVICLE LEFT: ICD-10-PCS | Mod: 26,LT,, | Performed by: RADIOLOGY

## 2020-11-25 PROCEDURE — 73000 X-RAY EXAM OF COLLAR BONE: CPT | Mod: TC,LT

## 2020-11-25 PROCEDURE — 99213 OFFICE O/P EST LOW 20 MIN: CPT | Mod: PBBFAC,25 | Performed by: STUDENT IN AN ORGANIZED HEALTH CARE EDUCATION/TRAINING PROGRAM

## 2020-11-28 NOTE — PROGRESS NOTES
Orthopaedic Follow-Up Visit    Last Appointment:  11/10/2020  Diagnosis:  Left clavicle fracture  Prior Procedure:  None    Vishnu Dougherty Jr. is a 60 y.o. male who is here for f/u evaluation of left clavicle fracture. The patient was last seen here by me on 11/10/2020 at which point we decided to treat him non operatively with sling immobilization.  The patient returns today reporting that the symptoms have improved.    To review his history, he reports left shoulder pain and dysfunction that developed 10/28/2020.  He reports that he was walking backwards pulling a branch out of his yd when he fell onto his left outstretched arm.  However, this is 1 of 2 history accounts that has been given.  His sister states that he has significant dementia and is not always have a clear recollection of what transpired.  He has also told her that he fell off of his steps onto his shoulder.     Patient's medications, allergies, past medical, surgical, social and family histories were reviewed and updated as appropriate.    Review of Systems   All systems reviewed were negative.  Specifically, the patient denies fever, chills, weight loss, chest pain, shortness of breath, or dyspnea on exertion.      Past Medical History:   Diagnosis Date    Chronic CHF 10/2/2019    Chronic diastolic HF (heart failure) 4/17/2018    Dilated cardiomyopathy 9/24/2015    Enlarged prostate     Essential hypertension 9/24/2015    Gastroesophageal reflux disease without esophagitis 10/7/2015    Gastroesophageal reflux disease without esophagitis 10/7/2015    Hyperlipidemia     Shortness of breath 9/24/2015       Past Surgical History:   Procedure Laterality Date    COLONOSCOPY N/A 5/25/2016    Procedure: COLONOSCOPY;  Surgeon: Demetrio Spicer MD;  Location: [facility];  Service: Endoscopy;  Laterality: N/A;       Patient's Medications   New Prescriptions    No medications on file   Previous Medications    AMLODIPINE (NORVASC) 10 MG TABLET    Take 1  tablet (10 mg total) by mouth once daily.    ASPIRIN 81 MG CHEW    Take 1 tablet (81 mg total) by mouth once daily.    ATORVASTATIN (LIPITOR) 20 MG TABLET    Take 1 tablet (20 mg total) by mouth every evening.    BUPROPION (WELLBUTRIN SR) 150 MG TBSR 12 HR TABLET    Take 1 tablet (150 mg total) by mouth 2 (two) times daily.    DONEPEZIL (ARICEPT) 10 MG TABLET    Take 1 tablet (10 mg total) by mouth every evening.    DULOXETINE (CYMBALTA) 30 MG CAPSULE    Take 1 capsule (30 mg total) by mouth 2 (two) times daily.    POTASSIUM CHLORIDE SA (K-DUR,KLOR-CON) 20 MEQ TABLET    Take 1 tablet (20 mEq total) by mouth once daily.   Modified Medications    No medications on file   Discontinued Medications    No medications on file       Family History   Problem Relation Age of Onset    Hypertension Mother     Hypertension Father     Stroke Father        Review of patient's allergies indicates:  No Known Allergies      Objective:      Physical Exam  Patient is alert and oriented, no distress. Skin is intact. Neuro is normal with no focal motor or sensory findings.    Left clavicle and shoulder:  Skin is intact with no abrasions or erythema  There is a slight prominence of the midshaft clavicle  No tenderness to palpation along the clavicle  Demonstrates fluid motion of the left shoulder including forward flexion, abduction, internal and external rotation  Sensation is intact to light touch in the axillary, median, radial, and ulnar nerve distributions  Motor function is intact in the axillary, median, radial, and ulnar nerve distributions  Hand is warm well perfused with good capillary refill to the digits    Imaging:    X-Ray Clavicle Left  Narrative: EXAMINATION:  XR CLAVICLE LEFT    CLINICAL HISTORY:  Displaced fracture of shaft of left clavicle, initial encounter for closed fracture    TECHNIQUE:  Two views of the left clavicle are submitted for interpretation.    COMPARISON:  Left clavicle radiographs November 9,  2020    FINDINGS:  Again noted is a comminuted fracture of the mid left clavicle.  There is unchanged inferior displacement of the dominant distal fracture fragment.  There has been interval development of subtle healing callus at the fracture site.  No new fracture.  Unchanged mild left acromioclavicular joint osteoarthritis.  Impression: As above.    Electronically signed by: Darvin Sy  Date:    11/25/2020  Time:    14:51       Physician read: I agree with the above impression.    Assessment/Plan:   Vishnu Doughetry Jr. is a 60 y.o. male with left clavicle shaft fracture, closed, displaced, comminuted    Plan:    1. Reinforced non operative treatment plan with him and his sister today.  2. His imaging findings show interval callus formation and healing of the clavicle fracture.  This goes along with his improving symptoms and lack of pain.  3. He does have some discomfort with extremes of range of motion and attempting to lift heavier objects.  4. He can discontinue the sling at this time.  I would still like him to avoid lifting anything heavier than about 5 lb.  He voiced understanding of this.  5. Follow up 4-6 weeks with x-rays of the left clavicle          Joshua Arango MD

## 2020-12-10 DIAGNOSIS — M89.8X1 PAIN OF LEFT CLAVICLE: Primary | ICD-10-CM

## 2020-12-11 ENCOUNTER — PATIENT MESSAGE (OUTPATIENT)
Dept: OTHER | Facility: OTHER | Age: 60
End: 2020-12-11

## 2020-12-18 DIAGNOSIS — F41.9 ANXIETY: ICD-10-CM

## 2020-12-18 NOTE — TELEPHONE ENCOUNTER
No new care gaps identified.  Powered by [employer]. Reference number: [id number]. 12/18/2020 1:06:15 AM   CST

## 2020-12-21 RX ORDER — DULOXETIN HYDROCHLORIDE 30 MG/1
CAPSULE, DELAYED RELEASE ORAL
Qty: 90 CAPSULE | Refills: 2 | Status: SHIPPED | OUTPATIENT
Start: 2020-12-21 | End: 2021-08-12

## 2020-12-21 NOTE — PROGRESS NOTES
Refill Routing Note   Medication(s) are not appropriate for processing by [facility] for the following reason(s):     - Required vitals are abnormal    [facility] action(s):  Defer       Medication Therapy Plan:  CDMR. Vitals > [facility] standards; defer     Medication reconciliation completed: No   Automatic Epic Generated Protocol Data:        Requested Prescriptions   Pending Prescriptions Disp Refills    DULoxetine (CYMBALTA) 30 MG capsule [Pharmacy Med Name: DULOXETINE HCL DR 30 MG CAP] 90 capsule 2     Sig: TAKE 1 CAPSULE BY MOUTH EVERY DAY       Psychiatry: Antidepressants - SNRI Failed - 12/21/2020 10:57 AM        Failed - Last BP in normal range within 360 days.     BP Readings from Last 3 Encounters:   11/25/20 (!) 145/73   11/10/20 (!) 150/80   11/09/20 (!) 150/84              Passed - Patient is at least 18 years old        Passed - Office visit in past 12 months or future 90 days     Recent Outpatient Visits            3 weeks ago Closed displaced fracture of shaft of left clavicle with routine healing, subsequent encounter    [facility] Joshua Arango MD    1 month ago Closed displaced fracture of shaft of left clavicle, initial encounter    [facility] Joshua Arango MD    1 month ago Closed displaced fracture of shaft of left clavicle, initial encounter    [facility] Kaylen Lemus PA-C    1 month ago Depression, unspecified depression type    [facility] [facility] Stephan Quiñones MD    4 months ago Dementia without behavioral disturbance, unspecified dementia type    [facility] Stephan Quiñones MD          Future Appointments              In 1 month [facility] XR[facility] [place] [facility] [place]    In 1 month Joshua Arango MD [facility], [place]    In 4 months Benjy Cameron MD [facility] - Neurology,  [facility]                Passed - Cr is 1.4 or below and within 360 days     Creatinine   Date  Value Ref Range Status   10/28/2020 1.0 0.5 - 1.4 mg/dL Final   08/21/2020 1.0 0.5 - 1.4 mg/dL Final   06/23/2020 0.9 0.5 - 1.4 mg/dL Final              Passed - eGFR within 360 days     eGFR if non    Date Value Ref Range Status   10/28/2020 >60 >60 mL/min/1.73 m^2 Final     Comment:     Calculation used to obtain the estimated glomerular filtration  rate (eGFR) is the CKD-EPI equation.      08/21/2020 >60.0 >60 mL/min/1.73 m^2 Final     Comment:     Calculation used to obtain the estimated glomerular filtration  rate (eGFR) is the CKD-EPI equation.      06/23/2020 >60 >60 mL/min/1.73 m^2 Final     Comment:     Calculation used to obtain the estimated glomerular filtration  rate (eGFR) is the CKD-EPI equation.        eGFR if    Date Value Ref Range Status   10/28/2020 >60 >60 mL/min/1.73 m^2 Final   08/21/2020 >60.0 >60 mL/min/1.73 m^2 Final   06/23/2020 >60 >60 mL/min/1.73 m^2 Final                    Appointments  past 12m or future 3m with PCP    Date Provider   Last Visit   11/5/2020 Stephan Quiñones MD   Next Visit   Visit date not found Stephan Quiñones MD   ED visits in past 90 days: 1        Note composed:10:58 AM 12/21/2020

## 2021-01-06 ENCOUNTER — OFFICE VISIT (OUTPATIENT)
Dept: FAMILY MEDICINE | Facility: CLINIC | Age: 61
End: 2021-01-06
Payer: MEDICARE

## 2021-01-06 VITALS
OXYGEN SATURATION: 98 % | DIASTOLIC BLOOD PRESSURE: 70 MMHG | TEMPERATURE: 98 F | BODY MASS INDEX: 26.98 KG/M2 | SYSTOLIC BLOOD PRESSURE: 124 MMHG | WEIGHT: 178 LBS | HEART RATE: 61 BPM | HEIGHT: 68 IN

## 2021-01-06 DIAGNOSIS — G31.84 MILD COGNITIVE IMPAIRMENT WITH MEMORY LOSS: ICD-10-CM

## 2021-01-06 DIAGNOSIS — L50.9 URTICARIA: Primary | ICD-10-CM

## 2021-01-06 PROCEDURE — 1126F AMNT PAIN NOTED NONE PRSNT: CPT | Mod: S$GLB,,, | Performed by: FAMILY MEDICINE

## 2021-01-06 PROCEDURE — 3008F BODY MASS INDEX DOCD: CPT | Mod: CPTII,S$GLB,, | Performed by: FAMILY MEDICINE

## 2021-01-06 PROCEDURE — 3008F PR BODY MASS INDEX (BMI) DOCUMENTED: ICD-10-PCS | Mod: CPTII,S$GLB,, | Performed by: FAMILY MEDICINE

## 2021-01-06 PROCEDURE — 99214 OFFICE O/P EST MOD 30 MIN: CPT | Mod: 25,S$GLB,, | Performed by: FAMILY MEDICINE

## 2021-01-06 PROCEDURE — 99999 PR PBB SHADOW E&M-EST. PATIENT-LVL IV: CPT | Mod: PBBFAC,,, | Performed by: FAMILY MEDICINE

## 2021-01-06 PROCEDURE — 3074F SYST BP LT 130 MM HG: CPT | Mod: CPTII,S$GLB,, | Performed by: FAMILY MEDICINE

## 2021-01-06 PROCEDURE — 99214 PR OFFICE/OUTPT VISIT, EST, LEVL IV, 30-39 MIN: ICD-10-PCS | Mod: 25,S$GLB,, | Performed by: FAMILY MEDICINE

## 2021-01-06 PROCEDURE — 1126F PR PAIN SEVERITY QUANTIFIED, NO PAIN PRESENT: ICD-10-PCS | Mod: S$GLB,,, | Performed by: FAMILY MEDICINE

## 2021-01-06 PROCEDURE — 3074F PR MOST RECENT SYSTOLIC BLOOD PRESSURE < 130 MM HG: ICD-10-PCS | Mod: CPTII,S$GLB,, | Performed by: FAMILY MEDICINE

## 2021-01-06 PROCEDURE — 96372 PR INJECTION,THERAP/PROPH/DIAG2ST, IM OR SUBCUT: ICD-10-PCS | Mod: S$GLB,,, | Performed by: FAMILY MEDICINE

## 2021-01-06 PROCEDURE — 3078F PR MOST RECENT DIASTOLIC BLOOD PRESSURE < 80 MM HG: ICD-10-PCS | Mod: CPTII,S$GLB,, | Performed by: FAMILY MEDICINE

## 2021-01-06 PROCEDURE — 3078F DIAST BP <80 MM HG: CPT | Mod: CPTII,S$GLB,, | Performed by: FAMILY MEDICINE

## 2021-01-06 PROCEDURE — 96372 THER/PROPH/DIAG INJ SC/IM: CPT | Mod: S$GLB,,, | Performed by: FAMILY MEDICINE

## 2021-01-06 PROCEDURE — 99999 PR PBB SHADOW E&M-EST. PATIENT-LVL IV: ICD-10-PCS | Mod: PBBFAC,,, | Performed by: FAMILY MEDICINE

## 2021-01-06 RX ORDER — BETAMETHASONE SODIUM PHOSPHATE AND BETAMETHASONE ACETATE 3; 3 MG/ML; MG/ML
9 INJECTION, SUSPENSION INTRA-ARTICULAR; INTRALESIONAL; INTRAMUSCULAR; SOFT TISSUE ONCE
Status: COMPLETED | OUTPATIENT
Start: 2021-01-06 | End: 2021-01-06

## 2021-01-06 RX ORDER — DESONIDE 0.5 MG/G
CREAM TOPICAL 2 TIMES DAILY
Qty: 60 G | Refills: 0 | Status: SHIPPED | OUTPATIENT
Start: 2021-01-06 | End: 2021-05-17 | Stop reason: SDUPTHER

## 2021-01-06 RX ORDER — PREDNISONE 20 MG/1
20 TABLET ORAL DAILY
Qty: 10 TABLET | Refills: 0 | Status: SHIPPED | OUTPATIENT
Start: 2021-01-06 | End: 2021-01-16

## 2021-01-06 RX ADMIN — BETAMETHASONE SODIUM PHOSPHATE AND BETAMETHASONE ACETATE 9 MG: 3; 3 INJECTION, SUSPENSION INTRA-ARTICULAR; INTRALESIONAL; INTRAMUSCULAR; SOFT TISSUE at 02:01

## 2021-01-25 ENCOUNTER — TELEPHONE (OUTPATIENT)
Dept: ORTHOPEDICS | Facility: CLINIC | Age: 61
End: 2021-01-25

## 2021-02-02 ENCOUNTER — OFFICE VISIT (OUTPATIENT)
Dept: ORTHOPEDICS | Facility: CLINIC | Age: 61
End: 2021-02-02
Payer: MEDICARE

## 2021-02-02 ENCOUNTER — HOSPITAL ENCOUNTER (OUTPATIENT)
Dept: RADIOLOGY | Facility: HOSPITAL | Age: 61
Discharge: HOME | End: 2021-02-02
Attending: STUDENT IN AN ORGANIZED HEALTH CARE EDUCATION/TRAINING PROGRAM
Payer: MEDICARE

## 2021-02-02 VITALS — HEIGHT: 68 IN | WEIGHT: 178 LBS | BODY MASS INDEX: 26.98 KG/M2

## 2021-02-02 DIAGNOSIS — M89.8X1 PAIN OF LEFT CLAVICLE: ICD-10-CM

## 2021-02-02 DIAGNOSIS — S42.022D CLOSED DISPLACED FRACTURE OF SHAFT OF LEFT CLAVICLE WITH ROUTINE HEALING, SUBSEQUENT ENCOUNTER: Primary | ICD-10-CM

## 2021-02-02 PROCEDURE — 99999 PR PBB SHADOW E&M-EST. PATIENT-LVL III: CPT | Mod: PBBFAC,,, | Performed by: STUDENT IN AN ORGANIZED HEALTH CARE EDUCATION/TRAINING PROGRAM

## 2021-02-02 PROCEDURE — 73000 X-RAY EXAM OF COLLAR BONE: CPT | Mod: TC,LT

## 2021-02-02 PROCEDURE — 73000 XR CLAVICLE LEFT: ICD-10-PCS | Mod: 26,LT,, | Performed by: RADIOLOGY

## 2021-02-02 PROCEDURE — 99214 PR OFFICE/OUTPT VISIT, EST, LEVL IV, 30-39 MIN: ICD-10-PCS | Mod: S$GLB,,, | Performed by: STUDENT IN AN ORGANIZED HEALTH CARE EDUCATION/TRAINING PROGRAM

## 2021-02-02 PROCEDURE — 3008F BODY MASS INDEX DOCD: CPT | Mod: CPTII,S$GLB,, | Performed by: STUDENT IN AN ORGANIZED HEALTH CARE EDUCATION/TRAINING PROGRAM

## 2021-02-02 PROCEDURE — 73000 X-RAY EXAM OF COLLAR BONE: CPT | Mod: 26,LT,, | Performed by: RADIOLOGY

## 2021-02-02 PROCEDURE — 1126F AMNT PAIN NOTED NONE PRSNT: CPT | Mod: S$GLB,,, | Performed by: STUDENT IN AN ORGANIZED HEALTH CARE EDUCATION/TRAINING PROGRAM

## 2021-02-02 PROCEDURE — 3008F PR BODY MASS INDEX (BMI) DOCUMENTED: ICD-10-PCS | Mod: CPTII,S$GLB,, | Performed by: STUDENT IN AN ORGANIZED HEALTH CARE EDUCATION/TRAINING PROGRAM

## 2021-02-02 PROCEDURE — 1126F PR PAIN SEVERITY QUANTIFIED, NO PAIN PRESENT: ICD-10-PCS | Mod: S$GLB,,, | Performed by: STUDENT IN AN ORGANIZED HEALTH CARE EDUCATION/TRAINING PROGRAM

## 2021-02-02 PROCEDURE — 99214 OFFICE O/P EST MOD 30 MIN: CPT | Mod: S$GLB,,, | Performed by: STUDENT IN AN ORGANIZED HEALTH CARE EDUCATION/TRAINING PROGRAM

## 2021-02-02 PROCEDURE — 99999 PR PBB SHADOW E&M-EST. PATIENT-LVL III: ICD-10-PCS | Mod: PBBFAC,,, | Performed by: STUDENT IN AN ORGANIZED HEALTH CARE EDUCATION/TRAINING PROGRAM

## 2021-04-19 ENCOUNTER — TELEPHONE (OUTPATIENT)
Dept: NEUROLOGY | Facility: CLINIC | Age: 61
End: 2021-04-19

## 2021-04-20 ENCOUNTER — TELEPHONE (OUTPATIENT)
Dept: NEUROLOGY | Facility: CLINIC | Age: 61
End: 2021-04-20

## 2021-04-20 ENCOUNTER — TELEPHONE (OUTPATIENT)
Dept: INTERNAL MEDICINE | Facility: CLINIC | Age: 61
End: 2021-04-20

## 2021-04-26 ENCOUNTER — OFFICE VISIT (OUTPATIENT)
Dept: INTERNAL MEDICINE | Facility: CLINIC | Age: 61
End: 2021-04-26
Payer: MEDICARE

## 2021-04-26 VITALS
OXYGEN SATURATION: 97 % | HEART RATE: 67 BPM | WEIGHT: 181 LBS | HEIGHT: 68 IN | RESPIRATION RATE: 18 BRPM | TEMPERATURE: 99 F | BODY MASS INDEX: 27.43 KG/M2

## 2021-04-26 DIAGNOSIS — M53.3 COCCYDYNIA: Primary | ICD-10-CM

## 2021-04-26 DIAGNOSIS — F05 SUNDOWNING: ICD-10-CM

## 2021-04-26 DIAGNOSIS — F41.9 ANXIETY: ICD-10-CM

## 2021-04-26 DIAGNOSIS — F32.A DEPRESSION, UNSPECIFIED DEPRESSION TYPE: ICD-10-CM

## 2021-04-26 PROCEDURE — 99213 OFFICE O/P EST LOW 20 MIN: CPT | Mod: S$GLB,,, | Performed by: FAMILY MEDICINE

## 2021-04-26 PROCEDURE — 99999 PR PBB SHADOW E&M-EST. PATIENT-LVL III: ICD-10-PCS | Mod: PBBFAC,,, | Performed by: FAMILY MEDICINE

## 2021-04-26 PROCEDURE — 99213 PR OFFICE/OUTPT VISIT, EST, LEVL III, 20-29 MIN: ICD-10-PCS | Mod: S$GLB,,, | Performed by: FAMILY MEDICINE

## 2021-04-26 PROCEDURE — 3008F PR BODY MASS INDEX (BMI) DOCUMENTED: ICD-10-PCS | Mod: CPTII,S$GLB,, | Performed by: FAMILY MEDICINE

## 2021-04-26 PROCEDURE — 99499 RISK ADDL DX/OHS AUDIT: ICD-10-PCS | Mod: S$GLB,,, | Performed by: FAMILY MEDICINE

## 2021-04-26 PROCEDURE — 99999 PR PBB SHADOW E&M-EST. PATIENT-LVL III: CPT | Mod: PBBFAC,,, | Performed by: FAMILY MEDICINE

## 2021-04-26 PROCEDURE — 99499 UNLISTED E&M SERVICE: CPT | Mod: S$GLB,,, | Performed by: FAMILY MEDICINE

## 2021-04-26 PROCEDURE — 3008F BODY MASS INDEX DOCD: CPT | Mod: CPTII,S$GLB,, | Performed by: FAMILY MEDICINE

## 2021-04-26 RX ORDER — MELOXICAM 7.5 MG/1
7.5 TABLET ORAL DAILY
Qty: 14 TABLET | Refills: 0 | Status: SHIPPED | OUTPATIENT
Start: 2021-04-26 | End: 2021-05-21

## 2021-04-30 ENCOUNTER — TELEPHONE (OUTPATIENT)
Dept: INTERNAL MEDICINE | Facility: CLINIC | Age: 61
End: 2021-04-30

## 2021-05-12 ENCOUNTER — OFFICE VISIT (OUTPATIENT)
Dept: NEUROLOGY | Facility: CLINIC | Age: 61
End: 2021-05-12
Payer: MEDICARE

## 2021-05-12 ENCOUNTER — HOSPITAL ENCOUNTER (OUTPATIENT)
Dept: CARDIOLOGY | Facility: HOSPITAL | Age: 61
Discharge: HOME | End: 2021-05-12
Attending: PSYCHIATRY & NEUROLOGY
Payer: MEDICARE

## 2021-05-12 ENCOUNTER — TELEPHONE (OUTPATIENT)
Dept: NEUROLOGY | Facility: CLINIC | Age: 61
End: 2021-05-12

## 2021-05-12 VITALS
BODY MASS INDEX: 27.5 KG/M2 | SYSTOLIC BLOOD PRESSURE: 116 MMHG | OXYGEN SATURATION: 99 % | WEIGHT: 181.44 LBS | RESPIRATION RATE: 16 BRPM | DIASTOLIC BLOOD PRESSURE: 68 MMHG | HEIGHT: 68 IN | HEART RATE: 63 BPM

## 2021-05-12 DIAGNOSIS — R41.3 MEMORY LOSS OF UNKNOWN CAUSE: ICD-10-CM

## 2021-05-12 DIAGNOSIS — F03.90 DEMENTIA WITHOUT BEHAVIORAL DISTURBANCE, UNSPECIFIED DEMENTIA TYPE: ICD-10-CM

## 2021-05-12 DIAGNOSIS — E83.59 OTHER DISORDERS OF CALCIUM METABOLISM: ICD-10-CM

## 2021-05-12 DIAGNOSIS — E53.8 B12 DEFICIENCY: ICD-10-CM

## 2021-05-12 DIAGNOSIS — F41.9 ANXIETY AND DEPRESSION: ICD-10-CM

## 2021-05-12 DIAGNOSIS — L23.7 CONTACT DERMATITIS DUE TO POISON IVY: ICD-10-CM

## 2021-05-12 DIAGNOSIS — F03.B18 MODERATE DEMENTIA WITH BEHAVIORAL DISTURBANCE: Primary | ICD-10-CM

## 2021-05-12 DIAGNOSIS — G45.9 TIA (TRANSIENT ISCHEMIC ATTACK): ICD-10-CM

## 2021-05-12 DIAGNOSIS — I50.9 CHRONIC CONGESTIVE HEART FAILURE, UNSPECIFIED HEART FAILURE TYPE: ICD-10-CM

## 2021-05-12 DIAGNOSIS — R94.31 PROLONGED Q-T INTERVAL ON ECG: ICD-10-CM

## 2021-05-12 DIAGNOSIS — I10 ESSENTIAL HYPERTENSION: ICD-10-CM

## 2021-05-12 DIAGNOSIS — F32.A ANXIETY AND DEPRESSION: ICD-10-CM

## 2021-05-12 DIAGNOSIS — E87.6 HYPOKALEMIA: ICD-10-CM

## 2021-05-12 DIAGNOSIS — G31.84 MILD COGNITIVE IMPAIRMENT WITH MEMORY LOSS: ICD-10-CM

## 2021-05-12 PROCEDURE — 3008F BODY MASS INDEX DOCD: CPT | Mod: CPTII,S$GLB,, | Performed by: PSYCHIATRY & NEUROLOGY

## 2021-05-12 PROCEDURE — 93005 ELECTROCARDIOGRAM TRACING: CPT

## 2021-05-12 PROCEDURE — 3008F PR BODY MASS INDEX (BMI) DOCUMENTED: ICD-10-PCS | Mod: CPTII,S$GLB,, | Performed by: PSYCHIATRY & NEUROLOGY

## 2021-05-12 PROCEDURE — 99999 PR PBB SHADOW E&M-EST. PATIENT-LVL V: CPT | Mod: PBBFAC,,, | Performed by: PSYCHIATRY & NEUROLOGY

## 2021-05-12 PROCEDURE — 1126F PR PAIN SEVERITY QUANTIFIED, NO PAIN PRESENT: ICD-10-PCS | Mod: S$GLB,,, | Performed by: PSYCHIATRY & NEUROLOGY

## 2021-05-12 PROCEDURE — 99999 PR PBB SHADOW E&M-EST. PATIENT-LVL V: ICD-10-PCS | Mod: PBBFAC,,, | Performed by: PSYCHIATRY & NEUROLOGY

## 2021-05-12 PROCEDURE — 1126F AMNT PAIN NOTED NONE PRSNT: CPT | Mod: S$GLB,,, | Performed by: PSYCHIATRY & NEUROLOGY

## 2021-05-12 PROCEDURE — 99499 UNLISTED E&M SERVICE: CPT | Mod: S$GLB,,, | Performed by: PSYCHIATRY & NEUROLOGY

## 2021-05-12 PROCEDURE — 99499 RISK ADDL DX/OHS AUDIT: ICD-10-PCS | Mod: S$GLB,,, | Performed by: PSYCHIATRY & NEUROLOGY

## 2021-05-12 PROCEDURE — 99417 PROLNG OP E/M EACH 15 MIN: CPT | Mod: S$GLB,,, | Performed by: PSYCHIATRY & NEUROLOGY

## 2021-05-12 PROCEDURE — 93010 ELECTROCARDIOGRAM REPORT: CPT | Mod: ,,, | Performed by: INTERNAL MEDICINE

## 2021-05-12 PROCEDURE — 99417 PR PROLONGED SVC, OUTPT, W/WO DIRECT PT CONTACT,  EA ADDTL 15 MIN: ICD-10-PCS | Mod: S$GLB,,, | Performed by: PSYCHIATRY & NEUROLOGY

## 2021-05-12 PROCEDURE — 99205 OFFICE O/P NEW HI 60 MIN: CPT | Mod: S$GLB,,, | Performed by: PSYCHIATRY & NEUROLOGY

## 2021-05-12 PROCEDURE — 99205 PR OFFICE/OUTPT VISIT, NEW, LEVL V, 60-74 MIN: ICD-10-PCS | Mod: S$GLB,,, | Performed by: PSYCHIATRY & NEUROLOGY

## 2021-05-12 PROCEDURE — 93010 EKG 12-LEAD: ICD-10-PCS | Mod: ,,, | Performed by: INTERNAL MEDICINE

## 2021-05-13 RX ORDER — CYANOCOBALAMIN 1000 UG/ML
INJECTION, SOLUTION INTRAMUSCULAR; SUBCUTANEOUS
Qty: 12 ML | Refills: 0 | Status: SHIPPED | OUTPATIENT
Start: 2021-05-13 | End: 2021-09-09

## 2021-05-13 RX ORDER — ASPIRIN 325 MG
50000 TABLET, DELAYED RELEASE (ENTERIC COATED) ORAL WEEKLY
Qty: 12 CAPSULE | Refills: 3 | Status: SHIPPED | OUTPATIENT
Start: 2021-05-13 | End: 2022-02-11

## 2021-05-13 RX ORDER — CYANOCOBALAMIN 1000 UG/ML
1000 INJECTION, SOLUTION INTRAMUSCULAR; SUBCUTANEOUS
Qty: 3 ML | Refills: 11 | Status: SHIPPED | OUTPATIENT
Start: 2021-05-13 | End: 2022-07-11

## 2021-05-14 ENCOUNTER — TELEPHONE (OUTPATIENT)
Dept: NEUROLOGY | Facility: CLINIC | Age: 61
End: 2021-05-14

## 2021-05-14 RX ORDER — DONEPEZIL HYDROCHLORIDE 5 MG/1
10 TABLET, FILM COATED ORAL NIGHTLY
Qty: 90 TABLET | Refills: 3 | Status: SHIPPED | OUTPATIENT
Start: 2021-05-14 | End: 2021-05-17 | Stop reason: DRUGHIGH

## 2021-05-17 DIAGNOSIS — L50.9 URTICARIA: ICD-10-CM

## 2021-05-17 RX ORDER — DONEPEZIL HYDROCHLORIDE 5 MG/1
5 TABLET, FILM COATED ORAL NIGHTLY
Qty: 90 TABLET | Refills: 3 | Status: SHIPPED | OUTPATIENT
Start: 2021-05-17 | End: 2022-05-17

## 2021-05-17 RX ORDER — DESONIDE 0.5 MG/G
CREAM TOPICAL 2 TIMES DAILY
Qty: 60 G | Refills: 0 | Status: SHIPPED | OUTPATIENT
Start: 2021-05-17 | End: 2021-09-09

## 2021-05-19 ENCOUNTER — TELEPHONE (OUTPATIENT)
Dept: NEUROLOGY | Facility: CLINIC | Age: 61
End: 2021-05-19

## 2021-05-21 DIAGNOSIS — M53.3 COCCYDYNIA: ICD-10-CM

## 2021-05-21 RX ORDER — MELOXICAM 7.5 MG/1
TABLET ORAL
Qty: 14 TABLET | Refills: 0 | Status: SHIPPED | OUTPATIENT
Start: 2021-05-21 | End: 2021-06-09

## 2021-05-27 RX ORDER — DONEPEZIL HYDROCHLORIDE 10 MG/1
TABLET, FILM COATED ORAL
Qty: 90 TABLET | Refills: 1 | OUTPATIENT
Start: 2021-05-27

## 2021-05-28 ENCOUNTER — HOSPITAL ENCOUNTER (OUTPATIENT)
Dept: CARDIOLOGY | Facility: HOSPITAL | Age: 61
Discharge: HOME | End: 2021-05-28
Attending: PSYCHIATRY & NEUROLOGY
Payer: MEDICARE

## 2021-05-28 DIAGNOSIS — E83.59 OTHER DISORDERS OF CALCIUM METABOLISM: ICD-10-CM

## 2021-05-28 DIAGNOSIS — E53.8 B12 DEFICIENCY: ICD-10-CM

## 2021-05-28 DIAGNOSIS — R94.31 PROLONGED Q-T INTERVAL ON ECG: ICD-10-CM

## 2021-05-28 DIAGNOSIS — L23.7 CONTACT DERMATITIS DUE TO POISON IVY: ICD-10-CM

## 2021-05-28 DIAGNOSIS — I10 ESSENTIAL HYPERTENSION: ICD-10-CM

## 2021-05-28 DIAGNOSIS — E87.6 HYPOKALEMIA: ICD-10-CM

## 2021-05-28 DIAGNOSIS — G31.84 MILD COGNITIVE IMPAIRMENT WITH MEMORY LOSS: ICD-10-CM

## 2021-05-28 DIAGNOSIS — G45.9 TIA (TRANSIENT ISCHEMIC ATTACK): ICD-10-CM

## 2021-05-28 DIAGNOSIS — R41.3 MEMORY LOSS OF UNKNOWN CAUSE: ICD-10-CM

## 2021-05-28 DIAGNOSIS — F41.9 ANXIETY AND DEPRESSION: ICD-10-CM

## 2021-05-28 DIAGNOSIS — I50.9 CHRONIC CONGESTIVE HEART FAILURE, UNSPECIFIED HEART FAILURE TYPE: ICD-10-CM

## 2021-05-28 DIAGNOSIS — F03.B18 MODERATE DEMENTIA WITH BEHAVIORAL DISTURBANCE: ICD-10-CM

## 2021-05-28 DIAGNOSIS — F32.A ANXIETY AND DEPRESSION: ICD-10-CM

## 2021-05-28 DIAGNOSIS — F03.90 DEMENTIA WITHOUT BEHAVIORAL DISTURBANCE, UNSPECIFIED DEMENTIA TYPE: ICD-10-CM

## 2021-05-28 PROCEDURE — 93005 ELECTROCARDIOGRAM TRACING: CPT

## 2021-05-28 PROCEDURE — 93010 ELECTROCARDIOGRAM REPORT: CPT | Mod: ,,, | Performed by: INTERNAL MEDICINE

## 2021-05-28 PROCEDURE — 93010 EKG 12-LEAD: ICD-10-PCS | Mod: ,,, | Performed by: INTERNAL MEDICINE

## 2021-05-29 DIAGNOSIS — F41.9 ANXIETY: ICD-10-CM

## 2021-06-01 ENCOUNTER — TELEPHONE (OUTPATIENT)
Dept: NEUROLOGY | Facility: CLINIC | Age: 61
End: 2021-06-01

## 2021-06-01 DIAGNOSIS — G31.84 MILD COGNITIVE IMPAIRMENT WITH MEMORY LOSS: Primary | ICD-10-CM

## 2021-06-02 RX ORDER — DULOXETIN HYDROCHLORIDE 30 MG/1
CAPSULE, DELAYED RELEASE ORAL
Qty: 180 CAPSULE | Refills: 1 | OUTPATIENT
Start: 2021-06-02

## 2021-06-08 ENCOUNTER — PATIENT OUTREACH (OUTPATIENT)
Dept: ADMINISTRATIVE | Facility: OTHER | Age: 61
End: 2021-06-08

## 2021-06-09 ENCOUNTER — OFFICE VISIT (OUTPATIENT)
Dept: NEUROLOGY | Facility: CLINIC | Age: 61
End: 2021-06-09
Payer: MEDICARE

## 2021-06-09 VITALS
HEART RATE: 70 BPM | DIASTOLIC BLOOD PRESSURE: 78 MMHG | WEIGHT: 177 LBS | SYSTOLIC BLOOD PRESSURE: 124 MMHG | BODY MASS INDEX: 26.83 KG/M2 | HEIGHT: 68 IN | RESPIRATION RATE: 16 BRPM

## 2021-06-09 DIAGNOSIS — F03.B18 MODERATE DEMENTIA WITH BEHAVIORAL DISTURBANCE: ICD-10-CM

## 2021-06-09 DIAGNOSIS — I10 ESSENTIAL HYPERTENSION: ICD-10-CM

## 2021-06-09 DIAGNOSIS — F03.90 DEMENTIA WITHOUT BEHAVIORAL DISTURBANCE, UNSPECIFIED DEMENTIA TYPE: ICD-10-CM

## 2021-06-09 DIAGNOSIS — L23.7 CONTACT DERMATITIS DUE TO POISON IVY: ICD-10-CM

## 2021-06-09 DIAGNOSIS — G31.84 MILD COGNITIVE IMPAIRMENT WITH MEMORY LOSS: Primary | ICD-10-CM

## 2021-06-09 DIAGNOSIS — R41.3 MEMORY LOSS OF UNKNOWN CAUSE: ICD-10-CM

## 2021-06-09 DIAGNOSIS — G45.9 TIA (TRANSIENT ISCHEMIC ATTACK): ICD-10-CM

## 2021-06-09 DIAGNOSIS — E83.59 OTHER DISORDERS OF CALCIUM METABOLISM: ICD-10-CM

## 2021-06-09 DIAGNOSIS — F32.A ANXIETY AND DEPRESSION: ICD-10-CM

## 2021-06-09 DIAGNOSIS — I50.9 CHRONIC CONGESTIVE HEART FAILURE, UNSPECIFIED HEART FAILURE TYPE: ICD-10-CM

## 2021-06-09 DIAGNOSIS — E87.6 HYPOKALEMIA: ICD-10-CM

## 2021-06-09 DIAGNOSIS — R94.31 PROLONGED Q-T INTERVAL ON ECG: ICD-10-CM

## 2021-06-09 DIAGNOSIS — F41.9 ANXIETY AND DEPRESSION: ICD-10-CM

## 2021-06-09 DIAGNOSIS — E53.8 B12 DEFICIENCY: ICD-10-CM

## 2021-06-09 PROCEDURE — 99214 OFFICE O/P EST MOD 30 MIN: CPT | Mod: S$GLB,,, | Performed by: NURSE PRACTITIONER

## 2021-06-09 PROCEDURE — 99999 PR PBB SHADOW E&M-EST. PATIENT-LVL IV: CPT | Mod: PBBFAC,,, | Performed by: NURSE PRACTITIONER

## 2021-06-09 PROCEDURE — 1126F AMNT PAIN NOTED NONE PRSNT: CPT | Mod: S$GLB,,, | Performed by: NURSE PRACTITIONER

## 2021-06-09 PROCEDURE — 3008F PR BODY MASS INDEX (BMI) DOCUMENTED: ICD-10-PCS | Mod: CPTII,S$GLB,, | Performed by: NURSE PRACTITIONER

## 2021-06-09 PROCEDURE — 3008F BODY MASS INDEX DOCD: CPT | Mod: CPTII,S$GLB,, | Performed by: NURSE PRACTITIONER

## 2021-06-09 PROCEDURE — 99214 PR OFFICE/OUTPT VISIT, EST, LEVL IV, 30-39 MIN: ICD-10-PCS | Mod: S$GLB,,, | Performed by: NURSE PRACTITIONER

## 2021-06-09 PROCEDURE — 99999 PR PBB SHADOW E&M-EST. PATIENT-LVL IV: ICD-10-PCS | Mod: PBBFAC,,, | Performed by: NURSE PRACTITIONER

## 2021-06-09 PROCEDURE — 1126F PR PAIN SEVERITY QUANTIFIED, NO PAIN PRESENT: ICD-10-PCS | Mod: S$GLB,,, | Performed by: NURSE PRACTITIONER

## 2021-06-09 RX ORDER — MEMANTINE HYDROCHLORIDE 5 MG/1
5 TABLET ORAL 2 TIMES DAILY
Qty: 60 TABLET | Refills: 2 | Status: SHIPPED | OUTPATIENT
Start: 2021-06-09 | End: 2021-09-13

## 2021-08-10 DIAGNOSIS — F41.9 ANXIETY: ICD-10-CM

## 2021-08-12 ENCOUNTER — OFFICE VISIT (OUTPATIENT)
Dept: FAMILY MEDICINE | Facility: CLINIC | Age: 61
End: 2021-08-12
Payer: MEDICARE

## 2021-08-12 DIAGNOSIS — L23.7 POISON IVY DERMATITIS: Primary | ICD-10-CM

## 2021-08-12 PROCEDURE — 99214 OFFICE O/P EST MOD 30 MIN: CPT | Mod: 95,,, | Performed by: NURSE PRACTITIONER

## 2021-08-12 PROCEDURE — 99214 PR OFFICE/OUTPT VISIT, EST, LEVL IV, 30-39 MIN: ICD-10-PCS | Mod: 95,,, | Performed by: NURSE PRACTITIONER

## 2021-08-12 RX ORDER — DULOXETIN HYDROCHLORIDE 30 MG/1
CAPSULE, DELAYED RELEASE ORAL
Qty: 180 CAPSULE | Refills: 1 | Status: SHIPPED | OUTPATIENT
Start: 2021-08-12 | End: 2022-02-06

## 2021-08-12 RX ORDER — PREDNISONE 20 MG/1
TABLET ORAL
Qty: 10 TABLET | Refills: 0 | Status: SHIPPED | OUTPATIENT
Start: 2021-08-12 | End: 2021-09-09

## 2021-08-16 RX ORDER — ATORVASTATIN CALCIUM 20 MG/1
TABLET, FILM COATED ORAL
Qty: 90 TABLET | Refills: 0 | Status: SHIPPED | OUTPATIENT
Start: 2021-08-16 | End: 2021-11-10

## 2021-08-16 RX ORDER — BUPROPION HYDROCHLORIDE 150 MG/1
TABLET, EXTENDED RELEASE ORAL
Qty: 180 TABLET | Refills: 0 | Status: SHIPPED | OUTPATIENT
Start: 2021-08-16 | End: 2021-11-11

## 2021-08-16 RX ORDER — AMLODIPINE BESYLATE 10 MG/1
TABLET ORAL
Qty: 90 TABLET | Refills: 0 | Status: SHIPPED | OUTPATIENT
Start: 2021-08-16 | End: 2021-11-11

## 2021-09-09 ENCOUNTER — OFFICE VISIT (OUTPATIENT)
Dept: NEUROLOGY | Facility: CLINIC | Age: 61
End: 2021-09-09
Payer: MEDICARE

## 2021-09-09 ENCOUNTER — LAB VISIT (OUTPATIENT)
Dept: LAB | Facility: HOSPITAL | Age: 61
End: 2021-09-09
Attending: NURSE PRACTITIONER
Payer: MEDICARE

## 2021-09-09 VITALS
OXYGEN SATURATION: 98 % | BODY MASS INDEX: 26.33 KG/M2 | HEART RATE: 58 BPM | WEIGHT: 173.75 LBS | RESPIRATION RATE: 16 BRPM | HEIGHT: 68 IN | SYSTOLIC BLOOD PRESSURE: 116 MMHG | DIASTOLIC BLOOD PRESSURE: 70 MMHG

## 2021-09-09 DIAGNOSIS — G31.84 MILD COGNITIVE IMPAIRMENT WITH MEMORY LOSS: ICD-10-CM

## 2021-09-09 DIAGNOSIS — F03.90 DEMENTIA WITHOUT BEHAVIORAL DISTURBANCE, UNSPECIFIED DEMENTIA TYPE: Primary | ICD-10-CM

## 2021-09-09 DIAGNOSIS — E53.8 B12 DEFICIENCY: ICD-10-CM

## 2021-09-09 DIAGNOSIS — F41.9 ANXIETY AND DEPRESSION: ICD-10-CM

## 2021-09-09 DIAGNOSIS — E55.9 VITAMIN D DEFICIENCY: ICD-10-CM

## 2021-09-09 DIAGNOSIS — F03.B18 MODERATE DEMENTIA WITH BEHAVIORAL DISTURBANCE: ICD-10-CM

## 2021-09-09 DIAGNOSIS — F32.A ANXIETY AND DEPRESSION: ICD-10-CM

## 2021-09-09 DIAGNOSIS — G45.9 TIA (TRANSIENT ISCHEMIC ATTACK): ICD-10-CM

## 2021-09-09 DIAGNOSIS — L23.7 CONTACT DERMATITIS DUE TO POISON IVY: ICD-10-CM

## 2021-09-09 DIAGNOSIS — R94.31 PROLONGED Q-T INTERVAL ON ECG: ICD-10-CM

## 2021-09-09 DIAGNOSIS — E83.59 OTHER DISORDERS OF CALCIUM METABOLISM: ICD-10-CM

## 2021-09-09 DIAGNOSIS — M79.602 LEFT ARM PAIN: ICD-10-CM

## 2021-09-09 DIAGNOSIS — I10 ESSENTIAL HYPERTENSION: ICD-10-CM

## 2021-09-09 DIAGNOSIS — R41.3 MEMORY LOSS OF UNKNOWN CAUSE: ICD-10-CM

## 2021-09-09 DIAGNOSIS — I50.9 CHRONIC CONGESTIVE HEART FAILURE, UNSPECIFIED HEART FAILURE TYPE: ICD-10-CM

## 2021-09-09 DIAGNOSIS — E87.6 HYPOKALEMIA: ICD-10-CM

## 2021-09-09 PROCEDURE — 99999 PR PBB SHADOW E&M-EST. PATIENT-LVL IV: ICD-10-PCS | Mod: PBBFAC,,, | Performed by: NURSE PRACTITIONER

## 2021-09-09 PROCEDURE — 3078F DIAST BP <80 MM HG: CPT | Mod: CPTII,S$GLB,, | Performed by: NURSE PRACTITIONER

## 2021-09-09 PROCEDURE — 99215 OFFICE O/P EST HI 40 MIN: CPT | Mod: S$GLB,,, | Performed by: NURSE PRACTITIONER

## 2021-09-09 PROCEDURE — 82306 VITAMIN D 25 HYDROXY: CPT | Performed by: NURSE PRACTITIONER

## 2021-09-09 PROCEDURE — 3078F PR MOST RECENT DIASTOLIC BLOOD PRESSURE < 80 MM HG: ICD-10-PCS | Mod: CPTII,S$GLB,, | Performed by: NURSE PRACTITIONER

## 2021-09-09 PROCEDURE — 99999 PR PBB SHADOW E&M-EST. PATIENT-LVL IV: CPT | Mod: PBBFAC,,, | Performed by: NURSE PRACTITIONER

## 2021-09-09 PROCEDURE — 82607 VITAMIN B-12: CPT | Performed by: NURSE PRACTITIONER

## 2021-09-09 PROCEDURE — 1160F PR REVIEW ALL MEDS BY PRESCRIBER/CLIN PHARMACIST DOCUMENTED: ICD-10-PCS | Mod: CPTII,S$GLB,, | Performed by: NURSE PRACTITIONER

## 2021-09-09 PROCEDURE — 3074F SYST BP LT 130 MM HG: CPT | Mod: CPTII,S$GLB,, | Performed by: NURSE PRACTITIONER

## 2021-09-09 PROCEDURE — 3008F BODY MASS INDEX DOCD: CPT | Mod: CPTII,S$GLB,, | Performed by: NURSE PRACTITIONER

## 2021-09-09 PROCEDURE — 3074F PR MOST RECENT SYSTOLIC BLOOD PRESSURE < 130 MM HG: ICD-10-PCS | Mod: CPTII,S$GLB,, | Performed by: NURSE PRACTITIONER

## 2021-09-09 PROCEDURE — 1160F RVW MEDS BY RX/DR IN RCRD: CPT | Mod: CPTII,S$GLB,, | Performed by: NURSE PRACTITIONER

## 2021-09-09 PROCEDURE — 3008F PR BODY MASS INDEX (BMI) DOCUMENTED: ICD-10-PCS | Mod: CPTII,S$GLB,, | Performed by: NURSE PRACTITIONER

## 2021-09-09 PROCEDURE — 99215 PR OFFICE/OUTPT VISIT, EST, LEVL V, 40-54 MIN: ICD-10-PCS | Mod: S$GLB,,, | Performed by: NURSE PRACTITIONER

## 2021-09-09 PROCEDURE — 36415 COLL VENOUS BLD VENIPUNCTURE: CPT | Performed by: NURSE PRACTITIONER

## 2021-09-09 PROCEDURE — 1159F MED LIST DOCD IN RCRD: CPT | Mod: CPTII,S$GLB,, | Performed by: NURSE PRACTITIONER

## 2021-09-09 PROCEDURE — 1159F PR MEDICATION LIST DOCUMENTED IN MEDICAL RECORD: ICD-10-PCS | Mod: CPTII,S$GLB,, | Performed by: NURSE PRACTITIONER

## 2021-09-10 LAB
25(OH)D3+25(OH)D2 SERPL-MCNC: 66 NG/ML (ref 30–96)
VIT B12 SERPL-MCNC: 210 PG/ML (ref 210–950)

## 2021-09-16 ENCOUNTER — HOSPITAL ENCOUNTER (OUTPATIENT)
Dept: CARDIOLOGY | Facility: HOSPITAL | Age: 61
Discharge: HOME | End: 2021-09-16
Payer: MEDICARE

## 2021-09-16 DIAGNOSIS — R94.31 PROLONGED Q-T INTERVAL ON ECG: ICD-10-CM

## 2021-09-16 PROCEDURE — 93010 ELECTROCARDIOGRAM REPORT: CPT | Mod: ,,, | Performed by: INTERNAL MEDICINE

## 2021-09-16 PROCEDURE — 93005 ELECTROCARDIOGRAM TRACING: CPT

## 2021-09-16 PROCEDURE — 93010 EKG 12-LEAD: ICD-10-PCS | Mod: ,,, | Performed by: INTERNAL MEDICINE

## 2021-11-10 RX ORDER — ATORVASTATIN CALCIUM 20 MG/1
TABLET, FILM COATED ORAL
Qty: 90 TABLET | Refills: 0 | Status: SHIPPED | OUTPATIENT
Start: 2021-11-10 | End: 2022-02-10

## 2021-11-11 RX ORDER — BUPROPION HYDROCHLORIDE 150 MG/1
TABLET, EXTENDED RELEASE ORAL
Qty: 180 TABLET | Refills: 0 | Status: SHIPPED | OUTPATIENT
Start: 2021-11-11 | End: 2022-02-06

## 2021-11-11 RX ORDER — AMLODIPINE BESYLATE 10 MG/1
TABLET ORAL
Qty: 90 TABLET | Refills: 0 | Status: SHIPPED | OUTPATIENT
Start: 2021-11-11 | End: 2022-02-06

## 2021-12-30 ENCOUNTER — PATIENT OUTREACH (OUTPATIENT)
Dept: ADMINISTRATIVE | Facility: OTHER | Age: 61
End: 2021-12-30
Payer: MEDICARE

## 2022-01-05 ENCOUNTER — HOSPITAL ENCOUNTER (OUTPATIENT)
Dept: CARDIOLOGY | Facility: HOSPITAL | Age: 62
Discharge: HOME | End: 2022-01-05
Payer: MEDICARE

## 2022-01-05 ENCOUNTER — OFFICE VISIT (OUTPATIENT)
Dept: NEUROLOGY | Facility: CLINIC | Age: 62
End: 2022-01-05
Payer: MEDICARE

## 2022-01-05 VITALS
SYSTOLIC BLOOD PRESSURE: 126 MMHG | HEART RATE: 73 BPM | RESPIRATION RATE: 16 BRPM | OXYGEN SATURATION: 97 % | HEIGHT: 68 IN | WEIGHT: 180.75 LBS | DIASTOLIC BLOOD PRESSURE: 70 MMHG | BODY MASS INDEX: 27.39 KG/M2

## 2022-01-05 DIAGNOSIS — G31.84 MILD COGNITIVE IMPAIRMENT WITH MEMORY LOSS: ICD-10-CM

## 2022-01-05 DIAGNOSIS — G45.9 TIA (TRANSIENT ISCHEMIC ATTACK): ICD-10-CM

## 2022-01-05 DIAGNOSIS — M53.3 TAIL BONE PAIN: ICD-10-CM

## 2022-01-05 DIAGNOSIS — E53.8 B12 DEFICIENCY: ICD-10-CM

## 2022-01-05 DIAGNOSIS — F03.90 DEMENTIA WITHOUT BEHAVIORAL DISTURBANCE, UNSPECIFIED DEMENTIA TYPE: Primary | ICD-10-CM

## 2022-01-05 DIAGNOSIS — I10 ESSENTIAL HYPERTENSION: ICD-10-CM

## 2022-01-05 DIAGNOSIS — R94.31 PROLONGED Q-T INTERVAL ON ECG: ICD-10-CM

## 2022-01-05 DIAGNOSIS — F03.B18 MODERATE DEMENTIA WITH BEHAVIORAL DISTURBANCE: ICD-10-CM

## 2022-01-05 DIAGNOSIS — I50.9 CHRONIC CONGESTIVE HEART FAILURE, UNSPECIFIED HEART FAILURE TYPE: ICD-10-CM

## 2022-01-05 DIAGNOSIS — E55.9 VITAMIN D DEFICIENCY: ICD-10-CM

## 2022-01-05 DIAGNOSIS — F03.90 DEMENTIA WITHOUT BEHAVIORAL DISTURBANCE, UNSPECIFIED DEMENTIA TYPE: ICD-10-CM

## 2022-01-05 DIAGNOSIS — F32.A ANXIETY AND DEPRESSION: ICD-10-CM

## 2022-01-05 DIAGNOSIS — E87.6 HYPOKALEMIA: ICD-10-CM

## 2022-01-05 DIAGNOSIS — F41.9 ANXIETY AND DEPRESSION: ICD-10-CM

## 2022-01-05 DIAGNOSIS — R41.3 MEMORY LOSS OF UNKNOWN CAUSE: ICD-10-CM

## 2022-01-05 PROCEDURE — 3074F PR MOST RECENT SYSTOLIC BLOOD PRESSURE < 130 MM HG: ICD-10-PCS | Mod: CPTII,S$GLB,, | Performed by: NURSE PRACTITIONER

## 2022-01-05 PROCEDURE — 3078F DIAST BP <80 MM HG: CPT | Mod: CPTII,S$GLB,, | Performed by: NURSE PRACTITIONER

## 2022-01-05 PROCEDURE — 3078F PR MOST RECENT DIASTOLIC BLOOD PRESSURE < 80 MM HG: ICD-10-PCS | Mod: CPTII,S$GLB,, | Performed by: NURSE PRACTITIONER

## 2022-01-05 PROCEDURE — 3074F SYST BP LT 130 MM HG: CPT | Mod: CPTII,S$GLB,, | Performed by: NURSE PRACTITIONER

## 2022-01-05 PROCEDURE — 93010 ELECTROCARDIOGRAM REPORT: CPT | Mod: ,,, | Performed by: INTERNAL MEDICINE

## 2022-01-05 PROCEDURE — 3008F PR BODY MASS INDEX (BMI) DOCUMENTED: ICD-10-PCS | Mod: CPTII,S$GLB,, | Performed by: NURSE PRACTITIONER

## 2022-01-05 PROCEDURE — 99214 OFFICE O/P EST MOD 30 MIN: CPT | Mod: S$GLB,,, | Performed by: NURSE PRACTITIONER

## 2022-01-05 PROCEDURE — 99999 PR PBB SHADOW E&M-EST. PATIENT-LVL IV: CPT | Mod: PBBFAC,,, | Performed by: NURSE PRACTITIONER

## 2022-01-05 PROCEDURE — 1159F MED LIST DOCD IN RCRD: CPT | Mod: CPTII,S$GLB,, | Performed by: NURSE PRACTITIONER

## 2022-01-05 PROCEDURE — 93010 EKG 12-LEAD: ICD-10-PCS | Mod: ,,, | Performed by: INTERNAL MEDICINE

## 2022-01-05 PROCEDURE — 93005 ELECTROCARDIOGRAM TRACING: CPT

## 2022-01-05 PROCEDURE — 1159F PR MEDICATION LIST DOCUMENTED IN MEDICAL RECORD: ICD-10-PCS | Mod: CPTII,S$GLB,, | Performed by: NURSE PRACTITIONER

## 2022-01-05 PROCEDURE — 99999 PR PBB SHADOW E&M-EST. PATIENT-LVL IV: ICD-10-PCS | Mod: PBBFAC,,, | Performed by: NURSE PRACTITIONER

## 2022-01-05 PROCEDURE — 99214 PR OFFICE/OUTPT VISIT, EST, LEVL IV, 30-39 MIN: ICD-10-PCS | Mod: S$GLB,,, | Performed by: NURSE PRACTITIONER

## 2022-01-05 PROCEDURE — 3008F BODY MASS INDEX DOCD: CPT | Mod: CPTII,S$GLB,, | Performed by: NURSE PRACTITIONER

## 2022-01-05 RX ORDER — MELOXICAM 7.5 MG/1
7.5 TABLET ORAL DAILY
Qty: 14 TABLET | Refills: 0 | Status: SHIPPED | OUTPATIENT
Start: 2022-01-05 | End: 2022-01-19

## 2022-01-05 NOTE — PATIENT INSTRUCTIONS
"Patient Education       Evaluating Memory and Thinking Problems   The Basics   Written by the doctors and editors at [facility]   What does "evaluating memory and thinking problems" mean? -- It means checking your memory and thinking. An "evaluation" is another word for a check-up or test.  Your doctor might do an evaluation if you or your family has noticed that you are having problems with your memory or thinking, or doing daily activities.  An evaluation can show:  · If you have memory or thinking problems - It is normal for adults to have slight memory problems as they get older. But some people have memory or thinking problems that are more serious.  · The types of problems you have, and how serious they are  An evaluation might also show what's causing your problems. Different conditions can cause memory and thinking problems.  Some people have only 1 evaluation. Other people have repeat evaluations to see if their problems get worse over time.  What does an evaluation involve? -- An evaluation involves different parts. You might have all of the parts in 1 day, or it might take a few days to do all the parts. The different parts include:  · Talking with your doctor or nurse - Your doctor or nurse will talk with you and your family about your symptoms, behavior, and daily activities. He or she will ask about changes in your symptoms and behavior. He or she will also ask about your medical problems, mood, medicines, and drug and alcohol use.  · A physical exam  · Tests for your memory and thinking - The doctor or nurse will ask some questions to check your memory and thinking. For example, he or she might tell you 3 words and have you remember them for a few minutes.   You might also see another person for more detailed testing. This is called "neuropsychological" testing because it is done to learn more about how your brain and mind are working. It involves testing how well you are able to:  ? Speak  ? Write and " "understand language  ? Learn and remember information  ? Use reason and logic   ? Do tasks related to math and numbers.  This type of detailed testing can take 1 to several hours. It is usually done by a doctor called a "neuropsychologist."  · Blood tests - These tests can check for other conditions that could be causing your symptoms.  · A CT or MRI scan of your brain - These are imaging tests that can create pictures of your brain. Not everyone needs a brain scan. Whether or not you have a brain scan depends on your symptoms and physical exam.  Why is it important to have an evaluation? -- It's important to have an evaluation because some memory and thinking problems can be treated. After treatment, the symptoms will often get better.  Another reason evaluation is important is that some tests can be repeated over time. This gives you and your doctors information about your condition. For example, if the tests show problems that are mild and not getting worse, that is a good sign.   If the tests show problems that are getting worse over time, it could be a sign that you might be developing dementia. Dementia is the general term for a group of brain disorders, including Alzheimer disease, that affect memory and thinking. Although most forms of dementia cannot be treated, it can be helpful to know you are at risk. For example, if you know that your problems might affect certain daily tasks or activities, then you can get help in those areas. Plus, if you know that your condition is not likely to improve, you and your family can make plans for the future.  All topics are updated as new evidence becomes available and our peer review process is complete.  This topic retrieved from [employer] on: Sep 21, 2021.  Topic 11635 Version 6.0  Release: 29.4.2 - C29.263  © 2021 UpToDate, Inc. and/or its affiliates. All rights reserved.  Consumer Information Use and Disclaimer   This information is not specific medical advice and " does not replace information you receive from your health care provider. This is only a brief summary of general information. It does NOT include all information about conditions, illnesses, injuries, tests, procedures, treatments, therapies, discharge instructions or life-style choices that may apply to you. You must talk with your health care provider for complete information about your health and treatment options. This information should not be used to decide whether or not to accept your health care provider's advice, instructions or recommendations. Only your health care provider has the knowledge and training to provide advice that is right for you. The use of this information is governed by the [employer] End User License Agreement, available at https://www.Wonderloop/en/solutions/[employer]TEA/about/federico.The use of [employer] content is governed by the [employer] Terms of Use. ©2021 UpToDate, Inc. All rights reserved.  Copyright   © 2021 UpToDate, Inc. and/or its affiliates. All rights reserved.  Patient Education       How to Help Your Memory   About this topic   All people forget things at times. For some people, it is frustrating. Memory does decline with age, but sometimes it is a sign of a more serious problem such as depression or dementia. If your memory is getting worse and becoming a problem, it is important to talk about it with your doctor. There are many different things that you can do to help your memory.  General   · Keep your brain active.  ? Working on crossword puzzles or games that make you think about finding words or current events helps to keep your brain cells active.  ? Learn a new skill. Challenge yourself to learn something new. Learn to knit, work with wood, play an instrument, or use a computer.  ? Practice paying attention. Teach yourself to remember details, like what color was someone wearing? Where were you when visiting with people?  · Group the information.  ? Most people can  "remember between 5 and 9 things at one time.  ? If you have to remember more than that, break it down into smaller groups.  ? Try to group like things together, such as numbers that form a special date or year or words that start with the same letter.  · Get organized.  ? Write things down and make lists.  ? Use paper, a calendar, or an electric device to keep track of your lists. Cross things off when you finish a task.  ? Set an alarm on your phone or watch.  ? Put your keys, wallet, purse, and cell phone in the same place where you see them often.  ? Get rid of clutter. Clean, organized surroundings can help you stay focused and remember better.  · Talk about it.  ? Say names and things you need to remember out loud.  ? Connect the item with a story to help you remember.  ? Use word associations to help you remember.  · Use all of your senses.  ? Underline important facts.  ? Keep a notebook to write down ideas you want to remember.  ? Associate ideas with color, sounds, or smells.  ? Make a picture in your mind of what you are trying to remember. If you have a doctor's appointment, picture a thermometer in your head.  · Try different memory techniques.  ? Take the first letter of each word to make a different word. If you need to remember to buy Milk, Orange juice, and Pickles at the store, keep saying the word "MOP" in your head or out loud.  ? Try rhyming to remember a name. "Ed likes the color red."  · Be social.  ? Volunteer. Spend time helping other people.  ? Spend time with family or friends.  · Take good care of your body.  ? Don't smoke. If you are a smoker, ask your doctor for help in quitting.  ? Don't drink beer, wine, and mixed drinks (alcohol).  ? Find out what a healthy weight is for you and eat a variety of foods to keep a healthy weight. Leafy green vegetables, fruits, salmon and tuna, lean meats, and whole grain breads or cereals are good choices. Avoid sugar.  ? See your doctor on a regular " basis. Ask your doctor what you can do to manage your blood pressure, cholesterol, and blood sugar.  ? Exercise often. Even a short walk each day can help.  · Sleep well.  ? Try to get 7 to 8 hours of sleep each night.  When do I need to call the doctor?   Call the doctor if you or a loved one:  · Forgets the names of close friends or family  · Cannot keep track of drugs and may take too little or too much  · Is not aware of a problem with memory loss  · Cannot recall common words or uses odd words in place of a common word  · Gets lost in places often visited or is not able to find the way home  · Leaves the stove on  · Takes longer to do familiar tasks, such as following a recipe  · Puts things in odd places  · Has rapid mood swings  · Has trouble making decisions  · Has worry about memory loss or memory loss is causing problems each day  · Is feeling sad, down, or depressed  · Is feeling that life isnt worth living  Where can I learn more?   Alzheimers Association  https://www.alz.org/help-support/caregiving/stages-behaviors/memory-loss-confusion   Food and Drug Administration  http://www.fda.gov/ForConsumers/ConsumerUpdates/hin065379.htm   National [place] on Aging  https://www.anel.nih.gov/health/featured/memory-cognitive-health   National [place] on Aging  http://www.anel.nih.gov/alzheimers/publication/understanding-memory-loss/introduction   Last Reviewed Date   2021-07-22  Consumer Information Use and Disclaimer   This information is not specific medical advice and does not replace information you receive from your health care provider. This is only a brief summary of general information. It does NOT include all information about conditions, illnesses, injuries, tests, procedures, treatments, therapies, discharge instructions or life-style choices that may apply to you. You must talk with your health care provider for complete information about your health and treatment options. This information should  "not be used to decide whether or not to accept your health care providers advice, instructions or recommendations. Only your health care provider has the knowledge and training to provide advice that is right for you.  Copyright   Copyright © 2021 UpToDate, Inc. and its affiliates and/or licensors. All rights reserved.  Patient Education       Evaluating Memory and Thinking Problems   The Basics   Written by the doctors and editors at [facility]   What does "evaluating memory and thinking problems" mean? -- It means checking your memory and thinking. An "evaluation" is another word for a check-up or test.  Your doctor might do an evaluation if you or your family has noticed that you are having problems with your memory or thinking, or doing daily activities.  An evaluation can show:  · If you have memory or thinking problems - It is normal for adults to have slight memory problems as they get older. But some people have memory or thinking problems that are more serious.  · The types of problems you have, and how serious they are  An evaluation might also show what's causing your problems. Different conditions can cause memory and thinking problems.  Some people have only 1 evaluation. Other people have repeat evaluations to see if their problems get worse over time.  What does an evaluation involve? -- An evaluation involves different parts. You might have all of the parts in 1 day, or it might take a few days to do all the parts. The different parts include:  · Talking with your doctor or nurse - Your doctor or nurse will talk with you and your family about your symptoms, behavior, and daily activities. He or she will ask about changes in your symptoms and behavior. He or she will also ask about your medical problems, mood, medicines, and drug and alcohol use.  · A physical exam  · Tests for your memory and thinking - The doctor or nurse will ask some questions to check your memory and thinking. For example, he or " "she might tell you 3 words and have you remember them for a few minutes.   You might also see another person for more detailed testing. This is called "neuropsychological" testing because it is done to learn more about how your brain and mind are working. It involves testing how well you are able to:  ? Speak  ? Write and understand language  ? Learn and remember information  ? Use reason and logic   ? Do tasks related to math and numbers.  This type of detailed testing can take 1 to several hours. It is usually done by a doctor called a "neuropsychologist."  · Blood tests - These tests can check for other conditions that could be causing your symptoms.  · A CT or MRI scan of your brain - These are imaging tests that can create pictures of your brain. Not everyone needs a brain scan. Whether or not you have a brain scan depends on your symptoms and physical exam.  Why is it important to have an evaluation? -- It's important to have an evaluation because some memory and thinking problems can be treated. After treatment, the symptoms will often get better.  Another reason evaluation is important is that some tests can be repeated over time. This gives you and your doctors information about your condition. For example, if the tests show problems that are mild and not getting worse, that is a good sign.   If the tests show problems that are getting worse over time, it could be a sign that you might be developing dementia. Dementia is the general term for a group of brain disorders, including Alzheimer disease, that affect memory and thinking. Although most forms of dementia cannot be treated, it can be helpful to know you are at risk. For example, if you know that your problems might affect certain daily tasks or activities, then you can get help in those areas. Plus, if you know that your condition is not likely to improve, you and your family can make plans for the future.  All topics are updated as new evidence " becomes available and our peer review process is complete.  This topic retrieved from [employer] on: Sep 21, 2021.  Topic 29773 Version 6.0  Release: 29.4.2 - C29.263  © 2021 UpToDate, Inc. and/or its affiliates. All rights reserved.  Consumer Information Use and Disclaimer   This information is not specific medical advice and does not replace information you receive from your health care provider. This is only a brief summary of general information. It does NOT include all information about conditions, illnesses, injuries, tests, procedures, treatments, therapies, discharge instructions or life-style choices that may apply to you. You must talk with your health care provider for complete information about your health and treatment options. This information should not be used to decide whether or not to accept your health care provider's advice, instructions or recommendations. Only your health care provider has the knowledge and training to provide advice that is right for you. The use of this information is governed by the [employer] End User License Agreement, available at https://www.PassionTag/en/solutions/[employer]/about/federico.The use of [employer] content is governed by the [employer] Terms of Use. ©2021 UpToDate, Inc. All rights reserved.  Copyright   © 2021 UpToDate, Inc. and/or its affiliates. All rights reserved.  Patient Education       How to Help Your Memory   About this topic   All people forget things at times. For some people, it is frustrating. Memory does decline with age, but sometimes it is a sign of a more serious problem such as depression or dementia. If your memory is getting worse and becoming a problem, it is important to talk about it with your doctor. There are many different things that you can do to help your memory.  General   · Keep your brain active.  ? Working on crossword puzzles or games that make you think about finding words or current events helps to keep your brain cells  "active.  ? Learn a new skill. Challenge yourself to learn something new. Learn to knit, work with wood, play an instrument, or use a computer.  ? Practice paying attention. Teach yourself to remember details, like what color was someone wearing? Where were you when visiting with people?  · Group the information.  ? Most people can remember between 5 and 9 things at one time.  ? If you have to remember more than that, break it down into smaller groups.  ? Try to group like things together, such as numbers that form a special date or year or words that start with the same letter.  · Get organized.  ? Write things down and make lists.  ? Use paper, a calendar, or an electric device to keep track of your lists. Cross things off when you finish a task.  ? Set an alarm on your phone or watch.  ? Put your keys, wallet, purse, and cell phone in the same place where you see them often.  ? Get rid of clutter. Clean, organized surroundings can help you stay focused and remember better.  · Talk about it.  ? Say names and things you need to remember out loud.  ? Connect the item with a story to help you remember.  ? Use word associations to help you remember.  · Use all of your senses.  ? Underline important facts.  ? Keep a notebook to write down ideas you want to remember.  ? Associate ideas with color, sounds, or smells.  ? Make a picture in your mind of what you are trying to remember. If you have a doctor's appointment, picture a thermometer in your head.  · Try different memory techniques.  ? Take the first letter of each word to make a different word. If you need to remember to buy Milk, Orange juice, and Pickles at the store, keep saying the word "MOP" in your head or out loud.  ? Try rhyming to remember a name. "Ed likes the color red."  · Be social.  ? Volunteer. Spend time helping other people.  ? Spend time with family or friends.  · Take good care of your body.  ? Don't smoke. If you are a smoker, ask your doctor " for help in quitting.  ? Don't drink beer, wine, and mixed drinks (alcohol).  ? Find out what a healthy weight is for you and eat a variety of foods to keep a healthy weight. Leafy green vegetables, fruits, salmon and tuna, lean meats, and whole grain breads or cereals are good choices. Avoid sugar.  ? See your doctor on a regular basis. Ask your doctor what you can do to manage your blood pressure, cholesterol, and blood sugar.  ? Exercise often. Even a short walk each day can help.  · Sleep well.  ? Try to get 7 to 8 hours of sleep each night.  When do I need to call the doctor?   Call the doctor if you or a loved one:  · Forgets the names of close friends or family  · Cannot keep track of drugs and may take too little or too much  · Is not aware of a problem with memory loss  · Cannot recall common words or uses odd words in place of a common word  · Gets lost in places often visited or is not able to find the way home  · Leaves the stove on  · Takes longer to do familiar tasks, such as following a recipe  · Puts things in odd places  · Has rapid mood swings  · Has trouble making decisions  · Has worry about memory loss or memory loss is causing problems each day  · Is feeling sad, down, or depressed  · Is feeling that life isnt worth living  Where can I learn more?   Alzheimers Association  https://www.alz.org/help-support/caregiving/stages-behaviors/memory-loss-confusion   Food and Drug Administration  http://www.fda.gov/ForConsumers/ConsumerUpdates/rie322923.htm   National [place] on Aging  https://www.anel.nih.gov/health/featured/memory-cognitive-health   National [place] on Aging  http://www.anel.nih.gov/alzheimers/publication/understanding-memory-loss/introduction   Last Reviewed Date   2021-07-22  Consumer Information Use and Disclaimer   This information is not specific medical advice and does not replace information you receive from your health care provider. This is only a brief summary of general  "information. It does NOT include all information about conditions, illnesses, injuries, tests, procedures, treatments, therapies, discharge instructions or life-style choices that may apply to you. You must talk with your health care provider for complete information about your health and treatment options. This information should not be used to decide whether or not to accept your health care providers advice, instructions or recommendations. Only your health care provider has the knowledge and training to provide advice that is right for you.  Copyright   Copyright © 2021 UpToDate, Inc. and its affiliates and/or licensors. All rights reserved.  Patient Education       Mild Cognitive Impairment   The Basics   Written by the doctors and editors at [employer]   What is mild cognitive impairment? -- Mild cognitive impairment (MCI) is a brain disorder that causes trouble with memory or thinking. The word "cognitive" has to do with memory and thinking. The word "impairment" means having trouble doing something.  It is normal for adults to have slight memory problems as they get older. But the problems in MCI are more significant than those of normal aging. For example, you might forget things more often than before, or you might forget more important things.  Some people with MCI later develop a condition called "dementia." Dementia is the general term for a group of brain disorders that cause problems with memory and thinking (figure 1).  Dementia is a more serious problem than MCI. People with MCI usually don't have many problems doing their daily tasks and activities. But people with dementia might not be able to do their daily tasks or activities correctly or at all.  What are the symptoms of MCI? -- Memory problems are the most common symptom of MCI. Some people have other types of thinking problems. They might have trouble concentrating, reasoning, or remembering the correct word to use. It can also be more " "difficult to make decisions. Some people with MCI might get lost.  Some people with MCI might also feel sad, worried, or angry; act in a threatening way; or believe things that aren't true. Other people with MCI might seem less interested in activities or other things that used to be important to them.  Some people with MCI are aware of their memory changes and problems. In other cases, family members, friends, or co-workers might notice these problems first.  How can my doctor or nurse tell if I have MCI? -- To tell if you have MCI, your doctor or nurse will:  · Talk with you and your family - He or she will ask about your symptoms, behavior, mood, and daily activities, and how these have changed over time. He or she will also ask about your medical conditions and medicines. That's because some medical conditions and medicines can also cause memory problems. In older adults, depression can cause symptoms similar to MCI.   · Do an exam  · Ask questions to check your memory and thinking  Your doctor might do tests to check that another medical condition isn't causing your symptoms. These tests can include:  · Blood tests  · A CT or MRI scan of your brain - These are imaging tests that can create pictures of your brain.  · More detailed tests to check your memory, language, and thinking - This is called "neuropsychological" testing because it is done to learn more about how your brain and mind are working. It involves testing how well you are able to:  ? Speak  ? Write and understand language  ? Learn and remember information  ? Use reason and logic  ? Do tasks related to math and numbers.  This type of detailed testing can take 1 to several hours. It is usually done by a doctor called a "neuropsychologist."  How is MCI treated? -- Sadly, there really aren't helpful treatments for MCI. As of now, no medicines can prevent MCI from turning into dementia.  If you have a lot of memory problems, your doctor might prescribe " a medicine used to treat a type of dementia called Alzheimer disease. These medicines can sometimes help improve symptoms. Plus, your doctor might suggest that you avoid certain medicines, which can impair thinking.  Your doctor will also talk with you about ways to keep your brain as healthy as possible. These include:  · Getting regular exercise  · Having an active social life  · Keeping your brain busy and active  · Making sure your blood pressure is not too high  Do people with MCI always get dementia? -- No. Although some people with MCI get dementia later on, some do not.  There is no way to know which people with MCI will get dementia. That's why it's important to have regular follow-ups with your doctor. He or she can follow your symptoms to see if they change or get worse. If your symptoms do get worse, this is a sign that you are more likely to develop dementia. Although most forms of dementia cannot be treated, it can be helpful to know you are at risk. For example, if you know that your problems might affect certain daily tasks or activities, then you can get help in those areas. Plus, if you know that your condition is not likely to improve, you and your family can make plans for the future.  All topics are updated as new evidence becomes available and our peer review process is complete.  This topic retrieved from [employer] on: Sep 21, 2021.  Topic 47231 Version 6.0  Release: 29.4.2 - C29.263  © 2021 UpToDate, Inc. and/or its affiliates. All rights reserved.  figure 1: Dementia caused by changes in the brain     Different forms of dementia are linked to changes in the brain. Alzheimer disease causes many parts of the brain to shrink. Parkinson disease damages parts of the brain involved in movement. Vascular dementia happens when the blood vessels that supply the brain are diseased.  Graphic 61948 Version 2.0    Consumer Information Use and Disclaimer   This information is not specific medical advice  and does not replace information you receive from your health care provider. This is only a brief summary of general information. It does NOT include all information about conditions, illnesses, injuries, tests, procedures, treatments, therapies, discharge instructions or life-style choices that may apply to you. You must talk with your health care provider for complete information about your health and treatment options. This information should not be used to decide whether or not to accept your health care provider's advice, instructions or recommendations. Only your health care provider has the knowledge and training to provide advice that is right for you. The use of this information is governed by the [employer] End User License Agreement, available at https://www.Knowrom.ToyTalk/en/solutions/Arctrieval/about/federico.The use of [employer] content is governed by the [employer] Terms of Use. ©2021 UpToDate, Inc. All rights reserved.  Copyright   © 2021 UpToDate, Inc. and/or its affiliates. All rights reserved.

## 2022-01-05 NOTE — PROGRESS NOTES
Subjective:       Patient ID: Vishnu Dougherty Jr. is a 61 y.o. male.    Chief Complaint: Dementia and Tailbone Pain    HPI       The patient I here for memory loss. The patient is presenting with 6-year history of memory loss since 2015 at the age of 55.The patient is accompanied by his sister.He started having significant errors while working and ended up losing his job as a  in 2018. He was evaluated by Dr. Clemente in 2015 and diagnosed with ADOLFO. He was again evaluated by Dr. Clemente in 2018 when the family noted remarkable memory loss and scored 22/30 and yet was not diagnosed with dementia. His sister moved him to Assisted Living in  after falling in the yard and it became impossible for the patient to take care of himself. He is maintained on Aricept 10 mg QHS. Takes Wellbutrin and Cymbalta for ADOLFO-MDD. No SI/HI. He is currently oriented to person and place but not time and date. He is dependent but able to ambulate, feed himself and use the bathroom. Has good appetite and sleeps well. No recent behavioral disturbances. From 0360-7720 underwent 4 CTs and 2 MRIs of the Brain that showed progressive atrophy. Labs show NL TFT, Low B12 and Vitamin D with no replacement noted. The last EKG in  showed prolonged QT interval. Tolerating Aricept 5 mg QHS and Namenda 5 mg BID with no issues. Doing well, living in assisted living. Able to preform ADLs without assistance. Denies behavorial disturbances or hallucinations. EKG ordered on 6/1/2021 not completed. Continues Vitamin B12 and Vitamin D3 replacement therapy. Complains of pain in left, medial forearm that starts from his elbow and radiates into lower arm. Believes it is a pulled muscle. Started when he was picking up branches at assisted living last week. States pain is aggravated with pulling movements and improved with rest. Denies weakness, swelling, numbness or tingling in extremity.    Interval Note: Patient present for follow up for memory  management and Complaint of tailbone pain. Patient  tolerating Aricept 5 mg QHS and Namenda 5 mg BID with no issues. Doing well, living in assisted living. Able to preform ADLs without assistance. Denies behavorial disturbances or hallucinations. No cognitive decline noted.  Currently taking Vitamin B12 and Vitamin D3 replacement therapy. Complains on tail bone pain started 3 days ago, and worsened with sitting, improved with standing. Patient had similar complaint in April, PCP prescribed Mobic and was very effective and tolerated well. Denies falls, denies injury, no weakness, no swelling,  No numbness or tingling in extremity.     9- EKG , HR 55 Slightly increased from 442 to 456; remains prolonged. Keep Aricept dose at 5 mg QHS. Will recheck in 6 months.    Labs 9-9-2021 Vit B-12 210 NL, Vit D 66 NL        Review of Systems   Constitutional: Negative for appetite change and fatigue.   HENT: Negative for hearing loss and tinnitus.    Eyes: Negative for photophobia and visual disturbance.   Respiratory: Negative for apnea and shortness of breath.    Cardiovascular: Negative for chest pain and palpitations.   Gastrointestinal: Negative for nausea and vomiting.   Genitourinary: Negative for difficulty urinating and urgency.   Musculoskeletal: Positive for arthralgias. Negative for gait problem.        Tailbone pain    Skin: Negative for color change and rash.   Neurological: Positive for tremors. Negative for dizziness, seizures, syncope, facial asymmetry, speech difficulty, weakness, light-headedness, numbness and headaches.   Psychiatric/Behavioral: Positive for confusion. Negative for agitation, behavioral problems, decreased concentration, dysphoric mood and hallucinations. The patient is nervous/anxious.                  Current Outpatient Medications:     amLODIPine (NORVASC) 10 MG tablet, TAKE 1 TABLET BY MOUTH EVERY DAY, Disp: 90 tablet, Rfl: 0    atorvastatin (LIPITOR) 20 MG tablet, TAKE 1  TABLET BY MOUTH EVERY DAY IN THE EVENING, Disp: 90 tablet, Rfl: 0    buPROPion (WELLBUTRIN SR) 150 MG TBSR 12 hr tablet, TAKE 1 TABLET BY MOUTH TWICE A DAY, Disp: 180 tablet, Rfl: 0    cholecalciferol, vitamin D3, 1,250 mcg (50,000 unit) capsule, Take 1 capsule (50,000 Units total) by mouth once a week., Disp: 12 capsule, Rfl: 3    cyanocobalamin 1,000 mcg/mL injection, Inject 1 mL (1,000 mcg total) into the muscle every 28 days., Disp: 3 mL, Rfl: 11    donepeziL (ARICEPT) 5 MG tablet, Take 1 tablet (5 mg total) by mouth every evening., Disp: 90 tablet, Rfl: 3    DULoxetine (CYMBALTA) 30 MG capsule, TAKE 1 CAPSULE (30 MG TOTAL) BY MOUTH 2 (TWO) TIMES DAILY., Disp: 180 capsule, Rfl: 1    memantine (NAMENDA) 5 MG Tab, Take 1 tablet (5 mg total) by mouth 2 (two) times daily., Disp: 180 tablet, Rfl: 3    potassium chloride SA (K-DUR,KLOR-CON) 20 MEQ tablet, Take 1 tablet (20 mEq total) by mouth once daily., Disp: 30 tablet, Rfl: 0    aspirin 81 MG Chew, Take 1 tablet (81 mg total) by mouth once daily. (Patient not taking: Reported on 1/5/2022), Disp: , Rfl: 0    meloxicam (MOBIC) 7.5 MG tablet, Take 1 tablet (7.5 mg total) by mouth once daily. for 14 days, Disp: 14 tablet, Rfl: 0  Past Medical History:   Diagnosis Date    Chronic CHF 10/2/2019    Chronic diastolic HF (heart failure) 4/17/2018    Dilated cardiomyopathy 9/24/2015    Enlarged prostate     Essential hypertension 9/24/2015    Gastroesophageal reflux disease without esophagitis 10/7/2015    Gastroesophageal reflux disease without esophagitis 10/7/2015    Hyperlipidemia     Personal history of colonic polyps 2016    Shortness of breath 9/24/2015     Past Surgical History:   Procedure Laterality Date    COLONOSCOPY N/A 5/25/2016    Procedure: COLONOSCOPY;  Surgeon: Demetrio Spicer MD;  Location: [facility];  Service: Endoscopy;  Laterality: N/A;     Social History     Socioeconomic History    Marital status:    Tobacco Use    Smoking  status: Former Smoker     Quit date: 1995     Years since quittin.3    Smokeless tobacco: Never Used   Substance and Sexual Activity    Alcohol use: No     Alcohol/week: 0.0 standard drinks    Drug use: No    Sexual activity: Not Currently     Partners: Female             Past/Current Medical/Surgical History, Past/Current Social History, Past/Current Family History and Past/Current Medications were reviewed in detail.        Objective:           VITAL SIGNS WERE REVIEWED      GENERAL APPEARANCE:     The patient looks comfortable.    BMI 26.41    No signs of respiratory distress.    Normal breathing pattern.    No dysmorphic features    Normal eye contact.     GENERAL MEDICAL EXAM:    HEENT:  Head is atraumatic normocephalic.      Neck and Axillae: No JVD. No visible lesions.    Cardiopulmonary: No cyanosis. No tachypnea. Normal respiratory effort.    Gastrointestinal/Urogenital:  No jaundice. No stomas or lesions. No visible hernias. No catheters.     Skin, Hair and Nails: No pathognonomic skin rash. No neurofibromatosis. No visible lesions.No stigmata of autoimmune disease. No clubbing.    Limbs: No varicose veins. No visible swelling. No loss of strength.    Muskoskeletal: No visible deformities.No visible lesions.           Neurologic Exam     Mental Status   Oriented to person.   Oriented to place.   Disoriented to time. Disoriented to month and date. Oriented to year, day and season.   Follows 2 step commands.   Attention: normal. Concentration: normal.   Speech: speech is normal   Level of consciousness: alert  Knowledge: poor. Able to perform simple calculations.   Able to name object. Able to repeat. Normal comprehension.     MOCA 17>21>21    Visuospatial/Executive 3/5    Naming                            3/3    Attention                         6/6    Language                         3/3    Abstraction                    2/2    Recall                                0/5    Orientation                      3/6    MILD DEMENTIA 20-25    +1 for education        Cranial Nerves   Cranial nerves II through XII intact.     CN II   Visual fields full to confrontation.   Visual acuity: normal  Right visual field deficit: none  Left visual field deficit: none     CN III, IV, VI   Pupils are equal, round, and reactive to light.  Extraocular motions are normal.   Right pupil: Size: 2 mm. Shape: regular. Reactivity: brisk. Consensual response: intact. Accommodation: intact.   Left pupil: Size: 2 mm. Shape: regular. Reactivity: brisk. Consensual response: intact. Accommodation: intact.   CN III: no CN III palsy  CN VI: no CN VI palsy  Nystagmus: none   Diplopia: none  Ophthalmoparesis: none  Upgaze: normal  Downgaze: normal    CN V   Facial sensation intact.   Right facial sensation deficit: none  Left facial sensation deficit: none    CN VII   Facial expression full, symmetric.   Right facial weakness: none  Left facial weakness: none    CN VIII   CN VIII normal.   Hearing: intact    CN IX, X   CN IX normal.   CN X normal.   Palate: symmetric    CN XI   CN XI normal.   Right sternocleidomastoid strength: normal  Left sternocleidomastoid strength: normal  Right trapezius strength: normal  Left trapezius strength: normal    CN XII   CN XII normal.   Tongue: not atrophic  Fasciculations: absent  Tongue deviation: none    Motor Exam   Muscle bulk: normal  Overall muscle tone: normal  Right arm tone: normal  Left arm tone: normal  Right arm pronator drift: absent  Left arm pronator drift: absent  Right leg tone: normal  Left leg tone: normal    Strength   Strength 5/5 throughout.   Right neck flexion: 5/5  Left neck flexion: 5/5  Right neck extension: 5/5  Left neck extension: 5/5  Right deltoid: 5/5  Left deltoid: 5/5  Right biceps: 5/5  Left biceps: 5/5  Right triceps: 5/5  Left triceps: 5/5  Right wrist flexion: 5/5  Left wrist flexion: 5/5  Right wrist extension: 5/5  Left wrist extension: 5/5  Right interossei:  5/5  Left interossei: 5/5  Right iliopsoas: 5/5  Left iliopsoas: 5/5  Right quadriceps: 5/5  Left quadriceps: 5/5  Right hamstrin/5  Left hamstrin/5  Right glutei: 5/5  Left glutei: 5/5  Right anterior tibial: 5/5  Left anterior tibial: 5/5  Right posterior tibial: 5/5  Left posterior tibial: 5/5  Right peroneal: 5/5  Left peroneal: 5/5  Right gastroc: 5/5  Left gastroc: 5/5    Sensory Exam   Light touch normal.   Right arm light touch: normal  Left arm light touch: normal  Right leg light touch: normal  Left leg light touch: normal  Right arm vibration: normal  Left arm vibration: normal  Right leg vibration: normal  Left leg vibration: decreased from knee  Proprioception normal.   Right arm proprioception: normal  Left arm proprioception: normal  Right leg proprioception: normal  Left leg proprioception: normal  Pinprick normal.   Right arm pinprick: normal  Left arm pinprick: normal  Right leg pinprick: normal  Left leg pinprick: normal  Graphesthesia: normal  Stereognosis: normal    Gait, Coordination, and Reflexes     Gait  Gait: normal    Coordination   Romberg: negative  Finger to nose coordination: normal  Heel to shin coordination: normal  Tandem walking coordination: normal    Tremor   Resting tremor: absent  Intention tremor: present  Action tremor: absent    Reflexes   Right brachioradialis: 2+  Left brachioradialis: 2+  Right biceps: 2+  Left biceps: 2+  Right triceps: 2+  Left triceps: 2+  Right patellar: 2+  Left patellar: 2+  Right achilles: 2+  Left achilles: 2+  Right plantar: normal  Left plantar: normal  Right Lee: absent  Left Lee: absent  Right ankle clonus: absent  Left ankle clonus: absent  Right pendular knee jerk: absent  Left pendular knee jerk: absent      Lab Results   Component Value Date    WBC 6.40 10/28/2020    HGB 11.5 (L) 10/28/2020    HCT 33.7 (L) 10/28/2020    MCV 92 10/28/2020     10/28/2020     Sodium   Date Value Ref Range Status   10/28/2020 138 136 -  145 mmol/L Final     Potassium   Date Value Ref Range Status   10/28/2020 3.2 (L) 3.5 - 5.1 mmol/L Final     Chloride   Date Value Ref Range Status   10/28/2020 103 95 - 110 mmol/L Final     CO2   Date Value Ref Range Status   10/28/2020 24 23 - 29 mmol/L Final     Glucose   Date Value Ref Range Status   10/28/2020 142 (H) 70 - 110 mg/dL Final     BUN   Date Value Ref Range Status   10/28/2020 13 8 - 23 mg/dL Final     Creatinine   Date Value Ref Range Status   10/28/2020 1.0 0.5 - 1.4 mg/dL Final     Calcium   Date Value Ref Range Status   10/28/2020 9.0 8.7 - 10.5 mg/dL Final     Total Protein   Date Value Ref Range Status   10/28/2020 6.9 6.0 - 8.4 g/dL Final     Albumin   Date Value Ref Range Status   10/28/2020 3.9 3.5 - 5.2 g/dL Final     Total Bilirubin   Date Value Ref Range Status   10/28/2020 0.6 0.1 - 1.0 mg/dL Final     Comment:     For infants and newborns, interpretation of results should be based  on gestational age, weight and in agreement with clinical  observations.  Premature Infant recommended reference ranges:  Up to 24 hours.............<8.0 mg/dL  Up to 48 hours............<12.0 mg/dL  3-5 days..................<15.0 mg/dL  6-29 days.................<15.0 mg/dL       Alkaline Phosphatase   Date Value Ref Range Status   10/28/2020 74 55 - 135 U/L Final     AST   Date Value Ref Range Status   10/28/2020 22 10 - 40 U/L Final     ALT   Date Value Ref Range Status   10/28/2020 15 10 - 44 U/L Final     Anion Gap   Date Value Ref Range Status   10/28/2020 11 8 - 16 mmol/L Final     eGFR if    Date Value Ref Range Status   10/28/2020 >60 >60 mL/min/1.73 m^2 Final     eGFR if non    Date Value Ref Range Status   10/28/2020 >60 >60 mL/min/1.73 m^2 Final     Comment:     Calculation used to obtain the estimated glomerular filtration  rate (eGFR) is the CKD-EPI equation.        Lab Results   Component Value Date    OMUHNVSR912 210 09/09/2021     Lab Results   Component Value  Date    TSH 0.972 06/23/2020    FREET4 1.09 11/17/2017     Labs Reviewed.      9-9-2021     Vit B-12 210 NL, Vit D 66 NL    Underwent 4 CTH and 2 MRIs [id number]    10-    CTH Atrophy       10-    CTH Atrophy      [id number]    CTH Atrophy      10-    CTH Atrophy       04-     Brain MRI Atrophy      10-     Brain MRI Atrophy     [id number]    TFT (TSH, T4) NL    B12 Low 227>208    10-    EKG  ms Prolonged       05-    Labs B12-MMA, FA-HC, RPR, Vitamin D    Severe B12 Deficiency <148 and Vitamin D insufficient <25       05-    EKG , HR is 59    Improved from 492 but remains prolonged     Decrease Aricept dose to 5 mg QHS (1/2 the dose) and repeat EKG in 2 weeks    05-    EKG , HR is 59    9-     EKG , HR 55      Slightly increased from 442 to 456; remains prolonged     Keep Aricept dose at 5 mg QHS. Will recheck in 6 months.    Assessment:       1. Dementia without behavioral disturbance, unspecified dementia type    2. Moderate dementia with behavioral disturbance    3. Prolonged Q-T interval on ECG    4. Tail bone pain    5. Mild cognitive impairment with memory loss    6. B12 deficiency    7. Vitamin D deficiency    8. Essential hypertension    9. Hypokalemia    10. Anxiety and depression    11. Memory loss of unknown cause    12. Chronic congestive heart failure, unspecified heart failure type    13. TIA (transient ischemic attack)        Plan:       MODERATE DEMENTIA, AD, BEHAVIORAL DISTURBANCES, EARLY ONSET          EVALUATION     EKG F/U QT       DISEASE-MODIFYING AGENTS     Explained to the patient and family that medications are intended to slow down the progression and not stop it or reverse the disease.The disease is relentless, progressive and so far cannot be controlled.     Continue memantine/Namenda 5 mg BID and repeat EKG    Continue donepezil/Aricept 10 mg QHS .      SYMPTOMATIC MANAGEMENT-BEHAVIORAL SYMPTOMS      Continue Cymbalta and Wellbutrin. .      HOME CARE-ASSISTED LIVING     Falling Down Precautions.     Avoid antihistamines and anticholinergics.    Avoid changing routine.    Use written reminders.    Avoid multitasking.    Healthy diet, exercise (physical and cognitive).    Good sleep hygiene.    CAREGIVERS COUNSELING     For behavioral problems I recommended that family to try some of the following communication tips: Try not to react and stay calm, Don't argue , Do not use logic, Let the patient feel safe, Keep reminding the patient and use photos if necessary, Distract the patient, Search for things to distract the person, then talk about what you found. For example, talk about a photograph or keepsake or even food, Use gentle touching or hugging to show you care, Body language matters, Use a cooling off period if needed, when possible. If safe to do so, give the patient some space or breathing room. Will consider antipsychotic medications as a last resort because of the risk of CAD and CVD.    Recommend reading the following books:     The 36-Hour Day and there are many online and printed resources to help caregivers as well.     Alzheimer's Through the Stages.     Dementia with G.R.A.C.E.    For More Information About Hallucinations, Delusions, and Paranoia in Alzheimer's   [facility] Alzheimer's and related Dementias Education and Referral (ADEAR) Center   1-518.147.4980 (toll-free)   adear@anel.nih.gov   www.anel.nih.gov/alzheimers   The National [employer] on Aging's ADEAR Center offers information and free print publications about Alzheimer's disease and related dementias for families, caregivers, and health professionals. ADEAR Center staff answer telephone, email, and written requests and make referrals to local and national resources      PREVENTION OF DELIRIUM       1. Good hydration and avoid electrolyte imbalance  2. Recognize andtreat infections immediately especially UTI.  3. Bladder emptying and prevent  constipation.   4. Provide stimulating activities and familiar objects  5. Use eyeglasses and hearing aids if needed.   6. Use simple and regular communication about people, current place and time  7. Mobility and range-of-motion exercises  8. Reduce noise, lighting and avoid sleep interruptions  9. Non-narcotic pain management.  10.Nondrug treatment for sleep problems or anxiety  11. Avoid antihistamines.  12. Avoid narcotics.  13. Avoid benzodiazepines.     VITAMIN D DEFICIENCY    Continue cholecalciferol, vit D, 1,259 mcg (50,000 unit) once a week    Recheck vit D level    VITAMIN B12 DEFICIENCY    Continue Cyanocobalamin 1,000 mcg/ml injection every 28 days    Recheck vit B12 level    TAIL BONE PAIN    MOBIC 7.5 MG DAILY FOR 2 WEEKS    DONUT CUSHION FOR CHAIR     Follow up with PCP for further management     MEDICAL/SURGICAL COMORBIDITIES     All relevant medical comorbidities noted and managed by primary care physician and medical care team.        MISCELLANEOUS MEDICAL PROBLEMS       HEALTHY LIFESTYLE AND PREVENTATIVE CARE    The patient to adhere to the age-appropriate health maintenance guidelines including screening tests and vaccinations. The patient to adhere to  healthy lifestyle, optimal weight, exercise, healthy diet, good sleep hygiene and avoiding drugs including smoking, alcohol and recreational drugs.        RTC in 3 months        Cecilia Hernandez, MSN NP      Collaborating Provider: Benjy Cameron MD, FAAN Neurologist/Epileptologist           I spent 30  minutes total E/M  More than 50 % spent face to face with the patient

## 2022-01-06 ENCOUNTER — PES CALL (OUTPATIENT)
Dept: ADMINISTRATIVE | Facility: CLINIC | Age: 62
End: 2022-01-06
Payer: MEDICARE

## 2022-01-11 ENCOUNTER — OFFICE VISIT (OUTPATIENT)
Dept: INTERNAL MEDICINE | Facility: CLINIC | Age: 62
End: 2022-01-11
Payer: MEDICARE

## 2022-01-11 VITALS
BODY MASS INDEX: 28.24 KG/M2 | OXYGEN SATURATION: 97 % | WEIGHT: 186.31 LBS | HEART RATE: 66 BPM | SYSTOLIC BLOOD PRESSURE: 142 MMHG | DIASTOLIC BLOOD PRESSURE: 74 MMHG | HEIGHT: 68 IN

## 2022-01-11 DIAGNOSIS — Z86.010 PERSONAL HISTORY OF COLONIC POLYPS: ICD-10-CM

## 2022-01-11 DIAGNOSIS — I70.0 CALCIFICATION OF AORTA: ICD-10-CM

## 2022-01-11 DIAGNOSIS — H91.90 DECREASED HEARING, UNSPECIFIED LATERALITY: ICD-10-CM

## 2022-01-11 DIAGNOSIS — D64.9 ANEMIA, UNSPECIFIED TYPE: ICD-10-CM

## 2022-01-11 DIAGNOSIS — F32.A ANXIETY AND DEPRESSION: ICD-10-CM

## 2022-01-11 DIAGNOSIS — F03.90 DEMENTIA WITHOUT BEHAVIORAL DISTURBANCE, UNSPECIFIED DEMENTIA TYPE: ICD-10-CM

## 2022-01-11 DIAGNOSIS — I10 ESSENTIAL HYPERTENSION: ICD-10-CM

## 2022-01-11 DIAGNOSIS — F41.9 ANXIETY AND DEPRESSION: ICD-10-CM

## 2022-01-11 DIAGNOSIS — I25.10 CORONARY ARTERY CALCIFICATION: ICD-10-CM

## 2022-01-11 DIAGNOSIS — R91.1 LUNG NODULE: ICD-10-CM

## 2022-01-11 DIAGNOSIS — Z00.00 ENCOUNTER FOR PREVENTIVE HEALTH EXAMINATION: Primary | ICD-10-CM

## 2022-01-11 DIAGNOSIS — I50.9 CHRONIC CONGESTIVE HEART FAILURE, UNSPECIFIED HEART FAILURE TYPE: ICD-10-CM

## 2022-01-11 DIAGNOSIS — I25.84 CORONARY ARTERY CALCIFICATION: ICD-10-CM

## 2022-01-11 DIAGNOSIS — Z86.73 HISTORY OF TIA (TRANSIENT ISCHEMIC ATTACK): ICD-10-CM

## 2022-01-11 PROCEDURE — 99499 UNLISTED E&M SERVICE: CPT | Mod: S$GLB,,, | Performed by: NURSE PRACTITIONER

## 2022-01-11 PROCEDURE — 99999 PR PBB SHADOW E&M-EST. PATIENT-LVL IV: CPT | Mod: PBBFAC,,, | Performed by: NURSE PRACTITIONER

## 2022-01-11 PROCEDURE — G0439 PR MEDICARE ANNUAL WELLNESS SUBSEQUENT VISIT: ICD-10-PCS | Mod: S$GLB,,, | Performed by: NURSE PRACTITIONER

## 2022-01-11 PROCEDURE — 3078F PR MOST RECENT DIASTOLIC BLOOD PRESSURE < 80 MM HG: ICD-10-PCS | Mod: CPTII,S$GLB,, | Performed by: NURSE PRACTITIONER

## 2022-01-11 PROCEDURE — 3008F PR BODY MASS INDEX (BMI) DOCUMENTED: ICD-10-PCS | Mod: CPTII,S$GLB,, | Performed by: NURSE PRACTITIONER

## 2022-01-11 PROCEDURE — 99999 PR PBB SHADOW E&M-EST. PATIENT-LVL IV: ICD-10-PCS | Mod: PBBFAC,,, | Performed by: NURSE PRACTITIONER

## 2022-01-11 PROCEDURE — 99499 RISK ADDL DX/OHS AUDIT: ICD-10-PCS | Mod: S$GLB,,, | Performed by: NURSE PRACTITIONER

## 2022-01-11 PROCEDURE — 1159F PR MEDICATION LIST DOCUMENTED IN MEDICAL RECORD: ICD-10-PCS | Mod: CPTII,S$GLB,, | Performed by: NURSE PRACTITIONER

## 2022-01-11 PROCEDURE — 1159F MED LIST DOCD IN RCRD: CPT | Mod: CPTII,S$GLB,, | Performed by: NURSE PRACTITIONER

## 2022-01-11 PROCEDURE — 3077F SYST BP >= 140 MM HG: CPT | Mod: CPTII,S$GLB,, | Performed by: NURSE PRACTITIONER

## 2022-01-11 PROCEDURE — 3078F DIAST BP <80 MM HG: CPT | Mod: CPTII,S$GLB,, | Performed by: NURSE PRACTITIONER

## 2022-01-11 PROCEDURE — 1160F PR REVIEW ALL MEDS BY PRESCRIBER/CLIN PHARMACIST DOCUMENTED: ICD-10-PCS | Mod: CPTII,S$GLB,, | Performed by: NURSE PRACTITIONER

## 2022-01-11 PROCEDURE — 1160F RVW MEDS BY RX/DR IN RCRD: CPT | Mod: CPTII,S$GLB,, | Performed by: NURSE PRACTITIONER

## 2022-01-11 PROCEDURE — 3077F PR MOST RECENT SYSTOLIC BLOOD PRESSURE >= 140 MM HG: ICD-10-PCS | Mod: CPTII,S$GLB,, | Performed by: NURSE PRACTITIONER

## 2022-01-11 PROCEDURE — G0439 PPPS, SUBSEQ VISIT: HCPCS | Mod: S$GLB,,, | Performed by: NURSE PRACTITIONER

## 2022-01-11 PROCEDURE — 3008F BODY MASS INDEX DOCD: CPT | Mod: CPTII,S$GLB,, | Performed by: NURSE PRACTITIONER

## 2022-01-11 NOTE — PATIENT INSTRUCTIONS
Counseling and Referral of Other Preventative  (Italic type indicates deductible and co-insurance are waived)    Patient Name: Vishnu Dougherty  Today's Date: 1/11/2022    Health Maintenance       Date Due Completion Date    Shingles Vaccine (1 of 2) Never done ---    Colorectal Cancer Screening 05/25/2021 5/25/2016    COVID-19 Vaccine (3 - Booster for Moderna series) 08/17/2021 2/17/2021    High Dose Statin 01/11/2023 1/11/2022    Lipid Panel 08/21/2025 8/21/2020    TETANUS VACCINE 04/10/2027 4/10/2017        Orders Placed This Encounter   Procedures    Ambulatory referral/consult to Audiology     The following information is provided to all patients.  This information is to help you find resources for any of the problems found today that may be affecting your health:                Living healthy guide: www.[facility].[place].gov      Understanding Diabetes: www.diabetes.org      Eating healthy: www.cdc.gov/healthyweight      CDC home safety checklist: www.cdc.gov/steadi/patient.html      Agency on Aging: www.goea.[place].[facility]      Alcoholics anonymous (AA): www.aa.org      Physical Activity: www.anel.nih.gov/kh7qoxl      Tobacco use: www.quitwithusla.org

## 2022-01-11 NOTE — PROGRESS NOTES
"  Vishnu Dougherty presented for a  Medicare AWV and comprehensive Health Risk Assessment today. The following components were reviewed and updated:    · Medical history  · Family History  · Social history  · Allergies and Current Medications  · Health Risk Assessment  · Health Maintenance  · Care Team         ** See Completed Assessments for Annual Wellness Visit within the encounter summary.**         The following assessments were completed:  · Living Situation  · CAGE  · Depression Screening  · Timed Get Up and Go  · Whisper Test  · Cognitive Function Screening  · Nutrition Screening  · ADL Screening  · PAQ Screening        Vitals:    01/11/22 1433   BP: (!) 142/74   BP Location: Left arm   Patient Position: Sitting   Pulse: 66   SpO2: 97%   Weight: 84.5 kg (186 lb 4.6 oz)   Height: 5' 8" (1.727 m)     Body mass index is 28.33 kg/m².  Physical Exam  Vitals and nursing note reviewed.   Constitutional:       Appearance: He is well-developed.      Comments: Patient accompanied by sister, who was present throughout the visit and aided in information gathering.      HENT:      Head: Normocephalic.   Cardiovascular:      Rate and Rhythm: Normal rate and regular rhythm.      Heart sounds: Normal heart sounds.   Pulmonary:      Effort: Pulmonary effort is normal. No respiratory distress.      Breath sounds: Normal breath sounds.   Abdominal:      Palpations: Abdomen is soft. There is no mass.      Tenderness: There is no abdominal tenderness.   Musculoskeletal:         General: Normal range of motion.   Skin:     General: Skin is warm and dry.   Neurological:      Mental Status: He is alert.      Motor: No abnormal muscle tone.      Comments: Oriented to person and place. Unable to give correct year   Psychiatric:         Speech: Speech normal.         Behavior: Behavior normal.               Diagnoses and health risks identified today and associated recommendations/orders:    1. Encounter for preventive health " examination  He will think about COVID booster, education given.    Reports cardiac conditions are stable  Discussed s/s of MI and stroke (patient denies any s/s) and advised to go to the ER/911 if occur. They expressed understanding.   Discussed s/s of heart failure (patient denies any s/s) and advised to follow up with PCP/ER (if severe) if occur. They expressed understanding.      MA to schedule  Cardiology  Sister to schedule optometry     They will discuss PSA with PCP  Discussed receiving Shingrix at pharmacy.      2. Essential hypertension  BP elevated at today's visit.  Advised patient to monitor BP (keep a log) and if continues to stay elevated (greater than 140/90) to follow up with PCP for further evaluation and treatment. They expressed understanding.     3. Anxiety and depression  PHQ 2-0  Stable and controlled. Continue current treatment plan as previously prescribed with your neurologist.     4. Chronic congestive heart failure, unspecified heart failure type  Stable. Continue current treatment plan as previously prescribed with your  Cardiologist.     5. Dementia without behavioral disturbance, unspecified dementia type  Abnormal cognitive function screening.    Continue current treatment plan as previously prescribed with your  Neurologist.     6. History of TIA (transient ischemic attack)  Continue current treatment plan as previously prescribed with your  Providers.     7. Anemia, unspecified type  Advised to follow up with PCP for further evaluation and recommendations. They expressed understanding.      8. Lung nodule  Ct 11/19  Advised to follow up with PCP for further evaluation and recommendations. They expressed understanding.      9. Coronary artery calcification  Ct 11/19  Discussed diagnosis and risk reduction.   Advised to follow up with PCP/cardiologist for further recommendations. They expressed understanding.       10. Calcification of aorta  See #9    11. Decreased hearing,  unspecified laterality  Abnormal whisper test.   Advised to follow up with PCP for further evaluation and recommendations. Patient expressed understanding.     - Ambulatory referral/consult to Audiology; Future    12. Personal history of colonic polyps  Sister declines colonoscopy (reports has POA)  They will discuss further with PCP      Provided Vishnu with a 5-10 year written screening schedule and personal prevention plan. Recommendations were developed using the USPSTF age appropriate recommendations. Education, counseling, and referrals were provided as needed. After Visit Summary printed and given to patient which includes a list of additional screenings\tests needed.    Follow up in about 1 year (around 1/11/2023) for awv.    Karie Kirkpatrick NP  I offered to discuss advanced care planning, including how to pick a person who would make decisions for you if you were unable to make them for yourself, called a health care power of , and what kind of decisions you might make such as use of life sustaining treatments such as ventilators and tube feeding when faced with a life limiting illness recorded on a living will that they will need to know. (How you want to be cared for as you near the end of your natural life)     X  Patient has advanced directives written and agrees to provide copies to the institution.

## 2022-03-06 DIAGNOSIS — F41.9 ANXIETY: ICD-10-CM

## 2022-03-06 NOTE — TELEPHONE ENCOUNTER
No new care gaps identified.  Powered by [employer]. Reference number: [id number].   3/06/2022 9:31:46 AM CST

## 2022-03-07 RX ORDER — DULOXETIN HYDROCHLORIDE 30 MG/1
CAPSULE, DELAYED RELEASE ORAL
Qty: 60 CAPSULE | Refills: 0 | Status: SHIPPED | OUTPATIENT
Start: 2022-03-07 | End: 2022-04-04 | Stop reason: DRUGHIGH

## 2022-03-07 RX ORDER — BUPROPION HYDROCHLORIDE 150 MG/1
TABLET, EXTENDED RELEASE ORAL
Qty: 60 TABLET | Refills: 0 | Status: SHIPPED | OUTPATIENT
Start: 2022-03-07 | End: 2022-04-04 | Stop reason: SDUPTHER

## 2022-04-03 DIAGNOSIS — F41.9 ANXIETY: ICD-10-CM

## 2022-04-03 NOTE — TELEPHONE ENCOUNTER
No new care gaps identified.  Powered by [employer]. Reference number: [id number].   4/03/2022 8:31:06 AM CDT

## 2022-04-04 ENCOUNTER — OFFICE VISIT (OUTPATIENT)
Dept: CARDIOLOGY | Facility: CLINIC | Age: 62
End: 2022-04-04
Payer: MEDICARE

## 2022-04-04 ENCOUNTER — OFFICE VISIT (OUTPATIENT)
Dept: INTERNAL MEDICINE | Facility: CLINIC | Age: 62
End: 2022-04-04
Payer: MEDICARE

## 2022-04-04 ENCOUNTER — LAB VISIT (OUTPATIENT)
Dept: LAB | Facility: HOSPITAL | Age: 62
End: 2022-04-04
Attending: FAMILY MEDICINE
Payer: MEDICARE

## 2022-04-04 VITALS
HEIGHT: 68 IN | OXYGEN SATURATION: 97 % | SYSTOLIC BLOOD PRESSURE: 138 MMHG | WEIGHT: 181.88 LBS | TEMPERATURE: 98 F | BODY MASS INDEX: 27.57 KG/M2 | DIASTOLIC BLOOD PRESSURE: 70 MMHG | HEART RATE: 63 BPM

## 2022-04-04 VITALS
OXYGEN SATURATION: 98 % | SYSTOLIC BLOOD PRESSURE: 140 MMHG | HEART RATE: 60 BPM | WEIGHT: 182.56 LBS | BODY MASS INDEX: 27.76 KG/M2 | DIASTOLIC BLOOD PRESSURE: 72 MMHG

## 2022-04-04 DIAGNOSIS — I50.32 CHRONIC DIASTOLIC CONGESTIVE HEART FAILURE: ICD-10-CM

## 2022-04-04 DIAGNOSIS — F03.B18 MODERATE DEMENTIA WITH BEHAVIORAL DISTURBANCE: ICD-10-CM

## 2022-04-04 DIAGNOSIS — E87.6 HYPOKALEMIA: ICD-10-CM

## 2022-04-04 DIAGNOSIS — G45.9 TIA (TRANSIENT ISCHEMIC ATTACK): ICD-10-CM

## 2022-04-04 DIAGNOSIS — Z79.899 ENCOUNTER FOR LONG-TERM (CURRENT) USE OF MEDICATIONS: ICD-10-CM

## 2022-04-04 DIAGNOSIS — I10 HYPERTENSION, UNSPECIFIED TYPE: Primary | ICD-10-CM

## 2022-04-04 DIAGNOSIS — Z00.00 ANNUAL PHYSICAL EXAM: ICD-10-CM

## 2022-04-04 DIAGNOSIS — Z76.89 ENCOUNTER TO ESTABLISH CARE: ICD-10-CM

## 2022-04-04 DIAGNOSIS — I70.0 CALCIFICATION OF AORTA: ICD-10-CM

## 2022-04-04 DIAGNOSIS — Z79.899 ENCOUNTER FOR LONG-TERM (CURRENT) USE OF MEDICATIONS: Primary | ICD-10-CM

## 2022-04-04 DIAGNOSIS — I10 ESSENTIAL HYPERTENSION: ICD-10-CM

## 2022-04-04 LAB
ALBUMIN SERPL BCP-MCNC: 4 G/DL (ref 3.5–5.2)
ALP SERPL-CCNC: 90 U/L (ref 55–135)
ALT SERPL W/O P-5'-P-CCNC: 12 U/L (ref 10–44)
ANION GAP SERPL CALC-SCNC: 6 MMOL/L (ref 8–16)
AST SERPL-CCNC: 15 U/L (ref 10–40)
BASOPHILS # BLD AUTO: 0.07 K/UL (ref 0–0.2)
BASOPHILS NFR BLD: 1.1 % (ref 0–1.9)
BILIRUB SERPL-MCNC: 0.3 MG/DL (ref 0.1–1)
BUN SERPL-MCNC: 16 MG/DL (ref 8–23)
CALCIUM SERPL-MCNC: 9.3 MG/DL (ref 8.7–10.5)
CHLORIDE SERPL-SCNC: 102 MMOL/L (ref 95–110)
CO2 SERPL-SCNC: 29 MMOL/L (ref 23–29)
CREAT SERPL-MCNC: 0.9 MG/DL (ref 0.5–1.4)
DIFFERENTIAL METHOD: ABNORMAL
EOSINOPHIL # BLD AUTO: 0.2 K/UL (ref 0–0.5)
EOSINOPHIL NFR BLD: 3.3 % (ref 0–8)
ERYTHROCYTE [DISTWIDTH] IN BLOOD BY AUTOMATED COUNT: 13.1 % (ref 11.5–14.5)
EST. GFR  (AFRICAN AMERICAN): >60 ML/MIN/1.73 M^2
EST. GFR  (NON AFRICAN AMERICAN): >60 ML/MIN/1.73 M^2
GLUCOSE SERPL-MCNC: 101 MG/DL (ref 70–110)
HCT VFR BLD AUTO: 38.3 % (ref 40–54)
HGB BLD-MCNC: 12.5 G/DL (ref 14–18)
IMM GRANULOCYTES # BLD AUTO: 0.02 K/UL (ref 0–0.04)
IMM GRANULOCYTES NFR BLD AUTO: 0.3 % (ref 0–0.5)
LYMPHOCYTES # BLD AUTO: 2 K/UL (ref 1–4.8)
LYMPHOCYTES NFR BLD: 30.9 % (ref 18–48)
MCH RBC QN AUTO: 30.2 PG (ref 27–31)
MCHC RBC AUTO-ENTMCNC: 32.6 G/DL (ref 32–36)
MCV RBC AUTO: 93 FL (ref 82–98)
MONOCYTES # BLD AUTO: 0.5 K/UL (ref 0.3–1)
MONOCYTES NFR BLD: 8 % (ref 4–15)
NEUTROPHILS # BLD AUTO: 3.6 K/UL (ref 1.8–7.7)
NEUTROPHILS NFR BLD: 56.4 % (ref 38–73)
NRBC BLD-RTO: 0 /100 WBC
PLATELET # BLD AUTO: 243 K/UL (ref 150–450)
PMV BLD AUTO: 11.4 FL (ref 9.2–12.9)
POTASSIUM SERPL-SCNC: 3.6 MMOL/L (ref 3.5–5.1)
PROT SERPL-MCNC: 7.1 G/DL (ref 6–8.4)
RBC # BLD AUTO: 4.14 M/UL (ref 4.6–6.2)
SODIUM SERPL-SCNC: 137 MMOL/L (ref 136–145)
TSH SERPL DL<=0.005 MIU/L-ACNC: 1.6 UIU/ML (ref 0.4–4)
WBC # BLD AUTO: 6.35 K/UL (ref 3.9–12.7)

## 2022-04-04 PROCEDURE — 1159F PR MEDICATION LIST DOCUMENTED IN MEDICAL RECORD: ICD-10-PCS | Mod: CPTII,S$GLB,, | Performed by: FAMILY MEDICINE

## 2022-04-04 PROCEDURE — 3078F PR MOST RECENT DIASTOLIC BLOOD PRESSURE < 80 MM HG: ICD-10-PCS | Mod: CPTII,S$GLB,, | Performed by: STUDENT IN AN ORGANIZED HEALTH CARE EDUCATION/TRAINING PROGRAM

## 2022-04-04 PROCEDURE — 99396 PR PREVENTIVE VISIT,EST,40-64: ICD-10-PCS | Mod: S$GLB,,, | Performed by: FAMILY MEDICINE

## 2022-04-04 PROCEDURE — 99999 PR PBB SHADOW E&M-EST. PATIENT-LVL III: CPT | Mod: PBBFAC,,, | Performed by: STUDENT IN AN ORGANIZED HEALTH CARE EDUCATION/TRAINING PROGRAM

## 2022-04-04 PROCEDURE — 36415 COLL VENOUS BLD VENIPUNCTURE: CPT | Performed by: FAMILY MEDICINE

## 2022-04-04 PROCEDURE — 3078F PR MOST RECENT DIASTOLIC BLOOD PRESSURE < 80 MM HG: ICD-10-PCS | Mod: CPTII,S$GLB,, | Performed by: FAMILY MEDICINE

## 2022-04-04 PROCEDURE — 4010F PR ACE/ARB THEARPY RXD/TAKEN: ICD-10-PCS | Mod: CPTII,S$GLB,, | Performed by: STUDENT IN AN ORGANIZED HEALTH CARE EDUCATION/TRAINING PROGRAM

## 2022-04-04 PROCEDURE — 99999 PR PBB SHADOW E&M-EST. PATIENT-LVL III: CPT | Mod: PBBFAC,,, | Performed by: FAMILY MEDICINE

## 2022-04-04 PROCEDURE — 3078F DIAST BP <80 MM HG: CPT | Mod: CPTII,S$GLB,, | Performed by: STUDENT IN AN ORGANIZED HEALTH CARE EDUCATION/TRAINING PROGRAM

## 2022-04-04 PROCEDURE — 3075F PR MOST RECENT SYSTOLIC BLOOD PRESS GE 130-139MM HG: ICD-10-PCS | Mod: CPTII,S$GLB,, | Performed by: FAMILY MEDICINE

## 2022-04-04 PROCEDURE — 3044F HG A1C LEVEL LT 7.0%: CPT | Mod: CPTII,S$GLB,, | Performed by: FAMILY MEDICINE

## 2022-04-04 PROCEDURE — 1159F MED LIST DOCD IN RCRD: CPT | Mod: CPTII,S$GLB,, | Performed by: FAMILY MEDICINE

## 2022-04-04 PROCEDURE — 99204 OFFICE O/P NEW MOD 45 MIN: CPT | Mod: S$GLB,,, | Performed by: STUDENT IN AN ORGANIZED HEALTH CARE EDUCATION/TRAINING PROGRAM

## 2022-04-04 PROCEDURE — 3008F BODY MASS INDEX DOCD: CPT | Mod: CPTII,S$GLB,, | Performed by: FAMILY MEDICINE

## 2022-04-04 PROCEDURE — 3044F PR MOST RECENT HEMOGLOBIN A1C LEVEL <7.0%: ICD-10-PCS | Mod: CPTII,S$GLB,, | Performed by: FAMILY MEDICINE

## 2022-04-04 PROCEDURE — 3008F BODY MASS INDEX DOCD: CPT | Mod: CPTII,S$GLB,, | Performed by: STUDENT IN AN ORGANIZED HEALTH CARE EDUCATION/TRAINING PROGRAM

## 2022-04-04 PROCEDURE — 3008F PR BODY MASS INDEX (BMI) DOCUMENTED: ICD-10-PCS | Mod: CPTII,S$GLB,, | Performed by: FAMILY MEDICINE

## 2022-04-04 PROCEDURE — 3078F DIAST BP <80 MM HG: CPT | Mod: CPTII,S$GLB,, | Performed by: FAMILY MEDICINE

## 2022-04-04 PROCEDURE — 84443 ASSAY THYROID STIM HORMONE: CPT | Performed by: FAMILY MEDICINE

## 2022-04-04 PROCEDURE — 3077F SYST BP >= 140 MM HG: CPT | Mod: CPTII,S$GLB,, | Performed by: STUDENT IN AN ORGANIZED HEALTH CARE EDUCATION/TRAINING PROGRAM

## 2022-04-04 PROCEDURE — 3008F PR BODY MASS INDEX (BMI) DOCUMENTED: ICD-10-PCS | Mod: CPTII,S$GLB,, | Performed by: STUDENT IN AN ORGANIZED HEALTH CARE EDUCATION/TRAINING PROGRAM

## 2022-04-04 PROCEDURE — 4010F ACE/ARB THERAPY RXD/TAKEN: CPT | Mod: CPTII,S$GLB,, | Performed by: STUDENT IN AN ORGANIZED HEALTH CARE EDUCATION/TRAINING PROGRAM

## 2022-04-04 PROCEDURE — 99999 PR PBB SHADOW E&M-EST. PATIENT-LVL III: ICD-10-PCS | Mod: PBBFAC,,, | Performed by: FAMILY MEDICINE

## 2022-04-04 PROCEDURE — 4010F ACE/ARB THERAPY RXD/TAKEN: CPT | Mod: CPTII,S$GLB,, | Performed by: FAMILY MEDICINE

## 2022-04-04 PROCEDURE — 80053 COMPREHEN METABOLIC PANEL: CPT | Performed by: FAMILY MEDICINE

## 2022-04-04 PROCEDURE — 3077F PR MOST RECENT SYSTOLIC BLOOD PRESSURE >= 140 MM HG: ICD-10-PCS | Mod: CPTII,S$GLB,, | Performed by: STUDENT IN AN ORGANIZED HEALTH CARE EDUCATION/TRAINING PROGRAM

## 2022-04-04 PROCEDURE — 85025 COMPLETE CBC W/AUTO DIFF WBC: CPT | Performed by: FAMILY MEDICINE

## 2022-04-04 PROCEDURE — 3075F SYST BP GE 130 - 139MM HG: CPT | Mod: CPTII,S$GLB,, | Performed by: FAMILY MEDICINE

## 2022-04-04 PROCEDURE — 99999 PR PBB SHADOW E&M-EST. PATIENT-LVL III: ICD-10-PCS | Mod: PBBFAC,,, | Performed by: STUDENT IN AN ORGANIZED HEALTH CARE EDUCATION/TRAINING PROGRAM

## 2022-04-04 PROCEDURE — 99396 PREV VISIT EST AGE 40-64: CPT | Mod: S$GLB,,, | Performed by: FAMILY MEDICINE

## 2022-04-04 PROCEDURE — 83036 HEMOGLOBIN GLYCOSYLATED A1C: CPT | Performed by: FAMILY MEDICINE

## 2022-04-04 PROCEDURE — 99204 PR OFFICE/OUTPT VISIT, NEW, LEVL IV, 45-59 MIN: ICD-10-PCS | Mod: S$GLB,,, | Performed by: STUDENT IN AN ORGANIZED HEALTH CARE EDUCATION/TRAINING PROGRAM

## 2022-04-04 PROCEDURE — 4010F PR ACE/ARB THEARPY RXD/TAKEN: ICD-10-PCS | Mod: CPTII,S$GLB,, | Performed by: FAMILY MEDICINE

## 2022-04-04 RX ORDER — AMLODIPINE AND BENAZEPRIL HYDROCHLORIDE 10; 20 MG/1; MG/1
1 CAPSULE ORAL DAILY
Qty: 90 CAPSULE | Refills: 3 | Status: SHIPPED | OUTPATIENT
Start: 2022-04-04 | End: 2022-10-25

## 2022-04-04 RX ORDER — AMLODIPINE BESYLATE 10 MG/1
10 TABLET ORAL DAILY
Qty: 90 TABLET | Refills: 3 | Status: SHIPPED | OUTPATIENT
Start: 2022-04-04 | End: 2022-04-04

## 2022-04-04 RX ORDER — ATORVASTATIN CALCIUM 20 MG/1
20 TABLET, FILM COATED ORAL NIGHTLY
Qty: 90 TABLET | Refills: 3 | Status: SHIPPED | OUTPATIENT
Start: 2022-04-04

## 2022-04-04 RX ORDER — BUPROPION HYDROCHLORIDE 150 MG/1
150 TABLET, EXTENDED RELEASE ORAL 2 TIMES DAILY
Qty: 180 TABLET | Refills: 3 | Status: SHIPPED | OUTPATIENT
Start: 2022-04-04 | End: 2022-09-29

## 2022-04-04 RX ORDER — DULOXETIN HYDROCHLORIDE 60 MG/1
60 CAPSULE, DELAYED RELEASE ORAL DAILY
Qty: 90 CAPSULE | Refills: 3 | Status: ON HOLD | OUTPATIENT
Start: 2022-04-04 | End: 2022-10-06 | Stop reason: HOSPADM

## 2022-04-04 NOTE — PROGRESS NOTES
Subjective:       Patient ID: Vishnu Dougherty Jr. is a 62 y.o. male.    Chief Complaint: Annual Exam    HPI    Patient Active Problem List   Diagnosis    Essential hypertension    Memory loss of unknown cause    Anxiety and depression    Mild cognitive impairment with memory loss    Hypokalemia    TIA (transient ischemic attack)    Chronic congestive heart failure    Contact dermatitis due to poison ivy    Dementia    Prolonged Q-T interval on ECG    B12 deficiency    Other disorders of calcium metabolism     Moderate dementia with behavioral disturbance    Vitamin D deficiency    Lung nodule    Anemia    Coronary artery calcification    Calcification of aorta       Past Medical History:   Diagnosis Date    Chronic CHF 10/2/2019    Chronic diastolic HF (heart failure) 2018    Coronary artery calcification 2022    Dilated cardiomyopathy 2015    Enlarged prostate     Essential hypertension 2015    Gastroesophageal reflux disease without esophagitis 10/7/2015    Gastroesophageal reflux disease without esophagitis 10/7/2015    Hyperlipidemia     Personal history of colonic polyps     Shortness of breath 2015       Past Surgical History:   Procedure Laterality Date    COLONOSCOPY N/A 2016    Procedure: COLONOSCOPY;  Surgeon: Demetrio Spicer MD;  [facility];  Service: Endoscopy;  Laterality: N/A;       Family History   Problem Relation Age of Onset    Hypertension Mother     Hypertension Father     Stroke Father        Social History     Tobacco Use   Smoking Status Former Smoker    Quit date: 1995    Years since quittin.5   Smokeless Tobacco Never Used       Wt Readings from Last 5 Encounters:   22 82.5 kg (181 lb 14.1 oz)   22 84.5 kg (186 lb 4.6 oz)   22 82 kg (180 lb 12.4 oz)   21 78.8 kg (173 lb 11.6 oz)   21 80.3 kg (177 lb 0.5 oz)       For further HPI details, see assessment and plan.    Review of  Systems    Objective:      Vitals:    04/04/22 1459   BP: 138/70   Pulse: 63   Temp: 98 °F (36.7 °C)       Physical Exam  Constitutional:       General: He is not in acute distress.     Appearance: Normal appearance. He is well-developed.   Neck:      Trachea: Trachea normal.   Cardiovascular:      Rate and Rhythm: Normal rate and regular rhythm.      Heart sounds: Normal heart sounds.   Pulmonary:      Effort: Pulmonary effort is normal. No respiratory distress.      Breath sounds: Normal breath sounds. No decreased breath sounds.   Abdominal:      Palpations: Abdomen is soft.   Musculoskeletal:      Cervical back: Full passive range of motion without pain.   Neurological:      Mental Status: He is alert and oriented to person, place, and time.   Psychiatric:         Speech: Speech normal.         Behavior: Behavior normal.         Thought Content: Thought content normal.         Assessment:       1. Encounter for long-term (current) use of medications    2. Annual physical exam        Plan:   Encounter for long-term (current) use of medications  -     CBC Auto Differential; Future; Expected date: 04/04/2022  -     Comprehensive Metabolic Panel; Future; Expected date: 04/04/2022  -     Hemoglobin A1C; Future; Expected date: 04/04/2022  -     TSH; Future; Expected date: 04/04/2022  -     Cancel: Vitamin B12; Future; Expected date: 04/04/2022    Annual physical exam    Other orders  -     DULoxetine (CYMBALTA) 60 MG capsule; Take 1 capsule (60 mg total) by mouth once daily.  Dispense: 90 capsule; Refill: 3  -     buPROPion (WELLBUTRIN SR) 150 MG TBSR 12 hr tablet; Take 1 tablet (150 mg total) by mouth 2 (two) times daily.  Dispense: 180 tablet; Refill: 3  -     atorvastatin (LIPITOR) 20 MG tablet; Take 1 tablet (20 mg total) by mouth every evening.  Dispense: 90 tablet; Refill: 3  -     amLODIPine (NORVASC) 10 MG tablet; Take 1 tablet (10 mg total) by mouth once daily.  Dispense: 90 tablet; Refill: 3    sister - POA  - declines colon ca screening    This note was verbally dictated, please excuse any type errors.

## 2022-04-04 NOTE — PROGRESS NOTES
Section of Cardiology                  Cardiac Clinic Note    Chief Complaint/Reason for consultation:  Reestablish care      HPI:   Vishnu Dougherty Jr. is a 62 y.o. male with h/o dementia, heart failure preserved EF, hypertension, HLD, GERD who comes into Cardiology Clinic to reestablish care.    4/4/22  Saw Dr. Bañuelos in 2018  Lives in a assisted living facility   Active at home with not limitations   Comes in with sister  Denies orthopnea,PND, syncope, LE swelling, chest pain.   Labs are pending     Stopped smoking many years ago. Denies ETOH abuse.   Denies DM.       EKG 1/5/22 SB, no acute ST - T wave changes, QTc 426 ms     ECHO  2019  CONCLUSIONS     1 - Concentric hypertrophy.     2 - No wall motion abnormalities.     3 - Normal left ventricular systolic function (EF 60-65%).     4 - Normal left ventricular diastolic function.     5 - Normal right ventricular systolic function .     6 - The estimated PA systolic pressure is 28 mmHg.     STRESS TEST 2018  Negative for ischemia        [facility]      ROS: All 10 systems reviewed. Please refer to the HPI for pertinent positives. All other systems negative.     Past Medical History  Past Medical History:   Diagnosis Date    Chronic CHF 10/2/2019    Chronic diastolic HF (heart failure) 4/17/2018    Coronary artery calcification 1/11/2022    Dilated cardiomyopathy 9/24/2015    Enlarged prostate     Essential hypertension 9/24/2015    Gastroesophageal reflux disease without esophagitis 10/7/2015    Gastroesophageal reflux disease without esophagitis 10/7/2015    Hyperlipidemia     Personal history of colonic polyps 2016    Shortness of breath 9/24/2015       Surgical History  Past Surgical History:   Procedure Laterality Date    COLONOSCOPY N/A 5/25/2016    Procedure: COLONOSCOPY;  Surgeon: Demetrio Spicer MD;  Location[facility];  Service: Endoscopy;  Laterality: N/A;          Allergies:   Review of patient's allergies indicates:  No Known  Allergies    Social History:  Social History     Socioeconomic History    Marital status:     Number of children: 3   Occupational History    Occupation: disability   Tobacco Use    Smoking status: Former Smoker     Quit date: 1995     Years since quittin.5    Smokeless tobacco: Never Used   Substance and Sexual Activity    Alcohol use: No     Alcohol/week: 0.0 standard drinks    Drug use: No    Sexual activity: Not Currently     Partners: Female     Social Determinants of Health     Financial Resource Strain: Low Risk     Difficulty of Paying Living Expenses: Not hard at all   Food Insecurity: No Food Insecurity    Worried About Running Out of Food in the Last Year: Never true    Ran Out of Food in the Last Year: Never true   Transportation Needs: No Transportation Needs    Lack of Transportation (Medical): No    Lack of Transportation (Non-Medical): No   Physical Activity: Insufficiently Active    Days of Exercise per Week: 3 days    Minutes of Exercise per Session: 10 min   Stress: No Stress Concern Present    Feeling of Stress : Not at all   Social Connections: Socially Isolated    Frequency of Communication with Friends and Family: Once a week    Frequency of Social Gatherings with Friends and Family: Once a week    Attends Pentec[employer]al Services: Never    Active Member of Clubs or Organizations: No    Attends Club or Organization Meetings: Never    Marital Status:    Housing Stability: Low Risk     Unable to Pay for Housing in the Last Year: No    Number of Places Lived in the Last Year: 1    Unstable Housing in the Last Year: No       Family History:  family history includes Hypertension in his father and mother; Stroke in his father.    Home Medications:  Current Outpatient Medications on File Prior to Visit   Medication Sig Dispense Refill    aspirin 81 MG Chew Take 1 tablet (81 mg total) by mouth once daily.  0    atorvastatin (LIPITOR) 20 MG tablet Take 1  tablet (20 mg total) by mouth every evening. 90 tablet 3    buPROPion (WELLBUTRIN SR) 150 MG TBSR 12 hr tablet Take 1 tablet (150 mg total) by mouth 2 (two) times daily. 180 tablet 3    cholecalciferol, vitamin D3, 1,250 mcg (50,000 unit) capsule TAKE 1 CAPSULE BY MOUTH ONE TIME PER WEEK 12 capsule 3    donepeziL (ARICEPT) 5 MG tablet Take 1 tablet (5 mg total) by mouth every evening. 90 tablet 3    DULoxetine (CYMBALTA) 60 MG capsule Take 1 capsule (60 mg total) by mouth once daily. 90 capsule 3    memantine (NAMENDA) 5 MG Tab Take 1 tablet (5 mg total) by mouth 2 (two) times daily. 180 tablet 3    potassium chloride SA (K-DUR,KLOR-CON) 20 MEQ tablet Take 1 tablet (20 mEq total) by mouth once daily. 30 tablet 0    [DISCONTINUED] amLODIPine (NORVASC) 10 MG tablet Take 1 tablet (10 mg total) by mouth once daily. 90 tablet 3    cyanocobalamin 1,000 mcg/mL injection Inject 1 mL (1,000 mcg total) into the muscle every 28 days. 3 mL 11    [DISCONTINUED] amLODIPine (NORVASC) 10 MG tablet TAKE 1 TABLET BY MOUTH EVERY DAY 90 tablet 0    [DISCONTINUED] atorvastatin (LIPITOR) 20 MG tablet TAKE 1 TABLET BY MOUTH EVERY DAY IN THE EVENING 90 tablet 0    [DISCONTINUED] buPROPion (WELLBUTRIN SR) 150 MG TBSR 12 hr tablet TAKE 1 TABLET BY MOUTH TWICE A DAY 60 tablet 0    [DISCONTINUED] DULoxetine (CYMBALTA) 30 MG capsule TAKE 1 CAPSULE BY MOUTH 2 TIMES DAILY. 60 capsule 0     No current facility-administered medications on file prior to visit.       Physical exam:  BP (!) 140/72 (BP Location: Right arm, Patient Position: Sitting, BP Method: Medium (Manual))   Pulse 60   Wt 82.8 kg (182 lb 8.7 oz)   SpO2 98%   BMI 27.76 kg/m²         General: Pt is a 62 y.o. year old male who is AAOx3, in NAD, is pleasant, well nourished, looks stated age  HEENT: PERRL, EOMI, Oral mucosa pink & moist  CVS  No abnormal cardiac pulsations noted on inspection. JVP not raised. The apical impulse is normal on palpation, and is located in  the left 5th intercostal space in the mid - clavicular line. No palpable thrills or abnormal pulsations noted. RR, S1 - S2 heard, no murmurs, rubs or gallops appreciated.   PUL : CTA B/L. No wheezes/crackles heard   ABD : BS +, soft. No tenderness elicited   LE : No C/C/E. Distal Pulses palpable B/L         LABS:    Chemistry:   Lab Results   Component Value Date     10/28/2020    K 3.2 (L) 10/28/2020     10/28/2020    CO2 24 10/28/2020    BUN 13 10/28/2020    CREATININE 1.0 10/28/2020    CALCIUM 9.0 10/28/2020     Cardiac Markers:   Lab Results   Component Value Date    TROPONINI <0.006 11/07/2019     Cardiac Markers (Last 3):   Lab Results   Component Value Date    TROPONINI <0.006 11/07/2019    TROPONINI <0.006 10/02/2019    TROPONINI <0.006 10/06/2015     CBC:   Lab Results   Component Value Date    WBC 6.40 10/28/2020    HGB 11.5 (L) 10/28/2020    HCT 33.7 (L) 10/28/2020    MCV 92 10/28/2020     10/28/2020     Lipids:   Lab Results   Component Value Date    CHOL 130 08/21/2020    TRIG 60 08/21/2020    HDL 48 08/21/2020     Coagulation:   Lab Results   Component Value Date    INR 1.0 10/28/2020    APTT 24.1 10/28/2020           Assessment        1. Hypertension, unspecified type    2. Encounter to establish care    3. Hypokalemia    4. TIA (transient ischemic attack)    5. Moderate dementia with behavioral disturbance    6. Essential hypertension    7. Chronic diastolic congestive heart failure    8. Calcification of aorta         Plan:    HFpEF  Compensated  Continue BP control     Hypertension  Elevated  Switch to amlodipine-benazepril 10/20 daily    HLD  Needs repeat lipid panel  Continue statin    H/o TIA  Stable  Continue aspirin and statin    Hypokalemia  Currently taking potassium supplements, labs pending  Will repeat labs in 1 week after starting amlodipine-benazepril (lotrel)    Dementia  Stable  Continue medications      This note was prepared using voice recognition system and is  likely to have sound alike errors that may have been overlooked even after proofreading.     I have reviewed all pertinent chart information.  Plans and recommendations have been formulated under my direct supervision. All questions answered and patient voiced understanding.   If symptoms persist go to the ED.    RTC in 1 month         Donn Trinidad MD  Cardiology

## 2022-04-05 LAB
ESTIMATED AVG GLUCOSE: 111 MG/DL (ref 68–131)
HBA1C MFR BLD: 5.5 % (ref 4–5.6)

## 2022-04-05 RX ORDER — DULOXETIN HYDROCHLORIDE 30 MG/1
CAPSULE, DELAYED RELEASE ORAL
Qty: 60 CAPSULE | Refills: 0 | OUTPATIENT
Start: 2022-04-05

## 2022-04-05 RX ORDER — BUPROPION HYDROCHLORIDE 150 MG/1
TABLET, EXTENDED RELEASE ORAL
Qty: 60 TABLET | Refills: 0 | OUTPATIENT
Start: 2022-04-05

## 2022-04-06 ENCOUNTER — CLINICAL SUPPORT (OUTPATIENT)
Dept: AUDIOLOGY | Facility: CLINIC | Age: 62
End: 2022-04-06
Payer: MEDICARE

## 2022-04-06 ENCOUNTER — PATIENT OUTREACH (OUTPATIENT)
Dept: ADMINISTRATIVE | Facility: OTHER | Age: 62
End: 2022-04-06
Payer: MEDICARE

## 2022-04-06 ENCOUNTER — OFFICE VISIT (OUTPATIENT)
Dept: NEUROLOGY | Facility: CLINIC | Age: 62
End: 2022-04-06
Payer: MEDICARE

## 2022-04-06 VITALS
WEIGHT: 181.44 LBS | HEIGHT: 68 IN | BODY MASS INDEX: 27.5 KG/M2 | HEART RATE: 75 BPM | RESPIRATION RATE: 16 BRPM | SYSTOLIC BLOOD PRESSURE: 122 MMHG | DIASTOLIC BLOOD PRESSURE: 74 MMHG | OXYGEN SATURATION: 98 %

## 2022-04-06 DIAGNOSIS — F03.90 DEMENTIA WITHOUT BEHAVIORAL DISTURBANCE, UNSPECIFIED DEMENTIA TYPE: ICD-10-CM

## 2022-04-06 DIAGNOSIS — E53.8 B12 DEFICIENCY: ICD-10-CM

## 2022-04-06 DIAGNOSIS — G31.84 MILD COGNITIVE IMPAIRMENT WITH MEMORY LOSS: ICD-10-CM

## 2022-04-06 DIAGNOSIS — R41.3 MEMORY LOSS OF UNKNOWN CAUSE: Primary | ICD-10-CM

## 2022-04-06 DIAGNOSIS — G45.9 TIA (TRANSIENT ISCHEMIC ATTACK): ICD-10-CM

## 2022-04-06 DIAGNOSIS — M53.3 TAIL BONE PAIN: ICD-10-CM

## 2022-04-06 DIAGNOSIS — H90.3 SENSORINEURAL HEARING LOSS (SNHL), BILATERAL: Primary | ICD-10-CM

## 2022-04-06 DIAGNOSIS — H91.90 DECREASED HEARING, UNSPECIFIED LATERALITY: ICD-10-CM

## 2022-04-06 DIAGNOSIS — R94.31 PROLONGED Q-T INTERVAL ON ECG: ICD-10-CM

## 2022-04-06 DIAGNOSIS — F03.B18 MODERATE DEMENTIA WITH BEHAVIORAL DISTURBANCE: ICD-10-CM

## 2022-04-06 PROCEDURE — 3078F DIAST BP <80 MM HG: CPT | Mod: CPTII,S$GLB,, | Performed by: NURSE PRACTITIONER

## 2022-04-06 PROCEDURE — 99999 PR PBB SHADOW E&M-EST. PATIENT-LVL I: CPT | Mod: PBBFAC,,, | Performed by: AUDIOLOGIST-HEARING AID FITTER

## 2022-04-06 PROCEDURE — 99999 PR PBB SHADOW E&M-EST. PATIENT-LVL I: ICD-10-PCS | Mod: PBBFAC,,, | Performed by: AUDIOLOGIST-HEARING AID FITTER

## 2022-04-06 PROCEDURE — 3074F SYST BP LT 130 MM HG: CPT | Mod: CPTII,S$GLB,, | Performed by: NURSE PRACTITIONER

## 2022-04-06 PROCEDURE — 3044F HG A1C LEVEL LT 7.0%: CPT | Mod: CPTII,S$GLB,, | Performed by: NURSE PRACTITIONER

## 2022-04-06 PROCEDURE — 3074F PR MOST RECENT SYSTOLIC BLOOD PRESSURE < 130 MM HG: ICD-10-PCS | Mod: CPTII,S$GLB,, | Performed by: NURSE PRACTITIONER

## 2022-04-06 PROCEDURE — 3044F PR MOST RECENT HEMOGLOBIN A1C LEVEL <7.0%: ICD-10-PCS | Mod: CPTII,S$GLB,, | Performed by: NURSE PRACTITIONER

## 2022-04-06 PROCEDURE — 92557 COMPREHENSIVE HEARING TEST: CPT | Mod: S$GLB,,, | Performed by: AUDIOLOGIST-HEARING AID FITTER

## 2022-04-06 PROCEDURE — 3008F PR BODY MASS INDEX (BMI) DOCUMENTED: ICD-10-PCS | Mod: CPTII,S$GLB,, | Performed by: NURSE PRACTITIONER

## 2022-04-06 PROCEDURE — 92567 TYMPANOMETRY: CPT | Mod: S$GLB,,, | Performed by: AUDIOLOGIST-HEARING AID FITTER

## 2022-04-06 PROCEDURE — 99213 PR OFFICE/OUTPT VISIT, EST, LEVL III, 20-29 MIN: ICD-10-PCS | Mod: S$GLB,,, | Performed by: NURSE PRACTITIONER

## 2022-04-06 PROCEDURE — 92557 PR COMPREHENSIVE HEARING TEST: ICD-10-PCS | Mod: S$GLB,,, | Performed by: AUDIOLOGIST-HEARING AID FITTER

## 2022-04-06 PROCEDURE — 99999 PR PBB SHADOW E&M-EST. PATIENT-LVL V: ICD-10-PCS | Mod: PBBFAC,,, | Performed by: NURSE PRACTITIONER

## 2022-04-06 PROCEDURE — 3078F PR MOST RECENT DIASTOLIC BLOOD PRESSURE < 80 MM HG: ICD-10-PCS | Mod: CPTII,S$GLB,, | Performed by: NURSE PRACTITIONER

## 2022-04-06 PROCEDURE — 99999 PR PBB SHADOW E&M-EST. PATIENT-LVL V: CPT | Mod: PBBFAC,,, | Performed by: NURSE PRACTITIONER

## 2022-04-06 PROCEDURE — 3008F BODY MASS INDEX DOCD: CPT | Mod: CPTII,S$GLB,, | Performed by: NURSE PRACTITIONER

## 2022-04-06 PROCEDURE — 1159F MED LIST DOCD IN RCRD: CPT | Mod: CPTII,S$GLB,, | Performed by: NURSE PRACTITIONER

## 2022-04-06 PROCEDURE — 92567 PR TYMPA2METRY: ICD-10-PCS | Mod: S$GLB,,, | Performed by: AUDIOLOGIST-HEARING AID FITTER

## 2022-04-06 PROCEDURE — 4010F PR ACE/ARB THEARPY RXD/TAKEN: ICD-10-PCS | Mod: CPTII,S$GLB,, | Performed by: NURSE PRACTITIONER

## 2022-04-06 PROCEDURE — 99213 OFFICE O/P EST LOW 20 MIN: CPT | Mod: S$GLB,,, | Performed by: NURSE PRACTITIONER

## 2022-04-06 PROCEDURE — 1159F PR MEDICATION LIST DOCUMENTED IN MEDICAL RECORD: ICD-10-PCS | Mod: CPTII,S$GLB,, | Performed by: NURSE PRACTITIONER

## 2022-04-06 PROCEDURE — 4010F ACE/ARB THERAPY RXD/TAKEN: CPT | Mod: CPTII,S$GLB,, | Performed by: NURSE PRACTITIONER

## 2022-04-06 NOTE — PROGRESS NOTES
Health Maintenance Due   Topic Date Due    Shingles Vaccine (1 of 2) Never done    COVID-19 Vaccine (3 - Booster for Moderna series) 07/17/2021     Updates were requested from care everywhere.  Chart was reviewed for overdue Proactive [facility] Encounters (RAFAELA) topics (CRS, Breast Cancer Screening, Eye exam)  Health Maintenance has been updated.  LINKS immunization registry triggered.  Immunizations were reconciled.

## 2022-04-06 NOTE — PROGRESS NOTES
Subjective:       Patient ID: Vishnu Dougherty Jr. is a 62 y.o. male.    Chief Complaint: No chief complaint on file.    HPI       The patient I here for memory loss. The patient is presenting with 6-year history of memory loss since 2015 at the age of 55.The patient is accompanied by his sister.He started having significant errors while working and ended up losing his job as a  in 2018. He was evaluated by Dr. Clemente in 2015 and diagnosed with ADOLFO. He was again evaluated by Dr. Clemente in 2018 when the family noted remarkable memory loss and scored 22/30 and yet was not diagnosed with dementia. His sister moved him to Assisted Living in  after falling in the yard and it became impossible for the patient to take care of himself. He is maintained on Aricept 10 mg QHS. Takes Wellbutrin and Cymbalta for ADOLFO-MDD. No SI/HI. He is currently oriented to person and place but not time and date. He is dependent but able to ambulate, feed himself and use the bathroom. Has good appetite and sleeps well. No recent behavioral disturbances. From [id number] underwent 4 CTs and 2 MRIs of the Brain that showed progressive atrophy. Labs show NL TFT, Low B12 and Vitamin D with no replacement noted. The last EKG in  showed prolonged QT interval. Tolerating Aricept 5 mg QHS and Namenda 5 mg BID with no issues. Doing well, living in assisted living. Able to preform ADLs without assistance. Denies behavorial disturbances or hallucinations. EKG ordered on 6/1/2021 not completed. Continues Vitamin B12 and Vitamin D3 replacement therapy. Complains of pain in left, medial forearm that starts from his elbow and radiates into lower arm. Believes it is a pulled muscle. Started when he was picking up branches at assisted living last week. States pain is aggravated with pulling movements and improved with rest. Denies weakness, swelling, numbness or tingling in extremity. Complaint of tailbone pain. Patient  tolerating Aricept 5  mg QHS and Namenda 5 mg BID with no issues. Doing well, living in assisted living. Able to preform ADLs without assistance. Denies behavorial disturbances or hallucinations. No cognitive decline noted.  Currently taking Vitamin B12 and Vitamin D3 replacement therapy. Complains on tail bone pain started 3 days ago, and worsened with sitting, improved with standing. Patient had similar complaint in April, PCP prescribed Mobic and was very effective and tolerated well. Denies falls, denies injury, no weakness, no swelling,  No numbness or tingling in extremity. 9- EKG , HR 55 Slightly increased from 442 to 456; remains prolonged. Keep Aricept dose at 5 mg QHS. Labs 9-9-2021 Vit B-12 210 NL, Vit D 66 NL      Interval Note 04-: Patient present for follow up. Accompanied by sister. She reports the patient is doing well, lives at Assistant living facility. Tolerating Aricept 10 mg QHS and Namenda 5 mg BID with no issues. Able to preform ADLs without assistance. Denies behavorial disturbances or hallucinations. Cognition decreased unable to recall current president or date and time.  Currently taking Vitamin B12 and Vitamin D3 replacement therapy.     Tail bone pain resolved. Patient denies falls, denies injury, no weakness, no swelling,  No numbness or tingling in extremity.         Review of Systems   Constitutional: Negative for appetite change and fatigue.   HENT: Negative for hearing loss and tinnitus.    Eyes: Negative for photophobia and visual disturbance.   Respiratory: Negative for apnea and shortness of breath.    Cardiovascular: Negative for chest pain and palpitations.   Gastrointestinal: Negative for nausea and vomiting.   Genitourinary: Negative for difficulty urinating and urgency.   Musculoskeletal: Positive for arthralgias. Negative for gait problem.        Tailbone pain    Skin: Negative for color change and rash.   Neurological: Positive for tremors. Negative for dizziness, seizures,  syncope, facial asymmetry, speech difficulty, weakness, light-headedness, numbness and headaches.   Psychiatric/Behavioral: Positive for confusion. Negative for agitation, behavioral problems, decreased concentration, dysphoric mood and hallucinations. The patient is nervous/anxious.                  Current Outpatient Medications:     amlodipine-benazepril 10-20mg (LOTREL) 10-20 mg per capsule, Take 1 capsule by mouth once daily., Disp: 90 capsule, Rfl: 3    aspirin 81 MG Chew, Take 1 tablet (81 mg total) by mouth once daily., Disp: , Rfl: 0    atorvastatin (LIPITOR) 20 MG tablet, Take 1 tablet (20 mg total) by mouth every evening., Disp: 90 tablet, Rfl: 3    buPROPion (WELLBUTRIN SR) 150 MG TBSR 12 hr tablet, Take 1 tablet (150 mg total) by mouth 2 (two) times daily., Disp: 180 tablet, Rfl: 3    cholecalciferol, vitamin D3, 1,250 mcg (50,000 unit) capsule, TAKE 1 CAPSULE BY MOUTH ONE TIME PER WEEK, Disp: 12 capsule, Rfl: 3    cyanocobalamin 1,000 mcg/mL injection, Inject 1 mL (1,000 mcg total) into the muscle every 28 days., Disp: 3 mL, Rfl: 11    donepeziL (ARICEPT) 5 MG tablet, Take 1 tablet (5 mg total) by mouth every evening., Disp: 90 tablet, Rfl: 3    DULoxetine (CYMBALTA) 60 MG capsule, Take 1 capsule (60 mg total) by mouth once daily., Disp: 90 capsule, Rfl: 3    memantine (NAMENDA) 5 MG Tab, Take 1 tablet (5 mg total) by mouth 2 (two) times daily., Disp: 180 tablet, Rfl: 3    potassium chloride SA (K-DUR,KLOR-CON) 20 MEQ tablet, Take 1 tablet (20 mEq total) by mouth once daily., Disp: 30 tablet, Rfl: 0  Past Medical History:   Diagnosis Date    Chronic CHF 10/2/2019    Chronic diastolic HF (heart failure) 4/17/2018    Coronary artery calcification 1/11/2022    Dilated cardiomyopathy 9/24/2015    Enlarged prostate     Essential hypertension 9/24/2015    Gastroesophageal reflux disease without esophagitis 10/7/2015    Gastroesophageal reflux disease without esophagitis 10/7/2015     Hyperlipidemia     Personal history of colonic polyps 2016    Shortness of breath 2015     Past Surgical History:   Procedure Laterality Date    COLONOSCOPY N/A 2016    Procedure: COLONOSCOPY;  Surgeon: Demetrio Spicer MD;  Location: [facility];  Service: Endoscopy;  Laterality: N/A;     Social History     Socioeconomic History    Marital status:     Number of children: 3   Occupational History    Occupation: disability   Tobacco Use    Smoking status: Former Smoker     Quit date: 1995     Years since quittin.5    Smokeless tobacco: Never Used   Substance and Sexual Activity    Alcohol use: No     Alcohol/week: 0.0 standard drinks    Drug use: No    Sexual activity: Not Currently     Partners: Female     Social Determinants of Health     Financial Resource Strain: Low Risk     Difficulty of Paying Living Expenses: Not hard at all   Food Insecurity: No Food Insecurity    Worried About Running Out of Food in the Last Year: Never true    Ran Out of Food in the Last Year: Never true   Transportation Needs: No Transportation Needs    Lack of Transportation (Medical): No    Lack of Transportation (Non-Medical): No   Physical Activity: Insufficiently Active    Days of Exercise per Week: 3 days    Minutes of Exercise per Session: 10 min   Stress: No Stress Concern Present    Feeling of Stress : Not at all   Social Connections: Socially Isolated    Frequency of Communication with Friends and Family: Once a week    Frequency of Social Gatherings with Friends and Family: Once a week    Attends Moravian Services: Never    Active Member of Clubs or Organizations: No    Attends Club or Organization Meetings: Never    Marital Status:    Housing Stability: Low Risk     Unable to Pay for Housing in the Last Year: No    Number of Places Lived in the Last Year: 1    Unstable Housing in the Last Year: No             Past/Current Medical/Surgical History, Past/Current Social  History, Past/Current Family History and Past/Current Medications were reviewed in detail.        Objective:           VITAL SIGNS WERE REVIEWED      GENERAL APPEARANCE:     The patient looks comfortable.    BMI 26.41    No signs of respiratory distress.    Normal breathing pattern.    No dysmorphic features    Normal eye contact.     GENERAL MEDICAL EXAM:    HEENT:  Head is atraumatic normocephalic.      Neck and Axillae: No JVD. No visible lesions.    Cardiopulmonary: No cyanosis. No tachypnea. Normal respiratory effort.    Gastrointestinal/Urogenital:  No jaundice. No stomas or lesions. No visible hernias. No catheters.     Skin, Hair and Nails: No pathognonomic skin rash. No neurofibromatosis. No visible lesions.No stigmata of autoimmune disease. No clubbing.    Limbs: No varicose veins. No visible swelling. No loss of strength.    Muskoskeletal: No visible deformities.No visible lesions.           Neurologic Exam     Mental Status   Oriented to person.   Oriented to place.   Disoriented to time. Disoriented to month and date. Oriented to year, day and season.   Follows 2 step commands.   Attention: normal. Concentration: normal.   Speech: speech is normal   Level of consciousness: alert  Knowledge: poor. Able to perform simple calculations.   Able to name object. Able to repeat. Normal comprehension.     MOCA 17>21>21    Visuospatial/Executive 3/5    Naming                            3/3    Attention                         6/6    Language                         3/3    Abstraction                    2/2    Recall                                0/5    Orientation                     3/6    MILD DEMENTIA 20-25    +1 for education        Cranial Nerves   Cranial nerves II through XII intact.     CN II   Visual fields full to confrontation.   Visual acuity: normal  Right visual field deficit: none  Left visual field deficit: none     CN III, IV, VI   Pupils are equal, round, and reactive to light.  Extraocular  motions are normal.   Right pupil: Size: 2 mm. Shape: regular. Reactivity: brisk. Consensual response: intact. Accommodation: intact.   Left pupil: Size: 2 mm. Shape: regular. Reactivity: brisk. Consensual response: intact. Accommodation: intact.   CN III: no CN III palsy  CN VI: no CN VI palsy  Nystagmus: none   Diplopia: none  Ophthalmoparesis: none  Upgaze: normal  Downgaze: normal    CN V   Facial sensation intact.   Right facial sensation deficit: none  Left facial sensation deficit: none    CN VII   Facial expression full, symmetric.   Right facial weakness: none  Left facial weakness: none    CN VIII   CN VIII normal.   Hearing: intact    CN IX, X   CN IX normal.   CN X normal.   Palate: symmetric    CN XI   CN XI normal.   Right sternocleidomastoid strength: normal  Left sternocleidomastoid strength: normal  Right trapezius strength: normal  Left trapezius strength: normal    CN XII   CN XII normal.   Tongue: not atrophic  Fasciculations: absent  Tongue deviation: none    Motor Exam   Muscle bulk: normal  Overall muscle tone: normal  Right arm tone: normal  Left arm tone: normal  Right arm pronator drift: absent  Left arm pronator drift: absent  Right leg tone: normal  Left leg tone: normal    Strength   Strength 5/5 throughout.   Right neck flexion: 5/5  Left neck flexion: 5/5  Right neck extension: 5/5  Left neck extension: 5/5  Right deltoid: 5/5  Left deltoid: 5/5  Right biceps: 5/5  Left biceps: 5/5  Right triceps: 5/5  Left triceps: 5/5  Right wrist flexion: 5/5  Left wrist flexion: 5/5  Right wrist extension: 5/5  Left wrist extension: 5/5  Right interossei: 5/5  Left interossei: 5/5  Right iliopsoas: 5/5  Left iliopsoas: 5/5  Right quadriceps: 5/5  Left quadriceps: 5/5  Right hamstrin/5  Left hamstrin/5  Right glutei: 5/5  Left glutei: 5/5  Right anterior tibial: 5/5  Left anterior tibial: 5/5  Right posterior tibial: 5/5  Left posterior tibial: 5/5  Right peroneal: 5/5  Left peroneal:  5/5  Right gastroc: 5/5  Left gastroc: 5/5    Sensory Exam   Light touch normal.   Right arm light touch: normal  Left arm light touch: normal  Right leg light touch: normal  Left leg light touch: normal  Right arm vibration: normal  Left arm vibration: normal  Right leg vibration: normal  Left leg vibration: decreased from knee  Proprioception normal.   Right arm proprioception: normal  Left arm proprioception: normal  Right leg proprioception: normal  Left leg proprioception: normal  Pinprick normal.   Right arm pinprick: normal  Left arm pinprick: normal  Right leg pinprick: normal  Left leg pinprick: normal  Graphesthesia: normal  Stereognosis: normal    Gait, Coordination, and Reflexes     Gait  Gait: normal    Coordination   Romberg: negative  Finger to nose coordination: normal  Heel to shin coordination: normal  Tandem walking coordination: normal    Tremor   Resting tremor: absent  Intention tremor: present  Action tremor: absent    Reflexes   Right brachioradialis: 2+  Left brachioradialis: 2+  Right biceps: 2+  Left biceps: 2+  Right triceps: 2+  Left triceps: 2+  Right patellar: 2+  Left patellar: 2+  Right achilles: 2+  Left achilles: 2+  Right plantar: normal  Left plantar: normal  Right Lee: absent  Left Lee: absent  Right ankle clonus: absent  Left ankle clonus: absent  Right pendular knee jerk: absent  Left pendular knee jerk: absent      Lab Results   Component Value Date    WBC 6.35 04/04/2022    HGB 12.5 (L) 04/04/2022    HCT 38.3 (L) 04/04/2022    MCV 93 04/04/2022     04/04/2022     Sodium   Date Value Ref Range Status   04/04/2022 137 136 - 145 mmol/L Final     Potassium   Date Value Ref Range Status   04/04/2022 3.6 3.5 - 5.1 mmol/L Final     Chloride   Date Value Ref Range Status   04/04/2022 102 95 - 110 mmol/L Final     CO2   Date Value Ref Range Status   04/04/2022 29 23 - 29 mmol/L Final     Glucose   Date Value Ref Range Status   04/04/2022 101 70 - 110 mg/dL Final      BUN   Date Value Ref Range Status   04/04/2022 16 8 - 23 mg/dL Final     Creatinine   Date Value Ref Range Status   04/04/2022 0.9 0.5 - 1.4 mg/dL Final     Calcium   Date Value Ref Range Status   04/04/2022 9.3 8.7 - 10.5 mg/dL Final     Total Protein   Date Value Ref Range Status   04/04/2022 7.1 6.0 - 8.4 g/dL Final     Albumin   Date Value Ref Range Status   04/04/2022 4.0 3.5 - 5.2 g/dL Final     Total Bilirubin   Date Value Ref Range Status   04/04/2022 0.3 0.1 - 1.0 mg/dL Final     Comment:     For infants and newborns, interpretation of results should be based  on gestational age, weight and in agreement with clinical  observations.    Premature Infant recommended reference ranges:  Up to 24 hours.............<8.0 mg/dL  Up to 48 hours............<12.0 mg/dL  3-5 days..................<15.0 mg/dL  6-29 days.................<15.0 mg/dL       Alkaline Phosphatase   Date Value Ref Range Status   04/04/2022 90 55 - 135 U/L Final     AST   Date Value Ref Range Status   04/04/2022 15 10 - 40 U/L Final     ALT   Date Value Ref Range Status   04/04/2022 12 10 - 44 U/L Final     Anion Gap   Date Value Ref Range Status   04/04/2022 6 (L) 8 - 16 mmol/L Final     eGFR if    Date Value Ref Range Status   04/04/2022 >60.0 >60 mL/min/1.73 m^2 Final     eGFR if non    Date Value Ref Range Status   04/04/2022 >60.0 >60 mL/min/1.73 m^2 Final     Comment:     Calculation used to obtain the estimated glomerular filtration  rate (eGFR) is the CKD-EPI equation.        Lab Results   Component Value Date    QHGJLZIO75 210 09/09/2021     Lab Results   Component Value Date    TSH 1.604 04/04/2022    FREET4 1.09 11/17/2017     Labs Reviewed.      9-9-2021     Vit B-12 210 NL, Vit D 66 NL    Underwent 4 CTH and 2 MRIs [id number]    10-    CTH Atrophy       10-    CT Atrophy      [id number]    CT Atrophy      10-    CT Atrophy       04-     Brain MRI  Atrophy      10-     Brain MRI Atrophy     [id number]    TFT (TSH, T4) NL    B12 Low 227>208    10-    EKG  ms Prolonged       05-    Labs B12-MMA, FA-HC, RPR, Vitamin D    Severe B12 Deficiency <148 and Vitamin D insufficient <25       05-    EKG , HR is 59    Improved from 492 but remains prolonged     Decrease Aricept dose to 5 mg QHS (1/2 the dose) and repeat EKG in 2 weeks    05-    EKG , HR is 59    9-     EKG , HR 55      Slightly increased from 442 to 456; remains prolonged     Keep Aricept dose at 5 mg QHS. Will recheck in 6 months.      Labs Reviewed:     01-     QT Interval 426  HR 55    Assessment:       1. Memory loss of unknown cause    2. Dementia without behavioral disturbance, unspecified dementia type    3. B12 deficiency    4. Moderate dementia with behavioral disturbance    5. Prolonged Q-T interval on ECG    6. Mild cognitive impairment with memory loss    7. Tail bone pain    8. TIA (transient ischemic attack)        Plan:       MODERATE- SEVERE DEMENTIA, AD, BEHAVIORAL DISTURBANCES, EARLY ONSET          EVALUATION     DISEASE-MODIFYING AGENTS     Explained to the patient and family that medications are intended to slow down the progression and not stop it or reverse the disease.The disease is relentless, progressive and so far cannot be controlled.     Continue memantine/Namenda 5 mg BID    Continue donepezil/Aricept 10 mg QHS .      SYMPTOMATIC MANAGEMENT-BEHAVIORAL SYMPTOMS     Continue Cymbalta and Wellbutrin. .      HOME CARE-ASSISTED LIVING     [facility]isted living [facility]  and medication pass.     Falling Down Precautions.     Avoid antihistamines and anticholinergics.    Avoid changing routine.    Use written reminders.    Avoid multitasking.    Healthy diet, exercise (physical and cognitive).    Good sleep hygiene.    CAREGIVERS COUNSELING     For behavioral problems I recommended that  family to try some of the following communication tips: Try not to react and stay calm, Don't argue , Do not use logic, Let the patient feel safe, Keep reminding the patient and use photos if necessary, Distract the patient, Search for things to distract the person, then talk about what you found. For example, talk about a photograph or keepsake or even food, Use gentle touching or hugging to show you care, Body language matters, Use a cooling off period if needed, when possible. If safe to do so, give the patient some space or breathing room. Will consider antipsychotic medications as a last resort because of the risk of CAD and CVD.    Recommend reading the following books:     The 36-Hour Day and there are many online and printed resources to help caregivers as well.     Alzheimer's Through the Stages.     Dementia with WEN    For More Information About Hallucinations, Delusions, and Paranoia in Alzheimer's   [facility] Alzheimer's and related Dementias Education and Referral (ADEAR) Center   1-626.503.1934 (toll-free)   adear@anel.nih.gov   www.anel.nih.gov/alzheimers   The [employer] [facility]'s ADEAR Center offers information and free print publications about Alzheimer's disease and related dementias for families, caregivers, and health professionals. ADEAR Center staff answer telephone, email, and written requests and make referrals to local and national resources      PREVENTION OF DELIRIUM       1. Good hydration and avoid electrolyte imbalance  2. Recognize andtreat infections immediately especially UTI.  3. Bladder emptying and prevent constipation.   4. Provide stimulating activities and familiar objects  5. Use eyeglasses and hearing aids if needed.   6. Use simple and regular communication about people, current place and time  7. Mobility and range-of-motion exercises  8. Reduce noise, lighting and avoid sleep interruptions  9. Non-narcotic pain management.  10.Nondrug treatment for sleep problems  or anxiety  11. Avoid antihistamines.  12. Avoid narcotics.  13. Avoid benzodiazepines.     VITAMIN D DEFICIENCY    Continue cholecalciferol, vit D, 1,259 mcg (50,000 unit) once a week    Recheck vit D level when available     VITAMIN B12 DEFICIENCY    Continue Cyanocobalamin 1,000 mcg/ml injection every 28 days    Recheck vit B12 level    TAIL BONE PAIN    No current complaint     DONUT CUSHION FOR CHAIR     Follow up with PCP for further management     MEDICAL/SURGICAL COMORBIDITIES     All relevant medical comorbidities noted and managed by primary care physician and medical care team.        MISCELLANEOUS MEDICAL PROBLEMS       HEALTHY LIFESTYLE AND PREVENTATIVE CARE    The patient to adhere to the age-appropriate health maintenance guidelines including screening tests and vaccinations. The patient to adhere to  healthy lifestyle, optimal weight, exercise, healthy diet, good sleep hygiene and avoiding drugs including smoking, alcohol and recreational drugs.        RTC in 3 months     Total E/M 20 mins      Cecilia Hernandez, MSN NP      Collaborating Provider: Benjy Cameron MD, FAAN Neurologist/Epileptologist           I spent 30  minutes total E/M  More than 50 % spent face to face with the patient

## 2022-04-06 NOTE — PROGRESS NOTES
Referring provider: Karie Kirkpatrick NP    Vishnu Dougherty Jr. was seen 04/06/2022 for an audiological evaluation.  Patient is referred due to failed hearing screen at PCP. Patient does not voice concerns about his hearing. He is accompanied by his sister who has not noted problems. Patient denies tinnitus. No aural fullness, pressure or otorrhea. No history of ear surgery. No family history of hearing loss. Patient has history of occupational loud noise exposure () and used earplugs at times. He has history of dementia and follows with neurology.      Results reveal a borderline normal-to-moderately severe sensorineural hearing loss 250-8000 Hz for the right ear, and a borderline normal-to-moderately severe sensorineural hearing loss 250-8000 Hz for the left ear.   Speech Reception Thresholds were 25 dBHL for the right ear and 20 dBHL for the left ear.   Word recognition scores were good for the right ear and good for the left ear.   Tympanograms were Type A for the right ear and Type A for the left ear.    Patient was counseled on the above findings.    Recommendations:  1. Audiogram every two years to monitor hearing loss.   2. Hearing aids, binaural, when motivated.   3. Hearing protection around loud noises.

## 2022-04-08 ENCOUNTER — TELEPHONE (OUTPATIENT)
Dept: CARDIOLOGY | Facility: CLINIC | Age: 62
End: 2022-04-08
Payer: MEDICARE

## 2022-04-08 NOTE — TELEPHONE ENCOUNTER
----- Message from Donn Trinidad MD sent at 4/8/2022 11:09 AM CDT -----  Sorry. Stop potassium supplements when he starts taking the lotrel, not the losartan. I mis-spoke.   ----- Message -----  From: Glo Nix MA  Sent: 4/6/2022   9:15 AM CDT  To: Donn Trinidad MD    Spoke with sister gave all information on potassium. Can you send in the losartan it has not been sent to pharmacy?   ----- Message -----  From: Donn Trinidad MD  Sent: 4/6/2022   7:55 AM CDT  To: Glo Nix MA    Please call him/sister and let them know to stop taking potassium supplements when he starts taking losartan. His potassium level was normal on his recent labs and losartan will make his potassium go higher and will not need extra potassium supplements. Will check labs next week to make sure potassium stays at normal range.

## 2022-05-04 ENCOUNTER — PATIENT OUTREACH (OUTPATIENT)
Dept: ADMINISTRATIVE | Facility: HOSPITAL | Age: 62
End: 2022-05-04
Payer: MEDICARE

## 2022-05-04 NOTE — PROGRESS NOTES
Working HTN Report.    Spoke with Ms Loyd, requesting an updated bp reading.  Meds recently adjusted per cardio.  States he lives in an assisted living facility. Will get reading from them and return call.

## 2022-05-05 ENCOUNTER — PATIENT MESSAGE (OUTPATIENT)
Dept: INTERNAL MEDICINE | Facility: CLINIC | Age: 62
End: 2022-05-05
Payer: MEDICARE

## 2022-05-16 ENCOUNTER — OFFICE VISIT (OUTPATIENT)
Dept: CARDIOLOGY | Facility: CLINIC | Age: 62
End: 2022-05-16
Payer: MEDICARE

## 2022-05-16 ENCOUNTER — LAB VISIT (OUTPATIENT)
Dept: LAB | Facility: HOSPITAL | Age: 62
End: 2022-05-16
Attending: STUDENT IN AN ORGANIZED HEALTH CARE EDUCATION/TRAINING PROGRAM
Payer: MEDICARE

## 2022-05-16 VITALS
OXYGEN SATURATION: 97 % | DIASTOLIC BLOOD PRESSURE: 70 MMHG | HEIGHT: 68 IN | SYSTOLIC BLOOD PRESSURE: 120 MMHG | HEART RATE: 63 BPM | BODY MASS INDEX: 28 KG/M2 | WEIGHT: 184.75 LBS

## 2022-05-16 DIAGNOSIS — I10 HYPERTENSION, UNSPECIFIED TYPE: Primary | ICD-10-CM

## 2022-05-16 DIAGNOSIS — E78.5 HYPERLIPIDEMIA, UNSPECIFIED HYPERLIPIDEMIA TYPE: ICD-10-CM

## 2022-05-16 DIAGNOSIS — I10 HYPERTENSION, UNSPECIFIED TYPE: ICD-10-CM

## 2022-05-16 DIAGNOSIS — E87.6 HYPOKALEMIA: ICD-10-CM

## 2022-05-16 DIAGNOSIS — Z86.73 HISTORY OF TRANSIENT ISCHEMIC ATTACK (TIA): ICD-10-CM

## 2022-05-16 DIAGNOSIS — I70.0 CALCIFICATION OF AORTA: ICD-10-CM

## 2022-05-16 DIAGNOSIS — F03.B18 MODERATE DEMENTIA WITH BEHAVIORAL DISTURBANCE: ICD-10-CM

## 2022-05-16 DIAGNOSIS — I42.0 DILATED CARDIOMYOPATHY: ICD-10-CM

## 2022-05-16 PROCEDURE — 99499 RISK ADDL DX/OHS AUDIT: ICD-10-PCS | Mod: S$GLB,,, | Performed by: STUDENT IN AN ORGANIZED HEALTH CARE EDUCATION/TRAINING PROGRAM

## 2022-05-16 PROCEDURE — 36415 COLL VENOUS BLD VENIPUNCTURE: CPT | Performed by: STUDENT IN AN ORGANIZED HEALTH CARE EDUCATION/TRAINING PROGRAM

## 2022-05-16 PROCEDURE — 4010F ACE/ARB THERAPY RXD/TAKEN: CPT | Mod: CPTII,S$GLB,, | Performed by: STUDENT IN AN ORGANIZED HEALTH CARE EDUCATION/TRAINING PROGRAM

## 2022-05-16 PROCEDURE — 3074F SYST BP LT 130 MM HG: CPT | Mod: CPTII,S$GLB,, | Performed by: STUDENT IN AN ORGANIZED HEALTH CARE EDUCATION/TRAINING PROGRAM

## 2022-05-16 PROCEDURE — 80048 BASIC METABOLIC PNL TOTAL CA: CPT | Performed by: STUDENT IN AN ORGANIZED HEALTH CARE EDUCATION/TRAINING PROGRAM

## 2022-05-16 PROCEDURE — 3044F PR MOST RECENT HEMOGLOBIN A1C LEVEL <7.0%: ICD-10-PCS | Mod: CPTII,S$GLB,, | Performed by: STUDENT IN AN ORGANIZED HEALTH CARE EDUCATION/TRAINING PROGRAM

## 2022-05-16 PROCEDURE — 99214 OFFICE O/P EST MOD 30 MIN: CPT | Mod: S$GLB,,, | Performed by: STUDENT IN AN ORGANIZED HEALTH CARE EDUCATION/TRAINING PROGRAM

## 2022-05-16 PROCEDURE — 3078F PR MOST RECENT DIASTOLIC BLOOD PRESSURE < 80 MM HG: ICD-10-PCS | Mod: CPTII,S$GLB,, | Performed by: STUDENT IN AN ORGANIZED HEALTH CARE EDUCATION/TRAINING PROGRAM

## 2022-05-16 PROCEDURE — 1159F PR MEDICATION LIST DOCUMENTED IN MEDICAL RECORD: ICD-10-PCS | Mod: CPTII,S$GLB,, | Performed by: STUDENT IN AN ORGANIZED HEALTH CARE EDUCATION/TRAINING PROGRAM

## 2022-05-16 PROCEDURE — 3008F BODY MASS INDEX DOCD: CPT | Mod: CPTII,S$GLB,, | Performed by: STUDENT IN AN ORGANIZED HEALTH CARE EDUCATION/TRAINING PROGRAM

## 2022-05-16 PROCEDURE — 1159F MED LIST DOCD IN RCRD: CPT | Mod: CPTII,S$GLB,, | Performed by: STUDENT IN AN ORGANIZED HEALTH CARE EDUCATION/TRAINING PROGRAM

## 2022-05-16 PROCEDURE — 3074F PR MOST RECENT SYSTOLIC BLOOD PRESSURE < 130 MM HG: ICD-10-PCS | Mod: CPTII,S$GLB,, | Performed by: STUDENT IN AN ORGANIZED HEALTH CARE EDUCATION/TRAINING PROGRAM

## 2022-05-16 PROCEDURE — 99999 PR PBB SHADOW E&M-EST. PATIENT-LVL IV: ICD-10-PCS | Mod: PBBFAC,,, | Performed by: STUDENT IN AN ORGANIZED HEALTH CARE EDUCATION/TRAINING PROGRAM

## 2022-05-16 PROCEDURE — 3078F DIAST BP <80 MM HG: CPT | Mod: CPTII,S$GLB,, | Performed by: STUDENT IN AN ORGANIZED HEALTH CARE EDUCATION/TRAINING PROGRAM

## 2022-05-16 PROCEDURE — 4010F PR ACE/ARB THEARPY RXD/TAKEN: ICD-10-PCS | Mod: CPTII,S$GLB,, | Performed by: STUDENT IN AN ORGANIZED HEALTH CARE EDUCATION/TRAINING PROGRAM

## 2022-05-16 PROCEDURE — 3008F PR BODY MASS INDEX (BMI) DOCUMENTED: ICD-10-PCS | Mod: CPTII,S$GLB,, | Performed by: STUDENT IN AN ORGANIZED HEALTH CARE EDUCATION/TRAINING PROGRAM

## 2022-05-16 PROCEDURE — 3044F HG A1C LEVEL LT 7.0%: CPT | Mod: CPTII,S$GLB,, | Performed by: STUDENT IN AN ORGANIZED HEALTH CARE EDUCATION/TRAINING PROGRAM

## 2022-05-16 PROCEDURE — 99214 PR OFFICE/OUTPT VISIT, EST, LEVL IV, 30-39 MIN: ICD-10-PCS | Mod: S$GLB,,, | Performed by: STUDENT IN AN ORGANIZED HEALTH CARE EDUCATION/TRAINING PROGRAM

## 2022-05-16 PROCEDURE — 99999 PR PBB SHADOW E&M-EST. PATIENT-LVL IV: CPT | Mod: PBBFAC,,, | Performed by: STUDENT IN AN ORGANIZED HEALTH CARE EDUCATION/TRAINING PROGRAM

## 2022-05-16 PROCEDURE — 99499 UNLISTED E&M SERVICE: CPT | Mod: S$GLB,,, | Performed by: STUDENT IN AN ORGANIZED HEALTH CARE EDUCATION/TRAINING PROGRAM

## 2022-05-16 NOTE — PROGRESS NOTES
Section of Cardiology                  Cardiac Clinic Note    Chief Complaint/Reason for consultation:  Reestablish care      HPI:   Vishnu Dougherty Jr. is a 62 y.o. male with h/o dementia, dilated CM (EF 35% recovered to normal), hypertension, HLD, GERD who comes into Cardiology Clinic to reestablish care.    4/4/22  Saw Dr. Bañuelos in 2018  Lives in a assisted living facility   Active at home with no limitations   Comes in with sister  Denies orthopnea,PND, syncope, LE swelling, chest pain.   Labs are pending     Stopped smoking many years ago. Denies ETOH abuse.   Denies DM.       5/16/22  Comes in with sister   BP normotensive today, but BP log with elevated BP readings  Taking new BP medication without issue    Denies SOB, orthopnea,PND, syncope, LE swelling, chest pain.         EKG 1/5/22 SB, no acute ST - T wave changes, QTc 426 ms     ECHO  2019  CONCLUSIONS     1 - Concentric hypertrophy.     2 - No wall motion abnormalities.     3 - Normal left ventricular systolic function (EF 60-65%).     4 - Normal left ventricular diastolic function.     5 - Normal right ventricular systolic function .     6 - The estimated PA systolic pressure is 28 mmHg.     STRESS TEST 2018  Negative for ischemia        [facility]      ROS: All 10 systems reviewed. Please refer to the HPI for pertinent positives. All other systems negative.     Past Medical History  Past Medical History:   Diagnosis Date    Chronic CHF 10/2/2019    Chronic diastolic HF (heart failure) 4/17/2018    Coronary artery calcification 1/11/2022    Dilated cardiomyopathy 9/24/2015    Enlarged prostate     Essential hypertension 9/24/2015    Gastroesophageal reflux disease without esophagitis 10/7/2015    Gastroesophageal reflux disease without esophagitis 10/7/2015    Hyperlipidemia     Personal history of colonic polyps 2016    Shortness of breath 9/24/2015       Surgical History  Past Surgical History:   Procedure Laterality Date     COLONOSCOPY N/A 2016    Procedure: COLONOSCOPY;  Surgeon: Demetrio Spicer MD;  Location: [facility];  Service: Endoscopy;  Laterality: N/A;          Allergies:   Review of patient's allergies indicates:  No Known Allergies    Social History:  Social History     Socioeconomic History    Marital status:     Number of children: 3   Occupational History    Occupation: disability   Tobacco Use    Smoking status: Former Smoker     Quit date: 1995     Years since quittin.6    Smokeless tobacco: Never Used   Substance and Sexual Activity    Alcohol use: No     Alcohol/week: 0.0 standard drinks    Drug use: No    Sexual activity: Not Currently     Partners: Female     Social Determinants of Health     Financial Resource Strain: Low Risk     Difficulty of Paying Living Expenses: Not hard at all   Food Insecurity: No Food Insecurity    Worried About Running Out of Food in the Last Year: Never true    Ran Out of Food in the Last Year: Never true   Transportation Needs: No Transportation Needs    Lack of Transportation (Medical): No    Lack of Transportation (Non-Medical): No   Physical Activity: Insufficiently Active    Days of Exercise per Week: 3 days    Minutes of Exercise per Session: 10 min   Stress: No Stress Concern Present    Feeling of Stress : Not at all   Social Connections: Socially Isolated    Frequency of Communication with Friends and Family: Once a week    Frequency of Social Gatherings with Friends and Family: Once a week    Attends Bahai Services: Never    Active Member of Clubs or Organizations: No    Attends Club or Organization Meetings: Never    Marital Status:    Housing Stability: Low Risk     Unable to Pay for Housing in the Last Year: No    Number of Places Lived in the Last Year: 1    Unstable Housing in the Last Year: No       Family History:  family history includes Hypertension in his father and mother; Stroke in his father.    Home  "Medications:  Current Outpatient Medications on File Prior to Visit   Medication Sig Dispense Refill    amlodipine-benazepril 10-20mg (LOTREL) 10-20 mg per capsule Take 1 capsule by mouth once daily. 90 capsule 3    aspirin 81 MG Chew Take 1 tablet (81 mg total) by mouth once daily.  0    atorvastatin (LIPITOR) 20 MG tablet Take 1 tablet (20 mg total) by mouth every evening. 90 tablet 3    buPROPion (WELLBUTRIN SR) 150 MG TBSR 12 hr tablet Take 1 tablet (150 mg total) by mouth 2 (two) times daily. 180 tablet 3    cholecalciferol, vitamin D3, 1,250 mcg (50,000 unit) capsule TAKE 1 CAPSULE BY MOUTH ONE TIME PER WEEK 12 capsule 3    cyanocobalamin 1,000 mcg/mL injection Inject 1 mL (1,000 mcg total) into the muscle every 28 days. 3 mL 11    donepeziL (ARICEPT) 5 MG tablet Take 1 tablet (5 mg total) by mouth every evening. 90 tablet 3    DULoxetine (CYMBALTA) 60 MG capsule Take 1 capsule (60 mg total) by mouth once daily. 90 capsule 3    memantine (NAMENDA) 5 MG Tab Take 1 tablet (5 mg total) by mouth 2 (two) times daily. 180 tablet 3    potassium chloride SA (K-DUR,KLOR-CON) 20 MEQ tablet Take 1 tablet (20 mEq total) by mouth once daily. 30 tablet 0     No current facility-administered medications on file prior to visit.       Physical exam:  /70 (BP Location: Right arm, Patient Position: Sitting, BP Method: Medium (Manual))   Pulse 63   Ht 5' 8" (1.727 m)   Wt 83.8 kg (184 lb 11.9 oz)   SpO2 97%   BMI 28.09 kg/m²         General: Pt is a 62 y.o. year old male who is AAOx3, in NAD, is pleasant, well nourished, looks stated age  HEENT: PERRL, EOMI, Oral mucosa pink & moist  CVS  No abnormal cardiac pulsations noted on inspection. JVP not raised. The apical impulse is normal on palpation, and is located in the left 5th intercostal space in the mid - clavicular line. No palpable thrills or abnormal pulsations noted. RR, S1 - S2 heard, no murmurs, rubs or gallops appreciated.   PUL : CTA B/L. No " wheezes/crackles heard   ABD : BS +, soft. No tenderness elicited   LE : No C/C/E. Distal Pulses palpable B/L         LABS:    Chemistry:   Lab Results   Component Value Date     04/04/2022    K 3.6 04/04/2022     04/04/2022    CO2 29 04/04/2022    BUN 16 04/04/2022    CREATININE 0.9 04/04/2022    CALCIUM 9.3 04/04/2022     Cardiac Markers:   Lab Results   Component Value Date    TROPONINI <0.006 11/07/2019     Cardiac Markers (Last 3):   Lab Results   Component Value Date    TROPONINI <0.006 11/07/2019    TROPONINI <0.006 10/02/2019    TROPONINI <0.006 10/06/2015     CBC:   Lab Results   Component Value Date    WBC 6.35 04/04/2022    HGB 12.5 (L) 04/04/2022    HCT 38.3 (L) 04/04/2022    MCV 93 04/04/2022     04/04/2022     Lipids:   Lab Results   Component Value Date    CHOL 130 08/21/2020    TRIG 60 08/21/2020    HDL 48 08/21/2020     Coagulation:   Lab Results   Component Value Date    INR 1.0 10/28/2020    APTT 24.1 10/28/2020           Assessment        1. Hypertension, unspecified type    2. Calcification of aorta    3. History of transient ischemic attack (TIA)    4. Dilated cardiomyopathy    5. Moderate dementia with behavioral disturbance    6. Hypokalemia    7. Hyperlipidemia, unspecified hyperlipidemia type         Plan:    Dilated CM  Recovered to normal- compensated  Continue BP control     Hypertension  Stable   Switch to amlodipine-benazepril 10/20 daily    HLD  Needs repeat lipid panel  Continue statin    H/o TIA  Stable  Continue aspirin and statin    Hypokalemia  Not on supplements   BMP     Dementia  Stable  Continue medications      This note was prepared using voice recognition system and is likely to have sound alike errors that may have been overlooked even after proofreading.     I have reviewed all pertinent chart information.  Plans and recommendations have been formulated under my direct supervision. All questions answered and patient voiced understanding.   If symptoms  persist go to the ED.    RTC in 6 months         Donn Trinidad MD  Cardiology

## 2022-05-17 LAB
ANION GAP SERPL CALC-SCNC: 12 MMOL/L (ref 8–16)
BUN SERPL-MCNC: 14 MG/DL (ref 8–23)
CALCIUM SERPL-MCNC: 9.1 MG/DL (ref 8.7–10.5)
CHLORIDE SERPL-SCNC: 105 MMOL/L (ref 95–110)
CO2 SERPL-SCNC: 25 MMOL/L (ref 23–29)
CREAT SERPL-MCNC: 1 MG/DL (ref 0.5–1.4)
EST. GFR  (AFRICAN AMERICAN): >60 ML/MIN/1.73 M^2
EST. GFR  (NON AFRICAN AMERICAN): >60 ML/MIN/1.73 M^2
GLUCOSE SERPL-MCNC: 70 MG/DL (ref 70–110)
POTASSIUM SERPL-SCNC: 4 MMOL/L (ref 3.5–5.1)
SODIUM SERPL-SCNC: 142 MMOL/L (ref 136–145)

## 2022-05-27 ENCOUNTER — TELEPHONE (OUTPATIENT)
Dept: CARDIOLOGY | Facility: CLINIC | Age: 62
End: 2022-05-27
Payer: MEDICARE

## 2022-05-27 NOTE — TELEPHONE ENCOUNTER
Spoke with patient sister gave lab results.----- Message from Donn Trinidad MD sent at 5/19/2022  7:35 AM CDT -----  Call patient  Potassium level is in normal range.   You have needed potassium supplements in the past but will no longer need it

## 2022-07-02 DIAGNOSIS — F41.9 ANXIETY: ICD-10-CM

## 2022-07-02 NOTE — TELEPHONE ENCOUNTER
Care Due:                  Date            Visit Type   Department     Provider  --------------------------------------------------------------------------------                                EP -                              PRIMARY      [facility] INTERNAL  Last Visit: 04-      CARE ([facility])   ANNA Quiñones                              EP -                              PRIMARY      [facility] INTERNAL  Next Visit: 10-      CARE ([facility])   ANNA Quiñones                                                            Last  Test          Frequency    Reason                     Performed    Due Date  --------------------------------------------------------------------------------    Lipid Panel.  12 months..  atorvastatin.............  08- 08-    [facility] Embedded Care Gaps. Reference number: [id number]. 7/02/2022   4:32:56 PM CDT

## 2022-07-04 RX ORDER — DULOXETIN HYDROCHLORIDE 30 MG/1
CAPSULE, DELAYED RELEASE ORAL
Qty: 60 CAPSULE | Refills: 0 | OUTPATIENT
Start: 2022-07-04

## 2022-07-04 NOTE — TELEPHONE ENCOUNTER
Refill Decision Note   Vishnu Dougherty  is requesting a refill authorization.  Brief Assessment and Rationale for Refill:  Quick Discontinue     Medication Therapy Plan:  INCREASED TO 60 MG    Medication Reconciliation Completed: No   Comments:     No Care Gaps recommended.     Note composed:11:26 AM 07/04/2022

## 2022-07-06 ENCOUNTER — OFFICE VISIT (OUTPATIENT)
Dept: NEUROLOGY | Facility: CLINIC | Age: 62
End: 2022-07-06
Payer: MEDICARE

## 2022-07-06 VITALS
BODY MASS INDEX: 27.93 KG/M2 | RESPIRATION RATE: 16 BRPM | DIASTOLIC BLOOD PRESSURE: 78 MMHG | OXYGEN SATURATION: 95 % | WEIGHT: 184.31 LBS | HEART RATE: 60 BPM | HEIGHT: 68 IN | SYSTOLIC BLOOD PRESSURE: 127 MMHG

## 2022-07-06 DIAGNOSIS — E53.8 B12 DEFICIENCY: ICD-10-CM

## 2022-07-06 DIAGNOSIS — E55.9 VITAMIN D DEFICIENCY: ICD-10-CM

## 2022-07-06 DIAGNOSIS — F03.B18 MODERATE DEMENTIA WITH BEHAVIORAL DISTURBANCE: ICD-10-CM

## 2022-07-06 DIAGNOSIS — F32.A ANXIETY AND DEPRESSION: ICD-10-CM

## 2022-07-06 DIAGNOSIS — F03.90 DEMENTIA WITHOUT BEHAVIORAL DISTURBANCE, UNSPECIFIED DEMENTIA TYPE: ICD-10-CM

## 2022-07-06 DIAGNOSIS — R41.3 MEMORY LOSS OF UNKNOWN CAUSE: ICD-10-CM

## 2022-07-06 DIAGNOSIS — G30.9 MAJOR NEUROCOGNITIVE DISORDER DUE TO ALZHEIMER'S DISEASE, WITHOUT BEHAVIORAL DISTURBANCE: Primary | ICD-10-CM

## 2022-07-06 DIAGNOSIS — F41.9 ANXIETY AND DEPRESSION: ICD-10-CM

## 2022-07-06 DIAGNOSIS — R94.31 PROLONGED Q-T INTERVAL ON ECG: ICD-10-CM

## 2022-07-06 DIAGNOSIS — F02.80 MAJOR NEUROCOGNITIVE DISORDER DUE TO ALZHEIMER'S DISEASE, WITHOUT BEHAVIORAL DISTURBANCE: Primary | ICD-10-CM

## 2022-07-06 PROCEDURE — 4010F ACE/ARB THERAPY RXD/TAKEN: CPT | Mod: CPTII,S$GLB,, | Performed by: NURSE PRACTITIONER

## 2022-07-06 PROCEDURE — 1159F MED LIST DOCD IN RCRD: CPT | Mod: CPTII,S$GLB,, | Performed by: NURSE PRACTITIONER

## 2022-07-06 PROCEDURE — 1160F RVW MEDS BY RX/DR IN RCRD: CPT | Mod: CPTII,S$GLB,, | Performed by: NURSE PRACTITIONER

## 2022-07-06 PROCEDURE — 3078F PR MOST RECENT DIASTOLIC BLOOD PRESSURE < 80 MM HG: ICD-10-PCS | Mod: CPTII,S$GLB,, | Performed by: NURSE PRACTITIONER

## 2022-07-06 PROCEDURE — 99999 PR PBB SHADOW E&M-EST. PATIENT-LVL IV: CPT | Mod: PBBFAC,,, | Performed by: NURSE PRACTITIONER

## 2022-07-06 PROCEDURE — 3074F SYST BP LT 130 MM HG: CPT | Mod: CPTII,S$GLB,, | Performed by: NURSE PRACTITIONER

## 2022-07-06 PROCEDURE — 4010F PR ACE/ARB THEARPY RXD/TAKEN: ICD-10-PCS | Mod: CPTII,S$GLB,, | Performed by: NURSE PRACTITIONER

## 2022-07-06 PROCEDURE — 3074F PR MOST RECENT SYSTOLIC BLOOD PRESSURE < 130 MM HG: ICD-10-PCS | Mod: CPTII,S$GLB,, | Performed by: NURSE PRACTITIONER

## 2022-07-06 PROCEDURE — 3078F DIAST BP <80 MM HG: CPT | Mod: CPTII,S$GLB,, | Performed by: NURSE PRACTITIONER

## 2022-07-06 PROCEDURE — 1159F PR MEDICATION LIST DOCUMENTED IN MEDICAL RECORD: ICD-10-PCS | Mod: CPTII,S$GLB,, | Performed by: NURSE PRACTITIONER

## 2022-07-06 PROCEDURE — 3008F PR BODY MASS INDEX (BMI) DOCUMENTED: ICD-10-PCS | Mod: CPTII,S$GLB,, | Performed by: NURSE PRACTITIONER

## 2022-07-06 PROCEDURE — 3044F PR MOST RECENT HEMOGLOBIN A1C LEVEL <7.0%: ICD-10-PCS | Mod: CPTII,S$GLB,, | Performed by: NURSE PRACTITIONER

## 2022-07-06 PROCEDURE — 99214 OFFICE O/P EST MOD 30 MIN: CPT | Mod: S$GLB,,, | Performed by: NURSE PRACTITIONER

## 2022-07-06 PROCEDURE — 99999 PR PBB SHADOW E&M-EST. PATIENT-LVL IV: ICD-10-PCS | Mod: PBBFAC,,, | Performed by: NURSE PRACTITIONER

## 2022-07-06 PROCEDURE — 1160F PR REVIEW ALL MEDS BY PRESCRIBER/CLIN PHARMACIST DOCUMENTED: ICD-10-PCS | Mod: CPTII,S$GLB,, | Performed by: NURSE PRACTITIONER

## 2022-07-06 PROCEDURE — 3008F BODY MASS INDEX DOCD: CPT | Mod: CPTII,S$GLB,, | Performed by: NURSE PRACTITIONER

## 2022-07-06 PROCEDURE — 3044F HG A1C LEVEL LT 7.0%: CPT | Mod: CPTII,S$GLB,, | Performed by: NURSE PRACTITIONER

## 2022-07-06 PROCEDURE — 99214 PR OFFICE/OUTPT VISIT, EST, LEVL IV, 30-39 MIN: ICD-10-PCS | Mod: S$GLB,,, | Performed by: NURSE PRACTITIONER

## 2022-07-06 NOTE — PROGRESS NOTES
Subjective:       Patient ID: Vishnu Dougherty Jr. is a 62 y.o. male.    Chief Complaint: Dementia, Memory Loss, and Follow-up    HPI       The patient I here for memory loss. The patient is presenting with 6-year history of memory loss since 2015 at the age of 55.The patient is accompanied by his sister.He started having significant errors while working and ended up losing his job as a  in 2018. He was evaluated by Dr. Clemente in 2015 and diagnosed with ADOLFO. He was again evaluated by Dr. Clemente in 2018 when the family noted remarkable memory loss and scored 22/30 and yet was not diagnosed with dementia. His sister moved him to Assisted Living in  after falling in the yard and it became impossible for the patient to take care of himself. He is maintained on Aricept 10 mg QHS. Takes Wellbutrin and Cymbalta for ADOLFO-MDD. No SI/HI. He is currently oriented to person and place but not time and date. He is dependent but able to ambulate, feed himself and use the bathroom. Has good appetite and sleeps well. No recent behavioral disturbances. From [id number] underwent 4 CTs and 2 MRIs of the Brain that showed progressive atrophy. Labs show NL TFT, Low B12 and Vitamin D with no replacement noted. The last EKG in  showed prolonged QT interval. Tolerating Aricept 5 mg QHS and Namenda 5 mg BID with no issues. Doing well, living in assisted living. Able to preform ADLs without assistance. Denies behavorial disturbances or hallucinations. EKG ordered on 6/1/2021 not completed. Continues Vitamin B12 and Vitamin D3 replacement therapy. Complains of pain in left, medial forearm that starts from his elbow and radiates into lower arm. Believes it is a pulled muscle. Started when he was picking up branches at assisted living last week. States pain is aggravated with pulling movements and improved with rest. Denies weakness, swelling, numbness or tingling in extremity. Complaint of tailbone pain. Patient  tolerating  Aricept 5 mg QHS and Namenda 5 mg BID with no issues. Doing well, living in assisted living. Able to preform ADLs without assistance. Denies behavorial disturbances or hallucinations. No cognitive decline noted.  Currently taking Vitamin B12 and Vitamin D3 replacement therapy. Complains on tail bone pain started 3 days ago, and worsened with sitting, improved with standing. Patient had similar complaint in April, PCP prescribed Mobic and was very effective and tolerated well. Denies falls, denies injury, no weakness, no swelling,  No numbness or tingling in extremity. 9- EKG , HR 55 Slightly increased from 442 to 456; remains prolonged. Keep Aricept dose at 5 mg QHS. Labs 9-9-2021 Vit B-12 210 NL, Vit D 66 NL      Interval Note 07-: Patient present for follow up. Accompanied by sister. Patient is doing well. Continues to live at Assistant living facility. Tolerating Aricept 10 mg QHS and Namenda 5 mg BID with no issues. Able to preform ADLs without assistance. Denies behavorial disturbances or hallucinations. Cognition decreased unable to recall date, time, unable to to recall current president.  Currently taking Vitamin B12 and Vitamin D3 replacement therapy.  He asked if it was possible that my kids will have AD. Discussed AD causes and genetic testing for AD.      Patient denies falls, denies injury, no weakness, no swelling, No numbness or tingling in extremity.         Review of Systems   Constitutional: Negative for appetite change and fatigue.   HENT: Negative for hearing loss and tinnitus.    Eyes: Negative for photophobia and visual disturbance.   Respiratory: Negative for apnea and shortness of breath.    Cardiovascular: Negative for chest pain and palpitations.   Gastrointestinal: Negative for nausea and vomiting.   Genitourinary: Negative for difficulty urinating and urgency.   Musculoskeletal: Positive for arthralgias. Negative for gait problem.        Tailbone pain    Skin: Negative  for color change and rash.   Neurological: Positive for tremors. Negative for dizziness, seizures, syncope, facial asymmetry, speech difficulty, weakness, light-headedness, numbness and headaches.   Psychiatric/Behavioral: Positive for confusion. Negative for agitation, behavioral problems, decreased concentration, dysphoric mood and hallucinations. The patient is nervous/anxious.                  Current Outpatient Medications:     amlodipine-benazepril 10-20mg (LOTREL) 10-20 mg per capsule, Take 1 capsule by mouth once daily., Disp: 90 capsule, Rfl: 3    aspirin 81 MG Chew, Take 1 tablet (81 mg total) by mouth once daily., Disp: , Rfl: 0    atorvastatin (LIPITOR) 20 MG tablet, Take 1 tablet (20 mg total) by mouth every evening., Disp: 90 tablet, Rfl: 3    buPROPion (WELLBUTRIN SR) 150 MG TBSR 12 hr tablet, Take 1 tablet (150 mg total) by mouth 2 (two) times daily., Disp: 180 tablet, Rfl: 3    cholecalciferol, vitamin D3, 1,250 mcg (50,000 unit) capsule, TAKE 1 CAPSULE BY MOUTH ONE TIME PER WEEK, Disp: 12 capsule, Rfl: 3    cyanocobalamin 1,000 mcg/mL injection, Inject 1 mL (1,000 mcg total) into the muscle every 28 days., Disp: 3 mL, Rfl: 11    donepeziL (ARICEPT) 5 MG tablet, TAKE 1 TABLET BY MOUTH EVERY DAY IN THE EVENING, Disp: 90 tablet, Rfl: 3    DULoxetine (CYMBALTA) 60 MG capsule, Take 1 capsule (60 mg total) by mouth once daily., Disp: 90 capsule, Rfl: 3    memantine (NAMENDA) 5 MG Tab, Take 1 tablet (5 mg total) by mouth 2 (two) times daily., Disp: 180 tablet, Rfl: 3    potassium chloride SA (K-DUR,KLOR-CON) 20 MEQ tablet, Take 1 tablet (20 mEq total) by mouth once daily., Disp: 30 tablet, Rfl: 0  Past Medical History:   Diagnosis Date    Chronic CHF 10/2/2019    Chronic diastolic HF (heart failure) 4/17/2018    Coronary artery calcification 1/11/2022    Dilated cardiomyopathy 9/24/2015    Enlarged prostate     Essential hypertension 9/24/2015    Gastroesophageal reflux disease without  esophagitis 10/7/2015    Gastroesophageal reflux disease without esophagitis 10/7/2015    Hyperlipidemia     Personal history of colonic polyps 2016    Shortness of breath 2015     Past Surgical History:   Procedure Laterality Date    COLONOSCOPY N/A 2016    Procedure: COLONOSCOPY;  Surgeon: Demetrio Spicer MD;  Location: [facility];  Service: Endoscopy;  Laterality: N/A;     Social History     Socioeconomic History    Marital status:     Number of children: 3   Occupational History    Occupation: disability   Tobacco Use    Smoking status: Former Smoker     Quit date: 1995     Years since quittin.8    Smokeless tobacco: Never Used   Substance and Sexual Activity    Alcohol use: No     Alcohol/week: 0.0 standard drinks    Drug use: No    Sexual activity: Not Currently     Partners: Female     Social Determinants of Health     Financial Resource Strain: Low Risk     Difficulty of Paying Living Expenses: Not hard at all   Food Insecurity: No Food Insecurity    Worried About Running Out of Food in the Last Year: Never true    Ran Out of Food in the Last Year: Never true   Transportation Needs: No Transportation Needs    Lack of Transportation (Medical): No    Lack of Transportation (Non-Medical): No   Physical Activity: Insufficiently Active    Days of Exercise per Week: 3 days    Minutes of Exercise per Session: 10 min   Stress: No Stress Concern Present    Feeling of Stress : Not at all   Social Connections: Socially Isolated    Frequency of Communication with Friends and Family: Once a week    Frequency of Social Gatherings with Friends and Family: Once a week    Attends Spiritism Services: Never    Active Member of Clubs or Organizations: No    Attends Club or Organization Meetings: Never    Marital Status:    Housing Stability: Low Risk     Unable to Pay for Housing in the Last Year: No    Number of Places Lived in the Last Year: 1    Unstable Housing in  the Last Year: No             Past/Current Medical/Surgical History, Past/Current Social History, Past/Current Family History and Past/Current Medications were reviewed in detail.        Objective:           VITAL SIGNS WERE REVIEWED      GENERAL APPEARANCE:     The patient looks comfortable.    BMI 26.41    No signs of respiratory distress.    Normal breathing pattern.    No dysmorphic features    Normal eye contact.     GENERAL MEDICAL EXAM:    HEENT:  Head is atraumatic normocephalic.      Neck and Axillae: No JVD. No visible lesions.    Cardiopulmonary: No cyanosis. No tachypnea. Normal respiratory effort.    Gastrointestinal/Urogenital:  No jaundice. No stomas or lesions. No visible hernias. No catheters.     Skin, Hair and Nails: No pathognonomic skin rash. No neurofibromatosis. No visible lesions.No stigmata of autoimmune disease. No clubbing.    Limbs: No varicose veins. No visible swelling. No loss of strength.    Muskoskeletal: No visible deformities.No visible lesions.           Neurologic Exam     Mental Status   Oriented to person.   Oriented to place.   Disoriented to time. Disoriented to month and date. Oriented to year, day and season.   Follows 2 step commands.   Attention: normal. Concentration: normal.   Speech: speech is normal   Level of consciousness: alert  Knowledge: poor. Able to perform simple calculations.   Able to name object. Able to repeat. Normal comprehension.     MOCA 17>21>21    Visuospatial/Executive 3/5    Naming                            3/3    Attention                         6/6    Language                         3/3    Abstraction                    2/2    Recall                                0/5    Orientation                     3/6    MILD DEMENTIA 20-25    +1 for education        Cranial Nerves   Cranial nerves II through XII intact.     CN II   Visual fields full to confrontation.   Visual acuity: normal  Right visual field deficit: none  Left visual field deficit:  none     CN III, IV, VI   Pupils are equal, round, and reactive to light.  Extraocular motions are normal.   Right pupil: Size: 2 mm. Shape: regular. Reactivity: brisk. Consensual response: intact. Accommodation: intact.   Left pupil: Size: 2 mm. Shape: regular. Reactivity: brisk. Consensual response: intact. Accommodation: intact.   CN III: no CN III palsy  CN VI: no CN VI palsy  Nystagmus: none   Diplopia: none  Ophthalmoparesis: none  Upgaze: normal  Downgaze: normal    CN V   Facial sensation intact.   Right facial sensation deficit: none  Left facial sensation deficit: none    CN VII   Facial expression full, symmetric.   Right facial weakness: none  Left facial weakness: none    CN VIII   CN VIII normal.   Hearing: intact    CN IX, X   CN IX normal.   CN X normal.   Palate: symmetric    CN XI   CN XI normal.   Right sternocleidomastoid strength: normal  Left sternocleidomastoid strength: normal  Right trapezius strength: normal  Left trapezius strength: normal    CN XII   CN XII normal.   Tongue: not atrophic  Fasciculations: absent  Tongue deviation: none    Motor Exam   Muscle bulk: normal  Overall muscle tone: normal  Right arm tone: normal  Left arm tone: normal  Right arm pronator drift: absent  Left arm pronator drift: absent  Right leg tone: normal  Left leg tone: normal    Strength   Strength 5/5 throughout.   Right neck flexion: 5/5  Left neck flexion: 5/5  Right neck extension: 5/5  Left neck extension: 5/5  Right deltoid: 5/5  Left deltoid: 5/5  Right biceps: 5/5  Left biceps: 5/5  Right triceps: 5/5  Left triceps: 5/5  Right wrist flexion: 5/5  Left wrist flexion: 5/5  Right wrist extension: 5/5  Left wrist extension: 5/5  Right interossei: 5/5  Left interossei: 5/5  Right iliopsoas: 5/5  Left iliopsoas: 5/5  Right quadriceps: 5/5  Left quadriceps: 5/5  Right hamstrin/5  Left hamstrin/5  Right glutei: 5/5  Left glutei: 5/5  Right anterior tibial: 5/5  Left anterior tibial: 5/5  Right  posterior tibial: 5/5  Left posterior tibial: 5/5  Right peroneal: 5/5  Left peroneal: 5/5  Right gastroc: 5/5  Left gastroc: 5/5    Sensory Exam   Light touch normal.   Right arm light touch: normal  Left arm light touch: normal  Right leg light touch: normal  Left leg light touch: normal  Right arm vibration: normal  Left arm vibration: normal  Right leg vibration: normal  Left leg vibration: decreased from knee  Proprioception normal.   Right arm proprioception: normal  Left arm proprioception: normal  Right leg proprioception: normal  Left leg proprioception: normal  Pinprick normal.   Right arm pinprick: normal  Left arm pinprick: normal  Right leg pinprick: normal  Left leg pinprick: normal  Graphesthesia: normal  Stereognosis: normal    Gait, Coordination, and Reflexes     Gait  Gait: normal    Coordination   Romberg: negative  Finger to nose coordination: normal  Heel to shin coordination: normal  Tandem walking coordination: normal    Tremor   Resting tremor: absent  Intention tremor: present  Action tremor: absent    Reflexes   Right brachioradialis: 2+  Left brachioradialis: 2+  Right biceps: 2+  Left biceps: 2+  Right triceps: 2+  Left triceps: 2+  Right patellar: 2+  Left patellar: 2+  Right achilles: 2+  Left achilles: 2+  Right plantar: normal  Left plantar: normal  Right Lee: absent  Left Lee: absent  Right ankle clonus: absent  Left ankle clonus: absent  Right pendular knee jerk: absent  Left pendular knee jerk: absent      Lab Results   Component Value Date    WBC 6.35 04/04/2022    HGB 12.5 (L) 04/04/2022    HCT 38.3 (L) 04/04/2022    MCV 93 04/04/2022     04/04/2022     Sodium   Date Value Ref Range Status   05/16/2022 142 136 - 145 mmol/L Final     Potassium   Date Value Ref Range Status   05/16/2022 4.0 3.5 - 5.1 mmol/L Final     Chloride   Date Value Ref Range Status   05/16/2022 105 95 - 110 mmol/L Final     CO2   Date Value Ref Range Status   05/16/2022 25 23 - 29 mmol/L Final      Glucose   Date Value Ref Range Status   05/16/2022 70 70 - 110 mg/dL Final     BUN   Date Value Ref Range Status   05/16/2022 14 8 - 23 mg/dL Final     Creatinine   Date Value Ref Range Status   05/16/2022 1.0 0.5 - 1.4 mg/dL Final     Calcium   Date Value Ref Range Status   05/16/2022 9.1 8.7 - 10.5 mg/dL Final     Total Protein   Date Value Ref Range Status   04/04/2022 7.1 6.0 - 8.4 g/dL Final     Albumin   Date Value Ref Range Status   04/04/2022 4.0 3.5 - 5.2 g/dL Final     Total Bilirubin   Date Value Ref Range Status   04/04/2022 0.3 0.1 - 1.0 mg/dL Final     Comment:     For infants and newborns, interpretation of results should be based  on gestational age, weight and in agreement with clinical  observations.    Premature Infant recommended reference ranges:  Up to 24 hours.............<8.0 mg/dL  Up to 48 hours............<12.0 mg/dL  3-5 days..................<15.0 mg/dL  6-29 days.................<15.0 mg/dL       Alkaline Phosphatase   Date Value Ref Range Status   04/04/2022 90 55 - 135 U/L Final     AST   Date Value Ref Range Status   04/04/2022 15 10 - 40 U/L Final     ALT   Date Value Ref Range Status   04/04/2022 12 10 - 44 U/L Final     Anion Gap   Date Value Ref Range Status   05/16/2022 12 8 - 16 mmol/L Final     eGFR if    Date Value Ref Range Status   05/16/2022 >60.0 >60 mL/min/1.73 m^2 Final     eGFR if non    Date Value Ref Range Status   05/16/2022 >60.0 >60 mL/min/1.73 m^2 Final     Comment:     Calculation used to obtain the estimated glomerular filtration  rate (eGFR) is the CKD-EPI equation.        Lab Results   Component Value Date    QMIANYZF15 210 09/09/2021     Lab Results   Component Value Date    TSH 1.604 04/04/2022    FREET4 1.09 11/17/2017     Labs Reviewed.      9-9-2021     Vit B-12 210 NL, Vit D 66 NL    Underwent 4 CTH and 2 MRIs [id number]    10-    CT Atrophy       10-    CT Atrophy      [id number]    [facility]  Atrophy      10-    [facility] Atrophy       04-     Brain MRI Atrophy      10-     Brain MRI Atrophy     [id number]    TFT (TSH, T4) NL    B12 Low 227>208    10-    EKG  ms Prolonged       05-    Labs B12-MMA, FA-HC, RPR, Vitamin D    Severe B12 Deficiency <148 and Vitamin D insufficient <25       05-    EKG , HR is 59    Improved from 492 but remains prolonged     Decrease Aricept dose to 5 mg QHS (1/2 the dose) and repeat EKG in 2 weeks    05-    EKG , HR is 59    9-     EKG , HR 55      Slightly increased from 442 to 456; remains prolonged     Keep Aricept dose at 5 mg QHS. Will recheck in 6 months.      Labs Reviewed:     01-     QT Interval 426  HR 55    Assessment:       1. Major neurocognitive disorder due to Alzheimer's disease, without behavioral disturbance    2. Memory loss of unknown cause    3. Dementia without behavioral disturbance, unspecified dementia type    4. B12 deficiency    5. Moderate dementia with behavioral disturbance    6. Prolonged Q-T interval on ECG    7. Vitamin D deficiency    8. Anxiety and depression        Plan:       MODERATE- SEVERE DEMENTIA, AD, BEHAVIORAL DISTURBANCES, EARLY ONSET          EVALUATION     DISEASE-MODIFYING AGENTS     Explained to the patient and family that medications are intended to slow down the progression and not stop it or reverse the disease.The disease is relentless, progressive and so far cannot be controlled.     Continue memantine/Namenda 5 mg BID    Continue donepezil/Aricept 10 mg QHS .      SYMPTOMATIC MANAGEMENT-BEHAVIORAL SYMPTOMS     Continue Cymbalta and Wellbutrin. .      HOME CARE-ASSISTED LIVING     Assisted living [facility]  and medication pass.     Family can undergo genetic testing APOE4 for AD risk    Falling Down Precautions.     Avoid antihistamines and anticholinergics.    Avoid changing routine.    Use written  reminders.    Avoid multitasking.    Healthy diet, exercise (physical and cognitive).    Good sleep hygiene.    CAREGIVERS COUNSELING     For behavioral problems I recommended that family to try some of the following communication tips: Try not to react and stay calm, Don't argue , Do not use logic, Let the patient feel safe, Keep reminding the patient and use photos if necessary, Distract the patient, Search for things to distract the person, then talk about what you found. For example, talk about a photograph or keepsake or even food, Use gentle touching or hugging to show you care, Body language matters, Use a cooling off period if needed, when possible. If safe to do so, give the patient some space or breathing room. Will consider antipsychotic medications as a last resort because of the risk of CAD and CVD.    Recommend reading the following books:     The 36-Hour Day and there are many online and printed resources to help caregivers as well.     Alzheimer's Through the Stages.     Dementia with TIFFANIE.    For More Information About Hallucinations, Delusions, and Paranoia in Alzheimer's   [facility] Alzheimer's and related Dementias Education and Referral (ADEAR) Center   1-656.983.6981 (toll-free)   adear@anel.nih.gov   www.anel.nih.gov/alzheimers   The [employer]'s ADEAR Center offers information and free print publications about Alzheimer's disease and related dementias for families, caregivers, and health professionals. ADEAR Center staff answer telephone, email, and written requests and make referrals to local and national resources      PREVENTION OF DELIRIUM       1. Good hydration and avoid electrolyte imbalance  2. Recognize andtreat infections immediately especially UTI.  3. Bladder emptying and prevent constipation.   4. Provide stimulating activities and familiar objects  5. Use eyeglasses and hearing aids if needed.   6. Use simple and regular communication about people, current place  and time  7. Mobility and range-of-motion exercises  8. Reduce noise, lighting and avoid sleep interruptions  9. Non-narcotic pain management.  10.Nondrug treatment for sleep problems or anxiety  11. Avoid antihistamines.  12. Avoid narcotics.  13. Avoid benzodiazepines.     VITAMIN D DEFICIENCY    Continue cholecalciferol, vit D, 1,259 mcg (50,000 unit) once a week    Recheck vit D level when available     VITAMIN B12 DEFICIENCY    Continue Cyanocobalamin 1,000 mcg/ml injection every 28 days    MEDICAL/SURGICAL COMORBIDITIES     All relevant medical comorbidities noted and managed by primary care physician and medical care team.        MISCELLANEOUS MEDICAL PROBLEMS       HEALTHY LIFESTYLE AND PREVENTATIVE CARE    The patient to adhere to the age-appropriate health maintenance guidelines including screening tests and vaccinations. The patient to adhere to  healthy lifestyle, optimal weight, exercise, healthy diet, good sleep hygiene and avoiding drugs including smoking, alcohol and recreational drugs.        RTC in 6 months     Total E/M 30  mins      Cecilia Hernandez, MSN NP      Collaborating Provider: Benjy Cameron MD, FAAN Neurologist/Epileptologist           I spent 30  minutes total E/M  More than 50 % spent face to face with the patient

## 2022-07-06 NOTE — PATIENT INSTRUCTIONS
What does it mean if you have the APOE4 gene?    APOE4 is the strongest risk factor gene for Alzheimer's disease, although inheriting APOE4 does not mean a person will definitely develop the disease.

## 2022-09-12 ENCOUNTER — TELEPHONE (OUTPATIENT)
Dept: NEUROLOGY | Facility: CLINIC | Age: 62
End: 2022-09-12
Payer: MEDICARE

## 2022-09-12 DIAGNOSIS — F02.80 ALZHEIMER'S DEMENTIA WITHOUT BEHAVIORAL DISTURBANCE, UNSPECIFIED TIMING OF DEMENTIA ONSET: ICD-10-CM

## 2022-09-12 DIAGNOSIS — F02.818 ALZHEIMER'S DEMENTIA OF OTHER ONSET WITH BEHAVIORAL DISTURBANCE: ICD-10-CM

## 2022-09-12 DIAGNOSIS — G30.9 ALZHEIMER'S DEMENTIA WITHOUT BEHAVIORAL DISTURBANCE, UNSPECIFIED TIMING OF DEMENTIA ONSET: ICD-10-CM

## 2022-09-12 DIAGNOSIS — G30.8 ALZHEIMER'S DEMENTIA OF OTHER ONSET WITH BEHAVIORAL DISTURBANCE: ICD-10-CM

## 2022-09-12 DIAGNOSIS — G31.84 MILD COGNITIVE IMPAIRMENT WITH MEMORY LOSS: Primary | ICD-10-CM

## 2022-09-12 RX ORDER — RISPERIDONE 0.5 MG/1
0.5 TABLET ORAL NIGHTLY
Qty: 30 TABLET | Refills: 11 | Status: SHIPPED | OUTPATIENT
Start: 2022-09-12 | End: 2022-09-29 | Stop reason: SDUPTHER

## 2022-09-12 NOTE — TELEPHONE ENCOUNTER
Spoke with patient's sister to discuss current concerns she has for the patient. I sent a High Priority message to Ms Sellers.-BR

## 2022-09-12 NOTE — TELEPHONE ENCOUNTER
----- Message from Neyda Cooley sent at 9/12/2022 12:46 PM CDT -----  Contact: Rach/Sister  Patient's sister is calling to speak with someone regarding concerns for the patient. Patient's sister reports patient is experiencing depression symptoms and request further assistance regarding patient's care. Please give Rach a call back at [phone] as soon as possible.  Thank you,  GH

## 2022-09-13 ENCOUNTER — TELEPHONE (OUTPATIENT)
Dept: NEUROLOGY | Facility: CLINIC | Age: 62
End: 2022-09-13
Payer: MEDICARE

## 2022-09-13 NOTE — TELEPHONE ENCOUNTER
Spoke with patient's sister per Verito David and instructed her to admit the patient to a treatment facility or the ER. The sister stated that he was being taken to [facility].-BR

## 2022-09-13 NOTE — TELEPHONE ENCOUNTER
----- Message from Verito Hernandez NP sent at 9/13/2022  3:42 PM CDT -----  He would mostly likely benefit from inpatient treatment for depression. If he verbalized wanting to commit suicide, bring him to the ER for treatment   ----- Message -----  From: Kayleigh Hill MA  Sent: 9/12/2022   1:24 PM CDT  To: Verito Hernandez NP    Patient's sister Rach stated that her brother, the patient has tried leaving the facility that he lives at. She stated that he is super depressed and angry and has mentioned committing suicide. The patient enjoys writing and has not been able to write for the past 6 weeks.

## 2022-09-29 ENCOUNTER — OFFICE VISIT (OUTPATIENT)
Dept: NEUROLOGY | Facility: CLINIC | Age: 62
End: 2022-09-29
Payer: MEDICARE

## 2022-09-29 VITALS
WEIGHT: 179.44 LBS | BODY MASS INDEX: 27.19 KG/M2 | OXYGEN SATURATION: 98 % | SYSTOLIC BLOOD PRESSURE: 115 MMHG | HEART RATE: 71 BPM | HEIGHT: 68 IN | RESPIRATION RATE: 16 BRPM | DIASTOLIC BLOOD PRESSURE: 70 MMHG

## 2022-09-29 DIAGNOSIS — G31.84 MILD COGNITIVE IMPAIRMENT WITH MEMORY LOSS: Primary | ICD-10-CM

## 2022-09-29 DIAGNOSIS — F02.80 ALZHEIMER'S DEMENTIA WITHOUT BEHAVIORAL DISTURBANCE, UNSPECIFIED TIMING OF DEMENTIA ONSET: ICD-10-CM

## 2022-09-29 DIAGNOSIS — R41.3 MEMORY LOSS OF UNKNOWN CAUSE: ICD-10-CM

## 2022-09-29 DIAGNOSIS — F02.818 ALZHEIMER'S DEMENTIA OF OTHER ONSET WITH BEHAVIORAL DISTURBANCE: ICD-10-CM

## 2022-09-29 DIAGNOSIS — G30.8 ALZHEIMER'S DEMENTIA OF OTHER ONSET WITH BEHAVIORAL DISTURBANCE: ICD-10-CM

## 2022-09-29 DIAGNOSIS — E53.8 B12 DEFICIENCY: ICD-10-CM

## 2022-09-29 DIAGNOSIS — R94.31 PROLONGED Q-T INTERVAL ON ECG: ICD-10-CM

## 2022-09-29 DIAGNOSIS — F03.90 DEMENTIA WITHOUT BEHAVIORAL DISTURBANCE, UNSPECIFIED DEMENTIA TYPE: ICD-10-CM

## 2022-09-29 DIAGNOSIS — G30.9 MAJOR NEUROCOGNITIVE DISORDER DUE TO ALZHEIMER'S DISEASE, WITHOUT BEHAVIORAL DISTURBANCE: ICD-10-CM

## 2022-09-29 DIAGNOSIS — G30.9 ALZHEIMER'S DEMENTIA WITHOUT BEHAVIORAL DISTURBANCE, UNSPECIFIED TIMING OF DEMENTIA ONSET: ICD-10-CM

## 2022-09-29 DIAGNOSIS — F03.B18 MODERATE DEMENTIA WITH BEHAVIORAL DISTURBANCE: ICD-10-CM

## 2022-09-29 DIAGNOSIS — F02.80 MAJOR NEUROCOGNITIVE DISORDER DUE TO ALZHEIMER'S DISEASE, WITHOUT BEHAVIORAL DISTURBANCE: ICD-10-CM

## 2022-09-29 PROCEDURE — 3074F PR MOST RECENT SYSTOLIC BLOOD PRESSURE < 130 MM HG: ICD-10-PCS | Mod: CPTII,S$GLB,, | Performed by: NURSE PRACTITIONER

## 2022-09-29 PROCEDURE — 99999 PR PBB SHADOW E&M-EST. PATIENT-LVL IV: CPT | Mod: PBBFAC,,, | Performed by: NURSE PRACTITIONER

## 2022-09-29 PROCEDURE — 1160F RVW MEDS BY RX/DR IN RCRD: CPT | Mod: CPTII,S$GLB,, | Performed by: NURSE PRACTITIONER

## 2022-09-29 PROCEDURE — 4010F ACE/ARB THERAPY RXD/TAKEN: CPT | Mod: CPTII,S$GLB,, | Performed by: NURSE PRACTITIONER

## 2022-09-29 PROCEDURE — 1159F PR MEDICATION LIST DOCUMENTED IN MEDICAL RECORD: ICD-10-PCS | Mod: CPTII,S$GLB,, | Performed by: NURSE PRACTITIONER

## 2022-09-29 PROCEDURE — 3078F PR MOST RECENT DIASTOLIC BLOOD PRESSURE < 80 MM HG: ICD-10-PCS | Mod: CPTII,S$GLB,, | Performed by: NURSE PRACTITIONER

## 2022-09-29 PROCEDURE — 99214 PR OFFICE/OUTPT VISIT, EST, LEVL IV, 30-39 MIN: ICD-10-PCS | Mod: S$GLB,,, | Performed by: NURSE PRACTITIONER

## 2022-09-29 PROCEDURE — 4010F PR ACE/ARB THEARPY RXD/TAKEN: ICD-10-PCS | Mod: CPTII,S$GLB,, | Performed by: NURSE PRACTITIONER

## 2022-09-29 PROCEDURE — 3044F HG A1C LEVEL LT 7.0%: CPT | Mod: CPTII,S$GLB,, | Performed by: NURSE PRACTITIONER

## 2022-09-29 PROCEDURE — 3044F PR MOST RECENT HEMOGLOBIN A1C LEVEL <7.0%: ICD-10-PCS | Mod: CPTII,S$GLB,, | Performed by: NURSE PRACTITIONER

## 2022-09-29 PROCEDURE — 99214 OFFICE O/P EST MOD 30 MIN: CPT | Mod: S$GLB,,, | Performed by: NURSE PRACTITIONER

## 2022-09-29 PROCEDURE — 3008F PR BODY MASS INDEX (BMI) DOCUMENTED: ICD-10-PCS | Mod: CPTII,S$GLB,, | Performed by: NURSE PRACTITIONER

## 2022-09-29 PROCEDURE — 3078F DIAST BP <80 MM HG: CPT | Mod: CPTII,S$GLB,, | Performed by: NURSE PRACTITIONER

## 2022-09-29 PROCEDURE — 1160F PR REVIEW ALL MEDS BY PRESCRIBER/CLIN PHARMACIST DOCUMENTED: ICD-10-PCS | Mod: CPTII,S$GLB,, | Performed by: NURSE PRACTITIONER

## 2022-09-29 PROCEDURE — 1159F MED LIST DOCD IN RCRD: CPT | Mod: CPTII,S$GLB,, | Performed by: NURSE PRACTITIONER

## 2022-09-29 PROCEDURE — 3074F SYST BP LT 130 MM HG: CPT | Mod: CPTII,S$GLB,, | Performed by: NURSE PRACTITIONER

## 2022-09-29 PROCEDURE — 3008F BODY MASS INDEX DOCD: CPT | Mod: CPTII,S$GLB,, | Performed by: NURSE PRACTITIONER

## 2022-09-29 PROCEDURE — 99999 PR PBB SHADOW E&M-EST. PATIENT-LVL IV: ICD-10-PCS | Mod: PBBFAC,,, | Performed by: NURSE PRACTITIONER

## 2022-09-29 RX ORDER — VALPROIC ACID 250 MG/5ML
2 SOLUTION ORAL 2 TIMES DAILY
COMMUNITY
Start: 2022-09-27 | End: 2022-11-07

## 2022-09-29 RX ORDER — MIRTAZAPINE 7.5 MG/1
7.5 TABLET, FILM COATED ORAL NIGHTLY
Status: ON HOLD | COMMUNITY
Start: 2022-09-27 | End: 2022-10-06 | Stop reason: HOSPADM

## 2022-09-29 RX ORDER — RISPERIDONE 1 MG/1
1 TABLET ORAL NIGHTLY
Qty: 30 TABLET | Refills: 11 | Status: ON HOLD
Start: 2022-09-29 | End: 2022-10-06 | Stop reason: HOSPADM
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
## 2022-10-04 ENCOUNTER — OFFICE VISIT (OUTPATIENT)
Dept: INTERNAL MEDICINE | Facility: CLINIC | Age: 62
End: 2022-10-04
Payer: MEDICARE

## 2022-10-04 ENCOUNTER — LAB VISIT (OUTPATIENT)
Dept: LAB | Facility: HOSPITAL | Age: 62
End: 2022-10-04
Attending: STUDENT IN AN ORGANIZED HEALTH CARE EDUCATION/TRAINING PROGRAM
Payer: MEDICARE

## 2022-10-04 VITALS
OXYGEN SATURATION: 96 % | SYSTOLIC BLOOD PRESSURE: 116 MMHG | TEMPERATURE: 98 F | HEIGHT: 68 IN | HEART RATE: 69 BPM | BODY MASS INDEX: 28.1 KG/M2 | DIASTOLIC BLOOD PRESSURE: 60 MMHG | WEIGHT: 185.44 LBS

## 2022-10-04 DIAGNOSIS — F02.818 ALZHEIMER'S DEMENTIA WITH OTHER BEHAVIORAL DISTURBANCE, UNSPECIFIED DEMENTIA SEVERITY, UNSPECIFIED TIMING OF DEMENTIA ONSET: ICD-10-CM

## 2022-10-04 DIAGNOSIS — F32.A DEPRESSION, UNSPECIFIED DEPRESSION TYPE: ICD-10-CM

## 2022-10-04 DIAGNOSIS — F41.9 ANXIETY AND DEPRESSION: ICD-10-CM

## 2022-10-04 DIAGNOSIS — I10 HYPERTENSION, UNSPECIFIED TYPE: ICD-10-CM

## 2022-10-04 DIAGNOSIS — I10 ESSENTIAL HYPERTENSION: ICD-10-CM

## 2022-10-04 DIAGNOSIS — G30.9 ALZHEIMER'S DEMENTIA WITH OTHER BEHAVIORAL DISTURBANCE, UNSPECIFIED DEMENTIA SEVERITY, UNSPECIFIED TIMING OF DEMENTIA ONSET: ICD-10-CM

## 2022-10-04 DIAGNOSIS — Z86.73 HISTORY OF TIA (TRANSIENT ISCHEMIC ATTACK): ICD-10-CM

## 2022-10-04 DIAGNOSIS — F32.A ANXIETY AND DEPRESSION: ICD-10-CM

## 2022-10-04 DIAGNOSIS — D64.9 ANEMIA, UNSPECIFIED TYPE: ICD-10-CM

## 2022-10-04 DIAGNOSIS — D64.9 ANEMIA, UNSPECIFIED TYPE: Primary | ICD-10-CM

## 2022-10-04 LAB
ANION GAP SERPL CALC-SCNC: 8 MMOL/L (ref 8–16)
BASOPHILS # BLD AUTO: 0.09 K/UL (ref 0–0.2)
BASOPHILS NFR BLD: 1.3 % (ref 0–1.9)
BUN SERPL-MCNC: 8 MG/DL (ref 8–23)
CALCIUM SERPL-MCNC: 9.2 MG/DL (ref 8.7–10.5)
CHLORIDE SERPL-SCNC: 105 MMOL/L (ref 95–110)
CO2 SERPL-SCNC: 26 MMOL/L (ref 23–29)
CREAT SERPL-MCNC: 0.9 MG/DL (ref 0.5–1.4)
DIFFERENTIAL METHOD: ABNORMAL
EOSINOPHIL # BLD AUTO: 0.3 K/UL (ref 0–0.5)
EOSINOPHIL NFR BLD: 4.4 % (ref 0–8)
ERYTHROCYTE [DISTWIDTH] IN BLOOD BY AUTOMATED COUNT: 13.2 % (ref 11.5–14.5)
EST. GFR  (NO RACE VARIABLE): >60 ML/MIN/1.73 M^2
FERRITIN SERPL-MCNC: 179 NG/ML (ref 20–300)
GLUCOSE SERPL-MCNC: 110 MG/DL (ref 70–110)
HCT VFR BLD AUTO: 35.2 % (ref 40–54)
HGB BLD-MCNC: 11.2 G/DL (ref 14–18)
IMM GRANULOCYTES # BLD AUTO: 0.05 K/UL (ref 0–0.04)
IMM GRANULOCYTES NFR BLD AUTO: 0.7 % (ref 0–0.5)
IRON SERPL-MCNC: 59 UG/DL (ref 45–160)
LYMPHOCYTES # BLD AUTO: 1.6 K/UL (ref 1–4.8)
LYMPHOCYTES NFR BLD: 23.3 % (ref 18–48)
MCH RBC QN AUTO: 31.5 PG (ref 27–31)
MCHC RBC AUTO-ENTMCNC: 31.8 G/DL (ref 32–36)
MCV RBC AUTO: 99 FL (ref 82–98)
MONOCYTES # BLD AUTO: 0.5 K/UL (ref 0.3–1)
MONOCYTES NFR BLD: 7.4 % (ref 4–15)
NEUTROPHILS # BLD AUTO: 4.3 K/UL (ref 1.8–7.7)
NEUTROPHILS NFR BLD: 62.9 % (ref 38–73)
NRBC BLD-RTO: 0 /100 WBC
PLATELET # BLD AUTO: 248 K/UL (ref 150–450)
PMV BLD AUTO: 12.4 FL (ref 9.2–12.9)
POTASSIUM SERPL-SCNC: 4.2 MMOL/L (ref 3.5–5.1)
RBC # BLD AUTO: 3.56 M/UL (ref 4.6–6.2)
SATURATED IRON: 22 % (ref 20–50)
SODIUM SERPL-SCNC: 139 MMOL/L (ref 136–145)
TOTAL IRON BINDING CAPACITY: 265 UG/DL (ref 250–450)
TRANSFERRIN SERPL-MCNC: 179 MG/DL (ref 200–375)
WBC # BLD AUTO: 6.77 K/UL (ref 3.9–12.7)

## 2022-10-04 PROCEDURE — 80048 BASIC METABOLIC PNL TOTAL CA: CPT | Performed by: STUDENT IN AN ORGANIZED HEALTH CARE EDUCATION/TRAINING PROGRAM

## 2022-10-04 PROCEDURE — 99214 OFFICE O/P EST MOD 30 MIN: CPT | Mod: S$GLB,,, | Performed by: FAMILY MEDICINE

## 2022-10-04 PROCEDURE — 99999 PR PBB SHADOW E&M-EST. PATIENT-LVL V: CPT | Mod: PBBFAC,,, | Performed by: FAMILY MEDICINE

## 2022-10-04 PROCEDURE — 1159F MED LIST DOCD IN RCRD: CPT | Mod: CPTII,S$GLB,, | Performed by: FAMILY MEDICINE

## 2022-10-04 PROCEDURE — 84466 ASSAY OF TRANSFERRIN: CPT | Performed by: FAMILY MEDICINE

## 2022-10-04 PROCEDURE — 3008F PR BODY MASS INDEX (BMI) DOCUMENTED: ICD-10-PCS | Mod: CPTII,S$GLB,, | Performed by: FAMILY MEDICINE

## 2022-10-04 PROCEDURE — 3078F DIAST BP <80 MM HG: CPT | Mod: CPTII,S$GLB,, | Performed by: FAMILY MEDICINE

## 2022-10-04 PROCEDURE — 3008F BODY MASS INDEX DOCD: CPT | Mod: CPTII,S$GLB,, | Performed by: FAMILY MEDICINE

## 2022-10-04 PROCEDURE — 4010F ACE/ARB THERAPY RXD/TAKEN: CPT | Mod: CPTII,S$GLB,, | Performed by: FAMILY MEDICINE

## 2022-10-04 PROCEDURE — 36415 COLL VENOUS BLD VENIPUNCTURE: CPT | Performed by: STUDENT IN AN ORGANIZED HEALTH CARE EDUCATION/TRAINING PROGRAM

## 2022-10-04 PROCEDURE — 99499 UNLISTED E&M SERVICE: CPT | Mod: S$GLB,,, | Performed by: FAMILY MEDICINE

## 2022-10-04 PROCEDURE — 3044F PR MOST RECENT HEMOGLOBIN A1C LEVEL <7.0%: ICD-10-PCS | Mod: CPTII,S$GLB,, | Performed by: FAMILY MEDICINE

## 2022-10-04 PROCEDURE — 82728 ASSAY OF FERRITIN: CPT | Performed by: FAMILY MEDICINE

## 2022-10-04 PROCEDURE — 4010F PR ACE/ARB THEARPY RXD/TAKEN: ICD-10-PCS | Mod: CPTII,S$GLB,, | Performed by: FAMILY MEDICINE

## 2022-10-04 PROCEDURE — 1159F PR MEDICATION LIST DOCUMENTED IN MEDICAL RECORD: ICD-10-PCS | Mod: CPTII,S$GLB,, | Performed by: FAMILY MEDICINE

## 2022-10-04 PROCEDURE — 99214 PR OFFICE/OUTPT VISIT, EST, LEVL IV, 30-39 MIN: ICD-10-PCS | Mod: S$GLB,,, | Performed by: FAMILY MEDICINE

## 2022-10-04 PROCEDURE — 3078F PR MOST RECENT DIASTOLIC BLOOD PRESSURE < 80 MM HG: ICD-10-PCS | Mod: CPTII,S$GLB,, | Performed by: FAMILY MEDICINE

## 2022-10-04 PROCEDURE — 85025 COMPLETE CBC W/AUTO DIFF WBC: CPT | Performed by: FAMILY MEDICINE

## 2022-10-04 PROCEDURE — 3044F HG A1C LEVEL LT 7.0%: CPT | Mod: CPTII,S$GLB,, | Performed by: FAMILY MEDICINE

## 2022-10-04 PROCEDURE — 3074F PR MOST RECENT SYSTOLIC BLOOD PRESSURE < 130 MM HG: ICD-10-PCS | Mod: CPTII,S$GLB,, | Performed by: FAMILY MEDICINE

## 2022-10-04 PROCEDURE — 3074F SYST BP LT 130 MM HG: CPT | Mod: CPTII,S$GLB,, | Performed by: FAMILY MEDICINE

## 2022-10-04 PROCEDURE — 99999 PR PBB SHADOW E&M-EST. PATIENT-LVL V: ICD-10-PCS | Mod: PBBFAC,,, | Performed by: FAMILY MEDICINE

## 2022-10-04 RX ORDER — ESCITALOPRAM OXALATE 10 MG/1
10 TABLET ORAL DAILY
Status: ON HOLD | COMMUNITY
Start: 2022-09-29 | End: 2022-10-06 | Stop reason: HOSPADM

## 2022-10-04 NOTE — PROGRESS NOTES
Subjective:       Patient ID: Vishnu Dougherty Jr. is a 62 y.o. male.    Chief Complaint: Annual Exam    HPI    Patient Active Problem List   Diagnosis    Essential hypertension    Memory loss of unknown cause    Anxiety and depression    Mild cognitive impairment with memory loss    Hypokalemia    TIA (transient ischemic attack)    Chronic congestive heart failure    Contact dermatitis due to poison ivy    Dementia    Prolonged Q-T interval on ECG    B12 deficiency    Other disorders of calcium metabolism     Dementia without behavioral disturbance    Vitamin D deficiency    Lung nodule    Anemia    Coronary artery calcification    Calcification of aorta    Tail bone pain       Past Medical History:   Diagnosis Date    Chronic CHF 10/2/2019    Chronic diastolic HF (heart failure) 2018    Coronary artery calcification 2022    Dilated cardiomyopathy 2015    Enlarged prostate     Essential hypertension 2015    Gastroesophageal reflux disease without esophagitis 10/7/2015    Gastroesophageal reflux disease without esophagitis 10/7/2015    Hyperlipidemia     Personal history of colonic polyps     Shortness of breath 2015       Past Surgical History:   Procedure Laterality Date    COLONOSCOPY N/A 2016    Procedure: COLONOSCOPY;  Surgeon: Dmeetrio Spicer MD;  Location: [facility];  Service: Endoscopy;  Laterality: N/A;       Family History   Problem Relation Age of Onset    Hypertension Mother     Hypertension Father     Stroke Father        Social History     Tobacco Use   Smoking Status Former    Types: Cigarettes    Quit date: 1995    Years since quittin.0   Smokeless Tobacco Never       Wt Readings from Last 5 Encounters:   10/04/22 84.1 kg (185 lb 6.5 oz)   22 81.4 kg (179 lb 7.3 oz)   22 83.6 kg (184 lb 4.9 oz)   22 83.8 kg (184 lb 11.9 oz)   22 82.3 kg (181 lb 7 oz)       For further HPI details, see assessment and plan.    Review of Systems     Objective:      Vitals:    10/04/22 0919   BP: 116/60   Pulse:    Temp:        Physical Exam  Constitutional:       General: He is not in acute distress.     Appearance: Normal appearance. He is well-developed.   Neck:      Trachea: Trachea normal.   Cardiovascular:      Rate and Rhythm: Normal rate and regular rhythm.      Heart sounds: Normal heart sounds.   Pulmonary:      Effort: Pulmonary effort is normal. No respiratory distress.      Breath sounds: Normal breath sounds. No decreased breath sounds.   Abdominal:      Palpations: Abdomen is soft.   Musculoskeletal:      Cervical back: Full passive range of motion without pain.   Neurological:      Mental Status: He is alert and oriented to person, place, and time.   Psychiatric:         Speech: Speech normal.         Behavior: Behavior normal.         Thought Content: Thought content normal.       Assessment:       1. Anemia, unspecified type    2. Depression, unspecified depression type    3. Essential hypertension    4. History of TIA (transient ischemic attack)    5. Anxiety and depression    6. Alzheimer's dementia with other behavioral disturbance, unspecified dementia severity, unspecified timing of dementia onset        Plan:   Anemia, unspecified type  -     Iron and TIBC; Future; Expected date: 10/04/2022  -     CBC Auto Differential; Future; Expected date: 10/04/2022  -     Ferritin; Future; Expected date: 10/04/2022    Depression, unspecified depression type  -     Ambulatory referral/consult to Psychiatry; Future; Expected date: 10/11/2022    Essential hypertension    History of TIA (transient ischemic attack)    Anxiety and depression    Alzheimer's dementia with other behavioral disturbance, unspecified dementia severity, unspecified timing of dementia onset      Anemia  Chronic  Stable  Will monitor  With some iron studies  If does appear iron def may need to revisit idea of Gi scopes  Declined in past by POA    HTN  BP Readings from Last 3  Encounters:   10/04/22 116/60   09/29/22 115/70   07/06/22 127/78   [facility]  [facility] BP med  Now that he is home he is back tot faith listed amlodipine-benazepril  We will leave him on these 2 medications in combination  Of course if pressrue do indeed drop we will adjust accordingly    Alzheimer's dementia  Recent admissions at [facility] from [facility] assisted living.  Disturbance in behavior and increased depression  He was crying a lot - wanted to leave -   Meds adjust  Seems to be less sad now  Lexapro added  Risperdal continued  Duloxetine and remeron continued    I do want him to get in with psychiatry outpatient given the recent meds.  SSRI and SNRI? - seems to be doing better but will ask psychaitry to assist management of these drugs.      H/o TIA  On statin  On asa    Working w/ cards    Shingles shot series to be completed today  He is getting flu shot at facility.    6 month

## 2022-10-05 ENCOUNTER — HOSPITAL ENCOUNTER (OUTPATIENT)
Facility: HOSPITAL | Age: 62
Discharge: PSYCHIATRIC OR SUBSTANCE USE FACILITY | End: 2022-10-06
Attending: EMERGENCY MEDICINE | Admitting: STUDENT IN AN ORGANIZED HEALTH CARE EDUCATION/TRAINING PROGRAM
Payer: MEDICARE

## 2022-10-05 DIAGNOSIS — R07.9 CHEST PAIN: ICD-10-CM

## 2022-10-05 DIAGNOSIS — R26.2 UNABLE TO AMBULATE: Primary | ICD-10-CM

## 2022-10-05 DIAGNOSIS — F03.918 DEMENTIA WITH BEHAVIORAL DISTURBANCE: ICD-10-CM

## 2022-10-05 DIAGNOSIS — G25.9 EXTRAPYRAMIDAL AND MOVEMENT DISORDER: ICD-10-CM

## 2022-10-05 DIAGNOSIS — R25.1 TREMOR: ICD-10-CM

## 2022-10-05 PROBLEM — G25.2 COARSE TREMORS: Status: ACTIVE | Noted: 2022-10-05

## 2022-10-05 PROBLEM — E87.6 HYPOKALEMIA: Status: ACTIVE | Noted: 2022-10-05

## 2022-10-05 PROBLEM — E87.6 HYPOKALEMIA: Status: RESOLVED | Noted: 2022-10-05 | Resolved: 2022-10-05

## 2022-10-05 PROBLEM — E87.6 HYPOKALEMIA: Status: RESOLVED | Noted: 2019-10-02 | Resolved: 2022-10-05

## 2022-10-05 LAB
ALBUMIN SERPL BCP-MCNC: 3.1 G/DL (ref 3.5–5.2)
ALP SERPL-CCNC: 63 U/L (ref 55–135)
ALT SERPL W/O P-5'-P-CCNC: 20 U/L (ref 10–44)
AMPHET+METHAMPHET UR QL: NEGATIVE
ANION GAP SERPL CALC-SCNC: 9 MMOL/L (ref 8–16)
AST SERPL-CCNC: 17 U/L (ref 10–40)
BARBITURATES UR QL SCN>200 NG/ML: NEGATIVE
BASOPHILS # BLD AUTO: 0.07 K/UL (ref 0–0.2)
BASOPHILS NFR BLD: 0.9 % (ref 0–1.9)
BENZODIAZ UR QL SCN>200 NG/ML: NEGATIVE
BILIRUB SERPL-MCNC: 0.3 MG/DL (ref 0.1–1)
BILIRUB UR QL STRIP: NEGATIVE
BUN SERPL-MCNC: 8 MG/DL (ref 8–23)
BZE UR QL SCN: NEGATIVE
CALCIUM SERPL-MCNC: 8.2 MG/DL (ref 8.7–10.5)
CANNABINOIDS UR QL SCN: NEGATIVE
CHLORIDE SERPL-SCNC: 108 MMOL/L (ref 95–110)
CK SERPL-CCNC: 56 U/L (ref 20–200)
CLARITY UR: CLEAR
CO2 SERPL-SCNC: 23 MMOL/L (ref 23–29)
COLOR UR: YELLOW
CREAT SERPL-MCNC: 0.9 MG/DL (ref 0.5–1.4)
CREAT UR-MCNC: 115.2 MG/DL (ref 23–375)
DIFFERENTIAL METHOD: ABNORMAL
EOSINOPHIL # BLD AUTO: 0.2 K/UL (ref 0–0.5)
EOSINOPHIL NFR BLD: 2.7 % (ref 0–8)
ERYTHROCYTE [DISTWIDTH] IN BLOOD BY AUTOMATED COUNT: 13.2 % (ref 11.5–14.5)
EST. GFR  (NO RACE VARIABLE): >60 ML/MIN/1.73 M^2
GLUCOSE SERPL-MCNC: 84 MG/DL (ref 70–110)
GLUCOSE UR QL STRIP: NEGATIVE
HCT VFR BLD AUTO: 32.6 % (ref 40–54)
HGB BLD-MCNC: 10.8 G/DL (ref 14–18)
HGB UR QL STRIP: NEGATIVE
IMM GRANULOCYTES # BLD AUTO: 0.07 K/UL (ref 0–0.04)
IMM GRANULOCYTES NFR BLD AUTO: 0.9 % (ref 0–0.5)
KETONES UR QL STRIP: NEGATIVE
LEUKOCYTE ESTERASE UR QL STRIP: NEGATIVE
LYMPHOCYTES # BLD AUTO: 0.9 K/UL (ref 1–4.8)
LYMPHOCYTES NFR BLD: 11.7 % (ref 18–48)
MCH RBC QN AUTO: 31 PG (ref 27–31)
MCHC RBC AUTO-ENTMCNC: 33.1 G/DL (ref 32–36)
MCV RBC AUTO: 94 FL (ref 82–98)
METHADONE UR QL SCN>300 NG/ML: NEGATIVE
MONOCYTES # BLD AUTO: 0.5 K/UL (ref 0.3–1)
MONOCYTES NFR BLD: 6.3 % (ref 4–15)
NEUTROPHILS # BLD AUTO: 6 K/UL (ref 1.8–7.7)
NEUTROPHILS NFR BLD: 77.5 % (ref 38–73)
NITRITE UR QL STRIP: NEGATIVE
NRBC BLD-RTO: 0 /100 WBC
OPIATES UR QL SCN: NEGATIVE
PCP UR QL SCN>25 NG/ML: NEGATIVE
PH UR STRIP: 8 [PH] (ref 5–8)
PLATELET # BLD AUTO: 208 K/UL (ref 150–450)
PMV BLD AUTO: 10.6 FL (ref 9.2–12.9)
POCT GLUCOSE: 83 MG/DL (ref 70–110)
POTASSIUM SERPL-SCNC: 3.8 MMOL/L (ref 3.5–5.1)
PROT SERPL-MCNC: 6 G/DL (ref 6–8.4)
PROT UR QL STRIP: NEGATIVE
RBC # BLD AUTO: 3.48 M/UL (ref 4.6–6.2)
SODIUM SERPL-SCNC: 140 MMOL/L (ref 136–145)
SP GR UR STRIP: 1.01 (ref 1–1.03)
TOXICOLOGY INFORMATION: NORMAL
URN SPEC COLLECT METH UR: NORMAL
UROBILINOGEN UR STRIP-ACNC: NEGATIVE EU/DL
VALPROATE SERPL-MCNC: 15.9 UG/ML (ref 50–100)
WBC # BLD AUTO: 7.79 K/UL (ref 3.9–12.7)

## 2022-10-05 PROCEDURE — G0378 HOSPITAL OBSERVATION PER HR: HCPCS

## 2022-10-05 PROCEDURE — G0425 INPT/ED TELECONSULT30: HCPCS | Mod: 95,,, | Performed by: PSYCHIATRY & NEUROLOGY

## 2022-10-05 PROCEDURE — 63600175 PHARM REV CODE 636 W HCPCS: Performed by: STUDENT IN AN ORGANIZED HEALTH CARE EDUCATION/TRAINING PROGRAM

## 2022-10-05 PROCEDURE — 96374 THER/PROPH/DIAG INJ IV PUSH: CPT

## 2022-10-05 PROCEDURE — 82550 ASSAY OF CK (CPK): CPT | Performed by: EMERGENCY MEDICINE

## 2022-10-05 PROCEDURE — 85025 COMPLETE CBC W/AUTO DIFF WBC: CPT | Performed by: EMERGENCY MEDICINE

## 2022-10-05 PROCEDURE — 81003 URINALYSIS AUTO W/O SCOPE: CPT | Mod: 59 | Performed by: EMERGENCY MEDICINE

## 2022-10-05 PROCEDURE — 63600175 PHARM REV CODE 636 W HCPCS: Performed by: EMERGENCY MEDICINE

## 2022-10-05 PROCEDURE — 80053 COMPREHEN METABOLIC PANEL: CPT | Performed by: EMERGENCY MEDICINE

## 2022-10-05 PROCEDURE — 96372 THER/PROPH/DIAG INJ SC/IM: CPT | Mod: 59 | Performed by: EMERGENCY MEDICINE

## 2022-10-05 PROCEDURE — 99285 EMERGENCY DEPT VISIT HI MDM: CPT | Mod: 25

## 2022-10-05 PROCEDURE — 80307 DRUG TEST PRSMV CHEM ANLYZR: CPT | Performed by: EMERGENCY MEDICINE

## 2022-10-05 PROCEDURE — 80164 ASSAY DIPROPYLACETIC ACD TOT: CPT | Performed by: EMERGENCY MEDICINE

## 2022-10-05 PROCEDURE — G0425 PR INPT TELEHEALTH CONSULT 30M: ICD-10-PCS | Mod: 95,,, | Performed by: PSYCHIATRY & NEUROLOGY

## 2022-10-05 PROCEDURE — 82962 GLUCOSE BLOOD TEST: CPT

## 2022-10-05 RX ORDER — DEXTROSE, SODIUM CHLORIDE, SODIUM LACTATE, POTASSIUM CHLORIDE, AND CALCIUM CHLORIDE 5; .6; .31; .03; .02 G/100ML; G/100ML; G/100ML; G/100ML; G/100ML
INJECTION, SOLUTION INTRAVENOUS CONTINUOUS
Status: DISCONTINUED | OUTPATIENT
Start: 2022-10-05 | End: 2022-10-07 | Stop reason: HOSPADM

## 2022-10-05 RX ORDER — GLUCAGON 1 MG
1 KIT INJECTION
Status: DISCONTINUED | OUTPATIENT
Start: 2022-10-05 | End: 2022-10-07 | Stop reason: HOSPADM

## 2022-10-05 RX ORDER — LABETALOL HYDROCHLORIDE 5 MG/ML
10 INJECTION, SOLUTION INTRAVENOUS EVERY 6 HOURS PRN
Status: DISCONTINUED | OUTPATIENT
Start: 2022-10-05 | End: 2022-10-07 | Stop reason: HOSPADM

## 2022-10-05 RX ORDER — TALC
6 POWDER (GRAM) TOPICAL NIGHTLY PRN
Status: DISCONTINUED | OUTPATIENT
Start: 2022-10-05 | End: 2022-10-07 | Stop reason: HOSPADM

## 2022-10-05 RX ORDER — SODIUM CHLORIDE, SODIUM LACTATE, POTASSIUM CHLORIDE, CALCIUM CHLORIDE 600; 310; 30; 20 MG/100ML; MG/100ML; MG/100ML; MG/100ML
INJECTION, SOLUTION INTRAVENOUS CONTINUOUS
Status: DISCONTINUED | OUTPATIENT
Start: 2022-10-05 | End: 2022-10-05

## 2022-10-05 RX ORDER — SODIUM CHLORIDE 0.9 % (FLUSH) 0.9 %
10 SYRINGE (ML) INJECTION
Status: DISCONTINUED | OUTPATIENT
Start: 2022-10-05 | End: 2022-10-07 | Stop reason: HOSPADM

## 2022-10-05 RX ORDER — NALOXONE HCL 0.4 MG/ML
0.02 VIAL (ML) INJECTION
Status: DISCONTINUED | OUTPATIENT
Start: 2022-10-05 | End: 2022-10-07 | Stop reason: HOSPADM

## 2022-10-05 RX ORDER — SODIUM CHLORIDE 0.9 % (FLUSH) 0.9 %
10 SYRINGE (ML) INJECTION EVERY 12 HOURS PRN
Status: DISCONTINUED | OUTPATIENT
Start: 2022-10-05 | End: 2022-10-07 | Stop reason: HOSPADM

## 2022-10-05 RX ORDER — BENZTROPINE MESYLATE 1 MG/ML
1 INJECTION, SOLUTION INTRAMUSCULAR; INTRAVENOUS
Status: COMPLETED | OUTPATIENT
Start: 2022-10-05 | End: 2022-10-05

## 2022-10-05 RX ORDER — DIPHENHYDRAMINE HYDROCHLORIDE 50 MG/ML
25 INJECTION INTRAMUSCULAR; INTRAVENOUS
Status: COMPLETED | OUTPATIENT
Start: 2022-10-05 | End: 2022-10-05

## 2022-10-05 RX ORDER — IBUPROFEN 200 MG
24 TABLET ORAL
Status: DISCONTINUED | OUTPATIENT
Start: 2022-10-05 | End: 2022-10-07 | Stop reason: HOSPADM

## 2022-10-05 RX ORDER — IBUPROFEN 200 MG
16 TABLET ORAL
Status: DISCONTINUED | OUTPATIENT
Start: 2022-10-05 | End: 2022-10-07 | Stop reason: HOSPADM

## 2022-10-05 RX ADMIN — BENZTROPINE MESYLATE 1 MG: 1 INJECTION INTRAMUSCULAR; INTRAVENOUS at 01:10

## 2022-10-05 RX ADMIN — DIPHENHYDRAMINE HYDROCHLORIDE 25 MG: 50 INJECTION, SOLUTION INTRAMUSCULAR; INTRAVENOUS at 10:10

## 2022-10-05 RX ADMIN — DEXTROSE, SODIUM CHLORIDE, SODIUM LACTATE, POTASSIUM CHLORIDE, AND CALCIUM CHLORIDE: 5; .6; .31; .03; .02 INJECTION, SOLUTION INTRAVENOUS at 06:10

## 2022-10-05 NOTE — ED PROVIDER NOTES
SCRIBE #1 NOTE: IYassine, am scribing for, and in the presence of, Yvan Dickson Jr., MD. I have scribed the entire note.      History      Chief Complaint   Patient presents with    Spasms     Call to [facility] for possible seizure. Pt recently started Depakote and has involuntary jerking and unsteady gait. Pt aaox3.        Review of patient's allergies indicates:  No Known Allergies     HPI   HPI    10/5/2022, 9:27 AM   [facility]      History of Present Illness: Vishnu Dougherty Jr. is a 62 y.o. male patient with a PMHx of CHF, HTN who presents to the Emergency Department for tremors, onset 1 day PTA. [facility] reports that the pt was exhibiting generalized tremors at his assisted living facility. Symptoms are intermittent and moderate in severity. No mitigating or exacerbating factors reported. No associated sxs reported. Patient denies any fever, chills, n/v/d, SOB, CP, weakness, numbness, dizziness, headache, and all other sxs at this time. Pt was started on Depakote 5 days ago. No further complaints or concerns at this time.     Arrival mode: [facility]    PCP: Stephan Quiñones MD       Past Medical History:  Past Medical History:   Diagnosis Date    Chronic CHF 10/2/2019    Chronic diastolic HF (heart failure) 4/17/2018    Coronary artery calcification 1/11/2022    Dilated cardiomyopathy 9/24/2015    Enlarged prostate     Essential hypertension 9/24/2015    Gastroesophageal reflux disease without esophagitis 10/7/2015    Gastroesophageal reflux disease without esophagitis 10/7/2015    Hyperlipidemia     Personal history of colonic polyps 2016    Shortness of breath 9/24/2015       Past Surgical History:  Past Surgical History:   Procedure Laterality Date    COLONOSCOPY N/A 5/25/2016    Procedure: COLONOSCOPY;  Surgeon: Demetrio Spicer MD;  Location: [facility];  Service: Endoscopy;  Laterality: N/A;         Family History:  Family History   Problem Relation Age of Onset    Hypertension Mother      Hypertension Father     Stroke Father        Social History:  Social History     Tobacco Use    Smoking status: Former     Types: Cigarettes     Quit date: 1995     Years since quittin.0    Smokeless tobacco: Never   Substance and Sexual Activity    Alcohol use: No     Alcohol/week: 0.0 standard drinks    Drug use: No    Sexual activity: Not Currently     Partners: Female       ROS   Review of Systems   Constitutional:  Negative for chills and fever.   HENT:  Negative for sore throat.    Respiratory:  Negative for shortness of breath.    Cardiovascular:  Negative for chest pain.   Gastrointestinal:  Negative for diarrhea, nausea and vomiting.   Genitourinary:  Negative for dysuria.   Musculoskeletal:  Negative for back pain.   Skin:  Negative for rash.   Neurological:  Positive for tremors (intermittent). Negative for dizziness, weakness, light-headedness, numbness and headaches.   Hematological:  Does not bruise/bleed easily.   All other systems reviewed and are negative.    Physical Exam      Initial Vitals   BP Pulse Resp Temp SpO2   10/05/22 0912 10/05/22 0912 10/05/22 0912 10/05/22 0920 10/05/22 1023   124/69 76 20 99.4 °F (37.4 °C) 97 %      MAP       --                 Physical Exam  Nursing Notes and Vital Signs Reviewed.  Constitutional: Patient is in no acute distress. Well-developed and well-nourished.  Head: Atraumatic. Normocephalic.  Eyes: PERRL. EOM intact. Conjunctivae are not pale. No scleral icterus.  ENT: Mucous membranes are moist. Oropharynx is clear and symmetric.    Neck: Supple. Full ROM.   Cardiovascular: Regular rate. Regular rhythm. No murmurs, rubs, or gallops. Distal pulses are 2+ and symmetric.  Pulmonary/Chest: No respiratory distress. Clear to auscultation bilaterally. No wheezing or rales.  Abdominal: Soft and non-distended.  There is no tenderness.  No rebound, guarding, or rigidity.   Musculoskeletal: Moves all extremities. No obvious deformities. No edema.  Skin: Warm  and dry.  Neurological:  Alert, awake, and appropriate.  Normal speech. Resting tremor.  Psychiatric: Normal affect. Good eye contact. Appropriate in content.    ED Course    Procedures  ED Vital Signs:  Vitals:    10/05/22 0912 10/05/22 0920 10/05/22 0945 10/05/22 1023   BP: 124/69   136/76   Pulse: 76  73 66   Resp: 20   (!) 24   Temp:  99.4 °F (37.4 °C)     TempSrc:  Oral     SpO2:    97%   Weight:        10/05/22 1045 10/05/22 1100 10/05/22 1200 10/05/22 1400   BP:  (!) 143/70 (!) 156/75 127/67   Pulse:  62 70 65   Resp:  (!) 22 (!) 22 (!) 22   Temp:       TempSrc:       SpO2:  96% 95% 97%   Weight: 83.9 kg (185 lb)          Abnormal Lab Results:  Labs Reviewed   CBC W/ AUTO DIFFERENTIAL - Abnormal; Notable for the following components:       Result Value    RBC 3.48 (*)     Hemoglobin 10.8 (*)     Hematocrit 32.6 (*)     Immature Granulocytes 0.9 (*)     Immature Grans (Abs) 0.07 (*)     Lymph # 0.9 (*)     Gran % 77.5 (*)     Lymph % 11.7 (*)     All other components within normal limits   COMPREHENSIVE METABOLIC PANEL - Abnormal; Notable for the following components:    Calcium 8.2 (*)     Albumin 3.1 (*)     All other components within normal limits   VALPROIC ACID - Abnormal; Notable for the following components:    Valproic Acid Level 15.9 (*)     All other components within normal limits   CK   URINALYSIS, REFLEX TO URINE CULTURE    Narrative:     Specimen Source->Urine   VALPROIC ACID   DRUG SCREEN PANEL, URINE EMERGENCY   DRUG SCREEN PANEL, URINE EMERGENCY    Narrative:     Specimen Source->Urine        All Lab Results:  Results for orders placed or performed during the hospital encounter of 10/05/22   CBC auto differential   Result Value Ref Range    WBC 7.79 3.90 - 12.70 K/uL    RBC 3.48 (L) 4.60 - 6.20 M/uL    Hemoglobin 10.8 (L) 14.0 - 18.0 g/dL    Hematocrit 32.6 (L) 40.0 - 54.0 %    MCV 94 82 - 98 fL    MCH 31.0 27.0 - 31.0 pg    MCHC 33.1 32.0 - 36.0 g/dL    RDW 13.2 11.5 - 14.5 %    Platelets  208 150 - 450 K/uL    MPV 10.6 9.2 - 12.9 fL    Immature Granulocytes 0.9 (H) 0.0 - 0.5 %    Gran # (ANC) 6.0 1.8 - 7.7 K/uL    Immature Grans (Abs) 0.07 (H) 0.00 - 0.04 K/uL    Lymph # 0.9 (L) 1.0 - 4.8 K/uL    Mono # 0.5 0.3 - 1.0 K/uL    Eos # 0.2 0.0 - 0.5 K/uL    Baso # 0.07 0.00 - 0.20 K/uL    nRBC 0 0 /100 WBC    Gran % 77.5 (H) 38.0 - 73.0 %    Lymph % 11.7 (L) 18.0 - 48.0 %    Mono % 6.3 4.0 - 15.0 %    Eosinophil % 2.7 0.0 - 8.0 %    Basophil % 0.9 0.0 - 1.9 %    Differential Method Automated    Comprehensive metabolic panel   Result Value Ref Range    Sodium 140 136 - 145 mmol/L    Potassium 3.8 3.5 - 5.1 mmol/L    Chloride 108 95 - 110 mmol/L    CO2 23 23 - 29 mmol/L    Glucose 84 70 - 110 mg/dL    BUN 8 8 - 23 mg/dL    Creatinine 0.9 0.5 - 1.4 mg/dL    Calcium 8.2 (L) 8.7 - 10.5 mg/dL    Total Protein 6.0 6.0 - 8.4 g/dL    Albumin 3.1 (L) 3.5 - 5.2 g/dL    Total Bilirubin 0.3 0.1 - 1.0 mg/dL    Alkaline Phosphatase 63 55 - 135 U/L    AST 17 10 - 40 U/L    ALT 20 10 - 44 U/L    Anion Gap 9 8 - 16 mmol/L    eGFR >60 >60 mL/min/1.73 m^2   CPK   Result Value Ref Range    CPK 56 20 - 200 U/L   Urinalysis, Reflex to Urine Culture Urine, Clean Catch    Specimen: Urine   Result Value Ref Range    Specimen UA Urine, Clean Catch     Color, UA Yellow Yellow, Straw, Marj    Appearance, UA Clear Clear    pH, UA 8.0 5.0 - 8.0    Specific Gravity, UA 1.010 1.005 - 1.030    Protein, UA Negative Negative    Glucose, UA Negative Negative    Ketones, UA Negative Negative    Bilirubin (UA) Negative Negative    Occult Blood UA Negative Negative    Nitrite, UA Negative Negative    Urobilinogen, UA Negative <2.0 EU/dL    Leukocytes, UA Negative Negative   Valproic Acid   Result Value Ref Range    Valproic Acid Level 15.9 (L) 50.0 - 100.0 ug/mL   Drug screen panel, in-house   Result Value Ref Range    Benzodiazepines Negative Negative    Methadone metabolites Negative Negative    Cocaine (Metab.) Negative Negative    Opiate  Scrn, Ur Negative Negative    Barbiturate Screen, Ur Negative Negative    Amphetamine Screen, Ur Negative Negative    THC Negative Negative    Phencyclidine Negative Negative    Creatinine, Urine 115.2 23.0 - 375.0 mg/dL    Toxicology Information SEE COMMENT      Imaging Results:  Imaging Results              X-Ray Chest 1 View (Final result)  Result time 10/05/22 10:32:09      Final result by Oleg Mondragon MD (10/05/22 10:32:09)                   Impression:      No acute findings.      Electronically signed by: Oleg Mondragon MD  Date:    10/05/2022  Time:    10:32               Narrative:    EXAMINATION:  XR CHEST 1 VIEW    CLINICAL HISTORY:  Respiratory distress,    COMPARISON:  10/28/2020 chest x-ray.    FINDINGS:  Heart size is normal.  The lung fields are clear with no definite infiltrate at this time.    Old left clavicle fracture.  Minor shoulder DJD.                                       CT Head Without Contrast (Final result)  Result time 10/05/22 10:16:06      Final result by Alonzo Murray MD (10/05/22 10:16:06)                   Impression:      Chronic microvascular ischemic changes.  MRI can be obtained as clinically warranted.      Electronically signed by: Alonzo Murray MD  Date:    10/05/2022  Time:    10:16               Narrative:    EXAMINATION:  CT HEAD WITHOUT CONTRAST    CLINICAL HISTORY:  Seizure, new-onset, no history of trauma;    TECHNIQUE:  Low dose axial CT images obtained throughout the head without intravenous contrast. Sagittal and coronal reconstructions were performed.  All CT scans at this facility use dose modulation, iterative reconstruction and/or weight based dosing when appropriate to reduce radiation dose to as low as reasonably achievable.    COMPARISON:  10/28/2020    FINDINGS:  Intracranial compartment:    The brain parenchyma demonstrates areas of decreased attenuation with mild to moderate periventricular white matter consistent with chronic microvascular  ischemic changes..  No parenchymal mass, hemorrhage, edema or major vascular distribution infarct.  Vascular calcifications are noted.    Mild prominence of the sulci and ventricles are consistent with age-related involutional changes.    No extra-axial blood or fluid collections.    Skull/extracranial contents (limited evaluation): No fracture. Mastoid air cells and paranasal sinuses are essentially clear.                                              The Emergency Provider reviewed the vital signs and test results, which are outlined above.    ED Discussion     4:05 PM: Discussed case with Shelby Taylor NP ([facility]). Dr. Antonio agrees with current care and management of pt and accepts admission.   Admitting Service: [facility]  Admitting Physician: Dr. Figueroa  Admit to: Obs Unit    4:06 PM: Re-evaluated pt. I have discussed test results, shared treatment plan, and the need for admission with patient and family at bedside. Pt and family express understanding at this time and agree with all information. All questions answered. Pt and family have no further questions or concerns at this time. Pt is ready for admit.         ED Medication(s):  Medications   diphenhydrAMINE injection 25 mg (25 mg Intravenous Given 10/5/22 1032)   benztropine mesylate injection 1 mg (1 mg Intramuscular Given 10/5/22 1312)       New Prescriptions    No medications on file         Medical Decision Making    Medical Decision Making:   Clinical Tests:   Lab Tests: Ordered and Reviewed  Radiological Study: Ordered and Reviewed         Scribe Attestation:   Scribe #1: I performed the above scribed service and the documentation accurately describes the services I performed. I attest to the accuracy of the note.    Attending:   Physician Attestation Statement for Scribe #1: I, Yvan Dickson Jr., MD, personally performed the services described in this documentation, as scribed by Yassine Porter, in my presence, and it is both accurate  and complete.          Clinical Impression       ICD-10-CM ICD-9-CM   1. Unable to ambulate  R26.2 719.7   2. Tremor  R25.1 781.0   3. Extrapyramidal and movement disorder  G25.9 333.90       Disposition:   Disposition: Placed in Observation  Condition: Jerrell Dickson Jr., MD  10/05/22 7828

## 2022-10-05 NOTE — CONSULTS
[facility]  Psychiatry  Telepsychiatry Consult Note    Please see previous notes:    Patient agreeable to consultation via telepsychiatry.    Tele-Consultation from Psychiatry started: 10/5/2022 at 5:20 PM  The chief complaint leading to psychiatric consultation is: tremors  This consultation was requested by Dr. Sherman, the Emergency Department attending physician.  The location of the consulting psychiatrist is [place], [place].  The patient location is  [facility] EMERGENCY DEPARTMENT   The patient arrived at the ED at: 4:00 PM    Also present with the patient at the time of the consultation: Nursing staff    Patient Identification:   Vishnu Dougherty Jr. is a 62 y.o. male.    Patient information was obtained from patient and relative(s).  Patient presented voluntarily to the Emergency Department by private vehicle.    Inpatient consult to Telemedicine - Psyc  Consult performed by: Pranav Davison DO  Consult ordered by: Yvan Dickson Jr., MD      Consult Start Time: 10/05/2022 17:20 CDT  Consult End Time: 10/05/2022 17:40 CDT      Subjective:     History of Present Illness:  Per ED MD: Vishnu Dougherty Jr. is a 62 y.o. male patient with a PMHx of CHF, HTN who presents to the Emergency Department for tremors, onset 1 day PTA. [facility] reports that the pt was exhibiting generalized tremors at his assisted living facility. Symptoms are intermittent and moderate in severity. No mitigating or exacerbating factors reported. No associated sxs reported. Patient denies any fever, chills, n/v/d, SOB, CP, weakness, numbness, dizziness, headache, and all other sxs at this time. Pt was started on Depakote 5 days ago. No further complaints or concerns at this time.     On Interview:  Patient seen through teleconference this evening on my approach accompanied by his sister. The patient has a history of major neurocognitive disorder and he is a poor historian. His sister assists with getting history. The patient's  sister reports that the staff found the patient having tremors which they were concerned was seizure like activity.     His sister reports that he was recently hospitalized at [facility] because he was having prolonged crying spells and he had also become physically aggressive with staff. He was seen by his neurologist on September 12th, 2022. She reports that the patient was started on Risperdal 0.5 mg PO QHS. He was admitted to [facility] the next day 9/13/2022. He was admitted to the hospital for two weeks and discharged on 9/27/2022. He was discharged on Depakote 250 mg PO BID, Remeron 7.5 mg PO QHS, Risperdal 1 mg PO QHS, Lexapro 10 mg PO daily, Cymbalta 60 mg PO daily, Namenda 5 mg PO BID, Aricept 5 mg PO daily.     She reports that the patient was not having any tremors when he was discharged from [facility] and he has not had any medication changes since the tremors appeared. The only thing that has happened is that he received a shingles vaccine yesterday.     Psychiatric History:   Previous Psychiatric Hospitalizations: Yes, once  Previous Medication Trials: Yes   Previous Suicide Attempts: no   History of Violence: No  History of Depression: Yes  History of Marizol: No  History of Auditory/Visual Hallucination No  History of Delusions: No  Outpatient psychiatrist (current & past): No    Substance Abuse History:  Tobacco:No  Alcohol: No  Illicit Substances:No  Detox/Rehab: No    Legal History: Past charges/incarcerations: No     Family Psychiatric History: Denies       Social History:  Developmental/Childhood:Achieved all developmental milestones timely  Education: 10th grade  Employment Status/Finances:Disabled   Relationship Status/Sexual Orientation:   3 times   Children: 4  Housing Status: Home in assisted living facility with 24 hour care   history:  NO  Access to gun: NO  Restorationist:Actively participates in organized Druze  Recreational activities:  "Fishing  Patient was born and raised in [place]    Psychiatric Mental Status Exam:  Arousal: alert  Sensorium/Orientation: disoriented to place, time, and situation  Behavior/Cooperation: cooperative   Speech: normal tone, normal rate, normal pitch, normal volume  Language: grossly intact  Mood: " ok "   Affect: blunted  Thought Process: illogical  Thought Content:   Auditory hallucinations: NO  Visual hallucinations: NO  Paranoia: NO  Delusions:  NO  Suicidal ideation: NO  Homicidal ideation: NO  Attention/Concentration:  intact  Memory:    Recent:  Intact   Remote: Intact   3/3 immediate, 3/3 at 5 min  Fund of Knowledge: Impaired   Abstract reasoning: proverbs were concrete, similarities were concrete  Insight: poor awareness of illness  Judgment: Poor      Past Medical History:   Past Medical History:   Diagnosis Date    Chronic CHF 10/2/2019    Chronic diastolic HF (heart failure) 4/17/2018    Coronary artery calcification 1/11/2022    Dilated cardiomyopathy 9/24/2015    Enlarged prostate     Essential hypertension 9/24/2015    Gastroesophageal reflux disease without esophagitis 10/7/2015    Gastroesophageal reflux disease without esophagitis 10/7/2015    Hyperlipidemia     Personal history of colonic polyps 2016    Shortness of breath 9/24/2015      Laboratory Data:   Labs Reviewed   CBC W/ AUTO DIFFERENTIAL - Abnormal; Notable for the following components:       Result Value    RBC 3.48 (*)     Hemoglobin 10.8 (*)     Hematocrit 32.6 (*)     Immature Granulocytes 0.9 (*)     Immature Grans (Abs) 0.07 (*)     Lymph # 0.9 (*)     Gran % 77.5 (*)     Lymph % 11.7 (*)     All other components within normal limits   COMPREHENSIVE METABOLIC PANEL - Abnormal; Notable for the following components:    Calcium 8.2 (*)     Albumin 3.1 (*)     All other components within normal limits   VALPROIC ACID - Abnormal; Notable for the following components:    Valproic Acid Level 15.9 (*)     All other components within normal " limits   CK   URINALYSIS, REFLEX TO URINE CULTURE    Narrative:     Specimen Source->Urine   VALPROIC ACID   DRUG SCREEN PANEL, URINE EMERGENCY   DRUG SCREEN PANEL, URINE EMERGENCY    Narrative:     Specimen Source->Urine   POCT GLUCOSE MONITORING CONTINUOUS   POCT GLUCOSE MONITORING CONTINUOUS       Neurological History:  Seizures: No  Head trauma: No    Allergies:   Review of patient's allergies indicates:  No Known Allergies    Medications in ER:   Medications   sodium chloride 0.9% flush 10 mL (has no administration in time range)   melatonin tablet 6 mg (has no administration in time range)   sodium chloride 0.9% flush 10 mL (has no administration in time range)   naloxone 0.4 mg/mL injection 0.02 mg (has no administration in time range)   glucose chewable tablet 16 g (has no administration in time range)   glucose chewable tablet 24 g (has no administration in time range)   glucagon (human recombinant) injection 1 mg (has no administration in time range)   dextrose 10% bolus 250 mL (has no administration in time range)   dextrose 5 % in lactated ringers infusion (has no administration in time range)   diphenhydrAMINE injection 25 mg (25 mg Intravenous Given 10/5/22 1032)   benztropine mesylate injection 1 mg (1 mg Intramuscular Given 10/5/22 1312)       Medications at home:     No new subjective & objective note has been filed under this hospital service since the last note was generated.      Assessment - Diagnosis - Goals:     Diagnosis/Impression: Patient is a 62 year old man with a past psychiatric history of Major Neurocognitive Disorder currently in observation due to tremors at home. The patient was recently discharged from [facility] due to a depressive episode and aggressive behavior with assisted living staff. The patient's sister at bedside is concerned about the patient's tremor and current medication regimen.    Rec: Recommend PEC as the patient is currently a gravely disabled secondary to psychiatric  illness. Recommend involuntary placement in an inpatient psychiatric facility once the patient is medically stable.      Time with patient: 20 minutes      More than 50% of the time was spent counseling/coordinating care    Consulting clinician was informed of the encounter and consult note.    Consultation ended: 10/5/2022 at 5:40 PM    Pranav Davison, DO   Psychiatry  [facility]

## 2022-10-05 NOTE — H&P
[facility] - Emergency Dept.  [facility] Medicine  History & Physical    Patient Name: Vishnu Dougherty Jr.  MRN: [id number]  Patient Class: OP- Observation  Admission Date: 10/5/2022  Attending Physician: Mena Figueroa, *   Primary Care Provider: Stephan Quiñones MD         Patient information was obtained from ER records and sister at bedside;         Subjective:     Principal Problem:Coarse tremors    Chief Complaint:   Chief Complaint   Patient presents with    Spasms     Call to [facility] for possible seizure. Pt recently started Depakote and has involuntary jerking and unsteady gait. Pt aaox3.         HPI: Pt was oriented to self; got most of the information from sister (POA- Ms. Loyd at bedside -contact )    Vishnu Dougherty Jr. is a 62 y.o. male patient with a PMHx of CHF, HTN, dementia, panic attack, am who presents to the Emergency Department for tremors, ambulatory issues onset 1 day PTA.   [facility] reports that the pt was exhibiting generalized tremors at his assisted living facility. Symptoms are intermittent and moderate in severity. No mitigating or exacerbating factors reported. No associated sxs reported. Patient denies any fever, chills, n/v/d, SOB, CP, weakness, numbness, dizziness, headache, and all other sxs at this time. Pt was started on Depakote 5 days ago. No further complaints or concerns at this time.    As per sister- on September 12th patient was noted to have behavioral issues at [facility] --> was sent to [facility] --> stayed there for 2 weeks until 27th;  During his stay over there he has been started on depakote and risperidone which has been taking for past 2-3 weeks;             Past Medical History:   Diagnosis Date    Chronic CHF 10/2/2019    Chronic diastolic HF (heart failure) 4/17/2018    Coronary artery calcification 1/11/2022    Dilated cardiomyopathy 9/24/2015    Enlarged prostate     Essential hypertension 9/24/2015    Gastroesophageal reflux disease without  esophagitis 10/7/2015    Gastroesophageal reflux disease without esophagitis 10/7/2015    Hyperlipidemia     Personal history of colonic polyps 2016    Shortness of breath 9/24/2015       Past Surgical History:   Procedure Laterality Date    COLONOSCOPY N/A 5/25/2016    Procedure: COLONOSCOPY;  Surgeon: Demetrio Spicer MD;  Location: [facility];  Service: Endoscopy;  Laterality: N/A;       Review of patient's allergies indicates:  No Known Allergies    No current facility-administered medications on file prior to encounter.     Current Outpatient Medications on File Prior to Encounter   Medication Sig    amlodipine-benazepril 10-20mg (LOTREL) 10-20 mg per capsule Take 1 capsule by mouth once daily.    aspirin 81 MG Chew Take 1 tablet (81 mg total) by mouth once daily.    atorvastatin (LIPITOR) 20 MG tablet Take 1 tablet (20 mg total) by mouth every evening.    cholecalciferol, vitamin D3, 1,250 mcg (50,000 unit) capsule TAKE 1 CAPSULE BY MOUTH ONE TIME PER WEEK    cyanocobalamin 1,000 mcg/mL injection INJECT 1 ML (1,000 MCG TOTAL) INTO THE MUSCLE EVERY 28 DAYS.    donepeziL (ARICEPT) 5 MG tablet TAKE 1 TABLET BY MOUTH EVERY DAY IN THE EVENING    DULoxetine (CYMBALTA) 60 MG capsule Take 1 capsule (60 mg total) by mouth once daily.    EScitalopram oxalate (LEXAPRO) 10 MG tablet Take 10 mg by mouth once daily.    memantine (NAMENDA) 5 MG Tab TAKE 1 TABLET BY MOUTH TWICE A DAY    mirtazapine (REMERON) 7.5 MG Tab Take 7.5 mg by mouth every evening.    potassium chloride SA (K-DUR,KLOR-CON) 20 MEQ tablet Take 1 tablet (20 mEq total) by mouth once daily.    risperiDONE (RISPERDAL) 1 MG tablet Take 1 tablet (1 mg total) by mouth every evening.    valproate (DEPAKENE) 250 mg/5 mL syrup Take 250 mg by mouth 2 (two) times daily.    varicella-zoster gE-AS01B, PF, (SHINGRIX, PF,) 50 mcg/0.5 mL injection Inject 0.5 mLs into the muscle once. for 1 dose     Family History       Problem Relation (Age of Onset)    Hypertension Mother,  Father    Stroke Father          Tobacco Use    Smoking status: Former     Types: Cigarettes     Quit date: 1995     Years since quittin.0    Smokeless tobacco: Never   Substance and Sexual Activity    Alcohol use: No     Alcohol/week: 0.0 standard drinks    Drug use: No    Sexual activity: Not Currently     Partners: Female     Review of Systems  Constitutional:  Negative for chills and fever.   HENT:  Negative for sore throat.    Respiratory:  Negative for shortness of breath.    Cardiovascular:  Negative for chest pain.   Gastrointestinal:  Negative for diarrhea, nausea and vomiting.   Genitourinary:  Negative for dysuria.   Musculoskeletal:  Negative for back pain.   Skin:  Negative for rash.   Neurological:  Positive for tremors (intermittent). Negative for dizziness, weakness, light-headedness, numbness and headaches.   Hematological:  Does not bruise/bleed easily.   All other systems reviewed and are negative.    Objective:     Vital Signs (Most Recent):  Temp: 99.4 °F (37.4 °C) (10/05/22 0920)  Pulse: 70 (10/05/22 1600)  Resp: (!) 22 (10/05/22 1600)  BP: 124/63 (10/05/22 1600)  SpO2: 95 % (10/05/22 1600)   Vital Signs (24h Range):  Temp:  [99.4 °F (37.4 °C)] 99.4 °F (37.4 °C)  Pulse:  [62-76] 70  Resp:  [20-24] 22  SpO2:  [95 %-97 %] 95 %  BP: (124-156)/(63-76) 124/63     Weight: 83.9 kg (185 lb)  Body mass index is 28.13 kg/m².    Physical Exam      Constitutional: mild distress;   Head: Atraumatic. Normocephalic.  Eyes: PERRL. EOM intact. Conjunctivae are not pale. No scleral icterus.  ENT: Mucous membranes are moist. Oropharynx is clear and symmetric.    Neck: Supple. Full ROM.   Cardiovascular: Regular rate. Regular rhythm. No murmurs, rubs, or gallops. Distal pulses are 2+ and symmetric.  Pulmonary/Chest: No respiratory distress. Clear to auscultation bilaterally. No wheezing or rales.  Abdominal: Soft and non-distended.  There is no tenderness.  No rebound, guarding, or rigidity.    Musculoskeletal: Moves all extremities. No obvious deformities. No edema.  Skin: Warm and dry.  Neurological:  Alert, oriented to self and date of birth; not oriented to place;  Resting tremor in bilateral upper extremities;   Psychiatric: flat effect;       Significant Labs:     Results for orders placed or performed during the hospital encounter of 10/05/22   CBC auto differential   Result Value Ref Range    WBC 7.79 3.90 - 12.70 K/uL    RBC 3.48 (L) 4.60 - 6.20 M/uL    Hemoglobin 10.8 (L) 14.0 - 18.0 g/dL    Hematocrit 32.6 (L) 40.0 - 54.0 %    MCV 94 82 - 98 fL    MCH 31.0 27.0 - 31.0 pg    MCHC 33.1 32.0 - 36.0 g/dL    RDW 13.2 11.5 - 14.5 %    Platelets 208 150 - 450 K/uL    MPV 10.6 9.2 - 12.9 fL    Immature Granulocytes 0.9 (H) 0.0 - 0.5 %    Gran # (ANC) 6.0 1.8 - 7.7 K/uL    Immature Grans (Abs) 0.07 (H) 0.00 - 0.04 K/uL    Lymph # 0.9 (L) 1.0 - 4.8 K/uL    Mono # 0.5 0.3 - 1.0 K/uL    Eos # 0.2 0.0 - 0.5 K/uL    Baso # 0.07 0.00 - 0.20 K/uL    nRBC 0 0 /100 WBC    Gran % 77.5 (H) 38.0 - 73.0 %    Lymph % 11.7 (L) 18.0 - 48.0 %    Mono % 6.3 4.0 - 15.0 %    Eosinophil % 2.7 0.0 - 8.0 %    Basophil % 0.9 0.0 - 1.9 %    Differential Method Automated    Comprehensive metabolic panel   Result Value Ref Range    Sodium 140 136 - 145 mmol/L    Potassium 3.8 3.5 - 5.1 mmol/L    Chloride 108 95 - 110 mmol/L    CO2 23 23 - 29 mmol/L    Glucose 84 70 - 110 mg/dL    BUN 8 8 - 23 mg/dL    Creatinine 0.9 0.5 - 1.4 mg/dL    Calcium 8.2 (L) 8.7 - 10.5 mg/dL    Total Protein 6.0 6.0 - 8.4 g/dL    Albumin 3.1 (L) 3.5 - 5.2 g/dL    Total Bilirubin 0.3 0.1 - 1.0 mg/dL    Alkaline Phosphatase 63 55 - 135 U/L    AST 17 10 - 40 U/L    ALT 20 10 - 44 U/L    Anion Gap 9 8 - 16 mmol/L    eGFR >60 >60 mL/min/1.73 m^2   CPK   Result Value Ref Range    CPK 56 20 - 200 U/L   Urinalysis, Reflex to Urine Culture Urine, Clean Catch    Specimen: Urine   Result Value Ref Range    Specimen UA Urine, Clean Catch     Color, UA Yellow  Yellow, Straw, Marj    Appearance, UA Clear Clear    pH, UA 8.0 5.0 - 8.0    Specific Gravity, UA 1.010 1.005 - 1.030    Protein, UA Negative Negative    Glucose, UA Negative Negative    Ketones, UA Negative Negative    Bilirubin (UA) Negative Negative    Occult Blood UA Negative Negative    Nitrite, UA Negative Negative    Urobilinogen, UA Negative <2.0 EU/dL    Leukocytes, UA Negative Negative   Valproic Acid   Result Value Ref Range    Valproic Acid Level 15.9 (L) 50.0 - 100.0 ug/mL   Drug screen panel, in-house   Result Value Ref Range    Benzodiazepines Negative Negative    Methadone metabolites Negative Negative    Cocaine (Metab.) Negative Negative    Opiate Scrn, Ur Negative Negative    Barbiturate Screen, Ur Negative Negative    Amphetamine Screen, Ur Negative Negative    THC Negative Negative    Phencyclidine Negative Negative    Creatinine, Urine 115.2 23.0 - 375.0 mg/dL    Toxicology Information SEE COMMENT         Significant Imaging:     Imaging Results              X-Ray Chest 1 View (Final result)  Result time 10/05/22 10:32:09      Final result by Oleg Mondragon MD (10/05/22 10:32:09)                   Impression:      No acute findings.      Electronically signed by: Oleg Mondragon MD  Date:    10/05/2022  Time:    10:32               Narrative:    EXAMINATION:  XR CHEST 1 VIEW    CLINICAL HISTORY:  Respiratory distress,    COMPARISON:  10/28/2020 chest x-ray.    FINDINGS:  Heart size is normal.  The lung fields are clear with no definite infiltrate at this time.    Old left clavicle fracture.  Minor shoulder DJD.                                       CT Head Without Contrast (Final result)  Result time 10/05/22 10:16:06      Final result by Alonzo Murray MD (10/05/22 10:16:06)                   Impression:      Chronic microvascular ischemic changes.  MRI can be obtained as clinically warranted.      Electronically signed by: Alonzo Murray MD  Date:    10/05/2022  Time:    10:16                Narrative:    EXAMINATION:  CT HEAD WITHOUT CONTRAST    CLINICAL HISTORY:  Seizure, new-onset, no history of trauma;    TECHNIQUE:  Low dose axial CT images obtained throughout the head without intravenous contrast. Sagittal and coronal reconstructions were performed.  All CT scans at this facility use dose modulation, iterative reconstruction and/or weight based dosing when appropriate to reduce radiation dose to as low as reasonably achievable.    COMPARISON:  10/28/2020    FINDINGS:  Intracranial compartment:    The brain parenchyma demonstrates areas of decreased attenuation with mild to moderate periventricular white matter consistent with chronic microvascular ischemic changes..  No parenchymal mass, hemorrhage, edema or major vascular distribution infarct.  Vascular calcifications are noted.    Mild prominence of the sulci and ventricles are consistent with age-related involutional changes.    No extra-axial blood or fluid collections.    Skull/extracranial contents (limited evaluation): No fracture. Mastoid air cells and paranasal sinuses are essentially clear.                                       Assessment/Plan:     * Coarse tremors  As per sister- on September 12th patient was noted to have behavioral issues at [facility] --> was sent to [facility] --> stayed there for 2 weeks until 27th;  During his stay over there he has been started on depakote and risperidone which has been taking for past 2-3 weeks;   Will hold them;   Received benadryl and benztropine in ED --> mild improvement in symptoms noted;   Psych and neuro consult;   Bedside swallow assessment;   IVF; sitter, fall precautions      Dementia with behavioral disturbance  Will resume home meds once patient passes swallow eval      Essential hypertension  Will resume home meds once pt passes bedside swallow;         VTE Risk Mitigation (From admission, onward)           Ordered     IP VTE HIGH RISK PATIENT  Once         10/05/22  1734     Place sequential compression device  Until discontinued         10/05/22 1733                   Addendum: Patient was placed under observation;     Mena Figueroa MD  Department of Hospital Medicine   [facility] - Emergency Dept.

## 2022-10-05 NOTE — PHARMACY MED REC
"Admission Medication History     The home medication history was taken by Glenroy Bhagat.    You may go to "Admission" then "Reconcile Home Medications" tabs to review and/or act upon these items.     The home medication list has been updated by the Pharmacy department.   Please read ALL comments highlighted in yellow.   Please address this information as you see fit.    Feel free to contact us if you have any questions or require assistance.      The medications listed below were removed from the home medication list. Please reorder if appropriate:  Patient reports no longer taking the following medication(s):  ASPIRIN 81 MG CHEWABLE TABLET  POTASSIUM CHLORIDE SA 20 MEQ TABLET    Medications listed below were obtained from: Patient/family, Medications brought from home, Anal[employer] software- [employer], and Patient's pharmacy  (Not in a hospital admission)      Potential issues to be addressed PRIOR TO DISCHARGE: NONE    Glenroy Bhagat CPhT-Adv  Pharmacy Technician Specialist-Medication History  [facility] [phone]  Secure chat preferred     Current Outpatient Medications on File Prior to Encounter   Medication Sig Dispense Refill Last Dose    amlodipine-benazepril 10-20mg (LOTREL) 10-20 mg per capsule Take 1 capsule by mouth once daily. 90 capsule 3 10/5/2022    atorvastatin (LIPITOR) 20 MG tablet Take 1 tablet (20 mg total) by mouth every evening. 90 tablet 3 10/4/2022    cholecalciferol, vitamin D3, 1,250 mcg (50,000 unit) capsule TAKE 1 CAPSULE BY MOUTH ONE TIME PER WEEK 12 capsule 3 Past Week at Monday, 10/03/22    cyanocobalamin 1,000 mcg/mL injection INJECT 1 ML (1,000 MCG TOTAL) INTO THE MUSCLE EVERY 28 DAYS. 3 mL 11 Past Month    donepeziL (ARICEPT) 5 MG tablet TAKE 1 TABLET BY MOUTH EVERY DAY IN THE EVENING 90 tablet 3 10/4/2022    DULoxetine (CYMBALTA) 60 MG capsule Take 1 capsule (60 mg total) by mouth once daily. 90 capsule 3 10/5/2022    EScitalopram oxalate (LEXAPRO) 10 MG tablet Take 10 mg by mouth " once daily.   10/5/2022    memantine (NAMENDA) 5 MG Tab TAKE 1 TABLET BY MOUTH TWICE A  tablet 3 10/5/2022    mirtazapine (REMERON) 7.5 MG Tab Take 7.5 mg by mouth every evening.   10/4/2022    risperiDONE (RISPERDAL) 1 MG tablet Take 1 tablet (1 mg total) by mouth every evening. 30 tablet 11 10/4/2022    valproate (DEPAKENE) 250 mg/5 mL syrup Take 250 mg by mouth 2 (two) times daily.   10/5/2022    [DISCONTINUED] aspirin 81 MG Chew Take 1 tablet (81 mg total) by mouth once daily.  0     [DISCONTINUED] potassium chloride SA (K-DUR,KLOR-CON) 20 MEQ tablet Take 1 tablet (20 mEq total) by mouth once daily. 30 tablet 0     [DISCONTINUED] varicella-zoster gE-AS01B, PF, (SHINGRIX, PF,) 50 mcg/0.5 mL injection Inject 0.5 mLs into the muscle once. for 1 dose 1 each 0                            .

## 2022-10-05 NOTE — SUBJECTIVE & OBJECTIVE
Past Medical History:   Diagnosis Date    Chronic CHF 10/2/2019    Chronic diastolic HF (heart failure) 4/17/2018    Coronary artery calcification 1/11/2022    Dilated cardiomyopathy 9/24/2015    Enlarged prostate     Essential hypertension 9/24/2015    Gastroesophageal reflux disease without esophagitis 10/7/2015    Gastroesophageal reflux disease without esophagitis 10/7/2015    Hyperlipidemia     Personal history of colonic polyps 2016    Shortness of breath 9/24/2015       Past Surgical History:   Procedure Laterality Date    COLONOSCOPY N/A 5/25/2016    Procedure: COLONOSCOPY;  Surgeon: Demetrio Spicer MD;  Location: [facility];  Service: Endoscopy;  Laterality: N/A;       Review of patient's allergies indicates:  No Known Allergies    No current facility-administered medications on file prior to encounter.     Current Outpatient Medications on File Prior to Encounter   Medication Sig    amlodipine-benazepril 10-20mg (LOTREL) 10-20 mg per capsule Take 1 capsule by mouth once daily.    aspirin 81 MG Chew Take 1 tablet (81 mg total) by mouth once daily.    atorvastatin (LIPITOR) 20 MG tablet Take 1 tablet (20 mg total) by mouth every evening.    cholecalciferol, vitamin D3, 1,250 mcg (50,000 unit) capsule TAKE 1 CAPSULE BY MOUTH ONE TIME PER WEEK    cyanocobalamin 1,000 mcg/mL injection INJECT 1 ML (1,000 MCG TOTAL) INTO THE MUSCLE EVERY 28 DAYS.    donepeziL (ARICEPT) 5 MG tablet TAKE 1 TABLET BY MOUTH EVERY DAY IN THE EVENING    DULoxetine (CYMBALTA) 60 MG capsule Take 1 capsule (60 mg total) by mouth once daily.    EScitalopram oxalate (LEXAPRO) 10 MG tablet Take 10 mg by mouth once daily.    memantine (NAMENDA) 5 MG Tab TAKE 1 TABLET BY MOUTH TWICE A DAY    mirtazapine (REMERON) 7.5 MG Tab Take 7.5 mg by mouth every evening.    potassium chloride SA (K-DUR,KLOR-CON) 20 MEQ tablet Take 1 tablet (20 mEq total) by mouth once daily.    risperiDONE (RISPERDAL) 1 MG tablet Take 1 tablet (1 mg total) by mouth every  evening.    valproate (DEPAKENE) 250 mg/5 mL syrup Take 250 mg by mouth 2 (two) times daily.    varicella-zoster [id number], PF, (SHINGRIX, PF,) 50 mcg/0.5 mL injection Inject 0.5 mLs into the muscle once. for 1 dose     Family History       Problem Relation (Age of Onset)    Hypertension Mother, Father    Stroke Father          Tobacco Use    Smoking status: Former     Types: Cigarettes     Quit date: 1995     Years since quittin.0    Smokeless tobacco: Never   Substance and Sexual Activity    Alcohol use: No     Alcohol/week: 0.0 standard drinks    Drug use: No    Sexual activity: Not Currently     Partners: Female     Review of Systems  Constitutional:  Negative for chills and fever.   HENT:  Negative for sore throat.    Respiratory:  Negative for shortness of breath.    Cardiovascular:  Negative for chest pain.   Gastrointestinal:  Negative for diarrhea, nausea and vomiting.   Genitourinary:  Negative for dysuria.   Musculoskeletal:  Negative for back pain.   Skin:  Negative for rash.   Neurological:  Positive for tremors (intermittent). Negative for dizziness, weakness, light-headedness, numbness and headaches.   Hematological:  Does not bruise/bleed easily.   All other systems reviewed and are negative.    Objective:     Vital Signs (Most Recent):  Temp: 99.4 °F (37.4 °C) (10/05/22 0920)  Pulse: 70 (10/05/22 1600)  Resp: (!) 22 (10/05/22 1600)  BP: 124/63 (10/05/22 1600)  SpO2: 95 % (10/05/22 1600)   Vital Signs (24h Range):  Temp:  [99.4 °F (37.4 °C)] 99.4 °F (37.4 °C)  Pulse:  [62-76] 70  Resp:  [20-24] 22  SpO2:  [95 %-97 %] 95 %  BP: (124-156)/(63-76) 124/63     Weight: 83.9 kg (185 lb)  Body mass index is 28.13 kg/m².    Physical Exam      Constitutional: mild distress;   Head: Atraumatic. Normocephalic.  Eyes: PERRL. EOM intact. Conjunctivae are not pale. No scleral icterus.  ENT: Mucous membranes are moist. Oropharynx is clear and symmetric.    Neck: Supple. Full ROM.   Cardiovascular: Regular  rate. Regular rhythm. No murmurs, rubs, or gallops. Distal pulses are 2+ and symmetric.  Pulmonary/Chest: No respiratory distress. Clear to auscultation bilaterally. No wheezing or rales.  Abdominal: Soft and non-distended.  There is no tenderness.  No rebound, guarding, or rigidity.   Musculoskeletal: Moves all extremities. No obvious deformities. No edema.  Skin: Warm and dry.  Neurological:  Alert, oriented to self and date of birth; not oriented to place;  Resting tremor in bilateral upper extremities;   Psychiatric: flat effect;       Significant Labs:     Results for orders placed or performed during the hospital encounter of 10/05/22   CBC auto differential   Result Value Ref Range    WBC 7.79 3.90 - 12.70 K/uL    RBC 3.48 (L) 4.60 - 6.20 M/uL    Hemoglobin 10.8 (L) 14.0 - 18.0 g/dL    Hematocrit 32.6 (L) 40.0 - 54.0 %    MCV 94 82 - 98 fL    MCH 31.0 27.0 - 31.0 pg    MCHC 33.1 32.0 - 36.0 g/dL    RDW 13.2 11.5 - 14.5 %    Platelets 208 150 - 450 K/uL    MPV 10.6 9.2 - 12.9 fL    Immature Granulocytes 0.9 (H) 0.0 - 0.5 %    Gran # (ANC) 6.0 1.8 - 7.7 K/uL    Immature Grans (Abs) 0.07 (H) 0.00 - 0.04 K/uL    Lymph # 0.9 (L) 1.0 - 4.8 K/uL    Mono # 0.5 0.3 - 1.0 K/uL    Eos # 0.2 0.0 - 0.5 K/uL    Baso # 0.07 0.00 - 0.20 K/uL    nRBC 0 0 /100 WBC    Gran % 77.5 (H) 38.0 - 73.0 %    Lymph % 11.7 (L) 18.0 - 48.0 %    Mono % 6.3 4.0 - 15.0 %    Eosinophil % 2.7 0.0 - 8.0 %    Basophil % 0.9 0.0 - 1.9 %    Differential Method Automated    Comprehensive metabolic panel   Result Value Ref Range    Sodium 140 136 - 145 mmol/L    Potassium 3.8 3.5 - 5.1 mmol/L    Chloride 108 95 - 110 mmol/L    CO2 23 23 - 29 mmol/L    Glucose 84 70 - 110 mg/dL    BUN 8 8 - 23 mg/dL    Creatinine 0.9 0.5 - 1.4 mg/dL    Calcium 8.2 (L) 8.7 - 10.5 mg/dL    Total Protein 6.0 6.0 - 8.4 g/dL    Albumin 3.1 (L) 3.5 - 5.2 g/dL    Total Bilirubin 0.3 0.1 - 1.0 mg/dL    Alkaline Phosphatase 63 55 - 135 U/L    AST 17 10 - 40 U/L    ALT 20 10 -  44 U/L    Anion Gap 9 8 - 16 mmol/L    eGFR >60 >60 mL/min/1.73 m^2   CPK   Result Value Ref Range    CPK 56 20 - 200 U/L   Urinalysis, Reflex to Urine Culture Urine, Clean Catch    Specimen: Urine   Result Value Ref Range    Specimen UA Urine, Clean Catch     Color, UA Yellow Yellow, Straw, Marj    Appearance, UA Clear Clear    pH, UA 8.0 5.0 - 8.0    Specific Gravity, UA 1.010 1.005 - 1.030    Protein, UA Negative Negative    Glucose, UA Negative Negative    Ketones, UA Negative Negative    Bilirubin (UA) Negative Negative    Occult Blood UA Negative Negative    Nitrite, UA Negative Negative    Urobilinogen, UA Negative <2.0 EU/dL    Leukocytes, UA Negative Negative   Valproic Acid   Result Value Ref Range    Valproic Acid Level 15.9 (L) 50.0 - 100.0 ug/mL   Drug screen panel, in-house   Result Value Ref Range    Benzodiazepines Negative Negative    Methadone metabolites Negative Negative    Cocaine (Metab.) Negative Negative    Opiate Scrn, Ur Negative Negative    Barbiturate Screen, Ur Negative Negative    Amphetamine Screen, Ur Negative Negative    THC Negative Negative    Phencyclidine Negative Negative    Creatinine, Urine 115.2 23.0 - 375.0 mg/dL    Toxicology Information SEE COMMENT         Significant Imaging:     Imaging Results              X-Ray Chest 1 View (Final result)  Result time 10/05/22 10:32:09      Final result by Oleg Mondragon MD (10/05/22 10:32:09)                   Impression:      No acute findings.      Electronically signed by: Oleg Mondragon MD  Date:    10/05/2022  Time:    10:32               Narrative:    EXAMINATION:  XR CHEST 1 VIEW    CLINICAL HISTORY:  Respiratory distress,    COMPARISON:  10/28/2020 chest x-ray.    FINDINGS:  Heart size is normal.  The lung fields are clear with no definite infiltrate at this time.    Old left clavicle fracture.  Minor shoulder DJD.                                       CT Head Without Contrast (Final result)  Result time 10/05/22  10:16:06      Final result by Alonzo Murray MD (10/05/22 10:16:06)                   Impression:      Chronic microvascular ischemic changes.  MRI can be obtained as clinically warranted.      Electronically signed by: Alonzo Murray MD  Date:    10/05/2022  Time:    10:16               Narrative:    EXAMINATION:  CT HEAD WITHOUT CONTRAST    CLINICAL HISTORY:  Seizure, new-onset, no history of trauma;    TECHNIQUE:  Low dose axial CT images obtained throughout the head without intravenous contrast. Sagittal and coronal reconstructions were performed.  All CT scans at this facility use dose modulation, iterative reconstruction and/or weight based dosing when appropriate to reduce radiation dose to as low as reasonably achievable.    COMPARISON:  10/28/2020    FINDINGS:  Intracranial compartment:    The brain parenchyma demonstrates areas of decreased attenuation with mild to moderate periventricular white matter consistent with chronic microvascular ischemic changes..  No parenchymal mass, hemorrhage, edema or major vascular distribution infarct.  Vascular calcifications are noted.    Mild prominence of the sulci and ventricles are consistent with age-related involutional changes.    No extra-axial blood or fluid collections.    Skull/extracranial contents (limited evaluation): No fracture. Mastoid air cells and paranasal sinuses are essentially clear.

## 2022-10-05 NOTE — ASSESSMENT & PLAN NOTE
As per sister- on September 12th patient was noted to have behavioral issues at [facility] --> was sent to [facility] behavioral [facility] --> stayed there for 2 weeks until 27th;  During his stay over there he has been started on depakote and risperidone which has been taking for past 2-3 weeks;   Will hold them;   Received benadryl and benztropine in ED --> mild improvement in symptoms noted;   Psych and neuro consult;   Bedside swallow assessment;   IVF; sitter, fall precautions

## 2022-10-05 NOTE — HPI
Pt was oriented to self; got most of the information from sister (POA- Ms. Loyd at bedside -contact )    Vishnu Dougherty Jr. is a 62 y.o. male patient with a PMHx of CHF, HTN, dementia, panic attack, am who presents to the Emergency Department for tremors, ambulatory issues onset 1 day PTA.   [facility] reports that the pt was exhibiting generalized tremors at his assisted living facility. Symptoms are intermittent and moderate in severity. No mitigating or exacerbating factors reported. No associated sxs reported. Patient denies any fever, chills, n/v/d, SOB, CP, weakness, numbness, dizziness, headache, and all other sxs at this time. Pt was started on Depakote 5 days ago. No further complaints or concerns at this time.    As per sister- on September 12th patient was noted to have behavioral issues at [facility] --> was sent to [facility] behavioral [facility] --> stayed there for 2 weeks until 27th;  During his stay over there he has been started on depakote and risperidone which has been taking for past 2-3 weeks;

## 2022-10-06 VITALS
WEIGHT: 174.38 LBS | HEART RATE: 85 BPM | DIASTOLIC BLOOD PRESSURE: 80 MMHG | BODY MASS INDEX: 26.43 KG/M2 | OXYGEN SATURATION: 94 % | SYSTOLIC BLOOD PRESSURE: 165 MMHG | HEIGHT: 68 IN | RESPIRATION RATE: 17 BRPM | TEMPERATURE: 99 F

## 2022-10-06 LAB
ANION GAP SERPL CALC-SCNC: 9 MMOL/L (ref 8–16)
BASOPHILS # BLD AUTO: 0.08 K/UL (ref 0–0.2)
BASOPHILS NFR BLD: 0.9 % (ref 0–1.9)
BUN SERPL-MCNC: 10 MG/DL (ref 8–23)
CALCIUM SERPL-MCNC: 8.4 MG/DL (ref 8.7–10.5)
CHLORIDE SERPL-SCNC: 105 MMOL/L (ref 95–110)
CO2 SERPL-SCNC: 24 MMOL/L (ref 23–29)
CREAT SERPL-MCNC: 0.9 MG/DL (ref 0.5–1.4)
DIFFERENTIAL METHOD: ABNORMAL
EOSINOPHIL # BLD AUTO: 0.3 K/UL (ref 0–0.5)
EOSINOPHIL NFR BLD: 3.8 % (ref 0–8)
ERYTHROCYTE [DISTWIDTH] IN BLOOD BY AUTOMATED COUNT: 13.5 % (ref 11.5–14.5)
EST. GFR  (NO RACE VARIABLE): >60 ML/MIN/1.73 M^2
GLUCOSE SERPL-MCNC: 101 MG/DL (ref 70–110)
HCT VFR BLD AUTO: 33 % (ref 40–54)
HGB BLD-MCNC: 10.8 G/DL (ref 14–18)
IMM GRANULOCYTES # BLD AUTO: 0.03 K/UL (ref 0–0.04)
IMM GRANULOCYTES NFR BLD AUTO: 0.3 % (ref 0–0.5)
LYMPHOCYTES # BLD AUTO: 1.2 K/UL (ref 1–4.8)
LYMPHOCYTES NFR BLD: 13.9 % (ref 18–48)
MAGNESIUM SERPL-MCNC: 1.9 MG/DL (ref 1.6–2.6)
MCH RBC QN AUTO: 30.9 PG (ref 27–31)
MCHC RBC AUTO-ENTMCNC: 32.7 G/DL (ref 32–36)
MCV RBC AUTO: 94 FL (ref 82–98)
MONOCYTES # BLD AUTO: 0.9 K/UL (ref 0.3–1)
MONOCYTES NFR BLD: 9.8 % (ref 4–15)
NEUTROPHILS # BLD AUTO: 6.2 K/UL (ref 1.8–7.7)
NEUTROPHILS NFR BLD: 71.3 % (ref 38–73)
NRBC BLD-RTO: 0 /100 WBC
PLATELET # BLD AUTO: 226 K/UL (ref 150–450)
PMV BLD AUTO: 10.8 FL (ref 9.2–12.9)
POCT GLUCOSE: 100 MG/DL (ref 70–110)
POCT GLUCOSE: 110 MG/DL (ref 70–110)
POTASSIUM SERPL-SCNC: 4.1 MMOL/L (ref 3.5–5.1)
RBC # BLD AUTO: 3.5 M/UL (ref 4.6–6.2)
SARS-COV-2 RDRP RESP QL NAA+PROBE: NEGATIVE
SODIUM SERPL-SCNC: 138 MMOL/L (ref 136–145)
TSH SERPL DL<=0.005 MIU/L-ACNC: 1.21 UIU/ML (ref 0.4–4)
WBC # BLD AUTO: 8.66 K/UL (ref 3.9–12.7)

## 2022-10-06 PROCEDURE — 83735 ASSAY OF MAGNESIUM: CPT | Performed by: STUDENT IN AN ORGANIZED HEALTH CARE EDUCATION/TRAINING PROGRAM

## 2022-10-06 PROCEDURE — 80048 BASIC METABOLIC PNL TOTAL CA: CPT | Performed by: STUDENT IN AN ORGANIZED HEALTH CARE EDUCATION/TRAINING PROGRAM

## 2022-10-06 PROCEDURE — 96372 THER/PROPH/DIAG INJ SC/IM: CPT | Performed by: STUDENT IN AN ORGANIZED HEALTH CARE EDUCATION/TRAINING PROGRAM

## 2022-10-06 PROCEDURE — 63600175 PHARM REV CODE 636 W HCPCS: Performed by: STUDENT IN AN ORGANIZED HEALTH CARE EDUCATION/TRAINING PROGRAM

## 2022-10-06 PROCEDURE — 96376 TX/PRO/DX INJ SAME DRUG ADON: CPT

## 2022-10-06 PROCEDURE — 36415 COLL VENOUS BLD VENIPUNCTURE: CPT | Performed by: STUDENT IN AN ORGANIZED HEALTH CARE EDUCATION/TRAINING PROGRAM

## 2022-10-06 PROCEDURE — 84443 ASSAY THYROID STIM HORMONE: CPT | Performed by: STUDENT IN AN ORGANIZED HEALTH CARE EDUCATION/TRAINING PROGRAM

## 2022-10-06 PROCEDURE — U0002 COVID-19 LAB TEST NON-CDC: HCPCS | Performed by: STUDENT IN AN ORGANIZED HEALTH CARE EDUCATION/TRAINING PROGRAM

## 2022-10-06 PROCEDURE — G0378 HOSPITAL OBSERVATION PER HR: HCPCS

## 2022-10-06 PROCEDURE — G0425 INPT/ED TELECONSULT30: HCPCS | Mod: 95,,, | Performed by: PSYCHIATRY & NEUROLOGY

## 2022-10-06 PROCEDURE — 86592 SYPHILIS TEST NON-TREP QUAL: CPT | Performed by: STUDENT IN AN ORGANIZED HEALTH CARE EDUCATION/TRAINING PROGRAM

## 2022-10-06 PROCEDURE — 85025 COMPLETE CBC W/AUTO DIFF WBC: CPT | Performed by: STUDENT IN AN ORGANIZED HEALTH CARE EDUCATION/TRAINING PROGRAM

## 2022-10-06 PROCEDURE — 92610 EVALUATE SWALLOWING FUNCTION: CPT

## 2022-10-06 PROCEDURE — G0425 PR INPT TELEHEALTH CONSULT 30M: ICD-10-PCS | Mod: 95,,, | Performed by: PSYCHIATRY & NEUROLOGY

## 2022-10-06 PROCEDURE — 96375 TX/PRO/DX INJ NEW DRUG ADDON: CPT

## 2022-10-06 RX ORDER — HALOPERIDOL 5 MG/ML
5 INJECTION INTRAMUSCULAR EVERY 6 HOURS PRN
Status: DISCONTINUED | OUTPATIENT
Start: 2022-10-06 | End: 2022-10-07 | Stop reason: HOSPADM

## 2022-10-06 RX ORDER — VALPROIC ACID 250 MG/5ML
250 SOLUTION ORAL EVERY 12 HOURS
Status: DISCONTINUED | OUTPATIENT
Start: 2022-10-06 | End: 2022-10-06

## 2022-10-06 RX ORDER — DIAZEPAM 10 MG/2ML
5 INJECTION INTRAMUSCULAR ONCE
Status: COMPLETED | OUTPATIENT
Start: 2022-10-06 | End: 2022-10-06

## 2022-10-06 RX ORDER — BENZTROPINE MESYLATE 1 MG/ML
1 INJECTION, SOLUTION INTRAMUSCULAR; INTRAVENOUS ONCE
Status: COMPLETED | OUTPATIENT
Start: 2022-10-06 | End: 2022-10-06

## 2022-10-06 RX ORDER — VALPROIC ACID 250 MG/5ML
500 SOLUTION ORAL 2 TIMES DAILY
Status: DISCONTINUED | OUTPATIENT
Start: 2022-10-06 | End: 2022-10-07 | Stop reason: HOSPADM

## 2022-10-06 RX ORDER — DIAZEPAM 10 MG/2ML
5 INJECTION INTRAMUSCULAR EVERY 30 MIN PRN
Status: COMPLETED | OUTPATIENT
Start: 2022-10-06 | End: 2022-10-06

## 2022-10-06 RX ADMIN — DIAZEPAM 5 MG: 5 INJECTION, SOLUTION INTRAMUSCULAR; INTRAVENOUS at 05:10

## 2022-10-06 RX ADMIN — DIAZEPAM 5 MG: 5 INJECTION, SOLUTION INTRAMUSCULAR; INTRAVENOUS at 12:10

## 2022-10-06 RX ADMIN — DIAZEPAM 5 MG: 5 INJECTION, SOLUTION INTRAMUSCULAR; INTRAVENOUS at 01:10

## 2022-10-06 RX ADMIN — BENZTROPINE MESYLATE 1 MG: 1 INJECTION INTRAMUSCULAR; INTRAVENOUS at 12:10

## 2022-10-06 RX ADMIN — DIAZEPAM 5 MG: 5 INJECTION, SOLUTION INTRAMUSCULAR; INTRAVENOUS at 09:10

## 2022-10-06 RX ADMIN — HALOPERIDOL LACTATE 5 MG: 5 INJECTION, SOLUTION INTRAMUSCULAR at 04:10

## 2022-10-06 NOTE — PT/OT/SLP PROGRESS
Physical Therapy      Patient Name:  Vishnu Dougherty Jr.   MRN:  [id number]    CONTRERAS WRIGHT INITIATED THIS AM VIA CHART REVIEW, PT CURRENTLY UN-AVAILABLE AT THIS TIME, IN MEETING WITH TELE DOC, WILL ASSESS PT NEXT VISIT    Triny Champagne, PT  10/6/2022  1123

## 2022-10-06 NOTE — PLAN OF CARE
10/06/22 1446   Post-Acute Status   Post-Acute Authorization Placement   Post-Acute Placement Status Pending medical clearance/testing   Discharge Delays None known at this time   Discharge Plan   Discharge Plan A Psychiatric hospital     Altonr spoke with Saray ([phone]) with [facility]-psych unit. Patient clinically accepted by Dr Phan. Swer to fax PEC to [facility]. Swer awaiting medical stability for patient to discharge. Per MD, anticipated DC is tomorrow. Swer to f/u in AM.

## 2022-10-06 NOTE — ASSESSMENT & PLAN NOTE
Will resume home meds once patient passes swallow eval    10/6     Patient became more agitated, confused  Possibly delirium  sitter at bedside  Atravi p.r.nMelissa, Garrett p.r.n.

## 2022-10-06 NOTE — DISCHARGE INSTRUCTIONS
Recommend patient to be compliant with medications, follow up visits  Recommend palliative follow up for goals of care discussion given worsening dementia

## 2022-10-06 NOTE — PLAN OF CARE
[facility] - Telemetry (Hospital)  Discharge Final Note    Primary Care Provider: Stephan Quiñones MD    Expected Discharge Date: 10/6/2022    Final Discharge Note (most recent)       Final Note - 10/06/22 1732          Final Note    Assessment Type Final Discharge Note     Anticipated Discharge Disposition Psychiatric Hospital        Post-Acute Status    Post-Acute Authorization Placement     Post-Acute Placement Status Set-up Complete/Auth obtained     Discharge Delays None known at this time                   Pt to discharge to [facility]. PEC completed and faxed to facility ([phone])along with negative COVID test.     No additional CM needs for discharge.     Important Message from Medicare             Contact Info       Stephan Quiñones MD   Specialty: Family Medicine   Relationship: PCP - [address] [address]   Phone: [phone]       Next Steps: Follow up    Instructions: f/u with PCP within one week upon discharge    Pranav Davison DO   Specialty: Psychiatry    [address]  [address]  [facility] [address]   Phone: [phone]       Next Steps: Follow up    Instructions: f/u with psychiatry upon discharge

## 2022-10-06 NOTE — HOSPITAL COURSE
10/6      Examination of patient done at bedside.    Patient got more confused, agitated, combative ---  and ordered Valium, benztropine, Haldol p.r.n. after discussing with Neurology; neurology recommended EEG, Haldol p.r.n; neural okay with resuming valproic acid; will keep Valium p.r.n.,  Hemodynamically stable, labs reviewed   working on transfer to inpatient psychiatry unit, will plan for transfer once patient more stable.  Sitter at bedside. PEC orders in;   Pt with worsening dementia with current delirium --> transfer to psych unit;   Consider possibility of extra pyramidal symptoms --> while ordering antipsychotic meds ;   worked on transferring patient to psych unit, charge nurse to arrange for transportation;  Updated plan to sister, agreed to the plan.

## 2022-10-06 NOTE — PT/OT/SLP EVAL
Speech Language Pathology Evaluation  Bedside Swallow    Patient Name:  Vishnu Dougherty Jr.   MRN:  [id number]  Admitting Diagnosis: Coarse tremors    Recommendations:                 General Recommendations:  Dysphagia therapy  Diet recommendations:  Puree Diet - IDDSI Level 4, Thin liquids - IDDSI Level 0 (no straws)   Aspiration Precautions: 1 bite/sip at a time, Assistance with meals, Avoid talking while eating, Feed only when awake/alert, Frequent oral care, HOB to 90 degrees, Meds crushed in puree, Meds whole buried in puree, No straws, Remain upright 30 minutes post meal, Small bites/sips, and Strict aspiration precautions   General Precautions: Standard, aspiration, pureed diet  Communication strategies:  provide increased time to answer    History:     Past Medical History:   Diagnosis Date    Chronic CHF 10/2/2019    Chronic diastolic HF (heart failure) 4/17/2018    Coronary artery calcification 1/11/2022    Dilated cardiomyopathy 9/24/2015    Enlarged prostate     Essential hypertension 9/24/2015    Gastroesophageal reflux disease without esophagitis 10/7/2015    Gastroesophageal reflux disease without esophagitis 10/7/2015    Hyperlipidemia     Personal history of colonic polyps 2016    Shortness of breath 9/24/2015       Past Surgical History:   Procedure Laterality Date    COLONOSCOPY N/A 5/25/2016    Procedure: COLONOSCOPY;  Surgeon: Demetrio Spicer MD;  Location: [facility];  Service: Endoscopy;  Laterality: N/A;       Social History: Patient lives at [facility], requires assist with ADL's.  Cognitive deficits with behavioral disturbance reported in medical hx.    Prior Intubation HX:  N/A    Modified Barium Swallow: N/A    Chest X-Rays: See medical chart.  Clear lungs.    Prior diet: Pt consuming a regular diet prior to admit.      Subjective     Pt seen bedside for ST swallowing evaluation.  No c/o pain.  Pt's sister present.  Awaiting disposition to Psych unit.  Patient goals: To eat/drink    Pain/Comfort:  Pain  Rating 1: 0/10  Pain Rating Post-Intervention 1: 0/10  Pain Rating 2: 0/10  Pain Rating Post-Intervention 2: 0/10    Respiratory Status: Room air    Objective:     Oral Musculature Evaluation  Oral Musculature: general weakness  Structural Abnormalities: whole body tremors present  Dentition: scattered dentition  Secretion Management: adequate  Mucosal Quality: good  Mandibular Strength and Mobility: impaired  Oral Labial Strength and Mobility: WFL  Lingual Strength and Mobility: impaired strength  Velar Elevation: WFL  Buccal Strength and Mobility: WFL  Volitional Cough: present  Volitional Swallow: present  Voice Prior to PO Intake: WFL    Bedside Swallow Eval:   Consistencies Assessed:  Pt consumed thin liquids by cup with [place] feeding assist, pureed solids and soft/chopped solids.  Noted tremors during swallowing evaluation, negatively impacting self-feeding.     Oral Phase:   Slow oral transit time    Pharyngeal Phase:   no overt clinical signs/symptoms of aspiration  At risk d/t tremors    Compensatory Strategies  Upright positioning, no straws, [place] feeding assist    Treatment: Pt recommended for a pureed consistency diet (IDDSI 4)/thin liquids (IDDSI 0), following strict aspiration precautions, daily oral care and feeding assist.  Medications recommended to be crushed (if medically indicated) or placed whole in applesauce.    Assessment:     Vishnu Dougherty Jr. is a 62 y.o. male with an SLP diagnosis of Dysphagia with aspiration risk d/t acute onset of tremors & hx cognitive impairment potentially impacting safety/efficiency during PO consumption.  He is recommended for a pureed consistency diet (IDDSI 4) with thin liquids (IDDSI 0) by cup-no straws, following strict aspiration precautions and feeding assist.  ST to follow for ongoing swallowing assessment of diet tolerance and/or advancement as clinically indicated.    Goals:   Multidisciplinary Problems       SLP Goals          Problem: SLP    Goal Priority  Disciplines Outcome   SLP Goal     SLP    Description: 1.  Pt will consume the least restrictive PO diet without incident.  1a. Pt will consume a pureed consistency diet/thin liquids without s/s of dysphagia.  1b. Tx feeds of soft solids for diet advancement as clinically indicated.                       Plan:     Patient to be seen:  2 x/week, 3 x/week   Plan of Care expires:  10/13/22  Plan of Care reviewed with:  patient, sibling   SLP Follow-Up:  Yes       Discharge recommendations:  psychiatric facility (per MD orders)   Barriers to Discharge:  None    Time Tracking:     SLP Treatment Date:   10/06/22  Speech Start Time:  1000  Speech Stop Time:  1030     Speech Total Time (min):  30 min    Billable Minutes: Eval Swallow and Oral Function 30 minutes    10/06/2022

## 2022-10-06 NOTE — PT/OT/SLP PROGRESS
Occupational Therapy      Patient Name:  Vishnu Dougherty Jr.   MRN:  [id number]    OT eval initiated via chart review. Patient not seen today at 11:25 AM secondary to pt unavailable, in meeting with tele doc at this time. Will follow-up next visit.    10/6/2022  Dotty Gan OT   [id number]

## 2022-10-06 NOTE — PROGRESS NOTES
[facility]  Progress Note    Patient Name: Vishnu Dougherty Jr.  MRN: [id number]  Patient Class: OP- Observation   Admission Date: 10/5/2022  Length of Stay: 0 days  Attending Physician: Mena Figueroa, *  Primary Care Provider: Stephan Quiñones MD        Subjective:     Principal Problem:Coarse tremors        HPI:  Pt was oriented to self; got most of the information from sister (POA- Ms. Loyd at bedside -contact )    Vishnu Dougherty Jr. is a 62 y.o. male patient with a PMHx of CHF, HTN, dementia, panic attack, am who presents to the Emergency Department for tremors, ambulatory issues onset 1 day PTA.   [facility] reports that the pt was exhibiting generalized tremors at his assisted living facility. Symptoms are intermittent and moderate in severity. No mitigating or exacerbating factors reported. No associated sxs reported. Patient denies any fever, chills, n/v/d, SOB, CP, weakness, numbness, dizziness, headache, and all other sxs at this time. Pt was started on Depakote 5 days ago. No further complaints or concerns at this time.    As per sister- on September 12th patient was noted to have behavioral issues at [facility] --> was sent to [facility] --> stayed there for 2 weeks until 27th;  During his stay over there he has been started on depakote and risperidone which has been taking for past 2-3 weeks;             Overview/Hospital Course:  In/extension  Examination of patient done at bedside.    Patient got more confused, agitated, combative ---  and ordered Valium, benztropine, Haldol p.r.n. after discussing with Neurology; neurology recommended EEG, Haldol p.r.n; neural okay with resuming valproic acid; will keep Valium p.r.n.,  Hemodynamically stable, labs reviewed  Will follow up on EEG    working on transfer to inpatient psychiatry unit, will plan for transfer once patient more stable.  Sitter at bedside. PEC orders in;           Review  of Systems    Unable to assess review of system as patient was disoriented, agitated, confused  Objective:     Vital Signs (Most Recent):  Temp: 98.2 °F (36.8 °C) (10/06/22 1129)  Pulse: 99 (10/06/22 1300)  Resp: 20 (10/06/22 1129)  BP: 126/70 (10/06/22 1154)  SpO2: 95 % (10/06/22 1129) Vital Signs (24h Range):  Temp:  [98.1 °F (36.7 °C)-99.3 °F (37.4 °C)] 98.2 °F (36.8 °C)  Pulse:  [61-99] 99  Resp:  [17-22] 20  SpO2:  [92 %-97 %] 95 %  BP: (123-152)/(63-70) 126/70     Weight: 79.1 kg (174 lb 6.1 oz)  Body mass index is 26.51 kg/m².    Intake/Output Summary (Last 24 hours) at 10/6/2022 1541  Last data filed at 10/6/2022 1433  Gross per 24 hour   Intake 0 ml   Output --   Net 0 ml      Physical Exam       Constitutional: mild distress;   Head: Atraumatic. Normocephalic.  Eyes: PERRL. EOM intact. Conjunctivae are not pale. No scleral icterus.  ENT: Mucous membranes are moist. Oropharynx is clear and symmetric.    Neck: Supple. Full ROM.   Cardiovascular: Regular rate. Regular rhythm. No murmurs, rubs, or gallops. Distal pulses are 2+ and symmetric.  Pulmonary/Chest: No respiratory distress. Clear to auscultation bilaterally. No wheezing or rales.  Abdominal: Soft and non-distended.  There is no tenderness.  No rebound, guarding, or rigidity.   Musculoskeletal: Moves all extremities. No obvious deformities. No edema.  Skin: Warm and dry.  Neurological:  Alert, oriented to self and date of birth; not oriented to place;  Resting tremor in bilateral upper extremities;   Psychiatric: flat effect;     Significant Labs:     Results for orders placed or performed during the hospital encounter of 10/05/22   CBC auto differential   Result Value Ref Range    WBC 7.79 3.90 - 12.70 K/uL    RBC 3.48 (L) 4.60 - 6.20 M/uL    Hemoglobin 10.8 (L) 14.0 - 18.0 g/dL    Hematocrit 32.6 (L) 40.0 - 54.0 %    MCV 94 82 - 98 fL    MCH 31.0 27.0 - 31.0 pg    MCHC 33.1 32.0 - 36.0 g/dL    RDW 13.2 11.5 - 14.5 %    Platelets 208 150 - 450 K/uL     MPV 10.6 9.2 - 12.9 fL    Immature Granulocytes 0.9 (H) 0.0 - 0.5 %    Gran # (ANC) 6.0 1.8 - 7.7 K/uL    Immature Grans (Abs) 0.07 (H) 0.00 - 0.04 K/uL    Lymph # 0.9 (L) 1.0 - 4.8 K/uL    Mono # 0.5 0.3 - 1.0 K/uL    Eos # 0.2 0.0 - 0.5 K/uL    Baso # 0.07 0.00 - 0.20 K/uL    nRBC 0 0 /100 WBC    Gran % 77.5 (H) 38.0 - 73.0 %    Lymph % 11.7 (L) 18.0 - 48.0 %    Mono % 6.3 4.0 - 15.0 %    Eosinophil % 2.7 0.0 - 8.0 %    Basophil % 0.9 0.0 - 1.9 %    Differential Method Automated    Comprehensive metabolic panel   Result Value Ref Range    Sodium 140 136 - 145 mmol/L    Potassium 3.8 3.5 - 5.1 mmol/L    Chloride 108 95 - 110 mmol/L    CO2 23 23 - 29 mmol/L    Glucose 84 70 - 110 mg/dL    BUN 8 8 - 23 mg/dL    Creatinine 0.9 0.5 - 1.4 mg/dL    Calcium 8.2 (L) 8.7 - 10.5 mg/dL    Total Protein 6.0 6.0 - 8.4 g/dL    Albumin 3.1 (L) 3.5 - 5.2 g/dL    Total Bilirubin 0.3 0.1 - 1.0 mg/dL    Alkaline Phosphatase 63 55 - 135 U/L    AST 17 10 - 40 U/L    ALT 20 10 - 44 U/L    Anion Gap 9 8 - 16 mmol/L    eGFR >60 >60 mL/min/1.73 m^2   CPK   Result Value Ref Range    CPK 56 20 - 200 U/L   Urinalysis, Reflex to Urine Culture Urine, Clean Catch    Specimen: Urine   Result Value Ref Range    Specimen UA Urine, Clean Catch     Color, UA Yellow Yellow, Straw, Marj    Appearance, UA Clear Clear    pH, UA 8.0 5.0 - 8.0    Specific Gravity, UA 1.010 1.005 - 1.030    Protein, UA Negative Negative    Glucose, UA Negative Negative    Ketones, UA Negative Negative    Bilirubin (UA) Negative Negative    Occult Blood UA Negative Negative    Nitrite, UA Negative Negative    Urobilinogen, UA Negative <2.0 EU/dL    Leukocytes, UA Negative Negative   Valproic Acid   Result Value Ref Range    Valproic Acid Level 15.9 (L) 50.0 - 100.0 ug/mL   Drug screen panel, in-house   Result Value Ref Range    Benzodiazepines Negative Negative    Methadone metabolites Negative Negative    Cocaine (Metab.) Negative Negative    Opiate Scrn, Ur Negative  Negative    Barbiturate Screen, Ur Negative Negative    Amphetamine Screen, Ur Negative Negative    THC Negative Negative    Phencyclidine Negative Negative    Creatinine, Urine 115.2 23.0 - 375.0 mg/dL    Toxicology Information SEE COMMENT    TSH   Result Value Ref Range    TSH 1.208 0.400 - 4.000 uIU/mL   CBC auto differential   Result Value Ref Range    WBC 8.66 3.90 - 12.70 K/uL    RBC 3.50 (L) 4.60 - 6.20 M/uL    Hemoglobin 10.8 (L) 14.0 - 18.0 g/dL    Hematocrit 33.0 (L) 40.0 - 54.0 %    MCV 94 82 - 98 fL    MCH 30.9 27.0 - 31.0 pg    MCHC 32.7 32.0 - 36.0 g/dL    RDW 13.5 11.5 - 14.5 %    Platelets 226 150 - 450 K/uL    MPV 10.8 9.2 - 12.9 fL    Immature Granulocytes 0.3 0.0 - 0.5 %    Gran # (ANC) 6.2 1.8 - 7.7 K/uL    Immature Grans (Abs) 0.03 0.00 - 0.04 K/uL    Lymph # 1.2 1.0 - 4.8 K/uL    Mono # 0.9 0.3 - 1.0 K/uL    Eos # 0.3 0.0 - 0.5 K/uL    Baso # 0.08 0.00 - 0.20 K/uL    nRBC 0 0 /100 WBC    Gran % 71.3 38.0 - 73.0 %    Lymph % 13.9 (L) 18.0 - 48.0 %    Mono % 9.8 4.0 - 15.0 %    Eosinophil % 3.8 0.0 - 8.0 %    Basophil % 0.9 0.0 - 1.9 %    Differential Method Automated    Basic metabolic panel   Result Value Ref Range    Sodium 138 136 - 145 mmol/L    Potassium 4.1 3.5 - 5.1 mmol/L    Chloride 105 95 - 110 mmol/L    CO2 24 23 - 29 mmol/L    Glucose 101 70 - 110 mg/dL    BUN 10 8 - 23 mg/dL    Creatinine 0.9 0.5 - 1.4 mg/dL    Calcium 8.4 (L) 8.7 - 10.5 mg/dL    Anion Gap 9 8 - 16 mmol/L    eGFR >60 >60 mL/min/1.73 m^2   Magnesium   Result Value Ref Range    Magnesium 1.9 1.6 - 2.6 mg/dL   POCT glucose   Result Value Ref Range    POCT Glucose 83 70 - 110 mg/dL   POCT glucose   Result Value Ref Range    POCT Glucose 100 70 - 110 mg/dL   POCT glucose   Result Value Ref Range    POCT Glucose 110 70 - 110 mg/dL        Imaging Results              X-Ray Chest 1 View (Final result)  Result time 10/05/22 10:32:09      Final result by Oleg Mondragon MD (10/05/22 10:32:09)                    Impression:      No acute findings.      Electronically signed by: Oleg Mondragon MD  Date:    10/05/2022  Time:    10:32               Narrative:    EXAMINATION:  XR CHEST 1 VIEW    CLINICAL HISTORY:  Respiratory distress,    COMPARISON:  10/28/2020 chest x-ray.    FINDINGS:  Heart size is normal.  The lung fields are clear with no definite infiltrate at this time.    Old left clavicle fracture.  Minor shoulder DJD.                                       CT Head Without Contrast (Final result)  Result time 10/05/22 10:16:06      Final result by Alonzo Murray MD (10/05/22 10:16:06)                   Impression:      Chronic microvascular ischemic changes.  MRI can be obtained as clinically warranted.      Electronically signed by: Alonzo Murray MD  Date:    10/05/2022  Time:    10:16               Narrative:    EXAMINATION:  CT HEAD WITHOUT CONTRAST    CLINICAL HISTORY:  Seizure, new-onset, no history of trauma;    TECHNIQUE:  Low dose axial CT images obtained throughout the head without intravenous contrast. Sagittal and coronal reconstructions were performed.  All CT scans at this facility use dose modulation, iterative reconstruction and/or weight based dosing when appropriate to reduce radiation dose to as low as reasonably achievable.    COMPARISON:  10/28/2020    FINDINGS:  Intracranial compartment:    The brain parenchyma demonstrates areas of decreased attenuation with mild to moderate periventricular white matter consistent with chronic microvascular ischemic changes..  No parenchymal mass, hemorrhage, edema or major vascular distribution infarct.  Vascular calcifications are noted.    Mild prominence of the sulci and ventricles are consistent with age-related involutional changes.    No extra-axial blood or fluid collections.    Skull/extracranial contents (limited evaluation): No fracture. Mastoid air cells and paranasal sinuses are essentially clear.                                            Assessment/Plan:      * Coarse tremors  As per sister- on September 12th patient was noted to have behavioral issues at [facility] --> was sent to [facility] --> stayed there for 2 weeks until 27th;  During his stay over there he has been started on depakote and risperidone which has been taking for past 2-3 weeks;   Will hold them;   Received benadryl and benztropine in ED --> mild improvement in symptoms noted;   Psych and neuro consult;   Bedside swallow assessment;   IVF; sitter, fall precautions    Psych recommended PC   Neurology on board appreciate recommendations    Dementia with behavioral disturbance  Will resume home meds once patient passes swallow eval    10/6     Patient became more agitated, confused  Possibly delirium  sitter at bedside  Ativan p.r.n., Haldol p.r.n.      Essential hypertension  Will resume home meds once pt passes bedside swallow;         VTE Risk Mitigation (From admission, onward)         Ordered     IP VTE HIGH RISK PATIENT  Once         10/05/22 1734     Place sequential compression device  Until discontinued         10/05/22 1733                Discharge Planning   CELIO:      Code Status: Full Code   Is the patient medically ready for discharge?:     Reason for patient still in hospital (select all that apply): got more delirious; pending eeg; working on transfer to inpatient psych unit;   Discharge Plan A: Psychiatric hospital   Discharge Delays: None known at this time              Mena Figueroa MD  Department of Hospital Medicine   [facility] - Telemetry ([facility])

## 2022-10-06 NOTE — SUBJECTIVE & OBJECTIVE
Review of Systems    Unable to assess review of system as patient was disoriented, agitated, confused  Objective:     Vital Signs (Most Recent):  Temp: 98.2 °F (36.8 °C) (10/06/22 1129)  Pulse: 99 (10/06/22 1300)  Resp: 20 (10/06/22 1129)  BP: 126/70 (10/06/22 1154)  SpO2: 95 % (10/06/22 1129) Vital Signs (24h Range):  Temp:  [98.1 °F (36.7 °C)-99.3 °F (37.4 °C)] 98.2 °F (36.8 °C)  Pulse:  [61-99] 99  Resp:  [17-22] 20  SpO2:  [92 %-97 %] 95 %  BP: (123-152)/(63-70) 126/70     Weight: 79.1 kg (174 lb 6.1 oz)  Body mass index is 26.51 kg/m².    Intake/Output Summary (Last 24 hours) at 10/6/2022 1541  Last data filed at 10/6/2022 1433  Gross per 24 hour   Intake 0 ml   Output --   Net 0 ml      Physical Exam       Constitutional: mild distress;   Head: Atraumatic. Normocephalic.  Eyes: PERRL. EOM intact. Conjunctivae are not pale. No scleral icterus.  ENT: Mucous membranes are moist. Oropharynx is clear and symmetric.    Neck: Supple. Full ROM.   Cardiovascular: Regular rate. Regular rhythm. No murmurs, rubs, or gallops. Distal pulses are 2+ and symmetric.  Pulmonary/Chest: No respiratory distress. Clear to auscultation bilaterally. No wheezing or rales.  Abdominal: Soft and non-distended.  There is no tenderness.  No rebound, guarding, or rigidity.   Musculoskeletal: Moves all extremities. No obvious deformities. No edema.  Skin: Warm and dry.  Neurological:  Alert, oriented to self and date of birth; not oriented to place;  Resting tremor in bilateral upper extremities;   Psychiatric: flat effect;     Significant Labs:     Results for orders placed or performed during the hospital encounter of 10/05/22   CBC auto differential   Result Value Ref Range    WBC 7.79 3.90 - 12.70 K/uL    RBC 3.48 (L) 4.60 - 6.20 M/uL    Hemoglobin 10.8 (L) 14.0 - 18.0 g/dL    Hematocrit 32.6 (L) 40.0 - 54.0 %    MCV 94 82 - 98 fL    MCH 31.0 27.0 - 31.0 pg    MCHC 33.1 32.0 - 36.0 g/dL    RDW 13.2 11.5 - 14.5 %    Platelets 208 150 -  450 K/uL    MPV 10.6 9.2 - 12.9 fL    Immature Granulocytes 0.9 (H) 0.0 - 0.5 %    Gran # (ANC) 6.0 1.8 - 7.7 K/uL    Immature Grans (Abs) 0.07 (H) 0.00 - 0.04 K/uL    Lymph # 0.9 (L) 1.0 - 4.8 K/uL    Mono # 0.5 0.3 - 1.0 K/uL    Eos # 0.2 0.0 - 0.5 K/uL    Baso # 0.07 0.00 - 0.20 K/uL    nRBC 0 0 /100 WBC    Gran % 77.5 (H) 38.0 - 73.0 %    Lymph % 11.7 (L) 18.0 - 48.0 %    Mono % 6.3 4.0 - 15.0 %    Eosinophil % 2.7 0.0 - 8.0 %    Basophil % 0.9 0.0 - 1.9 %    Differential Method Automated    Comprehensive metabolic panel   Result Value Ref Range    Sodium 140 136 - 145 mmol/L    Potassium 3.8 3.5 - 5.1 mmol/L    Chloride 108 95 - 110 mmol/L    CO2 23 23 - 29 mmol/L    Glucose 84 70 - 110 mg/dL    BUN 8 8 - 23 mg/dL    Creatinine 0.9 0.5 - 1.4 mg/dL    Calcium 8.2 (L) 8.7 - 10.5 mg/dL    Total Protein 6.0 6.0 - 8.4 g/dL    Albumin 3.1 (L) 3.5 - 5.2 g/dL    Total Bilirubin 0.3 0.1 - 1.0 mg/dL    Alkaline Phosphatase 63 55 - 135 U/L    AST 17 10 - 40 U/L    ALT 20 10 - 44 U/L    Anion Gap 9 8 - 16 mmol/L    eGFR >60 >60 mL/min/1.73 m^2   CPK   Result Value Ref Range    CPK 56 20 - 200 U/L   Urinalysis, Reflex to Urine Culture Urine, Clean Catch    Specimen: Urine   Result Value Ref Range    Specimen UA Urine, Clean Catch     Color, UA Yellow Yellow, Straw, Marj    Appearance, UA Clear Clear    pH, UA 8.0 5.0 - 8.0    Specific Gravity, UA 1.010 1.005 - 1.030    Protein, UA Negative Negative    Glucose, UA Negative Negative    Ketones, UA Negative Negative    Bilirubin (UA) Negative Negative    Occult Blood UA Negative Negative    Nitrite, UA Negative Negative    Urobilinogen, UA Negative <2.0 EU/dL    Leukocytes, UA Negative Negative   Valproic Acid   Result Value Ref Range    Valproic Acid Level 15.9 (L) 50.0 - 100.0 ug/mL   Drug screen panel, in-house   Result Value Ref Range    Benzodiazepines Negative Negative    Methadone metabolites Negative Negative    Cocaine (Metab.) Negative Negative    Opiate Scrn, Ur  Negative Negative    Barbiturate Screen, Ur Negative Negative    Amphetamine Screen, Ur Negative Negative    THC Negative Negative    Phencyclidine Negative Negative    Creatinine, Urine 115.2 23.0 - 375.0 mg/dL    Toxicology Information SEE COMMENT    TSH   Result Value Ref Range    TSH 1.208 0.400 - 4.000 uIU/mL   CBC auto differential   Result Value Ref Range    WBC 8.66 3.90 - 12.70 K/uL    RBC 3.50 (L) 4.60 - 6.20 M/uL    Hemoglobin 10.8 (L) 14.0 - 18.0 g/dL    Hematocrit 33.0 (L) 40.0 - 54.0 %    MCV 94 82 - 98 fL    MCH 30.9 27.0 - 31.0 pg    MCHC 32.7 32.0 - 36.0 g/dL    RDW 13.5 11.5 - 14.5 %    Platelets 226 150 - 450 K/uL    MPV 10.8 9.2 - 12.9 fL    Immature Granulocytes 0.3 0.0 - 0.5 %    Gran # (ANC) 6.2 1.8 - 7.7 K/uL    Immature Grans (Abs) 0.03 0.00 - 0.04 K/uL    Lymph # 1.2 1.0 - 4.8 K/uL    Mono # 0.9 0.3 - 1.0 K/uL    Eos # 0.3 0.0 - 0.5 K/uL    Baso # 0.08 0.00 - 0.20 K/uL    nRBC 0 0 /100 WBC    Gran % 71.3 38.0 - 73.0 %    Lymph % 13.9 (L) 18.0 - 48.0 %    Mono % 9.8 4.0 - 15.0 %    Eosinophil % 3.8 0.0 - 8.0 %    Basophil % 0.9 0.0 - 1.9 %    Differential Method Automated    Basic metabolic panel   Result Value Ref Range    Sodium 138 136 - 145 mmol/L    Potassium 4.1 3.5 - 5.1 mmol/L    Chloride 105 95 - 110 mmol/L    CO2 24 23 - 29 mmol/L    Glucose 101 70 - 110 mg/dL    BUN 10 8 - 23 mg/dL    Creatinine 0.9 0.5 - 1.4 mg/dL    Calcium 8.4 (L) 8.7 - 10.5 mg/dL    Anion Gap 9 8 - 16 mmol/L    eGFR >60 >60 mL/min/1.73 m^2   Magnesium   Result Value Ref Range    Magnesium 1.9 1.6 - 2.6 mg/dL   POCT glucose   Result Value Ref Range    POCT Glucose 83 70 - 110 mg/dL   POCT glucose   Result Value Ref Range    POCT Glucose 100 70 - 110 mg/dL   POCT glucose   Result Value Ref Range    POCT Glucose 110 70 - 110 mg/dL        Imaging Results              X-Ray Chest 1 View (Final result)  Result time 10/05/22 10:32:09      Final result by Oleg Mondragon MD (10/05/22 10:32:09)                    Impression:      No acute findings.      Electronically signed by: Oleg Mondragon MD  Date:    10/05/2022  Time:    10:32               Narrative:    EXAMINATION:  XR CHEST 1 VIEW    CLINICAL HISTORY:  Respiratory distress,    COMPARISON:  10/28/2020 chest x-ray.    FINDINGS:  Heart size is normal.  The lung fields are clear with no definite infiltrate at this time.    Old left clavicle fracture.  Minor shoulder DJD.                                       CT Head Without Contrast (Final result)  Result time 10/05/22 10:16:06      Final result by Alonzo Murray MD (10/05/22 10:16:06)                   Impression:      Chronic microvascular ischemic changes.  MRI can be obtained as clinically warranted.      Electronically signed by: Alonzo Murray MD  Date:    10/05/2022  Time:    10:16               Narrative:    EXAMINATION:  CT HEAD WITHOUT CONTRAST    CLINICAL HISTORY:  Seizure, new-onset, no history of trauma;    TECHNIQUE:  Low dose axial CT images obtained throughout the head without intravenous contrast. Sagittal and coronal reconstructions were performed.  All CT scans at this facility use dose modulation, iterative reconstruction and/or weight based dosing when appropriate to reduce radiation dose to as low as reasonably achievable.    COMPARISON:  10/28/2020    FINDINGS:  Intracranial compartment:    The brain parenchyma demonstrates areas of decreased attenuation with mild to moderate periventricular white matter consistent with chronic microvascular ischemic changes..  No parenchymal mass, hemorrhage, edema or major vascular distribution infarct.  Vascular calcifications are noted.    Mild prominence of the sulci and ventricles are consistent with age-related involutional changes.    No extra-axial blood or fluid collections.    Skull/extracranial contents (limited evaluation): No fracture. Mastoid air cells and paranasal sinuses are essentially clear.

## 2022-10-06 NOTE — CONSULTS
[facility] - [facility] ([facility])  Neurology  Consult Note    Patient Name: Vishnu Dougherty Jr.  MRN: [id number]  Admission Date: 10/5/2022  Hospital Length of Stay: 0 days  Code Status: Full Code   Attending Provider: Mena Figueroa, *   Consulting Provider: Jamarcus Anderson MD  Primary Care Physician: Stephan Quiñones MD  Principal Problem:Coarse tremors    Inpatient consult to Telemedicine-General Neurology  Consult performed by: Jamarcus Anderson MD  Consult ordered by: Mena Figueroa MD      Inpatient consult to Neurology  Consult performed by: Jamarcus Anderson MD  Consult ordered by: Mena Figueroa MD      Subjective:     Chief Complaint/Reason for consult: EPS    HPI: 62 y.o. male patient with a medical Hx of CHF, HTN, dementia was brought to ED for tremors. According to sister pt has been experiencing tremors and body jerks for two days. He had been put on citalopram recently after being taken off of bupropion and also started on valproic acid. His sister stopped his meds but tremors. did not stop. Besides that she states that pt is in his usual state of mentation.        Past Medical History:   Diagnosis Date    Chronic CHF 10/2/2019    Chronic diastolic HF (heart failure) 4/17/2018    Coronary artery calcification 1/11/2022    Dilated cardiomyopathy 9/24/2015    Enlarged prostate     Essential hypertension 9/24/2015    Gastroesophageal reflux disease without esophagitis 10/7/2015    Gastroesophageal reflux disease without esophagitis 10/7/2015    Hyperlipidemia     Personal history of colonic polyps 2016    Shortness of breath 9/24/2015       Past Surgical History:   Procedure Laterality Date    COLONOSCOPY N/A 5/25/2016    Procedure: COLONOSCOPY;  Surgeon: Demetrio Spicer MD;  Location: [facility];  Service: Endoscopy;  Laterality: N/A;       Review of patient's allergies indicates:  No Known Allergies    Current Neurological Medications:     No current facility-administered medications on file  prior to encounter.     Current Outpatient Medications on File Prior to Encounter   Medication Sig    amlodipine-benazepril 10-20mg (LOTREL) 10-20 mg per capsule Take 1 capsule by mouth once daily.    atorvastatin (LIPITOR) 20 MG tablet Take 1 tablet (20 mg total) by mouth every evening.    cholecalciferol, vitamin D3, 1,250 mcg (50,000 unit) capsule TAKE 1 CAPSULE BY MOUTH ONE TIME PER WEEK    cyanocobalamin 1,000 mcg/mL injection INJECT 1 ML (1,000 MCG TOTAL) INTO THE MUSCLE EVERY 28 DAYS.    donepeziL (ARICEPT) 5 MG tablet TAKE 1 TABLET BY MOUTH EVERY DAY IN THE EVENING    DULoxetine (CYMBALTA) 60 MG capsule Take 1 capsule (60 mg total) by mouth once daily.    EScitalopram oxalate (LEXAPRO) 10 MG tablet Take 10 mg by mouth once daily.    memantine (NAMENDA) 5 MG Tab TAKE 1 TABLET BY MOUTH TWICE A DAY    mirtazapine (REMERON) 7.5 MG Tab Take 7.5 mg by mouth every evening.    risperiDONE (RISPERDAL) 1 MG tablet Take 1 tablet (1 mg total) by mouth every evening.    valproate (DEPAKENE) 250 mg/5 mL syrup Take 250 mg by mouth 2 (two) times daily.      Family History       Problem Relation (Age of Onset)    Hypertension Mother, Father    Stroke Father          Tobacco Use    Smoking status: Former     Types: Cigarettes     Quit date: 1995     Years since quittin.0    Smokeless tobacco: Never   Substance and Sexual Activity    Alcohol use: No     Alcohol/week: 0.0 standard drinks    Drug use: No    Sexual activity: Not Currently     Partners: Female     Review of Systems   Constitutional:  Negative for fever.   HENT:  Negative for trouble swallowing.    Eyes:  Negative for photophobia.   Respiratory:  Negative for shortness of breath.    Cardiovascular:  Negative for chest pain.   Gastrointestinal:  Negative for abdominal pain.   Genitourinary:  Negative for dysuria.   Musculoskeletal:  Negative for back pain.   Neurological:  Positive for tremors. Negative for headaches.     Objective:     Vital Signs (Most  Recent):  Temp: 99.3 °F (37.4 °C) (10/06/22 0718)  Pulse: 64 (10/06/22 0718)  Resp: 20 (10/06/22 0718)  BP: 123/68 (10/06/22 0718)  SpO2: (!) 92 % (10/06/22 0718)   Vital Signs (24h Range):  Temp:  [98.1 °F (36.7 °C)-99.3 °F (37.4 °C)] 99.3 °F (37.4 °C)  Pulse:  [62-79] 64  Resp:  [17-22] 20  SpO2:  [92 %-97 %] 92 %  BP: (123-156)/(63-75) 123/68     Weight: 79.1 kg (174 lb 6.1 oz)  Body mass index is 26.51 kg/m².    Physical Exam  Constitutional:       General: He is not in acute distress.  Pulmonary:      Effort: No respiratory distress.   Psychiatric:         Speech: Speech normal.       NEUROLOGICAL EXAMINATION:     MENTAL STATUS   Speech: speech is normal   Level of consciousness: alert       Oriented to person     CRANIAL NERVES     CN III, IV, VI   Conjugate gaze: present    CN VII   Right facial weakness: none  Left facial weakness: none    CN XII   Tongue deviation: none       Facial twitching     MOTOR EXAM        AROM of UE's and LE's against gravity   Myoclonic jerks    Significant Labs: CBC:   Recent Labs   Lab 10/05/22  1029 10/06/22  0438   WBC 7.79 8.66   HGB 10.8* 10.8*   HCT 32.6* 33.0*    226     CMP:   Recent Labs   Lab 10/05/22  1029 10/06/22  0437   GLU 84 101    138   K 3.8 4.1    105   CO2 23 24   BUN 8 10   CREATININE 0.9 0.9   CALCIUM 8.2* 8.4*   MG  --  1.9   PROT 6.0  --    ALBUMIN 3.1*  --    BILITOT 0.3  --    ALKPHOS 63  --    AST 17  --    ALT 20  --    ANIONGAP 9 9       Significant Imaging: I have reviewed all pertinent imaging results/findings within the past 24 hours.    Head CT  FINDINGS:  Intracranial compartment:     The brain parenchyma demonstrates areas of decreased attenuation with mild to moderate periventricular white matter consistent with chronic microvascular ischemic changes..  No parenchymal mass, hemorrhage, edema or major vascular distribution infarct.  Vascular calcifications are noted.     Mild prominence of the sulci and ventricles are  consistent with age-related involutional changes.     No extra-axial blood or fluid collections.     Skull/extracranial contents (limited evaluation): No fracture. Mastoid air cells and paranasal sinuses are essentially clear.     Impression:     Chronic microvascular ischemic changes.  MRI can be obtained as clinically warranted.        Electronically signed by: Alonzo Murray MD  Date:                                            10/05/2022  Time:                                           10:16    Assessment and Plan:     61 y/o male consulted for tremors    Tremors: pt exhibiting myoclonic jerks, restlessness. This could be serotonin syndrome? EPS? No concerning findings on head CT.  Diazepam 5 mg IV x 1; repeat in 30 minutes if no improvement.  Repeat dose of benztropine 1 mg IV x 1.    Active Diagnoses:    Diagnosis Date Noted POA    PRINCIPAL PROBLEM:  Coarse tremors [G25.2] 10/05/2022 Unknown    Dementia with behavioral disturbance [F03.918] 10/05/2022 Unknown    Essential hypertension [I10] 09/24/2015 Yes      Problems Resolved During this Admission:    Diagnosis Date Noted Date Resolved POA    Hypokalemia [E87.6] 10/05/2022 10/05/2022 Unknown       VTE Risk Mitigation (From admission, onward)           Ordered     IP VTE HIGH RISK PATIENT  Once         10/05/22 1734     Place sequential compression device  Until discontinued         10/05/22 1733                    Thank you for your consult. I will follow-up with patient. Please contact us if you have any additional questions.    Jamarcus Anderson MD  Neurology  [facility] - Tele[facility] ([facility])

## 2022-10-06 NOTE — PLAN OF CARE
[facility] - Tele[facility] (Hospital)  Initial Discharge Assessment     Swer met with patient and sister, Rach, at the bedside to complete discharge assessment. Patient confirmed demographic information. Patient previously living at [facility] ([address], [place], [place]). Pt physically independently with ADLs. Patient was previously at [facility]. Family agreeable to psych placement. Family would like to pursue nguyen-psych placement for more appropriate treatment. Swer discussed placement options with [facility] or [facility]-psych inpatient units. Family agreeable to referrals being sent to both agencies.     Swer updated pt's whiteboard to reflect SW contact number. Swer to continue following.     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 No    Discharge Plan A: [facility]         [id number] - [place], [place] - [address] AT [facility]  [address]  [facility] [address]  Phone: [phone] Fax: [phone]    [id number] - [place], [place] - [address]  [address] [facility] [address]  Phone: [phone] Fax: [phone]      Initial Assessment (most recent)       Adult Discharge Assessment - 10/06/22 1113          Discharge Assessment    Assessment  Type Discharge Planning Assessment     Confirmed/corrected address, phone number and insurance Yes     Confirmed Demographics Correct on Facesheet     Source of Information family;patient     Communicated CELIO with patient/caregiver Date not available/Unable to determine     Reason For Admission Coarse tremors     Lives With facility resident     Facility Arrived From: Home     Do you expect to return to your current living situation? Yes     Do you have help at home or someone to help you manage your care at home? Yes     Who are your caregiver(s) and their phone number(s)? Pt's sister, Rach     Prior to hospitilization cognitive status: Alert/Oriented     Current cognitive status: Alert/Oriented     Walking or Climbing Stairs Difficulty none     Dressing/Bathing Difficulty none     Equipment Currently Used at Home none     Readmission within 30 days? No     Patient currently being followed by outpatient case management? No     Do you currently have service(s) that help you manage your care at home? No     Do you take prescription medications? Yes     Do you have prescription coverage? Yes     Do you have any problems affording any of your prescribed medications? No     Is the patient taking medications as prescribed? yes     How do you get to doctors appointments? family or friend will provide     Are you on dialysis? No     Do you take coumadin? No     Discharge Plan A Psychiatric hospital     DME Needed Upon Discharge  none     Discharge Plan discussed with: Patient;Sibling     Discharge Barriers Identified None

## 2022-10-07 ENCOUNTER — PATIENT MESSAGE (OUTPATIENT)
Dept: NEUROLOGY | Facility: CLINIC | Age: 62
End: 2022-10-07
Payer: MEDICARE

## 2022-10-07 NOTE — TELEPHONE ENCOUNTER
Rach called in she wanted to know why he did not  see his normal DrMelissa Advised  her that she will only she the normal ones if they are on call at the hospital but  does not do call . She said thank you and disconnected the line.

## 2022-10-07 NOTE — NURSING
Patient transported by [facility] by stretcher. Pt was on room air, had on wrist restraints, was anxious and cooperative.

## 2022-10-09 LAB — RPR SER QL: NORMAL

## 2022-10-12 ENCOUNTER — EXTERNAL HOSPITAL ADMISSION (OUTPATIENT)
Dept: ADMINISTRATIVE | Facility: CLINIC | Age: 62
End: 2022-10-12
Payer: MEDICARE

## 2022-10-14 ENCOUNTER — TELEPHONE (OUTPATIENT)
Dept: INTERNAL MEDICINE | Facility: CLINIC | Age: 62
End: 2022-10-14
Payer: MEDICARE

## 2022-10-14 NOTE — TELEPHONE ENCOUNTER
Calling patient to schedule hospital follow up. Rach take the call stating patient is on assisted living having dementia. States she will think about it and will give us a call to set up an appointment. Verbally understood.

## 2022-10-19 ENCOUNTER — TELEPHONE (OUTPATIENT)
Dept: INTERNAL MEDICINE | Facility: CLINIC | Age: 62
End: 2022-10-19
Payer: MEDICARE

## 2022-10-19 NOTE — TELEPHONE ENCOUNTER
----- Message from Daniela Hunt sent at 10/19/2022 10:34 AM CDT -----  Contact: Rach/sister  Patient sister is calling to speak with the nurse regarding advice on patient. Explains patient has not been himself and been in and out the hospital for the past 3-4 weeks and requesting for a referral for the Neurology. Please give sister a call back at [phone] as requested.  Thanks  LR

## 2022-10-21 NOTE — DISCHARGE SUMMARY
[facility] - Telemetry ([facility] Medicine  Discharge Summary      Patient Name: Vishnu Dougherty Jr.  MRN: [id number]  Patient Class: OP- Observation  Admission Date: 10/5/2022  Hospital Length of Stay: 0 days  Discharge Date and Time: 10/6/2022 11:54 PM  Attending Physician: No att. providers found   Discharging Provider: Mena Figueroa MD  Primary Care Provider: Stephan Quiñones MD      HPI:   Pt was oriented to self; got most of the information from sister (POA- Ms. Loyd at bedside -contact )    Vishnu Dougherty Jr. is a 62 y.o. male patient with a PMHx of CHF, HTN, dementia, panic attack, am who presents to the Emergency Department for tremors, ambulatory issues onset 1 day PTA.   [facility] reports that the pt was exhibiting generalized tremors at his assisted living facility. Symptoms are intermittent and moderate in severity. No mitigating or exacerbating factors reported. No associated sxs reported. Patient denies any fever, chills, n/v/d, SOB, CP, weakness, numbness, dizziness, headache, and all other sxs at this time. Pt was started on Depakote 5 days ago. No further complaints or concerns at this time.    As per sister- on September 12th patient was noted to have behavioral issues at [facility] --> was sent to [facility] --> stayed there for 2 weeks until 27th;  During his stay over there he has been started on depakote and risperidone which has been taking for past 2-3 weeks;             * No surgery found *      Hospital Course:   10/6      Examination of patient done at bedside.    Patient got more confused, agitated, combative ---  and ordered Valium, benztropine, Haldol p.r.n. after discussing with Neurology; neurology recommended EEG, Haldol p.r.n; neural okay with resuming valproic acid; will keep Valium p.r.n.,  Hemodynamically stable, labs reviewed   working on transfer to inpatient psychiatry unit, will plan for transfer once patient more stable.  Sitter at  bedside. PEC orders in;   Pt with worsening dementia with current delirium --> transfer to psych unit;   Consider possibility of extra pyramidal symptoms --> while ordering antipsychotic meds ;   worked on transferring patient to psych unit, charge nurse to arrange for transportation;  Updated plan to sister, agreed to the plan.              Review of Systems     Unable to assess review of system as patient was disoriented, agitated, confused    Physical Exam         Constitutional: mild distress;   Head: Atraumatic. Normocephalic.  Eyes: PERRL. EOM intact. Conjunctivae are not pale. No scleral icterus.  ENT: Mucous membranes are moist. Oropharynx is clear and symmetric.    Neck: Supple. Full ROM.   Cardiovascular: Regular rate. Regular rhythm. No murmurs, rubs, or gallops. Distal pulses are 2+ and symmetric.  Pulmonary/Chest: No respiratory distress. Clear to auscultation bilaterally. No wheezing or rales.  Abdominal: Soft and non-distended.  There is no tenderness.  No rebound, guarding, or rigidity.   Musculoskeletal: Moves all extremities. No obvious deformities. No edema.  Skin: Warm and dry.  Neurological:  Alert, oriented to self and date of birth; not oriented to place;  Resting tremor in bilateral upper extremities;   Psychiatric: flat effect;       Goals of Care Treatment Preferences:  Code Status: Full Code      Consults:   Consults (From admission, onward)        Status Ordering Provider     Inpatient consult to Telemedicine-General Neurology  Once        Provider:  Jamarcus Anderson MD    Completed ANA LAURA PATEL     Inpatient consult to Neurology  Once        Provider:  Jamarcus Anderson MD    Completed ANA LAURA PATEL     Inpatient consult to Telemedicine - Psyc  Once        Provider:  Bhupinder Baez MD    Completed DINA BA JR          No new Assessment & Plan notes have been filed under this hospital service since the last note was generated.  Service: [facility]  Medicine    Final Active Diagnoses:    Diagnosis Date Noted POA    PRINCIPAL PROBLEM:  Coarse tremors [G25.2] 10/05/2022 Unknown    Dementia with behavioral disturbance [F03.918] 10/05/2022 Unknown    Essential hypertension [I10] 09/24/2015 Yes      Problems Resolved During this Admission:    Diagnosis Date Noted Date Resolved POA    Hypokalemia [E87.6] 10/05/2022 10/05/2022 Unknown       Discharged Condition: fair    Disposition: Psychiatric Hospital    Follow Up:   Follow-up Information     Stephan Quiñones MD Follow up.    Specialty: Family Medicine  Why: f/u with PCP within one week upon discharge  Contact information:  [address] [facility] [address]  [phone]             Pranav Davison,  Follow up.    Specialty: Psychiatry  Why: f/u with psychiatry upon discharge  Contact information:  [address]  [address]  [facility] [address]  [phone]                       Patient Instructions:      Activity as tolerated       Significant Diagnostic Studies:    Results for orders placed or performed during the hospital encounter of 10/05/22   CBC auto differential   Result Value Ref Range    WBC 7.79 3.90 - 12.70 K/uL    RBC 3.48 (L) 4.60 - 6.20 M/uL    Hemoglobin 10.8 (L) 14.0 - 18.0 g/dL    Hematocrit 32.6 (L) 40.0 - 54.0 %    MCV 94 82 - 98 fL    MCH 31.0 27.0 - 31.0 pg    MCHC 33.1 32.0 - 36.0 g/dL    RDW 13.2 11.5 - 14.5 %    Platelets 208 150 - 450 K/uL    MPV 10.6 9.2 - 12.9 fL    Immature Granulocytes 0.9 (H) 0.0 - 0.5 %    Gran # (ANC) 6.0 1.8 - 7.7 K/uL    Immature Grans (Abs) 0.07 (H) 0.00 - 0.04 K/uL    Lymph # 0.9 (L) 1.0 - 4.8 K/uL    Mono # 0.5 0.3 - 1.0 K/uL    Eos # 0.2 0.0 - 0.5 K/uL    Baso # 0.07 0.00 - 0.20 K/uL    nRBC 0 0 /100 WBC    Gran % 77.5 (H) 38.0 - 73.0 %    Lymph % 11.7 (L) 18.0 - 48.0 %    Mono % 6.3 4.0 - 15.0 %    Eosinophil % 2.7 0.0 - 8.0 %    Basophil % 0.9 0.0 - 1.9 %    Differential Method Automated    Comprehensive metabolic panel   Result  Value Ref Range    Sodium 140 136 - 145 mmol/L    Potassium 3.8 3.5 - 5.1 mmol/L    Chloride 108 95 - 110 mmol/L    CO2 23 23 - 29 mmol/L    Glucose 84 70 - 110 mg/dL    BUN 8 8 - 23 mg/dL    Creatinine 0.9 0.5 - 1.4 mg/dL    Calcium 8.2 (L) 8.7 - 10.5 mg/dL    Total Protein 6.0 6.0 - 8.4 g/dL    Albumin 3.1 (L) 3.5 - 5.2 g/dL    Total Bilirubin 0.3 0.1 - 1.0 mg/dL    Alkaline Phosphatase 63 55 - 135 U/L    AST 17 10 - 40 U/L    ALT 20 10 - 44 U/L    Anion Gap 9 8 - 16 mmol/L    eGFR >60 >60 mL/min/1.73 m^2   CPK   Result Value Ref Range    CPK 56 20 - 200 U/L   Urinalysis, Reflex to Urine Culture Urine, Clean Catch    Specimen: Urine   Result Value Ref Range    Specimen UA Urine, Clean Catch     Color, UA Yellow Yellow, Straw, Marj    Appearance, UA Clear Clear    pH, UA 8.0 5.0 - 8.0    Specific Gravity, UA 1.010 1.005 - 1.030    Protein, UA Negative Negative    Glucose, UA Negative Negative    Ketones, UA Negative Negative    Bilirubin (UA) Negative Negative    Occult Blood UA Negative Negative    Nitrite, UA Negative Negative    Urobilinogen, UA Negative <2.0 EU/dL    Leukocytes, UA Negative Negative   Valproic Acid   Result Value Ref Range    Valproic Acid Level 15.9 (L) 50.0 - 100.0 ug/mL   Drug screen panel, in-house   Result Value Ref Range    Benzodiazepines Negative Negative    Methadone metabolites Negative Negative    Cocaine (Metab.) Negative Negative    Opiate Scrn, Ur Negative Negative    Barbiturate Screen, Ur Negative Negative    Amphetamine Screen, Ur Negative Negative    THC Negative Negative    Phencyclidine Negative Negative    Creatinine, Urine 115.2 23.0 - 375.0 mg/dL    Toxicology Information SEE COMMENT    TSH   Result Value Ref Range    TSH 1.208 0.400 - 4.000 uIU/mL   RPR   Result Value Ref Range    RPR Non-reactive Non-reactive   CBC auto differential   Result Value Ref Range    WBC 8.66 3.90 - 12.70 K/uL    RBC 3.50 (L) 4.60 - 6.20 M/uL    Hemoglobin 10.8 (L) 14.0 - 18.0 g/dL     Hematocrit 33.0 (L) 40.0 - 54.0 %    MCV 94 82 - 98 fL    MCH 30.9 27.0 - 31.0 pg    MCHC 32.7 32.0 - 36.0 g/dL    RDW 13.5 11.5 - 14.5 %    Platelets 226 150 - 450 K/uL    MPV 10.8 9.2 - 12.9 fL    Immature Granulocytes 0.3 0.0 - 0.5 %    Gran # (ANC) 6.2 1.8 - 7.7 K/uL    Immature Grans (Abs) 0.03 0.00 - 0.04 K/uL    Lymph # 1.2 1.0 - 4.8 K/uL    Mono # 0.9 0.3 - 1.0 K/uL    Eos # 0.3 0.0 - 0.5 K/uL    Baso # 0.08 0.00 - 0.20 K/uL    nRBC 0 0 /100 WBC    Gran % 71.3 38.0 - 73.0 %    Lymph % 13.9 (L) 18.0 - 48.0 %    Mono % 9.8 4.0 - 15.0 %    Eosinophil % 3.8 0.0 - 8.0 %    Basophil % 0.9 0.0 - 1.9 %    Differential Method Automated    Basic metabolic panel   Result Value Ref Range    Sodium 138 136 - 145 mmol/L    Potassium 4.1 3.5 - 5.1 mmol/L    Chloride 105 95 - 110 mmol/L    CO2 24 23 - 29 mmol/L    Glucose 101 70 - 110 mg/dL    BUN 10 8 - 23 mg/dL    Creatinine 0.9 0.5 - 1.4 mg/dL    Calcium 8.4 (L) 8.7 - 10.5 mg/dL    Anion Gap 9 8 - 16 mmol/L    eGFR >60 >60 mL/min/1.73 m^2   Magnesium   Result Value Ref Range    Magnesium 1.9 1.6 - 2.6 mg/dL   COVID-19 Rapid Screening   Result Value Ref Range    SARS-CoV-2 RNA, Amplification, Qual Negative Negative   POCT glucose   Result Value Ref Range    POCT Glucose 83 70 - 110 mg/dL   POCT glucose   Result Value Ref Range    POCT Glucose 100 70 - 110 mg/dL   POCT glucose   Result Value Ref Range    POCT Glucose 110 70 - 110 mg/dL       Pending Diagnostic Studies:     None           Imaging Results          X-Ray Chest 1 View (Final result)  Result time 10/05/22 10:32:09    Final result by Oleg Mondragon MD (10/05/22 10:32:09)                 Impression:      No acute findings.      Electronically signed by: Oleg Mondragon MD  Date:    10/05/2022  Time:    10:32             Narrative:    EXAMINATION:  XR CHEST 1 VIEW    CLINICAL HISTORY:  Respiratory distress,    COMPARISON:  10/28/2020 chest x-ray.    FINDINGS:  Heart size is normal.  The lung fields are  clear with no definite infiltrate at this time.    Old left clavicle fracture.  Minor shoulder DJD.                               CT Head Without Contrast (Final result)  Result time 10/05/22 10:16:06    Final result by Alonzo Murray MD (10/05/22 10:16:06)                 Impression:      Chronic microvascular ischemic changes.  MRI can be obtained as clinically warranted.      Electronically signed by: Alonzo Murray MD  Date:    10/05/2022  Time:    10:16             Narrative:    EXAMINATION:  CT HEAD WITHOUT CONTRAST    CLINICAL HISTORY:  Seizure, new-onset, no history of trauma;    TECHNIQUE:  Low dose axial CT images obtained throughout the head without intravenous contrast. Sagittal and coronal reconstructions were performed.  All CT scans at this facility use dose modulation, iterative reconstruction and/or weight based dosing when appropriate to reduce radiation dose to as low as reasonably achievable.    COMPARISON:  10/28/2020    FINDINGS:  Intracranial compartment:    The brain parenchyma demonstrates areas of decreased attenuation with mild to moderate periventricular white matter consistent with chronic microvascular ischemic changes..  No parenchymal mass, hemorrhage, edema or major vascular distribution infarct.  Vascular calcifications are noted.    Mild prominence of the sulci and ventricles are consistent with age-related involutional changes.    No extra-axial blood or fluid collections.    Skull/extracranial contents (limited evaluation): No fracture. Mastoid air cells and paranasal sinuses are essentially clear.                              Medications:         Medication List      CONTINUE taking these medications    amlodipine-benazepril 10-20mg 10-20 mg per capsule  Commonly known as: LOTREL  Take 1 capsule by mouth once daily.     atorvastatin 20 MG tablet  Commonly known as: LIPITOR  Take 1 tablet (20 mg total) by mouth every evening.     cholecalciferol (vitamin D3) 1,250 mcg (50,000  unit) capsule  TAKE 1 CAPSULE BY MOUTH ONE TIME PER WEEK     cyanocobalamin 1,000 mcg/mL injection  INJECT 1 ML (1,000 MCG TOTAL) INTO THE MUSCLE EVERY 28 DAYS.     donepeziL 5 MG tablet  Commonly known as: ARICEPT  TAKE 1 TABLET BY MOUTH EVERY DAY IN THE EVENING     memantine 5 MG Tab  Commonly known as: NAMENDA  TAKE 1 TABLET BY MOUTH TWICE A DAY     valproate 250 mg/5 mL syrup  Commonly known as: DEPAKENE        STOP taking these medications    DULoxetine 60 MG capsule  Commonly known as: CYMBALTA     EScitalopram oxalate 10 MG tablet  Commonly known as: LEXAPRO     mirtazapine 7.5 MG Tab  Commonly known as: REMERON     risperiDONE 1 MG tablet  Commonly known as: RISPERDAL              Indwelling Lines/Drains at time of discharge:   Lines/Drains/Airways     None                 Time spent on the discharge of patient: 54 minutes         Mena Figueroa MD  Department of Hospital Medicine  [facility] - Telemetry ([facility])

## 2022-10-21 NOTE — TELEPHONE ENCOUNTER
What external Psych Provider does he need the referral for?  Does he have a psych provider  I can do a referral, but external needs a provider

## 2022-10-24 ENCOUNTER — TELEPHONE (OUTPATIENT)
Dept: NEUROLOGY | Facility: CLINIC | Age: 62
End: 2022-10-24
Payer: MEDICARE

## 2022-10-24 NOTE — TELEPHONE ENCOUNTER
Called pts sister to ask the following per provider:    What external Psych Provider does he need the referral for?  Does he have a psych provider  I can do a referral, but external needs a provider    No answer, LVM to call clinic back.

## 2022-10-24 NOTE — TELEPHONE ENCOUNTER
----- Message from Farzana Arriaga sent at 10/24/2022 11:20 AM CDT -----  Contact: pt  Type:  Patient Returning Call    Who Called: pt  Who Left Message for Patient: Sanna  Does the patient know what this is regarding? no  Would the patient rather a call back or a response via [employer]? Call back  Best Call Back Number: [phone]  Additional Information: n/a

## 2022-10-24 NOTE — TELEPHONE ENCOUNTER
----- Message from Verito Hernandez NP sent at 10/24/2022 10:52 AM CDT -----  Regarding: RE:  He may need to go to inpatient behavioral unit to adjust medications  ----- Message -----  From: Kayleigh Hill MA  Sent: 9/12/2022   1:24 PM CDT  To: Verito Hernandez NP    Patient's sister Rach stated that her brother, the patient has tried leaving the facility that he lives at. She stated that he is super depressed and angry and has mentioned committing suicide. The patient enjoys writing and has not been able to write for the past 6 weeks.

## 2022-10-25 ENCOUNTER — TELEPHONE (OUTPATIENT)
Dept: NEUROLOGY | Facility: CLINIC | Age: 62
End: 2022-10-25
Payer: MEDICARE

## 2022-10-25 NOTE — TELEPHONE ENCOUNTER
Spoke with patient's sister regarding getting the patient in sooner to discuss medications and plan of care.-ZAC

## 2022-11-07 ENCOUNTER — TELEPHONE (OUTPATIENT)
Dept: INTERNAL MEDICINE | Facility: CLINIC | Age: 62
End: 2022-11-07
Payer: MEDICARE

## 2022-11-07 ENCOUNTER — OFFICE VISIT (OUTPATIENT)
Dept: NEUROLOGY | Facility: CLINIC | Age: 62
End: 2022-11-07
Payer: MEDICARE

## 2022-11-07 VITALS
OXYGEN SATURATION: 98 % | SYSTOLIC BLOOD PRESSURE: 128 MMHG | RESPIRATION RATE: 16 BRPM | HEART RATE: 60 BPM | BODY MASS INDEX: 28.74 KG/M2 | DIASTOLIC BLOOD PRESSURE: 69 MMHG | WEIGHT: 189.63 LBS | HEIGHT: 68 IN

## 2022-11-07 DIAGNOSIS — R94.31 PROLONGED Q-T INTERVAL ON ECG: ICD-10-CM

## 2022-11-07 DIAGNOSIS — E53.8 B12 DEFICIENCY: ICD-10-CM

## 2022-11-07 DIAGNOSIS — F32.A ANXIETY AND DEPRESSION: ICD-10-CM

## 2022-11-07 DIAGNOSIS — R41.3 MEMORY LOSS OF UNKNOWN CAUSE: ICD-10-CM

## 2022-11-07 DIAGNOSIS — G30.9 MAJOR NEUROCOGNITIVE DISORDER DUE TO ALZHEIMER'S DISEASE, WITHOUT BEHAVIORAL DISTURBANCE: ICD-10-CM

## 2022-11-07 DIAGNOSIS — F03.B18 MODERATE DEMENTIA WITH BEHAVIORAL DISTURBANCE: ICD-10-CM

## 2022-11-07 DIAGNOSIS — E55.9 VITAMIN D DEFICIENCY: ICD-10-CM

## 2022-11-07 DIAGNOSIS — F02.80 MAJOR NEUROCOGNITIVE DISORDER DUE TO ALZHEIMER'S DISEASE, WITHOUT BEHAVIORAL DISTURBANCE: ICD-10-CM

## 2022-11-07 DIAGNOSIS — G31.84 MILD COGNITIVE IMPAIRMENT WITH MEMORY LOSS: Primary | ICD-10-CM

## 2022-11-07 DIAGNOSIS — F41.9 ANXIETY AND DEPRESSION: ICD-10-CM

## 2022-11-07 PROCEDURE — 99999 PR PBB SHADOW E&M-EST. PATIENT-LVL IV: CPT | Mod: PBBFAC,,, | Performed by: NURSE PRACTITIONER

## 2022-11-07 PROCEDURE — 1160F PR REVIEW ALL MEDS BY PRESCRIBER/CLIN PHARMACIST DOCUMENTED: ICD-10-PCS | Mod: CPTII,S$GLB,, | Performed by: NURSE PRACTITIONER

## 2022-11-07 PROCEDURE — 99999 PR PBB SHADOW E&M-EST. PATIENT-LVL IV: ICD-10-PCS | Mod: PBBFAC,,, | Performed by: NURSE PRACTITIONER

## 2022-11-07 PROCEDURE — 3078F DIAST BP <80 MM HG: CPT | Mod: CPTII,S$GLB,, | Performed by: NURSE PRACTITIONER

## 2022-11-07 PROCEDURE — 3044F PR MOST RECENT HEMOGLOBIN A1C LEVEL <7.0%: ICD-10-PCS | Mod: CPTII,S$GLB,, | Performed by: NURSE PRACTITIONER

## 2022-11-07 PROCEDURE — 1160F RVW MEDS BY RX/DR IN RCRD: CPT | Mod: CPTII,S$GLB,, | Performed by: NURSE PRACTITIONER

## 2022-11-07 PROCEDURE — 3044F HG A1C LEVEL LT 7.0%: CPT | Mod: CPTII,S$GLB,, | Performed by: NURSE PRACTITIONER

## 2022-11-07 PROCEDURE — 99214 OFFICE O/P EST MOD 30 MIN: CPT | Mod: S$GLB,,, | Performed by: NURSE PRACTITIONER

## 2022-11-07 PROCEDURE — 3008F BODY MASS INDEX DOCD: CPT | Mod: CPTII,S$GLB,, | Performed by: NURSE PRACTITIONER

## 2022-11-07 PROCEDURE — 3078F PR MOST RECENT DIASTOLIC BLOOD PRESSURE < 80 MM HG: ICD-10-PCS | Mod: CPTII,S$GLB,, | Performed by: NURSE PRACTITIONER

## 2022-11-07 PROCEDURE — 3008F PR BODY MASS INDEX (BMI) DOCUMENTED: ICD-10-PCS | Mod: CPTII,S$GLB,, | Performed by: NURSE PRACTITIONER

## 2022-11-07 PROCEDURE — 3074F PR MOST RECENT SYSTOLIC BLOOD PRESSURE < 130 MM HG: ICD-10-PCS | Mod: CPTII,S$GLB,, | Performed by: NURSE PRACTITIONER

## 2022-11-07 PROCEDURE — 4010F PR ACE/ARB THEARPY RXD/TAKEN: ICD-10-PCS | Mod: CPTII,S$GLB,, | Performed by: NURSE PRACTITIONER

## 2022-11-07 PROCEDURE — 1159F PR MEDICATION LIST DOCUMENTED IN MEDICAL RECORD: ICD-10-PCS | Mod: CPTII,S$GLB,, | Performed by: NURSE PRACTITIONER

## 2022-11-07 PROCEDURE — 1159F MED LIST DOCD IN RCRD: CPT | Mod: CPTII,S$GLB,, | Performed by: NURSE PRACTITIONER

## 2022-11-07 PROCEDURE — 99214 PR OFFICE/OUTPT VISIT, EST, LEVL IV, 30-39 MIN: ICD-10-PCS | Mod: S$GLB,,, | Performed by: NURSE PRACTITIONER

## 2022-11-07 PROCEDURE — 3074F SYST BP LT 130 MM HG: CPT | Mod: CPTII,S$GLB,, | Performed by: NURSE PRACTITIONER

## 2022-11-07 PROCEDURE — 4010F ACE/ARB THERAPY RXD/TAKEN: CPT | Mod: CPTII,S$GLB,, | Performed by: NURSE PRACTITIONER

## 2022-11-07 RX ORDER — RISPERIDONE 1 MG/1
1 TABLET ORAL
COMMUNITY
End: 2022-11-07 | Stop reason: SDUPTHER

## 2022-11-07 RX ORDER — ERGOCALCIFEROL 1.25 MG/1
50000 CAPSULE ORAL
COMMUNITY
Start: 2022-09-29 | End: 2023-04-19

## 2022-11-07 RX ORDER — BUPROPION HYDROCHLORIDE 150 MG/1
150 TABLET, EXTENDED RELEASE ORAL 2 TIMES DAILY
COMMUNITY
End: 2022-11-18 | Stop reason: SDUPTHER

## 2022-11-07 RX ORDER — DULOXETIN HYDROCHLORIDE 60 MG/1
60 CAPSULE, DELAYED RELEASE ORAL
COMMUNITY
End: 2023-02-20 | Stop reason: SDUPTHER

## 2022-11-07 RX ORDER — RISPERIDONE 1 MG/1
1 TABLET ORAL 2 TIMES DAILY
Qty: 60 TABLET | Refills: 11 | Status: SHIPPED | OUTPATIENT
Start: 2022-11-07 | End: 2023-04-19

## 2022-11-07 NOTE — TELEPHONE ENCOUNTER
----- Message from Ofelia Mayfield sent at 11/7/2022  1:32 PM CST -----  Contact: Rach/Sister  Rach is calling on behalf of the patient being exposed to poison ivy, she would like to see if any appointments are available. Please give her a call back at     Thanks  LJ

## 2022-11-07 NOTE — TELEPHONE ENCOUNTER
----- Message from Hina Pickett sent at 11/7/2022  3:19 PM CST -----  Contact: Rach  .Type:  Patient Returning Call    Who Called:  Rach   Who Left Message for Patient:Nurse  Does the patient know what this is regarding?:Apt  Would the patient rather a call back or a response via [employer]? Call   Best Call Back Number:.[phone]  Additional Information:
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
## 2022-11-08 ENCOUNTER — OFFICE VISIT (OUTPATIENT)
Dept: INTERNAL MEDICINE | Facility: CLINIC | Age: 62
End: 2022-11-08
Payer: MEDICARE

## 2022-11-08 VITALS
HEIGHT: 68 IN | WEIGHT: 187.81 LBS | TEMPERATURE: 98 F | HEART RATE: 62 BPM | BODY MASS INDEX: 28.46 KG/M2 | SYSTOLIC BLOOD PRESSURE: 118 MMHG | OXYGEN SATURATION: 96 % | DIASTOLIC BLOOD PRESSURE: 62 MMHG | RESPIRATION RATE: 18 BRPM

## 2022-11-08 DIAGNOSIS — L23.7 POISON IVY DERMATITIS: Primary | ICD-10-CM

## 2022-11-08 PROBLEM — F02.818 EARLY ONSET ALZHEIMER'S DEMENTIA WITH BEHAVIORAL DISTURBANCE: Status: ACTIVE | Noted: 2022-09-14

## 2022-11-08 PROBLEM — G30.0 EARLY ONSET ALZHEIMER'S DEMENTIA WITH BEHAVIORAL DISTURBANCE: Status: ACTIVE | Noted: 2022-09-14

## 2022-11-08 PROBLEM — E78.5 HYPERLIPIDEMIA: Status: ACTIVE | Noted: 2022-09-14

## 2022-11-08 PROCEDURE — 99999 PR PBB SHADOW E&M-EST. PATIENT-LVL III: ICD-10-PCS | Mod: PBBFAC,,, | Performed by: NURSE PRACTITIONER

## 2022-11-08 PROCEDURE — 96372 THER/PROPH/DIAG INJ SC/IM: CPT | Mod: S$GLB,,, | Performed by: NURSE PRACTITIONER

## 2022-11-08 PROCEDURE — 4010F PR ACE/ARB THEARPY RXD/TAKEN: ICD-10-PCS | Mod: CPTII,S$GLB,, | Performed by: NURSE PRACTITIONER

## 2022-11-08 PROCEDURE — 96372 PR INJECTION,THERAP/PROPH/DIAG2ST, IM OR SUBCUT: ICD-10-PCS | Mod: S$GLB,,, | Performed by: NURSE PRACTITIONER

## 2022-11-08 PROCEDURE — 99214 PR OFFICE/OUTPT VISIT, EST, LEVL IV, 30-39 MIN: ICD-10-PCS | Mod: 25,S$GLB,, | Performed by: NURSE PRACTITIONER

## 2022-11-08 PROCEDURE — 99214 OFFICE O/P EST MOD 30 MIN: CPT | Mod: 25,S$GLB,, | Performed by: NURSE PRACTITIONER

## 2022-11-08 PROCEDURE — 3008F BODY MASS INDEX DOCD: CPT | Mod: CPTII,S$GLB,, | Performed by: NURSE PRACTITIONER

## 2022-11-08 PROCEDURE — 3074F PR MOST RECENT SYSTOLIC BLOOD PRESSURE < 130 MM HG: ICD-10-PCS | Mod: CPTII,S$GLB,, | Performed by: NURSE PRACTITIONER

## 2022-11-08 PROCEDURE — 1159F MED LIST DOCD IN RCRD: CPT | Mod: CPTII,S$GLB,, | Performed by: NURSE PRACTITIONER

## 2022-11-08 PROCEDURE — 3074F SYST BP LT 130 MM HG: CPT | Mod: CPTII,S$GLB,, | Performed by: NURSE PRACTITIONER

## 2022-11-08 PROCEDURE — 3078F PR MOST RECENT DIASTOLIC BLOOD PRESSURE < 80 MM HG: ICD-10-PCS | Mod: CPTII,S$GLB,, | Performed by: NURSE PRACTITIONER

## 2022-11-08 PROCEDURE — 3044F PR MOST RECENT HEMOGLOBIN A1C LEVEL <7.0%: ICD-10-PCS | Mod: CPTII,S$GLB,, | Performed by: NURSE PRACTITIONER

## 2022-11-08 PROCEDURE — 4010F ACE/ARB THERAPY RXD/TAKEN: CPT | Mod: CPTII,S$GLB,, | Performed by: NURSE PRACTITIONER

## 2022-11-08 PROCEDURE — 1159F PR MEDICATION LIST DOCUMENTED IN MEDICAL RECORD: ICD-10-PCS | Mod: CPTII,S$GLB,, | Performed by: NURSE PRACTITIONER

## 2022-11-08 PROCEDURE — 3044F HG A1C LEVEL LT 7.0%: CPT | Mod: CPTII,S$GLB,, | Performed by: NURSE PRACTITIONER

## 2022-11-08 PROCEDURE — 1160F RVW MEDS BY RX/DR IN RCRD: CPT | Mod: CPTII,S$GLB,, | Performed by: NURSE PRACTITIONER

## 2022-11-08 PROCEDURE — 3078F DIAST BP <80 MM HG: CPT | Mod: CPTII,S$GLB,, | Performed by: NURSE PRACTITIONER

## 2022-11-08 PROCEDURE — 3008F PR BODY MASS INDEX (BMI) DOCUMENTED: ICD-10-PCS | Mod: CPTII,S$GLB,, | Performed by: NURSE PRACTITIONER

## 2022-11-08 PROCEDURE — 1160F PR REVIEW ALL MEDS BY PRESCRIBER/CLIN PHARMACIST DOCUMENTED: ICD-10-PCS | Mod: CPTII,S$GLB,, | Performed by: NURSE PRACTITIONER

## 2022-11-08 PROCEDURE — 99999 PR PBB SHADOW E&M-EST. PATIENT-LVL III: CPT | Mod: PBBFAC,,, | Performed by: NURSE PRACTITIONER

## 2022-11-08 RX ORDER — DEXAMETHASONE SODIUM PHOSPHATE 100 MG/10ML
10 INJECTION INTRAMUSCULAR; INTRAVENOUS ONCE
Status: COMPLETED | OUTPATIENT
Start: 2022-11-08 | End: 2022-11-08

## 2022-11-08 RX ORDER — HYDROCORTISONE 25 MG/G
CREAM TOPICAL 2 TIMES DAILY
Qty: 1 EACH | Refills: 1 | Status: SHIPPED | OUTPATIENT
Start: 2022-11-08 | End: 2023-03-29

## 2022-11-08 RX ORDER — PREDNISONE 10 MG/1
TABLET ORAL
Qty: 9 TABLET | Refills: 0 | Status: SHIPPED | OUTPATIENT
Start: 2022-11-08 | End: 2022-11-14

## 2022-11-08 RX ADMIN — DEXAMETHASONE SODIUM PHOSPHATE 10 MG: 100 INJECTION INTRAMUSCULAR; INTRAVENOUS at 03:11

## 2022-11-08 NOTE — PROGRESS NOTES
Vishnu Dougherty Jr.  2022  [id number]    Stephan Quiñones MD  Patient Care Team:  Stephan Quiñones MD as PCP - General (Family Medicine)  Verito Hernandez NP as Nurse Practitioner (Neurology)          Visit Type:an urgent visit for a new problem    Chief Complaint:  Chief Complaint   Patient presents with    Poison Ivy     Belly area       History of Present Illness:    62 year old male presents with c/o poison ivy dermatitis and itching to left arm and abd that started Saturday after working outside in the yard. Pt denies, tongue swelling, lip swelling, difficulty breathing, swallowing, wheezing, N/V, or throat tightness, fever. OTC benadryl medication for symptoms.       History:  Past Medical History:   Diagnosis Date    Chronic CHF 10/2/2019    Chronic diastolic HF (heart failure) 2018    Coronary artery calcification 2022    Dilated cardiomyopathy 2015    Enlarged prostate     Essential hypertension 2015    Gastroesophageal reflux disease without esophagitis 10/7/2015    Gastroesophageal reflux disease without esophagitis 10/7/2015    Hyperlipidemia     Personal history of colonic polyps 2016    Shortness of breath 2015     Past Surgical History:   Procedure Laterality Date    COLONOSCOPY N/A 2016    Procedure: COLONOSCOPY;  Surgeon: Demetrio Spicer MD;  Location: [facility];  Service: Endoscopy;  Laterality: N/A;     Family History   Problem Relation Age of Onset    Hypertension Mother     Hypertension Father     Stroke Father      Social History     Socioeconomic History    Marital status:     Number of children: 3   Occupational History    Occupation: disability   Tobacco Use    Smoking status: Former     Types: Cigarettes     Quit date: 1995     Years since quittin.1    Smokeless tobacco: Never   Substance and Sexual Activity    Alcohol use: No     Alcohol/week: 0.0 standard drinks    Drug use: No    Sexual activity: Not Currently      Partners: Female     Social Determinants of Health     Financial Resource Strain: Low Risk     Difficulty of Paying Living Expenses: Not hard at all   Food Insecurity: No Food Insecurity    Worried About Running Out of Food in the Last Year: Never true    Ran Out of Food in the Last Year: Never true   Transportation Needs: No Transportation Needs    Lack of Transportation (Medical): No    Lack of Transportation (Non-Medical): No   Physical Activity: Insufficiently Active    Days of Exercise per Week: 3 days    Minutes of Exercise per Session: 10 min   Stress: No Stress Concern Present    Feeling of Stress : Not at all   Social Connections: Socially Isolated    Frequency of Communication with Friends and Family: Once a week    Frequency of Social Gatherings with Friends and Family: Once a week    Attends Restoration Services: Never    Active Member of Clubs or Organizations: No    Attends Club or Organization Meetings: Never    Marital Status:    Housing Stability: Low Risk     Unable to Pay for Housing in the Last Year: No    Number of Places Lived in the Last Year: 1    Unstable Housing in the Last Year: No     Patient Active Problem List   Diagnosis    Essential hypertension    Memory loss of unknown cause    Anxiety and depression    Mild cognitive impairment with memory loss    TIA (transient ischemic attack)    Chronic congestive heart failure    Contact dermatitis due to poison ivy    Dementia    Prolonged Q-T interval on ECG    B12 deficiency    Other disorders of calcium metabolism     Dementia without behavioral disturbance    Vitamin D deficiency    Lung nodule    Anemia    Coronary artery calcification    Calcification of aorta    Tail bone pain    Coarse tremors    Dementia with behavioral disturbance    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    Hyperlipidemia     Review of patient's allergies indicates:  No Known Allergies    The  following were reviewed at this visit: active problem list, medication list, allergies, family history, social history, and health maintenance.    Medications:  Current Outpatient Medications on File Prior to Visit   Medication Sig Dispense Refill    amlodipine-benazepril 10-20mg (LOTREL) 10-20 mg per capsule TAKE 1 CAPSULE BY MOUTH ONCE DAILY 90 capsule 3    atorvastatin (LIPITOR) 20 MG tablet Take 1 tablet (20 mg total) by mouth every evening. 90 tablet 3    buPROPion (WELLBUTRIN SR) 150 MG TBSR 12 hr tablet Take 150 mg by mouth 2 (two) times daily.      cholecalciferol, vitamin D3, 1,250 mcg (50,000 unit) capsule TAKE 1 CAPSULE BY MOUTH ONE TIME PER WEEK 12 capsule 3    cyanocobalamin 1,000 mcg/mL injection INJECT 1 ML (1,000 MCG TOTAL) INTO THE MUSCLE EVERY 28 DAYS. 3 mL 11    donepeziL (ARICEPT) 5 MG tablet TAKE 1 TABLET BY MOUTH EVERY DAY IN THE EVENING 90 tablet 3    DULoxetine (CYMBALTA) 60 MG capsule Take 60 mg by mouth.      ergocalciferol (ERGOCALCIFEROL) 50,000 unit Cap Take 50,000 Units by mouth every 7 days.      memantine (NAMENDA) 5 MG Tab TAKE 1 TABLET BY MOUTH TWICE A  tablet 3    risperiDONE (RISPERDAL) 1 MG tablet Take 1 tablet (1 mg total) by mouth 2 (two) times daily. 60 tablet 11     No current facility-administered medications on file prior to visit.       Medications have been reviewed and reconciled with patient at this visit.  Barriers to medications reviewed with patient.    Adverse reactions to current medications reviewed with patient..    Over the counter medications reviewed and reconciled with patient.    Exam:  Wt Readings from Last 3 Encounters:   11/08/22 85.2 kg (187 lb 13.3 oz)   11/07/22 86 kg (189 lb 9.5 oz)   10/05/22 79.1 kg (174 lb 6.1 oz)     Temp Readings from Last 3 Encounters:   11/08/22 98 °F (36.7 °C) (Oral)   10/06/22 98.7 °F (37.1 °C) (Oral)   10/04/22 97.9 °F (36.6 °C) (Tympanic)     BP Readings from Last 3 Encounters:   11/08/22 118/62   11/07/22  128/69   10/06/22 (!) 165/80     Pulse Readings from Last 3 Encounters:   11/08/22 62   11/07/22 60   10/06/22 85     Body mass index is 28.56 kg/m².      Review of Systems   Constitutional: Negative.    HENT: Negative.     Eyes: Negative.    Respiratory: Negative.     Cardiovascular: Negative.    Gastrointestinal: Negative.    Genitourinary: Negative.    Musculoskeletal: Negative.    Skin:  Positive for itching and rash.   Neurological: Negative.    Endo/Heme/Allergies: Negative.    Psychiatric/Behavioral: Negative.     Physical Exam  Constitutional:       Appearance: Normal appearance. He is normal weight.   HENT:      Head: Normocephalic and atraumatic.      Right Ear: Tympanic membrane, ear canal and external ear normal.      Left Ear: Tympanic membrane, ear canal and external ear normal.      Mouth/Throat:      Mouth: Mucous membranes are moist.      Pharynx: Oropharynx is clear.   Eyes:      Extraocular Movements: Extraocular movements intact.      Conjunctiva/sclera: Conjunctivae normal.      Pupils: Pupils are equal, round, and reactive to light.   Cardiovascular:      Rate and Rhythm: Normal rate and regular rhythm.      Pulses: Normal pulses.      Heart sounds: Normal heart sounds.   Pulmonary:      Effort: Pulmonary effort is normal.      Breath sounds: Normal breath sounds.   Abdominal:      General: Bowel sounds are normal.      Palpations: Abdomen is soft.   Musculoskeletal:         General: Normal range of motion.      Cervical back: Normal range of motion and neck supple.   Skin:     Capillary Refill: Capillary refill takes less than 2 seconds.      Findings: Erythema and rash present.          Neurological:      General: No focal deficit present.      Mental Status: He is alert and oriented to person, place, and time. Mental status is at baseline.   Psychiatric:         Mood and Affect: Mood normal.         Behavior: Behavior normal.         Thought Content: Thought content normal.         Judgment:  Judgment normal.       Laboratory Reviewed ({N/A)  Lab Results   Component Value Date    WBC 8.66 10/06/2022    HGB 10.8 (L) 10/06/2022    HCT 33.0 (L) 10/06/2022     10/06/2022    CHOL 130 08/21/2020    TRIG 60 08/21/2020    HDL 48 08/21/2020    ALT 20 10/05/2022    AST 17 10/05/2022     10/06/2022    K 4.1 10/06/2022     10/06/2022    CREATININE 0.9 10/06/2022    BUN 10 10/06/2022    CO2 24 10/06/2022    TSH 1.208 10/06/2022    PSA 1.5 04/12/2017    INR 1.0 10/28/2020    HGBA1C 5.5 04/04/2022       Vishnu was seen today for poison ivy.    Diagnoses and all orders for this visit:    Poison ivy dermatitis  -     dexAMETHasone injection 10 mg  -     predniSONE (DELTASONE) 10 MG tablet; Take 1 tablet (10 mg total) by mouth 2 (two) times daily for 3 days, THEN 1 tablet (10 mg total) once daily for 3 days.  -     hydrocortisone 2.5 % cream; Apply topically 2 (two) times daily.                Care Plan/Goals: Reviewed    Goals    None         Follow up: No follow-ups on file.    After visit summary was printed and given to patient upon discharge today.  Patient goals and care plan are included in After Visit Summary.

## 2022-11-17 ENCOUNTER — PATIENT MESSAGE (OUTPATIENT)
Dept: NEUROLOGY | Facility: CLINIC | Age: 62
End: 2022-11-17
Payer: MEDICARE

## 2022-11-18 RX ORDER — BUPROPION HYDROCHLORIDE 150 MG/1
150 TABLET, EXTENDED RELEASE ORAL 2 TIMES DAILY
Qty: 60 TABLET | Refills: 11 | Status: SHIPPED | OUTPATIENT
Start: 2022-11-18 | End: 2023-11-13

## 2022-11-22 DIAGNOSIS — I10 ESSENTIAL HYPERTENSION: Primary | ICD-10-CM

## 2022-11-22 DIAGNOSIS — R94.31 PROLONGED Q-T INTERVAL ON ECG: ICD-10-CM

## 2022-11-22 DIAGNOSIS — I50.32 CHRONIC DIASTOLIC CONGESTIVE HEART FAILURE: ICD-10-CM

## 2022-11-23 ENCOUNTER — OFFICE VISIT (OUTPATIENT)
Dept: CARDIOLOGY | Facility: CLINIC | Age: 62
End: 2022-11-23
Payer: MEDICARE

## 2022-11-23 ENCOUNTER — HOSPITAL ENCOUNTER (OUTPATIENT)
Dept: CARDIOLOGY | Facility: HOSPITAL | Age: 62
Discharge: HOME | End: 2022-11-23
Attending: STUDENT IN AN ORGANIZED HEALTH CARE EDUCATION/TRAINING PROGRAM
Payer: MEDICARE

## 2022-11-23 VITALS
SYSTOLIC BLOOD PRESSURE: 140 MMHG | OXYGEN SATURATION: 94 % | HEIGHT: 68 IN | WEIGHT: 184.94 LBS | DIASTOLIC BLOOD PRESSURE: 50 MMHG | HEART RATE: 63 BPM | BODY MASS INDEX: 28.03 KG/M2

## 2022-11-23 DIAGNOSIS — I50.32 CHRONIC DIASTOLIC CONGESTIVE HEART FAILURE: ICD-10-CM

## 2022-11-23 DIAGNOSIS — I70.0 CALCIFICATION OF AORTA: ICD-10-CM

## 2022-11-23 DIAGNOSIS — R94.31 PROLONGED Q-T INTERVAL ON ECG: ICD-10-CM

## 2022-11-23 DIAGNOSIS — I10 ESSENTIAL HYPERTENSION: ICD-10-CM

## 2022-11-23 DIAGNOSIS — Z86.73 HISTORY OF TRANSIENT ISCHEMIC ATTACK (TIA): ICD-10-CM

## 2022-11-23 DIAGNOSIS — F03.B18 MODERATE DEMENTIA WITH BEHAVIORAL DISTURBANCE: ICD-10-CM

## 2022-11-23 DIAGNOSIS — E78.5 HYPERLIPIDEMIA, UNSPECIFIED HYPERLIPIDEMIA TYPE: ICD-10-CM

## 2022-11-23 DIAGNOSIS — E87.6 HYPOKALEMIA: ICD-10-CM

## 2022-11-23 DIAGNOSIS — I10 ESSENTIAL HYPERTENSION: Primary | ICD-10-CM

## 2022-11-23 DIAGNOSIS — I42.0 DILATED CARDIOMYOPATHY: ICD-10-CM

## 2022-11-23 DIAGNOSIS — F03.918 DEMENTIA WITH BEHAVIORAL DISTURBANCE: ICD-10-CM

## 2022-11-23 PROCEDURE — 3044F PR MOST RECENT HEMOGLOBIN A1C LEVEL <7.0%: ICD-10-PCS | Mod: CPTII,S$GLB,, | Performed by: STUDENT IN AN ORGANIZED HEALTH CARE EDUCATION/TRAINING PROGRAM

## 2022-11-23 PROCEDURE — 99214 PR OFFICE/OUTPT VISIT, EST, LEVL IV, 30-39 MIN: ICD-10-PCS | Mod: S$GLB,,, | Performed by: STUDENT IN AN ORGANIZED HEALTH CARE EDUCATION/TRAINING PROGRAM

## 2022-11-23 PROCEDURE — 3077F SYST BP >= 140 MM HG: CPT | Mod: CPTII,S$GLB,, | Performed by: STUDENT IN AN ORGANIZED HEALTH CARE EDUCATION/TRAINING PROGRAM

## 2022-11-23 PROCEDURE — 93010 ELECTROCARDIOGRAM REPORT: CPT | Mod: ,,, | Performed by: INTERNAL MEDICINE

## 2022-11-23 PROCEDURE — 93010 EKG 12-LEAD: ICD-10-PCS | Mod: ,,, | Performed by: INTERNAL MEDICINE

## 2022-11-23 PROCEDURE — 1159F PR MEDICATION LIST DOCUMENTED IN MEDICAL RECORD: ICD-10-PCS | Mod: CPTII,S$GLB,, | Performed by: STUDENT IN AN ORGANIZED HEALTH CARE EDUCATION/TRAINING PROGRAM

## 2022-11-23 PROCEDURE — 99999 PR PBB SHADOW E&M-EST. PATIENT-LVL III: ICD-10-PCS | Mod: PBBFAC,,, | Performed by: STUDENT IN AN ORGANIZED HEALTH CARE EDUCATION/TRAINING PROGRAM

## 2022-11-23 PROCEDURE — 4010F PR ACE/ARB THEARPY RXD/TAKEN: ICD-10-PCS | Mod: CPTII,S$GLB,, | Performed by: STUDENT IN AN ORGANIZED HEALTH CARE EDUCATION/TRAINING PROGRAM

## 2022-11-23 PROCEDURE — 99999 PR PBB SHADOW E&M-EST. PATIENT-LVL III: CPT | Mod: PBBFAC,,, | Performed by: STUDENT IN AN ORGANIZED HEALTH CARE EDUCATION/TRAINING PROGRAM

## 2022-11-23 PROCEDURE — 99214 OFFICE O/P EST MOD 30 MIN: CPT | Mod: S$GLB,,, | Performed by: STUDENT IN AN ORGANIZED HEALTH CARE EDUCATION/TRAINING PROGRAM

## 2022-11-23 PROCEDURE — 1159F MED LIST DOCD IN RCRD: CPT | Mod: CPTII,S$GLB,, | Performed by: STUDENT IN AN ORGANIZED HEALTH CARE EDUCATION/TRAINING PROGRAM

## 2022-11-23 PROCEDURE — 4010F ACE/ARB THERAPY RXD/TAKEN: CPT | Mod: CPTII,S$GLB,, | Performed by: STUDENT IN AN ORGANIZED HEALTH CARE EDUCATION/TRAINING PROGRAM

## 2022-11-23 PROCEDURE — 3078F DIAST BP <80 MM HG: CPT | Mod: CPTII,S$GLB,, | Performed by: STUDENT IN AN ORGANIZED HEALTH CARE EDUCATION/TRAINING PROGRAM

## 2022-11-23 PROCEDURE — 93005 ELECTROCARDIOGRAM TRACING: CPT

## 2022-11-23 PROCEDURE — 3078F PR MOST RECENT DIASTOLIC BLOOD PRESSURE < 80 MM HG: ICD-10-PCS | Mod: CPTII,S$GLB,, | Performed by: STUDENT IN AN ORGANIZED HEALTH CARE EDUCATION/TRAINING PROGRAM

## 2022-11-23 PROCEDURE — 3077F PR MOST RECENT SYSTOLIC BLOOD PRESSURE >= 140 MM HG: ICD-10-PCS | Mod: CPTII,S$GLB,, | Performed by: STUDENT IN AN ORGANIZED HEALTH CARE EDUCATION/TRAINING PROGRAM

## 2022-11-23 PROCEDURE — 3008F PR BODY MASS INDEX (BMI) DOCUMENTED: ICD-10-PCS | Mod: CPTII,S$GLB,, | Performed by: STUDENT IN AN ORGANIZED HEALTH CARE EDUCATION/TRAINING PROGRAM

## 2022-11-23 PROCEDURE — 3008F BODY MASS INDEX DOCD: CPT | Mod: CPTII,S$GLB,, | Performed by: STUDENT IN AN ORGANIZED HEALTH CARE EDUCATION/TRAINING PROGRAM

## 2022-11-23 PROCEDURE — 3044F HG A1C LEVEL LT 7.0%: CPT | Mod: CPTII,S$GLB,, | Performed by: STUDENT IN AN ORGANIZED HEALTH CARE EDUCATION/TRAINING PROGRAM

## 2022-11-23 NOTE — PROGRESS NOTES
Section of Cardiology                  Cardiac Clinic Note    Chief Complaint/Reason for consultation:  Reestablish care      HPI:   Vishnu Dougherty Jr. is a 62 y.o. male with h/o dementia, dilated CM (EF 35% recovered to normal), hypertension, HLD, GERD who comes into Cardiology Clinic to reestablish care.    4/4/22  Saw Dr. Bañuelos in 2018  Lives in a assisted living facility   Active at home with no limitations   Comes in with sister  Denies orthopnea,PND, syncope, LE swelling, chest pain.   Labs are pending     Stopped smoking many years ago. Denies ETOH abuse.   Denies DM.       5/16/22  Comes in with sister   BP normotensive today, but BP log with elevated BP readings  Taking new BP medication without issue    Denies SOB, orthopnea,PND, syncope, LE swelling, chest pain.       11/23/22  Comes in with sister   Was hospitalized at [facility] 10/22 due to having tremors after shingles injection   Tremors improved  Has a rash now that he is dealing with  Medications got changed, which he was on in August   BP elevated today due to aggravation with the rash       Denies SOB, orthopnea,PND, syncope, LE swelling, chest pain.       EKG 11/23/22 SB, 59 bpm  EKG 1/5/22 SB, no acute ST - T wave changes, QTc 426 ms     ECHO  2019  CONCLUSIONS     1 - Concentric hypertrophy.     2 - No wall motion abnormalities.     3 - Normal left ventricular systolic function (EF 60-65%).     4 - Normal left ventricular diastolic function.     5 - Normal right ventricular systolic function .     6 - The estimated PA systolic pressure is 28 mmHg.     STRESS TEST 2018  Negative for ischemia        [facility]      ROS: All 10 systems reviewed. Please refer to the HPI for pertinent positives. All other systems negative.     Past Medical History  Past Medical History:   Diagnosis Date    Chronic CHF 10/2/2019    Chronic diastolic HF (heart failure) 4/17/2018    Coronary artery calcification 1/11/2022    Dilated cardiomyopathy 9/24/2015     Enlarged prostate     Essential hypertension 2015    Gastroesophageal reflux disease without esophagitis 10/7/2015    Gastroesophageal reflux disease without esophagitis 10/7/2015    Hyperlipidemia     Personal history of colonic polyps 2016    Shortness of breath 2015       Surgical History  Past Surgical History:   Procedure Laterality Date    COLONOSCOPY N/A 2016    Procedure: COLONOSCOPY;  Surgeon: Demetrio Spicer MD;  Location: [facility];  Service: Endoscopy;  Laterality: N/A;          Allergies:   Review of patient's allergies indicates:  No Known Allergies    Social History:  Social History     Socioeconomic History    Marital status:     Number of children: 3   Occupational History    Occupation: disability   Tobacco Use    Smoking status: Former     Types: Cigarettes     Quit date: 1995     Years since quittin.1    Smokeless tobacco: Never   Substance and Sexual Activity    Alcohol use: No     Alcohol/week: 0.0 standard drinks    Drug use: No    Sexual activity: Not Currently     Partners: Female     Social Determinants of Health     Financial Resource Strain: Low Risk     Difficulty of Paying Living Expenses: Not hard at all   Food Insecurity: No Food Insecurity    Worried About Running Out of Food in the Last Year: Never true    Ran Out of Food in the Last Year: Never true   Transportation Needs: No Transportation Needs    Lack of Transportation (Medical): No    Lack of Transportation (Non-Medical): No   Physical Activity: Insufficiently Active    Days of Exercise per Week: 3 days    Minutes of Exercise per Session: 10 min   Stress: No Stress Concern Present    Feeling of Stress : Not at all   Social Connections: Socially Isolated    Frequency of Communication with Friends and Family: Once a week    Frequency of Social Gatherings with Friends and Family: Once a week    Attends Ep[employer]op[employer] Services: Never    Active Member of Clubs or Organizations: No    Attends Club or  "Organization Meetings: Never    Marital Status:    Housing Stability: Low Risk     Unable to Pay for Housing in the Last Year: No    Number of Places Lived in the Last Year: 1    Unstable Housing in the Last Year: No       Family History:  family history includes Hypertension in his father and mother; Stroke in his father.    Home Medications:  Current Outpatient Medications on File Prior to Visit   Medication Sig Dispense Refill    amlodipine-benazepril 10-20mg (LOTREL) 10-20 mg per capsule TAKE 1 CAPSULE BY MOUTH ONCE DAILY 90 capsule 3    atorvastatin (LIPITOR) 20 MG tablet Take 1 tablet (20 mg total) by mouth every evening. 90 tablet 3    buPROPion (WELLBUTRIN SR) 150 MG TBSR 12 hr tablet Take 1 tablet (150 mg total) by mouth 2 (two) times daily. 60 tablet 11    cholecalciferol, vitamin D3, 1,250 mcg (50,000 unit) capsule TAKE 1 CAPSULE BY MOUTH ONE TIME PER WEEK 12 capsule 3    cyanocobalamin 1,000 mcg/mL injection INJECT 1 ML (1,000 MCG TOTAL) INTO THE MUSCLE EVERY 28 DAYS. 3 mL 11    donepeziL (ARICEPT) 5 MG tablet TAKE 1 TABLET BY MOUTH EVERY DAY IN THE EVENING 90 tablet 3    DULoxetine (CYMBALTA) 60 MG capsule Take 60 mg by mouth.      ergocalciferol (ERGOCALCIFEROL) 50,000 unit Cap Take 50,000 Units by mouth every 7 days.      hydrocortisone 2.5 % cream Apply topically 2 (two) times daily. 1 each 1    memantine (NAMENDA) 5 MG Tab TAKE 1 TABLET BY MOUTH TWICE A  tablet 3    risperiDONE (RISPERDAL) 1 MG tablet Take 1 tablet (1 mg total) by mouth 2 (two) times daily. 60 tablet 11     No current facility-administered medications on file prior to visit.       Physical exam:  BP (!) 140/50 (BP Location: Right arm, Patient Position: Sitting, BP Method: Medium (Manual))   Pulse 63   Ht 5' 8" (1.727 m)   Wt 83.9 kg (184 lb 15.5 oz)   SpO2 (!) 94%   BMI 28.12 kg/m²         General: Pt is a 62 y.o. year old male who is AAOx3, in NAD, is pleasant, well nourished, looks stated age  HEENT: PERRL, " EOMI, Oral mucosa pink & moist  CVS  No abnormal cardiac pulsations noted on inspection. JVP not raised. The apical impulse is normal on palpation, and is located in the left 5th intercostal space in the mid - clavicular line. No palpable thrills or abnormal pulsations noted. RR, S1 - S2 heard, no murmurs, rubs or gallops appreciated.   PUL : CTA B/L. No wheezes/crackles heard   ABD : BS +, soft. No tenderness elicited   LE : No C/C/E. Distal Pulses palpable B/L         LABS:    Chemistry:   Lab Results   Component Value Date     10/06/2022    K 4.1 10/06/2022     10/06/2022    CO2 24 10/06/2022    BUN 10 10/06/2022    CREATININE 0.9 10/06/2022    CALCIUM 8.4 (L) 10/06/2022     Cardiac Markers:   Lab Results   Component Value Date    TROPONINI <0.006 11/07/2019     Cardiac Markers (Last 3):   Lab Results   Component Value Date    TROPONINI <0.006 11/07/2019    TROPONINI <0.006 10/02/2019    TROPONINI <0.006 10/06/2015     CBC:   Lab Results   Component Value Date    WBC 8.66 10/06/2022    HGB 10.8 (L) 10/06/2022    HCT 33.0 (L) 10/06/2022    MCV 94 10/06/2022     10/06/2022     Lipids:   Lab Results   Component Value Date    CHOL 130 08/21/2020    TRIG 60 08/21/2020    HDL 48 08/21/2020     Coagulation:   Lab Results   Component Value Date    INR 1.0 10/28/2020    APTT 24.1 10/28/2020           Assessment      1. Essential hypertension    2. Dilated cardiomyopathy    3. History of transient ischemic attack (TIA)    4. Hypokalemia    5. Hyperlipidemia, unspecified hyperlipidemia type    6. Moderate dementia with behavioral disturbance    7. Calcification of aorta    8. Dementia with behavioral disturbance           Plan:    Dilated CM  Recovered to normal- compensated  Continue BP control   Obtain echo next visit     Hypertension  Stable   Continue amlodipine-benazepril 10/20 daily    HLD  Will repeat lipid panel  Continue statin    H/o TIA  Stable  Continue aspirin and  statin    Dementia  Stable  Continue medications      This note was prepared using voice recognition system and is likely to have sound alike errors that may have been overlooked even after proofreading.     I have reviewed all pertinent chart information.  Plans and recommendations have been formulated under my direct supervision. All questions answered and patient voiced understanding.   If symptoms persist go to the ED.    RTC in 6 months         Donn Trinidad MD  Cardiology

## 2023-01-15 ENCOUNTER — HOSPITAL ENCOUNTER (EMERGENCY)
Facility: HOSPITAL | Age: 63
Discharge: HOME | End: 2023-01-15
Attending: EMERGENCY MEDICINE
Payer: MEDICARE

## 2023-01-15 VITALS
BODY MASS INDEX: 28.12 KG/M2 | TEMPERATURE: 98 F | SYSTOLIC BLOOD PRESSURE: 134 MMHG | HEART RATE: 67 BPM | RESPIRATION RATE: 15 BRPM | DIASTOLIC BLOOD PRESSURE: 67 MMHG | HEIGHT: 68 IN | OXYGEN SATURATION: 96 %

## 2023-01-15 DIAGNOSIS — R41.82 ALTERED MENTAL STATUS: ICD-10-CM

## 2023-01-15 DIAGNOSIS — F03.918 DEMENTIA WITH OTHER BEHAVIORAL DISTURBANCE, UNSPECIFIED DEMENTIA SEVERITY, UNSPECIFIED DEMENTIA TYPE: Primary | ICD-10-CM

## 2023-01-15 LAB
ALBUMIN SERPL BCP-MCNC: 3.6 G/DL (ref 3.5–5.2)
ALP SERPL-CCNC: 68 U/L (ref 55–135)
ALT SERPL W/O P-5'-P-CCNC: 17 U/L (ref 10–44)
ANION GAP SERPL CALC-SCNC: 10 MMOL/L (ref 8–16)
AST SERPL-CCNC: 16 U/L (ref 10–40)
BASOPHILS # BLD AUTO: 0.05 K/UL (ref 0–0.2)
BASOPHILS NFR BLD: 0.7 % (ref 0–1.9)
BILIRUB SERPL-MCNC: 0.4 MG/DL (ref 0.1–1)
BILIRUB UR QL STRIP: NEGATIVE
BUN SERPL-MCNC: 14 MG/DL (ref 8–23)
CALCIUM SERPL-MCNC: 9 MG/DL (ref 8.7–10.5)
CHLORIDE SERPL-SCNC: 105 MMOL/L (ref 95–110)
CLARITY UR: CLEAR
CO2 SERPL-SCNC: 27 MMOL/L (ref 23–29)
COLOR UR: YELLOW
CREAT SERPL-MCNC: 0.9 MG/DL (ref 0.5–1.4)
DIFFERENTIAL METHOD: ABNORMAL
EOSINOPHIL # BLD AUTO: 0.4 K/UL (ref 0–0.5)
EOSINOPHIL NFR BLD: 5.6 % (ref 0–8)
ERYTHROCYTE [DISTWIDTH] IN BLOOD BY AUTOMATED COUNT: 13.3 % (ref 11.5–14.5)
EST. GFR  (NO RACE VARIABLE): >60 ML/MIN/1.73 M^2
GLUCOSE SERPL-MCNC: 101 MG/DL (ref 70–110)
GLUCOSE UR QL STRIP: NEGATIVE
HCT VFR BLD AUTO: 34.9 % (ref 40–54)
HGB BLD-MCNC: 11.5 G/DL (ref 14–18)
HGB UR QL STRIP: NEGATIVE
IMM GRANULOCYTES # BLD AUTO: 0.04 K/UL (ref 0–0.04)
IMM GRANULOCYTES NFR BLD AUTO: 0.5 % (ref 0–0.5)
KETONES UR QL STRIP: NEGATIVE
LEUKOCYTE ESTERASE UR QL STRIP: NEGATIVE
LYMPHOCYTES # BLD AUTO: 1.7 K/UL (ref 1–4.8)
LYMPHOCYTES NFR BLD: 22.8 % (ref 18–48)
MCH RBC QN AUTO: 31 PG (ref 27–31)
MCHC RBC AUTO-ENTMCNC: 33 G/DL (ref 32–36)
MCV RBC AUTO: 94 FL (ref 82–98)
MONOCYTES # BLD AUTO: 0.6 K/UL (ref 0.3–1)
MONOCYTES NFR BLD: 7.8 % (ref 4–15)
NEUTROPHILS # BLD AUTO: 4.7 K/UL (ref 1.8–7.7)
NEUTROPHILS NFR BLD: 62.6 % (ref 38–73)
NITRITE UR QL STRIP: NEGATIVE
NRBC BLD-RTO: 0 /100 WBC
PH UR STRIP: 7 [PH] (ref 5–8)
PLATELET # BLD AUTO: 224 K/UL (ref 150–450)
PMV BLD AUTO: 10.9 FL (ref 9.2–12.9)
POTASSIUM SERPL-SCNC: 3.7 MMOL/L (ref 3.5–5.1)
PROT SERPL-MCNC: 6.5 G/DL (ref 6–8.4)
PROT UR QL STRIP: NEGATIVE
RBC # BLD AUTO: 3.71 M/UL (ref 4.6–6.2)
SODIUM SERPL-SCNC: 142 MMOL/L (ref 136–145)
SP GR UR STRIP: 1.02 (ref 1–1.03)
TROPONIN I SERPL DL<=0.01 NG/ML-MCNC: <0.006 NG/ML (ref 0–0.03)
URN SPEC COLLECT METH UR: NORMAL
UROBILINOGEN UR STRIP-ACNC: NEGATIVE EU/DL
WBC # BLD AUTO: 7.44 K/UL (ref 3.9–12.7)

## 2023-01-15 PROCEDURE — 84484 ASSAY OF TROPONIN QUANT: CPT | Performed by: EMERGENCY MEDICINE

## 2023-01-15 PROCEDURE — 85025 COMPLETE CBC W/AUTO DIFF WBC: CPT | Performed by: EMERGENCY MEDICINE

## 2023-01-15 PROCEDURE — P9612 CATHETERIZE FOR URINE SPEC: HCPCS

## 2023-01-15 PROCEDURE — 81003 URINALYSIS AUTO W/O SCOPE: CPT | Performed by: EMERGENCY MEDICINE

## 2023-01-15 PROCEDURE — 93010 ELECTROCARDIOGRAM REPORT: CPT | Mod: ,,, | Performed by: INTERNAL MEDICINE

## 2023-01-15 PROCEDURE — 93010 EKG 12-LEAD: ICD-10-PCS | Mod: ,,, | Performed by: INTERNAL MEDICINE

## 2023-01-15 PROCEDURE — 93005 ELECTROCARDIOGRAM TRACING: CPT

## 2023-01-15 PROCEDURE — 80053 COMPREHEN METABOLIC PANEL: CPT | Performed by: EMERGENCY MEDICINE

## 2023-01-15 PROCEDURE — 99285 EMERGENCY DEPT VISIT HI MDM: CPT | Mod: 25

## 2023-01-15 NOTE — ED PROVIDER NOTES
SCRIBE #1 NOTE: I, Javan Villavicencio, am scribing for, and in the presence of, Kevin London MD. I have scribed the entire note.       History     Chief Complaint   Patient presents with    Altered Mental Status     Pt brought in by [facility] unable to provide much detail. Did report pt in assisted living and crawled to nurses station. Pt confused to time, events, and day. Offers no CC. Unk baseline LOC.      Review of patient's allergies indicates:  No Known Allergies      History of Present Illness     HPI    1/15/2023, 2:14 AM  History obtained from the patient      History of Present Illness: Vishnu Dougherty Jr. is a 62 y.o. male patient with a PMHx of CHF, dilated cardiomegaly, and HLD who presents to the Emergency Department for evaluation of AMS which onset PTA. Pt brought in by [facility] but unable to provide much detail. EMS reports the pt is in assisted living and crawled to nurses station. Pt confused to time, events, and day. Patient denies any fever, dysuria, headache, and all other sxs at this time. No further complaints or concerns at this time.       Arrival mode: Ambulance Service    PCP: Stephan Quiñones MD        Past Medical History:  Past Medical History:   Diagnosis Date    Chronic CHF 10/2/2019    Chronic diastolic HF (heart failure) 4/17/2018    Coronary artery calcification 1/11/2022    Dilated cardiomyopathy 9/24/2015    Enlarged prostate     Essential hypertension 9/24/2015    Gastroesophageal reflux disease without esophagitis 10/7/2015    Gastroesophageal reflux disease without esophagitis 10/7/2015    Hyperlipidemia     Personal history of colonic polyps 2016    Shortness of breath 9/24/2015       Past Surgical History:  Past Surgical History:   Procedure Laterality Date    COLONOSCOPY N/A 5/25/2016    Procedure: COLONOSCOPY;  Surgeon: Demetrio Spicer MD;  Location: [facility];  Service: Endoscopy;  Laterality: N/A;         Family History:  Family History   Problem Relation Age of Onset     Hypertension Mother     Hypertension Father     Stroke Father        Social History:  Social History     Tobacco Use    Smoking status: Former     Types: Cigarettes     Quit date: 1995     Years since quittin.3    Smokeless tobacco: Never   Substance and Sexual Activity    Alcohol use: No     Alcohol/week: 0.0 standard drinks    Drug use: No    Sexual activity: Not Currently     Partners: Female        Review of Systems     Review of Systems   Unable to perform ROS: Mental status change   Constitutional:  Negative for fever.   Genitourinary:  Negative for dysuria.   Neurological:  Negative for headaches.   Psychiatric/Behavioral:  Positive for confusion.       Physical Exam     Initial Vitals [01/15/23 0126]   BP Pulse Resp Temp SpO2   (!) 147/71 62 18 98.2 °F (36.8 °C) 96 %      MAP       --          Physical Exam  Nursing Notes and Vital Signs Reviewed.  Constitutional: Patient is in no apparent distress. Well-developed.  Head: Atraumatic. Normocephalic.  Eyes: EOM intact. Conjunctivae are not pale. No scleral icterus.  ENT: Mucous membranes are dry. Oropharynx is clear and symmetric.    Neck: Supple. Full ROM.   Cardiovascular: Regular rate. Regular rhythm. No murmurs, rubs, or gallops. Distal pulses are 2+ and symmetric.  Pulmonary/Chest: No respiratory distress. Clear to auscultation bilaterally. No wheezing or rales.  Abdominal: Soft and non-distended.  There is no tenderness.  No rebound, guarding, or rigidity.   Musculoskeletal: Moves all extremities. No obvious deformities. No edema. Chronic spasms and jerking motion to left side of neck.  Skin: Warm and dry.  Neurological:  Awake and oriented x 1.  Normal speech.  No acute focal neurological deficits are appreciated.  Psychiatric: Normal affect. Good eye contact. Appropriate in content.     ED Course   Procedures  ED Vital Signs:  Vitals:    01/15/23 0126 01/15/23 0407   BP: (!) 147/71 134/67   Pulse: 62 67   Resp: 18 15   Temp: 98.2 °F (36.8 °C)  "   TempSrc: Oral    SpO2: 96%    Height: 5' 8" (1.727 m)        Abnormal Lab Results:  Labs Reviewed   CBC W/ AUTO DIFFERENTIAL - Abnormal; Notable for the following components:       Result Value    RBC 3.71 (*)     Hemoglobin 11.5 (*)     Hematocrit 34.9 (*)     All other components within normal limits   COMPREHENSIVE METABOLIC PANEL   URINALYSIS, REFLEX TO URINE CULTURE    Narrative:     Specimen Source->Urine   TROPONIN I        All Lab Results:  Results for orders placed or performed during the hospital encounter of 01/15/23   CBC auto differential   Result Value Ref Range    WBC 7.44 3.90 - 12.70 K/uL    RBC 3.71 (L) 4.60 - 6.20 M/uL    Hemoglobin 11.5 (L) 14.0 - 18.0 g/dL    Hematocrit 34.9 (L) 40.0 - 54.0 %    MCV 94 82 - 98 fL    MCH 31.0 27.0 - 31.0 pg    MCHC 33.0 32.0 - 36.0 g/dL    RDW 13.3 11.5 - 14.5 %    Platelets 224 150 - 450 K/uL    MPV 10.9 9.2 - 12.9 fL    Immature Granulocytes 0.5 0.0 - 0.5 %    Gran # (ANC) 4.7 1.8 - 7.7 K/uL    Immature Grans (Abs) 0.04 0.00 - 0.04 K/uL    Lymph # 1.7 1.0 - 4.8 K/uL    Mono # 0.6 0.3 - 1.0 K/uL    Eos # 0.4 0.0 - 0.5 K/uL    Baso # 0.05 0.00 - 0.20 K/uL    nRBC 0 0 /100 WBC    Gran % 62.6 38.0 - 73.0 %    Lymph % 22.8 18.0 - 48.0 %    Mono % 7.8 4.0 - 15.0 %    Eosinophil % 5.6 0.0 - 8.0 %    Basophil % 0.7 0.0 - 1.9 %    Differential Method Automated    Comprehensive metabolic panel   Result Value Ref Range    Sodium 142 136 - 145 mmol/L    Potassium 3.7 3.5 - 5.1 mmol/L    Chloride 105 95 - 110 mmol/L    CO2 27 23 - 29 mmol/L    Glucose 101 70 - 110 mg/dL    BUN 14 8 - 23 mg/dL    Creatinine 0.9 0.5 - 1.4 mg/dL    Calcium 9.0 8.7 - 10.5 mg/dL    Total Protein 6.5 6.0 - 8.4 g/dL    Albumin 3.6 3.5 - 5.2 g/dL    Total Bilirubin 0.4 0.1 - 1.0 mg/dL    Alkaline Phosphatase 68 55 - 135 U/L    AST 16 10 - 40 U/L    ALT 17 10 - 44 U/L    Anion Gap 10 8 - 16 mmol/L    eGFR >60 >60 mL/min/1.73 m^2   Urinalysis, Reflex to Urine Culture Urine, Clean Catch    " Specimen: Urine   Result Value Ref Range    Specimen UA Urine, Clean Catch     Color, UA Yellow Yellow, Straw, Marj    Appearance, UA Clear Clear    pH, UA 7.0 5.0 - 8.0    Specific Gravity, UA 1.020 1.005 - 1.030    Protein, UA Negative Negative    Glucose, UA Negative Negative    Ketones, UA Negative Negative    Bilirubin (UA) Negative Negative    Occult Blood UA Negative Negative    Nitrite, UA Negative Negative    Urobilinogen, UA Negative <2.0 EU/dL    Leukocytes, UA Negative Negative   Troponin I   Result Value Ref Range    Troponin I <0.006 0.000 - 0.026 ng/mL         Imaging Results:  Imaging Results              CT Head Without Contrast (Final result)  Result time 01/15/23 03:12:32      Final result by Fran Avina MD (01/15/23 03:12:32)                   Impression:     No CT evidence of an acute intracranial process.  Atrophy.    All CT scans at [this location] are performed using dose modulation techniques as appropriate to a performed exam including the following: automated exposure control; adjustment of the mA and/or kV according to patient size (this includes techniques or standardized protocols for targeted exams where dose is matched to indication / reason for exam; i.e. extremities or head); use of iterative reconstruction technique.    Finalized on: 1/15/2023 3:12 AM By:  Fran Avina MD  [facility]# [id number]      2023-01-15 03:14:[id number]    [facility]               Narrative:    EXAM:  CT HEAD WITHOUT CONTRAST    CLINICAL HISTORY:   Transient alteration awareness, unspecified.    TECHNIQUE:  Routine noncontrast head CT.    COMPARISON STUDY:  None.    FINDINGS:  There is no acute intracranial hemorrhage or abnormal extra-axial fluid collection.  There is advanced cerebral atrophy with ventricular sulcal congruence.  No abnormal increased or decreased density within the brain parenchyma.  Gray-white differentiation preserved.  There is no intracranial mass or mass effect.  Calvarium is intact.  Visualized  paranasal sinuses and mastoid air cells are well aerated.                                         The EKG was ordered, reviewed, and independently interpreted by the ED provider.  Interpretation time: 02:20:57  Rate: 62 BPM  Rhythm: normal sinus rhythm  Interpretation: Low voltage QRS. No acute ST or T wave abnormalities. No STEMI.           The Emergency Provider reviewed the vital signs and test results, which are outlined above.     ED Discussion       3:36 AM: Reassessed pt at this time. Discussed with pt all pertinent ED information and results. Discussed pt dx and plan of tx. Gave pt all f/u and return to the ED instructions. All questions and concerns were addressed at this time. Pt expresses understanding of information and instructions, and is comfortable with plan to discharge. Pt is stable for discharge.    I discussed with patient and/or family/caretaker that evaluation in the ED does not suggest any emergent or life threatening medical conditions requiring immediate intervention beyond what was provided in the ED, and I believe patient is safe for discharge.  Regardless, an unremarkable evaluation in the ED does not preclude the development or presence of a serious of life threatening condition. As such, patient was instructed to return immediately for any worsening or change in current symptoms.        Medical Decision Making:   Clinical Tests:   Lab Tests: Ordered and Reviewed  Radiological Study: Reviewed and Ordered  Medical Tests: Reviewed and Ordered         ED Medication(s):  Medications - No data to display    Discharge Medication List as of 1/15/2023  3:25 AM           Follow-up Information       Stephan Quiñones MD. Schedule an appointment as soon as possible for a visit in 1 day.    Specialty: Family Medicine  Contact information:  [address] [facility] [address] [phone]               [facility] - Emergency Dept..    Specialty: Emergency Medicine  Why: As needed, If  symptoms worsen  Contact information:  [address]  [facility] [address] [phone]                               Scribe Attestation:   Scribe #1: I performed the above scribed service and the documentation accurately describes the services I performed. I attest to the accuracy of the note.     Attending:   Physician Attestation Statement for Scribe #1: I, Kevin London MD, personally performed the services described in this documentation, as scribed by Javan Villavicencio, in my presence, and it is both accurate and complete.           Clinical Impression       ICD-10-CM ICD-9-CM   1. Dementia with other behavioral disturbance, unspecified dementia severity, unspecified dementia type  F03.918 294.21   2. Altered mental status  R41.82 780.97       Disposition:   Disposition: Discharged  Condition: Stable       Kevin London MD  01/16/23 [id number]

## 2023-01-15 NOTE — ED NOTES
Facility contacted and advised of patients ending discharge. States they will attempt to contact patients sister from transport.

## 2023-02-08 ENCOUNTER — OFFICE VISIT (OUTPATIENT)
Dept: NEUROLOGY | Facility: CLINIC | Age: 63
End: 2023-02-08
Payer: MEDICARE

## 2023-02-08 VITALS
OXYGEN SATURATION: 98 % | WEIGHT: 193.56 LBS | RESPIRATION RATE: 16 BRPM | HEART RATE: 60 BPM | BODY MASS INDEX: 29.34 KG/M2 | HEIGHT: 68 IN | SYSTOLIC BLOOD PRESSURE: 120 MMHG | DIASTOLIC BLOOD PRESSURE: 71 MMHG

## 2023-02-08 DIAGNOSIS — F41.9 ANXIETY AND DEPRESSION: ICD-10-CM

## 2023-02-08 DIAGNOSIS — R41.3 MEMORY LOSS OF UNKNOWN CAUSE: ICD-10-CM

## 2023-02-08 DIAGNOSIS — G30.0 EARLY ONSET ALZHEIMER'S DEMENTIA WITH BEHAVIORAL DISTURBANCE: ICD-10-CM

## 2023-02-08 DIAGNOSIS — E53.8 B12 DEFICIENCY: ICD-10-CM

## 2023-02-08 DIAGNOSIS — G30.9 MAJOR NEUROCOGNITIVE DISORDER DUE TO ALZHEIMER'S DISEASE, WITHOUT BEHAVIORAL DISTURBANCE: Primary | ICD-10-CM

## 2023-02-08 DIAGNOSIS — G31.84 MILD COGNITIVE IMPAIRMENT WITH MEMORY LOSS: ICD-10-CM

## 2023-02-08 DIAGNOSIS — B36.9 FUNGAL RASH OF TORSO: ICD-10-CM

## 2023-02-08 DIAGNOSIS — R21 SKIN RASH: ICD-10-CM

## 2023-02-08 DIAGNOSIS — F02.818 EARLY ONSET ALZHEIMER'S DEMENTIA WITH BEHAVIORAL DISTURBANCE: ICD-10-CM

## 2023-02-08 DIAGNOSIS — F02.80 MAJOR NEUROCOGNITIVE DISORDER DUE TO ALZHEIMER'S DISEASE, WITHOUT BEHAVIORAL DISTURBANCE: Primary | ICD-10-CM

## 2023-02-08 DIAGNOSIS — E55.9 VITAMIN D DEFICIENCY: ICD-10-CM

## 2023-02-08 DIAGNOSIS — F03.B18 MODERATE DEMENTIA WITH BEHAVIORAL DISTURBANCE: ICD-10-CM

## 2023-02-08 DIAGNOSIS — F32.A ANXIETY AND DEPRESSION: ICD-10-CM

## 2023-02-08 DIAGNOSIS — R94.31 PROLONGED Q-T INTERVAL ON ECG: ICD-10-CM

## 2023-02-08 PROCEDURE — 4010F ACE/ARB THERAPY RXD/TAKEN: CPT | Mod: CPTII,S$PBB,, | Performed by: NURSE PRACTITIONER

## 2023-02-08 PROCEDURE — 3008F BODY MASS INDEX DOCD: CPT | Mod: CPTII,S$PBB,, | Performed by: NURSE PRACTITIONER

## 2023-02-08 PROCEDURE — 1160F RVW MEDS BY RX/DR IN RCRD: CPT | Mod: CPTII,S$PBB,, | Performed by: NURSE PRACTITIONER

## 2023-02-08 PROCEDURE — 3074F SYST BP LT 130 MM HG: CPT | Mod: CPTII,S$PBB,, | Performed by: NURSE PRACTITIONER

## 2023-02-08 PROCEDURE — 4010F PR ACE/ARB THEARPY RXD/TAKEN: ICD-10-PCS | Mod: CPTII,S$PBB,, | Performed by: NURSE PRACTITIONER

## 2023-02-08 PROCEDURE — 1159F MED LIST DOCD IN RCRD: CPT | Mod: CPTII,S$PBB,, | Performed by: NURSE PRACTITIONER

## 2023-02-08 PROCEDURE — 1160F PR REVIEW ALL MEDS BY PRESCRIBER/CLIN PHARMACIST DOCUMENTED: ICD-10-PCS | Mod: CPTII,S$PBB,, | Performed by: NURSE PRACTITIONER

## 2023-02-08 PROCEDURE — 3008F PR BODY MASS INDEX (BMI) DOCUMENTED: ICD-10-PCS | Mod: CPTII,S$PBB,, | Performed by: NURSE PRACTITIONER

## 2023-02-08 PROCEDURE — 99999 PR PBB SHADOW E&M-EST. PATIENT-LVL V: ICD-10-PCS | Mod: PBBFAC,,, | Performed by: NURSE PRACTITIONER

## 2023-02-08 PROCEDURE — 3078F DIAST BP <80 MM HG: CPT | Mod: CPTII,S$PBB,, | Performed by: NURSE PRACTITIONER

## 2023-02-08 PROCEDURE — 3078F PR MOST RECENT DIASTOLIC BLOOD PRESSURE < 80 MM HG: ICD-10-PCS | Mod: CPTII,S$PBB,, | Performed by: NURSE PRACTITIONER

## 2023-02-08 PROCEDURE — 99215 OFFICE O/P EST HI 40 MIN: CPT | Mod: S$PBB,,, | Performed by: NURSE PRACTITIONER

## 2023-02-08 PROCEDURE — 1159F PR MEDICATION LIST DOCUMENTED IN MEDICAL RECORD: ICD-10-PCS | Mod: CPTII,S$PBB,, | Performed by: NURSE PRACTITIONER

## 2023-02-08 PROCEDURE — 99215 PR OFFICE/OUTPT VISIT, EST, LEVL V, 40-54 MIN: ICD-10-PCS | Mod: S$PBB,,, | Performed by: NURSE PRACTITIONER

## 2023-02-08 PROCEDURE — 99999 PR PBB SHADOW E&M-EST. PATIENT-LVL V: CPT | Mod: PBBFAC,,, | Performed by: NURSE PRACTITIONER

## 2023-02-08 PROCEDURE — 3074F PR MOST RECENT SYSTOLIC BLOOD PRESSURE < 130 MM HG: ICD-10-PCS | Mod: CPTII,S$PBB,, | Performed by: NURSE PRACTITIONER

## 2023-02-08 RX ORDER — DOXYCYCLINE HYCLATE 100 MG
100 TABLET ORAL 2 TIMES DAILY
COMMUNITY
Start: 2023-01-30 | End: 2023-03-29
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
## 2023-02-09 ENCOUNTER — OFFICE VISIT (OUTPATIENT)
Dept: ALLERGY | Facility: CLINIC | Age: 63
End: 2023-02-09
Payer: MEDICARE

## 2023-02-09 VITALS
DIASTOLIC BLOOD PRESSURE: 69 MMHG | HEART RATE: 66 BPM | WEIGHT: 192.88 LBS | SYSTOLIC BLOOD PRESSURE: 117 MMHG | HEIGHT: 72 IN | TEMPERATURE: 98 F | BODY MASS INDEX: 26.12 KG/M2

## 2023-02-09 DIAGNOSIS — L73.9 FOLLICULITIS: Primary | ICD-10-CM

## 2023-02-09 DIAGNOSIS — R21 SKIN RASH: ICD-10-CM

## 2023-02-09 PROCEDURE — 3074F SYST BP LT 130 MM HG: CPT | Mod: CPTII,S$GLB,, | Performed by: ALLERGY & IMMUNOLOGY

## 2023-02-09 PROCEDURE — 99999 PR PBB SHADOW E&M-EST. PATIENT-LVL III: CPT | Mod: PBBFAC,,, | Performed by: ALLERGY & IMMUNOLOGY

## 2023-02-09 PROCEDURE — 99204 PR OFFICE/OUTPT VISIT, NEW, LEVL IV, 45-59 MIN: ICD-10-PCS | Mod: S$GLB,,, | Performed by: ALLERGY & IMMUNOLOGY

## 2023-02-09 PROCEDURE — 3078F DIAST BP <80 MM HG: CPT | Mod: CPTII,S$GLB,, | Performed by: ALLERGY & IMMUNOLOGY

## 2023-02-09 PROCEDURE — 99204 OFFICE O/P NEW MOD 45 MIN: CPT | Mod: S$GLB,,, | Performed by: ALLERGY & IMMUNOLOGY

## 2023-02-09 PROCEDURE — 99999 PR PBB SHADOW E&M-EST. PATIENT-LVL III: ICD-10-PCS | Mod: PBBFAC,,, | Performed by: ALLERGY & IMMUNOLOGY

## 2023-02-09 PROCEDURE — 1159F MED LIST DOCD IN RCRD: CPT | Mod: CPTII,S$GLB,, | Performed by: ALLERGY & IMMUNOLOGY

## 2023-02-09 PROCEDURE — 3078F PR MOST RECENT DIASTOLIC BLOOD PRESSURE < 80 MM HG: ICD-10-PCS | Mod: CPTII,S$GLB,, | Performed by: ALLERGY & IMMUNOLOGY

## 2023-02-09 PROCEDURE — 3074F PR MOST RECENT SYSTOLIC BLOOD PRESSURE < 130 MM HG: ICD-10-PCS | Mod: CPTII,S$GLB,, | Performed by: ALLERGY & IMMUNOLOGY

## 2023-02-09 PROCEDURE — 4010F PR ACE/ARB THEARPY RXD/TAKEN: ICD-10-PCS | Mod: CPTII,S$GLB,, | Performed by: ALLERGY & IMMUNOLOGY

## 2023-02-09 PROCEDURE — 3008F BODY MASS INDEX DOCD: CPT | Mod: CPTII,S$GLB,, | Performed by: ALLERGY & IMMUNOLOGY

## 2023-02-09 PROCEDURE — 3008F PR BODY MASS INDEX (BMI) DOCUMENTED: ICD-10-PCS | Mod: CPTII,S$GLB,, | Performed by: ALLERGY & IMMUNOLOGY

## 2023-02-09 PROCEDURE — 4010F ACE/ARB THERAPY RXD/TAKEN: CPT | Mod: CPTII,S$GLB,, | Performed by: ALLERGY & IMMUNOLOGY

## 2023-02-09 PROCEDURE — 1159F PR MEDICATION LIST DOCUMENTED IN MEDICAL RECORD: ICD-10-PCS | Mod: CPTII,S$GLB,, | Performed by: ALLERGY & IMMUNOLOGY

## 2023-02-09 RX ORDER — SULFAMETHOXAZOLE AND TRIMETHOPRIM 800; 160 MG/1; MG/1
1 TABLET ORAL 2 TIMES DAILY
Qty: 20 TABLET | Refills: 0 | Status: SHIPPED | OUTPATIENT
Start: 2023-02-09 | End: 2023-03-27 | Stop reason: ALTCHOICE

## 2023-02-09 NOTE — PROGRESS NOTES
Subjective:       Patient ID: Vishnu Dougherty Jr. is a 62 y.o. male.      Referred by Verito Hernandez NP for rash    Chief Complaint:  Rash      HPI: 62 year old male complaining of a back rash that itches. Rash is constant. Progressively worse throughout the day. He has not placed creams or medications on the area. He reports seeing someone for it, but he is not sure who he saw.      Past Medical History:   Diagnosis Date    Chronic CHF 10/2/2019    Chronic diastolic HF (heart failure) 4/17/2018    Coronary artery calcification 1/11/2022    Dilated cardiomyopathy 9/24/2015    Enlarged prostate     Essential hypertension 9/24/2015    Gastroesophageal reflux disease without esophagitis 10/7/2015    Gastroesophageal reflux disease without esophagitis 10/7/2015    Hyperlipidemia     Personal history of colonic polyps 2016    Shortness of breath 9/24/2015        Family History   Problem Relation Age of Onset    Hypertension Mother     Hypertension Father     Stroke Father         Current Outpatient Medications on File Prior to Visit   Medication Sig Dispense Refill    amlodipine-benazepril 10-20mg (LOTREL) 10-20 mg per capsule TAKE 1 CAPSULE BY MOUTH ONCE DAILY 90 capsule 3    atorvastatin (LIPITOR) 20 MG tablet Take 1 tablet (20 mg total) by mouth every evening. 90 tablet 3    buPROPion (WELLBUTRIN SR) 150 MG TBSR 12 hr tablet Take 1 tablet (150 mg total) by mouth 2 (two) times daily. 60 tablet 11    cyanocobalamin 1,000 mcg/mL injection INJECT 1 ML (1,000 MCG TOTAL) INTO THE MUSCLE EVERY 28 DAYS. 3 mL 11    donepeziL (ARICEPT) 5 MG tablet TAKE 1 TABLET BY MOUTH EVERY DAY IN THE EVENING 90 tablet 3    doxycycline (VIBRA-TABS) 100 MG tablet Take 100 mg by mouth 2 (two) times daily.      DULoxetine (CYMBALTA) 60 MG capsule Take 60 mg by mouth.      ergocalciferol (ERGOCALCIFEROL) 50,000 unit Cap Take 50,000 Units by mouth every 7 days.      memantine (NAMENDA) 5 MG Tab TAKE 1 TABLET BY MOUTH TWICE A  tablet 3     risperiDONE (RISPERDAL) 1 MG tablet Take 1 tablet (1 mg total) by mouth 2 (two) times daily. 60 tablet 11    cholecalciferol, vitamin D3, 1,250 mcg (50,000 unit) capsule TAKE 1 CAPSULE BY MOUTH ONE TIME PER WEEK (Patient not taking: Reported on 2/8/2023) 12 capsule 3    hydrocortisone 2.5 % cream Apply topically 2 (two) times daily. (Patient not taking: Reported on 2/8/2023) 1 each 1     No current facility-administered medications on file prior to visit.        Review of patient's allergies indicates:  No Known Allergies     Environmental History: Pets in the home: none.  Tobacco Smoke in Home: no  Review of Systems   Skin:  Positive for rash. Negative for wound.   Psychiatric/Behavioral:  Negative for behavioral problems and suicidal ideas.       Objective:    Physical Exam  Vitals reviewed.   Constitutional:       General: He is not in acute distress.     Appearance: Normal appearance. He is not ill-appearing, toxic-appearing or diaphoretic.   HENT:      Head: Normocephalic and atraumatic.      Mouth/Throat:      Mouth: Mucous membranes are moist.      Pharynx: No oropharyngeal exudate or posterior oropharyngeal erythema.   Eyes:      General: No scleral icterus.        Right eye: No discharge.         Left eye: No discharge.   Neck:      Vascular: No carotid bruit.   Cardiovascular:      Rate and Rhythm: Normal rate and regular rhythm.      Heart sounds: Normal heart sounds. No murmur heard.    No friction rub. No gallop.   Pulmonary:      Effort: Pulmonary effort is normal. No respiratory distress.      Breath sounds: Normal breath sounds. No stridor. No wheezing, rhonchi or rales.   Chest:      Chest wall: No tenderness.   Musculoskeletal:         General: No swelling, tenderness, deformity or signs of injury. Normal range of motion.      Cervical back: Normal range of motion and neck supple. No rigidity or tenderness.      Right lower leg: No edema.      Left lower leg: No edema.   Lymphadenopathy:       Cervical: No cervical adenopathy.   Skin:     General: Skin is warm.      Coloration: Skin is not jaundiced.      Findings: Rash present. No lesion.      Comments: Erythema around hair follicles    Neurological:      General: No focal deficit present.      Mental Status: He is alert and oriented to person, place, and time.      Gait: Gait normal.   Psychiatric:         Mood and Affect: Mood normal.         Behavior: Behavior normal.         Thought Content: Thought content normal.         Judgment: Judgment normal.          Assessment:       1. Folliculitis    2. Skin rash         Plan:       Folliculitis  -     sulfamethoxazole-trimethoprim 800-160mg (BACTRIM DS) 800-160 mg Tab; Take 1 tablet by mouth 2 (two) times daily.  Dispense: 20 tablet; Refill: 0    Skin rash  -     Ambulatory referral/consult to Allergy      Not an allergic rash. Appears to be folliculitis. Bactrim ordered    RTC prn        SHAR RIVERA                    Problems Address                                                 Amount and/or Complexity                                                                      Risk       3           [] 2 or more self-limited or minor problems                      [] Limited                                                                        [] Low                  [] 1 stable chronic illness                                                  Any combination of the two                                               OTC drugs                  []Acute, uncomplicated illness or injury                            Review of prior external notes from unique source           Minor surgery with no risk factors                                                                                                               [] 1 []2  []3+                                                                                                              Review of results from each unique test                                                                                                                [] 1 []2  [] 3+                                                                                                              Order of each unique test                                                                                                               [] 1 []2  [] 3+                                                                                                              Or                                                                                                             [] Assessment requiring an independent historian      4            [] One or more chronic illness with exacerbation,              [] Moderate                                                                      [x] Moderate                 Progression, or side effects of treatment                            -test documents or independent historians                        Prescription drug management                [x]  2 or more stable chronic illnesses                                    [] Independent interpretation of tests                              Minor surgery with identifiable risk                [] 1 undiagnosed new problem with uncertain prognosis    [] Discussion or management of test results                    elective major surgery                [] 1 acute illness with                systemic symptoms                                                                                                                                                              [] 1 acute complicated injury                                                                                                                                          Elective major surgery                                                                                                                                                                                                                                                                                                                                                                                                   5            [] 1 or more chronic illnesses with severe exacerbation,     [] Extensive(two from below)                                         [] High                                                                                                               [] Independent interpretation of results                         Drug therapy requiring intensive                                                                                                               []Discussion of management or test interpretation           monitoring                                                                                                                                                                                                       Decision to de-escalate care                 [] 1 acute or chronic illness or injury that poses a threat                                                                                               Decision regarding hospitalization                                                                                                                                                                                                               CC: Verito Hernandez NP

## 2023-02-20 ENCOUNTER — PATIENT MESSAGE (OUTPATIENT)
Dept: INTERNAL MEDICINE | Facility: CLINIC | Age: 63
End: 2023-02-20
Payer: MEDICARE

## 2023-02-20 ENCOUNTER — PATIENT MESSAGE (OUTPATIENT)
Dept: NEUROLOGY | Facility: CLINIC | Age: 63
End: 2023-02-20
Payer: MEDICARE

## 2023-02-20 RX ORDER — DULOXETIN HYDROCHLORIDE 60 MG/1
60 CAPSULE, DELAYED RELEASE ORAL DAILY
Qty: 30 CAPSULE | Refills: 3 | Status: SHIPPED | OUTPATIENT
Start: 2023-02-20

## 2023-03-16 ENCOUNTER — PATIENT MESSAGE (OUTPATIENT)
Dept: NEUROLOGY | Facility: CLINIC | Age: 63
End: 2023-03-16
Payer: MEDICARE

## 2023-03-25 ENCOUNTER — PATIENT MESSAGE (OUTPATIENT)
Dept: ALLERGY | Facility: CLINIC | Age: 63
End: 2023-03-25
Payer: MEDICARE

## 2023-03-25 ENCOUNTER — PATIENT MESSAGE (OUTPATIENT)
Dept: NEUROLOGY | Facility: CLINIC | Age: 63
End: 2023-03-25
Payer: MEDICARE

## 2023-03-25 ENCOUNTER — PATIENT MESSAGE (OUTPATIENT)
Dept: INTERNAL MEDICINE | Facility: CLINIC | Age: 63
End: 2023-03-25
Payer: MEDICARE

## 2023-03-27 ENCOUNTER — OFFICE VISIT (OUTPATIENT)
Dept: URGENT CARE | Facility: CLINIC | Age: 63
End: 2023-03-27
Payer: MEDICARE

## 2023-03-27 VITALS
WEIGHT: 192 LBS | DIASTOLIC BLOOD PRESSURE: 71 MMHG | OXYGEN SATURATION: 96 % | HEIGHT: 72 IN | RESPIRATION RATE: 18 BRPM | TEMPERATURE: 98 F | SYSTOLIC BLOOD PRESSURE: 120 MMHG | BODY MASS INDEX: 26.01 KG/M2 | HEART RATE: 56 BPM

## 2023-03-27 DIAGNOSIS — L03.113 CELLULITIS OF RIGHT ARM: Primary | ICD-10-CM

## 2023-03-27 PROCEDURE — 87075 CULTR BACTERIA EXCEPT BLOOD: CPT

## 2023-03-27 PROCEDURE — 99213 PR OFFICE/OUTPT VISIT, EST, LEVL III, 20-29 MIN: ICD-10-PCS | Mod: 25,S$GLB,,

## 2023-03-27 PROCEDURE — 87070 CULTURE OTHR SPECIMN AEROBIC: CPT

## 2023-03-27 PROCEDURE — 10060 I&D ABSCESS SIMPLE/SINGLE: CPT | Mod: S$GLB,,,

## 2023-03-27 PROCEDURE — 87077 CULTURE AEROBIC IDENTIFY: CPT

## 2023-03-27 PROCEDURE — 87186 SC STD MICRODIL/AGAR DIL: CPT

## 2023-03-27 PROCEDURE — 99213 OFFICE O/P EST LOW 20 MIN: CPT | Mod: 25,S$GLB,,

## 2023-03-27 PROCEDURE — 10060 INCISION & DRAINAGE: ICD-10-PCS | Mod: S$GLB,,,

## 2023-03-27 RX ORDER — SULFAMETHOXAZOLE AND TRIMETHOPRIM 800; 160 MG/1; MG/1
1 TABLET ORAL 2 TIMES DAILY
Qty: 20 TABLET | Refills: 0 | Status: ON HOLD | OUTPATIENT
Start: 2023-03-27 | End: 2023-03-30 | Stop reason: HOSPADM

## 2023-03-27 RX ORDER — MUPIROCIN 20 MG/G
OINTMENT TOPICAL 3 TIMES DAILY
Qty: 30 G | Refills: 1 | Status: SHIPPED | OUTPATIENT
Start: 2023-03-27 | End: 2023-04-03

## 2023-03-27 NOTE — PROCEDURES
Incision & Drainage    Date/Time: 3/27/2023 3:15 PM  Performed by: Marcia Adam PA-C  Authorized by: Marcia Adam PA-C     Consent Done?:  Yes (Verbal)    Type:  Abscess  Body area:  Upper extremity  Location details:  Right arm  Anesthesia:  Local infiltration  Local anesthetic: Lidocaine 1% without epinephrine  Anesthetic total (ml):  1.5  Scalpel size:  11  Incision type:  Single straight  Complexity:  Simple  Drainage:  Bloody and purulent  Drainage amount:  Moderate  Wound treatment:  Incision, drainage, wound left open, expression of material and deloculation  Patient tolerance:  Patient tolerated the procedure well with no immediate complications

## 2023-03-27 NOTE — PROGRESS NOTES
Subjective:       Patient ID: Vishnu Dougherty Jr. is a 63 y.o. male.    Vitals:  height is 6' (1.829 m) and weight is 87.1 kg (192 lb). His oral temperature is 98.4 °F (36.9 °C). His blood pressure is 120/71 and his pulse is 56 (abnormal). His respiration is 18 and oxygen saturation is 96%.     Chief Complaint: Rash    History limited, patient has dementia. History provided by patient's sister.  Pt seen at  twice before, starting in November 2022; given steroids.  Third time he had the rash saw Allergy, Dx folliculitis; abx prescribed      Pt has appt with Allergy on Wedneday    Area on right forearm appears infected. Patient has not had fevers. Sister states she saw the patient Saturday and his arm did not appear infected. States it has worsened over the last 24-48 hours.        Rash  This is a chronic problem. The problem has been gradually worsening since onset. Location: entire body. The rash is characterized by redness, itchiness and burning. He was exposed to nothing. Past treatments include oral steroids, antibiotic cream and antibiotics (steroid inj). The treatment provided no relief. There is no history of allergies, asthma, eczema or varicella.     Skin:  Positive for rash.     Objective:      Physical Exam   Constitutional:  Non-toxic appearance. He does not appear ill. No distress.   HENT:   Head: Normocephalic and atraumatic.   Ears:   Right Ear: External ear normal.   Left Ear: External ear normal.   Eyes: Conjunctivae are normal.   Cardiovascular: Normal rate.   Pulmonary/Chest: Effort normal.   Abdominal: Normal appearance.   Neurological: He is alert.   Skin: Skin is warm, dry, rash, macular, papular and abscessed.         Comments: Abscess to right forearm with small central area of fluctuance. Induration and cellulitis surrounding center of abscess. Nontender to the touch although patient is a poor historian    Patient did complain of pain with expression of abscess                          Assessment:       1. Cellulitis of right arm          Plan:         Wound cultures sent to ensure appropriate abx for treatment. Patient to follow up with allergy in 2 days. Instructed patients sister to have them re-check his wound at that appt or they may bring him back here for wound check. Discussed if no improvement in 48 hours or worsening of symptoms before, patients needs to go to the ED for further evaluation and treatment. He is afebrile today. VSS. No concern for systemic involvement at this point. Patients sister in agreement with plan    Cellulitis of right arm  -     CULTURE, AEROBIC  (SPECIFY SOURCE)  -     CULTURE, ANAEROBE  -     sulfamethoxazole-trimethoprim 800-160mg (BACTRIM DS) 800-160 mg Tab; Take 1 tablet by mouth 2 (two) times daily. for 10 days  Dispense: 20 tablet; Refill: 0  -     mupirocin (BACTROBAN) 2 % ointment; Apply topically 3 (three) times daily. for 7 days  Dispense: 30 g; Refill: 1  -     Incision & Drainage

## 2023-03-27 NOTE — PATIENT INSTRUCTIONS
Abscess  If your condition worsens or fails to improve we recommend that you receive another evaluation at the ER immediately or contact your PCP to discuss your concerns or return here. You must understand that you've received an urgent care treatment only and that you may be released before all your medical problems are known or treated. You the patient will arrange for followup care as instructed.     Gently clean wound with soap and water at least twice daily unless more frequently soiled, then clean more frequently.      May apply topical antibiotic ointment as prescribed to wound to promote healing.   Clean old antibiotic ointment away before new application.      Keep covered when you are out and about, if the dressing becomes wet, change it immediately.       IF YOU WERE PRESCRIBED AN ANTIBIOTIC: This is to help prevent infection. Follow all instructions for taking this medicine. Take the medicine every day until it is gone or you are told to stop. You should not have any left over.    Monitor for signs of infection such as redness, drainage, increase swelling or increase pain.   Contact the clinic or your healthcare provider if any of the below occurs:  Wound bleeding not controlled by direct pressure  Signs of infection, including increasing pain in the wound, increasing wound redness or swelling, or pus or bad odor coming from the wound  Fever of 100.4°F (38.ºC) or higher or as directed by your healthcare provider  Wound edges re-open  Wound changes colors  Numbness around the wound   Decreased movement around the injured area      Tylenol or ibuprofen can also be used as directed for pain unless you have an allergy to them or medical condition such as stomach ulcers, kidney or liver disease or blood thinners etc for which you should not be taking these type of medications.

## 2023-03-29 ENCOUNTER — OFFICE VISIT (OUTPATIENT)
Dept: ALLERGY | Facility: CLINIC | Age: 63
End: 2023-03-29
Payer: MEDICARE

## 2023-03-29 ENCOUNTER — ANESTHESIA (OUTPATIENT)
Dept: SURGERY | Facility: HOSPITAL | Age: 63
End: 2023-03-29
Payer: MEDICARE

## 2023-03-29 ENCOUNTER — HOSPITAL ENCOUNTER (OUTPATIENT)
Facility: HOSPITAL | Age: 63
Discharge: HOME | End: 2023-03-30
Attending: EMERGENCY MEDICINE | Admitting: FAMILY MEDICINE
Payer: MEDICARE

## 2023-03-29 ENCOUNTER — ANESTHESIA EVENT (OUTPATIENT)
Dept: SURGERY | Facility: HOSPITAL | Age: 63
End: 2023-03-29
Payer: MEDICARE

## 2023-03-29 VITALS
TEMPERATURE: 97 F | BODY MASS INDEX: 25.8 KG/M2 | DIASTOLIC BLOOD PRESSURE: 67 MMHG | HEART RATE: 61 BPM | HEIGHT: 72 IN | SYSTOLIC BLOOD PRESSURE: 121 MMHG | WEIGHT: 190.5 LBS

## 2023-03-29 DIAGNOSIS — Z51.89 WOUND CHECK, ABSCESS: Primary | ICD-10-CM

## 2023-03-29 DIAGNOSIS — R21 RASH/SKIN ERUPTION: ICD-10-CM

## 2023-03-29 DIAGNOSIS — R07.9 CHEST PAIN: ICD-10-CM

## 2023-03-29 DIAGNOSIS — L03.113 CELLULITIS OF RIGHT FOREARM: ICD-10-CM

## 2023-03-29 DIAGNOSIS — L02.419 ABSCESS OF ARM: ICD-10-CM

## 2023-03-29 DIAGNOSIS — L08.9 SKIN INFECTION: Primary | ICD-10-CM

## 2023-03-29 LAB
ALBUMIN SERPL BCP-MCNC: 4 G/DL (ref 3.5–5.2)
ALP SERPL-CCNC: 83 U/L (ref 55–135)
ALT SERPL W/O P-5'-P-CCNC: 26 U/L (ref 10–44)
ANION GAP SERPL CALC-SCNC: 8 MMOL/L (ref 8–16)
AST SERPL-CCNC: 22 U/L (ref 10–40)
BASOPHILS # BLD AUTO: 0.08 K/UL (ref 0–0.2)
BASOPHILS NFR BLD: 0.9 % (ref 0–1.9)
BILIRUB SERPL-MCNC: 0.4 MG/DL (ref 0.1–1)
BILIRUB UR QL STRIP: NEGATIVE
BUN SERPL-MCNC: 18 MG/DL (ref 8–23)
CALCIUM SERPL-MCNC: 9.5 MG/DL (ref 8.7–10.5)
CHLORIDE SERPL-SCNC: 103 MMOL/L (ref 95–110)
CLARITY UR: CLEAR
CO2 SERPL-SCNC: 27 MMOL/L (ref 23–29)
COLOR UR: YELLOW
CREAT SERPL-MCNC: 1.3 MG/DL (ref 0.5–1.4)
DIFFERENTIAL METHOD: ABNORMAL
EOSINOPHIL # BLD AUTO: 0.5 K/UL (ref 0–0.5)
EOSINOPHIL NFR BLD: 5.8 % (ref 0–8)
ERYTHROCYTE [DISTWIDTH] IN BLOOD BY AUTOMATED COUNT: 12.9 % (ref 11.5–14.5)
EST. GFR  (NO RACE VARIABLE): >60 ML/MIN/1.73 M^2
GLUCOSE SERPL-MCNC: 80 MG/DL (ref 70–110)
GLUCOSE UR QL STRIP: NEGATIVE
HCT VFR BLD AUTO: 35.3 % (ref 40–54)
HGB BLD-MCNC: 11.5 G/DL (ref 14–18)
HGB UR QL STRIP: NEGATIVE
IMM GRANULOCYTES # BLD AUTO: 0.05 K/UL (ref 0–0.04)
IMM GRANULOCYTES NFR BLD AUTO: 0.6 % (ref 0–0.5)
KETONES UR QL STRIP: NEGATIVE
LACTATE SERPL-SCNC: 1 MMOL/L (ref 0.5–2.2)
LEUKOCYTE ESTERASE UR QL STRIP: NEGATIVE
LYMPHOCYTES # BLD AUTO: 1.3 K/UL (ref 1–4.8)
LYMPHOCYTES NFR BLD: 14.8 % (ref 18–48)
MCH RBC QN AUTO: 31.2 PG (ref 27–31)
MCHC RBC AUTO-ENTMCNC: 32.6 G/DL (ref 32–36)
MCV RBC AUTO: 96 FL (ref 82–98)
MONOCYTES # BLD AUTO: 0.6 K/UL (ref 0.3–1)
MONOCYTES NFR BLD: 7 % (ref 4–15)
NEUTROPHILS # BLD AUTO: 6.4 K/UL (ref 1.8–7.7)
NEUTROPHILS NFR BLD: 70.9 % (ref 38–73)
NITRITE UR QL STRIP: NEGATIVE
NRBC BLD-RTO: 0 /100 WBC
PH UR STRIP: 7 [PH] (ref 5–8)
PLATELET # BLD AUTO: 269 K/UL (ref 150–450)
PMV BLD AUTO: 11.2 FL (ref 9.2–12.9)
POTASSIUM SERPL-SCNC: 4 MMOL/L (ref 3.5–5.1)
PROT SERPL-MCNC: 7.7 G/DL (ref 6–8.4)
PROT UR QL STRIP: NEGATIVE
RBC # BLD AUTO: 3.69 M/UL (ref 4.6–6.2)
SODIUM SERPL-SCNC: 138 MMOL/L (ref 136–145)
SP GR UR STRIP: 1.01 (ref 1–1.03)
URN SPEC COLLECT METH UR: NORMAL
UROBILINOGEN UR STRIP-ACNC: NEGATIVE EU/DL
WBC # BLD AUTO: 9.04 K/UL (ref 3.9–12.7)

## 2023-03-29 PROCEDURE — 63600175 PHARM REV CODE 636 W HCPCS: Performed by: SURGERY

## 2023-03-29 PROCEDURE — 3288F PR FALLS RISK ASSESSMENT DOCUMENTED: ICD-10-PCS | Mod: CPTII,S$GLB,, | Performed by: ALLERGY & IMMUNOLOGY

## 2023-03-29 PROCEDURE — 99999 PR PBB SHADOW E&M-EST. PATIENT-LVL III: ICD-10-PCS | Mod: PBBFAC,,, | Performed by: ALLERGY & IMMUNOLOGY

## 2023-03-29 PROCEDURE — 25000003 PHARM REV CODE 250

## 2023-03-29 PROCEDURE — 10061 PR DRAIN SKIN ABSCESS COMPLIC: ICD-10-PCS | Mod: ,,, | Performed by: SURGERY

## 2023-03-29 PROCEDURE — 3074F SYST BP LT 130 MM HG: CPT | Mod: CPTII,S$GLB,, | Performed by: ALLERGY & IMMUNOLOGY

## 2023-03-29 PROCEDURE — 63600175 PHARM REV CODE 636 W HCPCS

## 2023-03-29 PROCEDURE — 1101F PT FALLS ASSESS-DOCD LE1/YR: CPT | Mod: CPTII,S$GLB,, | Performed by: ALLERGY & IMMUNOLOGY

## 2023-03-29 PROCEDURE — 3074F PR MOST RECENT SYSTOLIC BLOOD PRESSURE < 130 MM HG: ICD-10-PCS | Mod: CPTII,S$GLB,, | Performed by: ALLERGY & IMMUNOLOGY

## 2023-03-29 PROCEDURE — G0378 HOSPITAL OBSERVATION PER HR: HCPCS

## 2023-03-29 PROCEDURE — 37000009 HC ANESTHESIA EA ADD 15 MINS: Performed by: SURGERY

## 2023-03-29 PROCEDURE — 99203 OFFICE O/P NEW LOW 30 MIN: CPT | Mod: 25,,, | Performed by: SURGERY

## 2023-03-29 PROCEDURE — 25000003 PHARM REV CODE 250: Performed by: EMERGENCY MEDICINE

## 2023-03-29 PROCEDURE — 36000705 HC OR TIME LEV I EA ADD 15 MIN: Performed by: SURGERY

## 2023-03-29 PROCEDURE — 1101F PR PT FALLS ASSESS DOC 0-1 FALLS W/OUT INJ PAST YR: ICD-10-PCS | Mod: CPTII,S$GLB,, | Performed by: ALLERGY & IMMUNOLOGY

## 2023-03-29 PROCEDURE — 71000033 HC RECOVERY, INTIAL HOUR: Performed by: SURGERY

## 2023-03-29 PROCEDURE — 88304 PR  SURG PATH,LEVEL III: ICD-10-PCS | Mod: 26,,, | Performed by: STUDENT IN AN ORGANIZED HEALTH CARE EDUCATION/TRAINING PROGRAM

## 2023-03-29 PROCEDURE — 88304 TISSUE EXAM BY PATHOLOGIST: CPT | Mod: 26,,, | Performed by: STUDENT IN AN ORGANIZED HEALTH CARE EDUCATION/TRAINING PROGRAM

## 2023-03-29 PROCEDURE — 80053 COMPREHEN METABOLIC PANEL: CPT | Performed by: PHYSICIAN ASSISTANT

## 2023-03-29 PROCEDURE — 94799 UNLISTED PULMONARY SVC/PX: CPT

## 2023-03-29 PROCEDURE — 37000008 HC ANESTHESIA 1ST 15 MINUTES: Performed by: SURGERY

## 2023-03-29 PROCEDURE — 10061 I&D ABSCESS COMP/MULTIPLE: CPT | Mod: ,,, | Performed by: SURGERY

## 2023-03-29 PROCEDURE — 4010F ACE/ARB THERAPY RXD/TAKEN: CPT | Mod: CPTII,S$GLB,, | Performed by: ALLERGY & IMMUNOLOGY

## 2023-03-29 PROCEDURE — 83605 ASSAY OF LACTIC ACID: CPT | Performed by: PHYSICIAN ASSISTANT

## 2023-03-29 PROCEDURE — 3078F PR MOST RECENT DIASTOLIC BLOOD PRESSURE < 80 MM HG: ICD-10-PCS | Mod: CPTII,S$GLB,, | Performed by: ALLERGY & IMMUNOLOGY

## 2023-03-29 PROCEDURE — 1159F PR MEDICATION LIST DOCUMENTED IN MEDICAL RECORD: ICD-10-PCS | Mod: CPTII,S$GLB,, | Performed by: ALLERGY & IMMUNOLOGY

## 2023-03-29 PROCEDURE — 3008F BODY MASS INDEX DOCD: CPT | Mod: CPTII,S$GLB,, | Performed by: ALLERGY & IMMUNOLOGY

## 2023-03-29 PROCEDURE — 3288F FALL RISK ASSESSMENT DOCD: CPT | Mod: CPTII,S$GLB,, | Performed by: ALLERGY & IMMUNOLOGY

## 2023-03-29 PROCEDURE — 71000039 HC RECOVERY, EACH ADD'L HOUR: Performed by: SURGERY

## 2023-03-29 PROCEDURE — 36000704 HC OR TIME LEV I 1ST 15 MIN: Performed by: SURGERY

## 2023-03-29 PROCEDURE — 99203 PR OFFICE/OUTPT VISIT, NEW, LEVL III, 30-44 MIN: ICD-10-PCS | Mod: 25,,, | Performed by: SURGERY

## 2023-03-29 PROCEDURE — 25000003 PHARM REV CODE 250: Performed by: SURGERY

## 2023-03-29 PROCEDURE — 87040 BLOOD CULTURE FOR BACTERIA: CPT | Performed by: PHYSICIAN ASSISTANT

## 2023-03-29 PROCEDURE — 88304 TISSUE EXAM BY PATHOLOGIST: CPT | Performed by: STUDENT IN AN ORGANIZED HEALTH CARE EDUCATION/TRAINING PROGRAM

## 2023-03-29 PROCEDURE — 85025 COMPLETE CBC W/AUTO DIFF WBC: CPT | Performed by: PHYSICIAN ASSISTANT

## 2023-03-29 PROCEDURE — 81003 URINALYSIS AUTO W/O SCOPE: CPT | Performed by: PHYSICIAN ASSISTANT

## 2023-03-29 PROCEDURE — 3078F DIAST BP <80 MM HG: CPT | Mod: CPTII,S$GLB,, | Performed by: ALLERGY & IMMUNOLOGY

## 2023-03-29 PROCEDURE — 99215 OFFICE O/P EST HI 40 MIN: CPT | Mod: S$GLB,,, | Performed by: ALLERGY & IMMUNOLOGY

## 2023-03-29 PROCEDURE — 99215 PR OFFICE/OUTPT VISIT, EST, LEVL V, 40-54 MIN: ICD-10-PCS | Mod: S$GLB,,, | Performed by: ALLERGY & IMMUNOLOGY

## 2023-03-29 PROCEDURE — 4010F PR ACE/ARB THEARPY RXD/TAKEN: ICD-10-PCS | Mod: CPTII,S$GLB,, | Performed by: ALLERGY & IMMUNOLOGY

## 2023-03-29 PROCEDURE — 1157F ADVNC CARE PLAN IN RCRD: CPT | Mod: CPTII,S$GLB,, | Performed by: ALLERGY & IMMUNOLOGY

## 2023-03-29 PROCEDURE — 1157F PR ADVANCE CARE PLAN OR EQUIV PRESENT IN MEDICAL RECORD: ICD-10-PCS | Mod: CPTII,S$GLB,, | Performed by: ALLERGY & IMMUNOLOGY

## 2023-03-29 PROCEDURE — 99999 PR PBB SHADOW E&M-EST. PATIENT-LVL III: CPT | Mod: PBBFAC,,, | Performed by: ALLERGY & IMMUNOLOGY

## 2023-03-29 PROCEDURE — 3008F PR BODY MASS INDEX (BMI) DOCUMENTED: ICD-10-PCS | Mod: CPTII,S$GLB,, | Performed by: ALLERGY & IMMUNOLOGY

## 2023-03-29 PROCEDURE — 99285 EMERGENCY DEPT VISIT HI MDM: CPT

## 2023-03-29 PROCEDURE — 1159F MED LIST DOCD IN RCRD: CPT | Mod: CPTII,S$GLB,, | Performed by: ALLERGY & IMMUNOLOGY

## 2023-03-29 PROCEDURE — 63600175 PHARM REV CODE 636 W HCPCS: Performed by: EMERGENCY MEDICINE

## 2023-03-29 RX ORDER — ATORVASTATIN CALCIUM 10 MG/1
20 TABLET, FILM COATED ORAL NIGHTLY
Status: DISCONTINUED | OUTPATIENT
Start: 2023-03-29 | End: 2023-03-30 | Stop reason: HOSPADM

## 2023-03-29 RX ORDER — ONDANSETRON 2 MG/ML
4 INJECTION INTRAMUSCULAR; INTRAVENOUS EVERY 8 HOURS PRN
Status: DISCONTINUED | OUTPATIENT
Start: 2023-03-29 | End: 2023-03-30 | Stop reason: HOSPADM

## 2023-03-29 RX ORDER — RISPERIDONE 1 MG/1
1 TABLET ORAL 2 TIMES DAILY
Status: DISCONTINUED | OUTPATIENT
Start: 2023-03-29 | End: 2023-03-30 | Stop reason: HOSPADM

## 2023-03-29 RX ORDER — DEXMEDETOMIDINE HYDROCHLORIDE 100 UG/ML
INJECTION, SOLUTION INTRAVENOUS
Status: DISCONTINUED | OUTPATIENT
Start: 2023-03-29 | End: 2023-03-29

## 2023-03-29 RX ORDER — CHLORHEXIDINE GLUCONATE ORAL RINSE 1.2 MG/ML
10 SOLUTION DENTAL 2 TIMES DAILY
Status: DISCONTINUED | OUTPATIENT
Start: 2023-03-29 | End: 2023-03-30 | Stop reason: HOSPADM

## 2023-03-29 RX ORDER — HYDROMORPHONE HYDROCHLORIDE 1 MG/ML
0.5 INJECTION, SOLUTION INTRAMUSCULAR; INTRAVENOUS; SUBCUTANEOUS EVERY 4 HOURS PRN
Status: DISCONTINUED | OUTPATIENT
Start: 2023-03-29 | End: 2023-03-30 | Stop reason: HOSPADM

## 2023-03-29 RX ORDER — NALOXONE HCL 0.4 MG/ML
0.02 VIAL (ML) INJECTION
Status: DISCONTINUED | OUTPATIENT
Start: 2023-03-29 | End: 2023-03-30 | Stop reason: HOSPADM

## 2023-03-29 RX ORDER — DIPHENHYDRAMINE HCL 25 MG
25 CAPSULE ORAL EVERY 6 HOURS PRN
Status: DISCONTINUED | OUTPATIENT
Start: 2023-03-29 | End: 2023-03-30 | Stop reason: HOSPADM

## 2023-03-29 RX ORDER — TALC
6 POWDER (GRAM) TOPICAL NIGHTLY PRN
Status: DISCONTINUED | OUTPATIENT
Start: 2023-03-29 | End: 2023-03-30 | Stop reason: HOSPADM

## 2023-03-29 RX ORDER — LIDOCAINE HYDROCHLORIDE 10 MG/ML
INJECTION, SOLUTION EPIDURAL; INFILTRATION; INTRACAUDAL; PERINEURAL
Status: DISCONTINUED | OUTPATIENT
Start: 2023-03-29 | End: 2023-03-29

## 2023-03-29 RX ORDER — LIDOCAINE HYDROCHLORIDE 10 MG/ML
INJECTION, SOLUTION EPIDURAL; INFILTRATION; INTRACAUDAL; PERINEURAL
Status: DISCONTINUED | OUTPATIENT
Start: 2023-03-29 | End: 2023-03-29 | Stop reason: HOSPADM

## 2023-03-29 RX ORDER — AMLODIPINE BESYLATE 10 MG/1
10 TABLET ORAL DAILY
Status: DISCONTINUED | OUTPATIENT
Start: 2023-03-30 | End: 2023-03-30 | Stop reason: HOSPADM

## 2023-03-29 RX ORDER — HYDROCODONE BITARTRATE AND ACETAMINOPHEN 5; 325 MG/1; MG/1
1 TABLET ORAL EVERY 4 HOURS PRN
Status: DISCONTINUED | OUTPATIENT
Start: 2023-03-29 | End: 2023-03-30 | Stop reason: HOSPADM

## 2023-03-29 RX ORDER — MEMANTINE HYDROCHLORIDE 5 MG/1
5 TABLET ORAL 2 TIMES DAILY
Status: DISCONTINUED | OUTPATIENT
Start: 2023-03-29 | End: 2023-03-30 | Stop reason: HOSPADM

## 2023-03-29 RX ORDER — BUPROPION HYDROCHLORIDE 150 MG/1
150 TABLET, EXTENDED RELEASE ORAL 2 TIMES DAILY
Status: DISCONTINUED | OUTPATIENT
Start: 2023-03-29 | End: 2023-03-30 | Stop reason: HOSPADM

## 2023-03-29 RX ORDER — SODIUM CHLORIDE, SODIUM LACTATE, POTASSIUM CHLORIDE, CALCIUM CHLORIDE 600; 310; 30; 20 MG/100ML; MG/100ML; MG/100ML; MG/100ML
INJECTION, SOLUTION INTRAVENOUS CONTINUOUS
Status: DISCONTINUED | OUTPATIENT
Start: 2023-03-29 | End: 2023-03-30 | Stop reason: HOSPADM

## 2023-03-29 RX ORDER — DULOXETIN HYDROCHLORIDE 30 MG/1
60 CAPSULE, DELAYED RELEASE ORAL DAILY
Status: DISCONTINUED | OUTPATIENT
Start: 2023-03-30 | End: 2023-03-30 | Stop reason: HOSPADM

## 2023-03-29 RX ORDER — BUPIVACAINE HYDROCHLORIDE 2.5 MG/ML
INJECTION, SOLUTION EPIDURAL; INFILTRATION; INTRACAUDAL
Status: DISCONTINUED | OUTPATIENT
Start: 2023-03-29 | End: 2023-03-29 | Stop reason: HOSPADM

## 2023-03-29 RX ORDER — GLUCAGON 1 MG
1 KIT INJECTION
Status: DISCONTINUED | OUTPATIENT
Start: 2023-03-29 | End: 2023-03-30 | Stop reason: HOSPADM

## 2023-03-29 RX ORDER — CLOTRIMAZOLE AND BETAMETHASONE DIPROPIONATE 10; .64 MG/G; MG/G
CREAM TOPICAL 2 TIMES DAILY
COMMUNITY
Start: 2023-01-30 | End: 2023-04-19

## 2023-03-29 RX ORDER — DONEPEZIL HYDROCHLORIDE 5 MG/1
5 TABLET, FILM COATED ORAL NIGHTLY
Status: DISCONTINUED | OUTPATIENT
Start: 2023-03-29 | End: 2023-03-30 | Stop reason: HOSPADM

## 2023-03-29 RX ORDER — SODIUM CHLORIDE 0.9 % (FLUSH) 0.9 %
10 SYRINGE (ML) INJECTION EVERY 12 HOURS PRN
Status: DISCONTINUED | OUTPATIENT
Start: 2023-03-29 | End: 2023-03-30 | Stop reason: HOSPADM

## 2023-03-29 RX ORDER — HYDROCODONE BITARTRATE AND ACETAMINOPHEN 5; 325 MG/1; MG/1
1 TABLET ORAL EVERY 6 HOURS PRN
Status: DISCONTINUED | OUTPATIENT
Start: 2023-03-29 | End: 2023-03-30 | Stop reason: HOSPADM

## 2023-03-29 RX ORDER — ENOXAPARIN SODIUM 100 MG/ML
40 INJECTION SUBCUTANEOUS EVERY 24 HOURS
Status: DISCONTINUED | OUTPATIENT
Start: 2023-03-29 | End: 2023-03-30 | Stop reason: HOSPADM

## 2023-03-29 RX ORDER — PROPOFOL 10 MG/ML
VIAL (ML) INTRAVENOUS
Status: DISCONTINUED | OUTPATIENT
Start: 2023-03-29 | End: 2023-03-29

## 2023-03-29 RX ORDER — ACETAMINOPHEN 325 MG/1
650 TABLET ORAL EVERY 4 HOURS PRN
Status: DISCONTINUED | OUTPATIENT
Start: 2023-03-29 | End: 2023-03-30 | Stop reason: HOSPADM

## 2023-03-29 RX ADMIN — LIDOCAINE HYDROCHLORIDE 50 MG: 10 INJECTION, SOLUTION EPIDURAL; INFILTRATION; INTRACAUDAL; PERINEURAL at 05:03

## 2023-03-29 RX ADMIN — CHLORHEXIDINE GLUCONATE 0.12% ORAL RINSE 10 ML: 1.2 LIQUID ORAL at 08:03

## 2023-03-29 RX ADMIN — Medication 6 MG: at 10:03

## 2023-03-29 RX ADMIN — PROPOFOL 20 MG: 10 INJECTION, EMULSION INTRAVENOUS at 05:03

## 2023-03-29 RX ADMIN — PROPOFOL 40 MG: 10 INJECTION, EMULSION INTRAVENOUS at 05:03

## 2023-03-29 RX ADMIN — ATORVASTATIN CALCIUM 20 MG: 10 TABLET, FILM COATED ORAL at 08:03

## 2023-03-29 RX ADMIN — VANCOMYCIN HYDROCHLORIDE 1750 MG: 1 INJECTION, POWDER, LYOPHILIZED, FOR SOLUTION INTRAVENOUS at 03:03

## 2023-03-29 RX ADMIN — DEXMEDETOMIDINE 6 MCG: 100 INJECTION, SOLUTION, CONCENTRATE INTRAVENOUS at 05:03

## 2023-03-29 RX ADMIN — RISPERIDONE 1 MG: 1 TABLET ORAL at 08:03

## 2023-03-29 RX ADMIN — ENOXAPARIN SODIUM 40 MG: 40 INJECTION SUBCUTANEOUS at 08:03

## 2023-03-29 RX ADMIN — SODIUM CHLORIDE, POTASSIUM CHLORIDE, SODIUM LACTATE AND CALCIUM CHLORIDE: 600; 310; 30; 20 INJECTION, SOLUTION INTRAVENOUS at 08:03

## 2023-03-29 RX ADMIN — DONEPEZIL HYDROCHLORIDE 5 MG: 5 TABLET, FILM COATED ORAL at 08:03

## 2023-03-29 RX ADMIN — MEMANTINE HYDROCHLORIDE 5 MG: 5 TABLET ORAL at 08:03

## 2023-03-29 RX ADMIN — SODIUM CHLORIDE, POTASSIUM CHLORIDE, SODIUM LACTATE AND CALCIUM CHLORIDE: 600; 310; 30; 20 INJECTION, SOLUTION INTRAVENOUS at 05:03

## 2023-03-29 RX ADMIN — PROPOFOL 60 MG: 10 INJECTION, EMULSION INTRAVENOUS at 05:03

## 2023-03-29 RX ADMIN — PROPOFOL 30 MG: 10 INJECTION, EMULSION INTRAVENOUS at 05:03

## 2023-03-29 RX ADMIN — BUPROPION HYDROCHLORIDE 150 MG: 150 TABLET, EXTENDED RELEASE ORAL at 08:03

## 2023-03-29 RX ADMIN — DEXMEDETOMIDINE 4 MCG: 100 INJECTION, SOLUTION, CONCENTRATE INTRAVENOUS at 05:03

## 2023-03-29 NOTE — ASSESSMENT & PLAN NOTE
- previous I&D 3/27 with cultures + staph aureus   - start vanco  - follow-up blood cultures  - dr. prakash  Planning to take to OR for repeat I&D

## 2023-03-29 NOTE — SUBJECTIVE & OBJECTIVE
Past Medical History:   Diagnosis Date    Chronic CHF 10/2/2019    Chronic diastolic HF (heart failure) 4/17/2018    Coronary artery calcification 1/11/2022    Dilated cardiomyopathy 9/24/2015    Enlarged prostate     Essential hypertension 9/24/2015    Gastroesophageal reflux disease without esophagitis 10/7/2015    Gastroesophageal reflux disease without esophagitis 10/7/2015    Hyperlipidemia     Personal history of colonic polyps 2016    Shortness of breath 9/24/2015       Past Surgical History:   Procedure Laterality Date    COLONOSCOPY N/A 5/25/2016    Procedure: COLONOSCOPY;  Surgeon: Demetrio Spicer MD;  Location: [facility];  Service: Endoscopy;  Laterality: N/A;       Review of patient's allergies indicates:  No Known Allergies    No current facility-administered medications on file prior to encounter.     Current Outpatient Medications on File Prior to Encounter   Medication Sig    amlodipine-benazepril 10-20mg (LOTREL) 10-20 mg per capsule TAKE 1 CAPSULE BY MOUTH ONCE DAILY    atorvastatin (LIPITOR) 20 MG tablet Take 1 tablet (20 mg total) by mouth every evening.    buPROPion (WELLBUTRIN SR) 150 MG TBSR 12 hr tablet Take 1 tablet (150 mg total) by mouth 2 (two) times daily.    clotrimazole-betamethasone 1-0.05% (LOTRISONE) cream Apply topically 2 (two) times daily.    cyanocobalamin 1,000 mcg/mL injection INJECT 1 ML (1,000 MCG TOTAL) INTO THE MUSCLE EVERY 28 DAYS.    donepeziL (ARICEPT) 5 MG tablet TAKE 1 TABLET BY MOUTH EVERY DAY IN THE EVENING    DULoxetine (CYMBALTA) 60 MG capsule Take 1 capsule (60 mg total) by mouth once daily.    ergocalciferol (ERGOCALCIFEROL) 50,000 unit Cap Take 50,000 Units by mouth every 7 days.    memantine (NAMENDA) 5 MG Tab TAKE 1 TABLET BY MOUTH TWICE A DAY    mupirocin (BACTROBAN) 2 % ointment Apply topically 3 (three) times daily. for 7 days    risperiDONE (RISPERDAL) 1 MG tablet Take 1 tablet (1 mg total) by mouth 2 (two) times daily. (Patient taking differently: Take 1 mg  by mouth 2 (two) times daily. Pt taking 1/2 tablet daily at night)    sulfamethoxazole-trimethoprim 800-160mg (BACTRIM DS) 800-160 mg Tab Take 1 tablet by mouth 2 (two) times daily. for 10 days    [DISCONTINUED] cholecalciferol, vitamin D3, 1,250 mcg (50,000 unit) capsule TAKE 1 CAPSULE BY MOUTH ONE TIME PER WEEK (Patient not taking: Reported on 2023)    [DISCONTINUED] doxycycline (VIBRA-TABS) 100 MG tablet Take 100 mg by mouth 2 (two) times daily.    [DISCONTINUED] hydrocortisone 2.5 % cream Apply topically 2 (two) times daily. (Patient not taking: Reported on 2023)     Family History       Problem Relation (Age of Onset)    Hypertension Mother, Father    Stroke Father          Tobacco Use    Smoking status: Former     Types: Cigarettes     Quit date: 1995     Years since quittin.5     Passive exposure: Never    Smokeless tobacco: Never   Substance and Sexual Activity    Alcohol use: No     Alcohol/week: 0.0 standard drinks    Drug use: No    Sexual activity: Not Currently     Partners: Female     Review of Systems   Skin:  Positive for wound.   Objective:     Vital Signs (Most Recent):  Temp: 97.8 °F (36.6 °C) (23 1004)  Pulse: 63 (23 1604)  Resp: 20 (23 1004)  BP: (!) 157/72 (23 1604)  SpO2: 96 % (23 1604)   Vital Signs (24h Range):  Temp:  [97.3 °F (36.3 °C)-97.8 °F (36.6 °C)] 97.8 °F (36.6 °C)  Pulse:  [61-63] 63  Resp:  [20] 20  SpO2:  [96 %-100 %] 96 %  BP: (121-157)/(63-72) 157/72     Weight: 86.2 kg (190 lb)  Body mass index is 25.77 kg/m².    Physical Exam  Vitals and nursing note reviewed.   Cardiovascular:      Rate and Rhythm: Regular rhythm. Bradycardia present.      Pulses: Normal pulses.      Heart sounds: Normal heart sounds.   Pulmonary:      Effort: Pulmonary effort is normal.      Breath sounds: Normal breath sounds. No wheezing.   Abdominal:      General: Bowel sounds are normal. There is no distension.      Palpations: Abdomen is soft.       Tenderness: There is no abdominal tenderness.   Musculoskeletal:         General: No swelling.   Skin:     Comments: Abscess to right arm with surrounding redness, large area of fluctuance noted   Neurological:      Mental Status: He is alert.      Comments: At baseline mental status per sister; oriented to person, follows some commands, does not answer questions appropriately, moves all extremities spontaneously; easily agitated           Significant Labs: All pertinent labs within the past 24 hours have been reviewed.    Significant Imaging: I have reviewed all pertinent imaging results/findings within the past 24 hours.

## 2023-03-29 NOTE — ED PROVIDER NOTES
SCRIBE #1 NOTE: I, Sharonda Talavera/Yassine Porter, am scribing for, and in the presence of, Floridalma Perez DO. I have scribed the entire note.       History     Chief Complaint   Patient presents with    Wound Check     Pt family member reports that she thinks pt has staph on his right arm, was sent by pcp for iv antibiotics      Review of patient's allergies indicates:  No Known Allergies      History of Present Illness     HPI    3/29/2023, 3:10 PM  History obtained from the patient and sister, Rach    Primary historian is patient's sister Rach as patient has history of dementia.      History of Present Illness: Vishnu Dougherty Jr. is a 63 y.o. male patient with a PMHx of CHF, coronary artery calcification, HTN, and HLD who presents to the Emergency Department for a wound check. Pt denies use of blood thinners. Symptoms are constant and moderate in severity. No mitigating or exacerbating factors reported. His sister reports that the wound has gotten bigger and the surrounding redness has been spreading. Associated sxs include rash on his abdomen. Patient denies any fever, chills, N/V/D, CP, headaches, SOB, and all other sxs at this time.  Patient developed lesion to his right forearm.  He was seen at urgent care.  He underwent incision and drainage 48 hours prior..  Prior Tx includes Doxycycline and Bactroban. Cultures obtained.   Staph.   No further complaints or concerns at this time.     Reviewed old cultures:    1 Result Note      Component 2 d ago   Aerobic Bacterial Culture      Abnormal   STAPHYLOCOCCUS AUREUS   Many   Susceptibility pending   P      Resulting Agency [facility]              Specimen Collected: 03/27/23 16:03 Last Resulted: 03/29/23 09:17               Note per Dr. Toshia Joshi:   Rash/skin eruption      HPI:       3/29/2023: Accompanied by his wife. His abdominal rash has now spread to this arms and limbs. Right arm- weeping purulent discharge with 4cm area of erythema. He was seen in Urgent Care and  advised that he may need IV antibiotics, if symptoms do not resolve with oral medications.        He was previously on Doxycyline and Bactroban. Rash has worsened.   Advised to go to the ER for IV antibiotics. Staff will contact. Likely needs admission.       Arrival mode: Personal vehicle     PCP: Stephan Quiñones MD        Past Medical History:  Past Medical History:   Diagnosis Date    Chronic CHF 10/2/2019    Chronic diastolic HF (heart failure) 2018    Coronary artery calcification 2022    Dilated cardiomyopathy 2015    Enlarged prostate     Essential hypertension 2015    Gastroesophageal reflux disease without esophagitis 10/7/2015    Gastroesophageal reflux disease without esophagitis 10/7/2015    Hyperlipidemia     Personal history of colonic polyps 2016    Shortness of breath 2015       Past Surgical History:  Past Surgical History:   Procedure Laterality Date    COLONOSCOPY N/A 2016    Procedure: COLONOSCOPY;  Surgeon: Demetrio Spicer MD;  Location: [facility];  Service: Endoscopy;  Laterality: N/A;         Family History:  Family History   Problem Relation Age of Onset    Hypertension Mother     Hypertension Father     Stroke Father        Social History:  Social History     Tobacco Use    Smoking status: Former     Types: Cigarettes     Quit date: 1995     Years since quittin.5     Passive exposure: Never    Smokeless tobacco: Never   Substance and Sexual Activity    Alcohol use: No     Alcohol/week: 0.0 standard drinks    Drug use: No    Sexual activity: Not Currently     Partners: Female        Review of Systems     Review of Systems   Constitutional:  Negative for chills and fever.   HENT:  Negative for sore throat.    Respiratory:  Negative for shortness of breath.    Cardiovascular:  Negative for chest pain.   Gastrointestinal:  Negative for diarrhea, nausea and vomiting.   Genitourinary:  Negative for dysuria.   Musculoskeletal:  Negative for back pain.   Skin:   Positive for wound (right dorsal forearm). Negative for rash.   Neurological:  Negative for weakness and headaches.   Hematological:  Does not bruise/bleed easily.   All other systems reviewed and are negative.     Physical Exam     Initial Vitals [03/29/23 1004]   BP Pulse Resp Temp SpO2   132/63 62 20 97.8 °F (36.6 °C) 100 %      MAP       --          Physical Exam  Nursing Notes and Vital Signs Reviewed.  Constitutional: Patient is in no acute distress. Well-developed and well-nourished.  Head: Atraumatic. Normocephalic.  Eyes: PERRL. EOM intact. Conjunctivae are not pale. No scleral icterus.  ENT: Mucous membranes are moist. Oropharynx is clear and symmetric.    Neck: Supple. Full ROM. No lymphadenopathy.  Cardiovascular: Regular rate. Regular rhythm. No murmurs, rubs, or gallops. Distal pulses are 2+ and symmetric.  Pulmonary/Chest: No respiratory distress. Clear to auscultation bilaterally. No wheezing or rales.  Abdominal: Soft and non-distended.  There is no tenderness.  No rebound, guarding, or rigidity.  Musculoskeletal: Moves all extremities. No obvious deformities. No edema. No calf tenderness.  Skin: Warm and dry. 10 cm area draining wound to the R distal forearm, with surrounding erythema. Abdomen folliculitis.   Neurological:  Alert, awake, and appropriate.  Normal speech.  No acute focal neurological deficits are appreciated.  Psychiatric: Normal affect. Good eye contact. Appropriate in content.      Folliculitis from today        Photo from 48 hours prior        Findings of right upper extremity 48 hours prior        Right upper extremity today:  There is redness that extends slightly beyond the markings of the pen.  There is a central wound with drainage of purulence.  No obvious fluctuance.  There is induration present.  No odor.         ED Course   Procedures  ED Vital Signs:  Vitals:    03/29/23 1004 03/29/23 1604 03/29/23 1747 03/29/23 1750   BP: 132/63 (!) 157/72 116/65 131/73   Pulse: 62 63  63 (!) 53   Resp: 20  14 14   Temp: 97.8 °F (36.6 °C)  98 °F (36.7 °C)    TempSrc: Oral  Temporal    SpO2: 100% 96% 95% 96%   Weight: 86.2 kg (190 lb)      Height:        03/29/23 1755 03/29/23 1800 03/29/23 1815 03/29/23 1830   BP:  123/71 (!) 164/80 (!) 144/82   Pulse: (!) 55 (!) 51 67 70   Resp: 13 13 (!) 23 (!) 23   Temp:       TempSrc:       SpO2: 100% 100% 100% 96%   Weight:       Height:        03/29/23 1845 03/29/23 1900 03/29/23 1930 03/29/23 2000   BP: (!) 188/81 (!) 146/67 (!) 143/66 139/63   Pulse: 75 (!) 59 66 66   Resp: 20 (!) 22 18 18   Temp:    98.1 °F (36.7 °C)   TempSrc:    Temporal   SpO2: 96% (!) 93% (!) 94% (!) 93%   Weight:       Height:        03/29/23 2017   BP: 136/65   Pulse: 61   Resp: 17   Temp: 97.1 °F (36.2 °C)   TempSrc: Oral   SpO2: 97%   Weight: 86.2 kg (190 lb)   Height: 6' (1.829 m)       Abnormal Lab Results:  Labs Reviewed   CBC W/ AUTO DIFFERENTIAL - Abnormal; Notable for the following components:       Result Value    RBC 3.69 (*)     Hemoglobin 11.5 (*)     Hematocrit 35.3 (*)     MCH 31.2 (*)     Immature Granulocytes 0.6 (*)     Immature Grans (Abs) 0.05 (*)     Lymph % 14.8 (*)     All other components within normal limits   COMPREHENSIVE METABOLIC PANEL   LACTIC ACID, PLASMA   URINALYSIS, REFLEX TO URINE CULTURE    Narrative:     Specimen Source->Urine        All Lab Results:  Results for orders placed or performed during the hospital encounter of 03/29/23   CBC auto differential   Result Value Ref Range    WBC 9.04 3.90 - 12.70 K/uL    RBC 3.69 (L) 4.60 - 6.20 M/uL    Hemoglobin 11.5 (L) 14.0 - 18.0 g/dL    Hematocrit 35.3 (L) 40.0 - 54.0 %    MCV 96 82 - 98 fL    MCH 31.2 (H) 27.0 - 31.0 pg    MCHC 32.6 32.0 - 36.0 g/dL    RDW 12.9 11.5 - 14.5 %    Platelets 269 150 - 450 K/uL    MPV 11.2 9.2 - 12.9 fL    Immature Granulocytes 0.6 (H) 0.0 - 0.5 %    Gran # (ANC) 6.4 1.8 - 7.7 K/uL    Immature Grans (Abs) 0.05 (H) 0.00 - 0.04 K/uL    Lymph # 1.3 1.0 - 4.8 K/uL    Mono #  0.6 0.3 - 1.0 K/uL    Eos # 0.5 0.0 - 0.5 K/uL    Baso # 0.08 0.00 - 0.20 K/uL    nRBC 0 0 /100 WBC    Gran % 70.9 38.0 - 73.0 %    Lymph % 14.8 (L) 18.0 - 48.0 %    Mono % 7.0 4.0 - 15.0 %    Eosinophil % 5.8 0.0 - 8.0 %    Basophil % 0.9 0.0 - 1.9 %    Differential Method Automated    Comprehensive metabolic panel   Result Value Ref Range    Sodium 138 136 - 145 mmol/L    Potassium 4.0 3.5 - 5.1 mmol/L    Chloride 103 95 - 110 mmol/L    CO2 27 23 - 29 mmol/L    Glucose 80 70 - 110 mg/dL    BUN 18 8 - 23 mg/dL    Creatinine 1.3 0.5 - 1.4 mg/dL    Calcium 9.5 8.7 - 10.5 mg/dL    Total Protein 7.7 6.0 - 8.4 g/dL    Albumin 4.0 3.5 - 5.2 g/dL    Total Bilirubin 0.4 0.1 - 1.0 mg/dL    Alkaline Phosphatase 83 55 - 135 U/L    AST 22 10 - 40 U/L    ALT 26 10 - 44 U/L    Anion Gap 8 8 - 16 mmol/L    eGFR >60 >60 mL/min/1.73 m^2   Lactic acid, plasma #1   Result Value Ref Range    Lactate (Lactic Acid) 1.0 0.5 - 2.2 mmol/L   Urinalysis, Reflex to Urine Culture Urine, Clean Catch    Specimen: Urine   Result Value Ref Range    Specimen UA Urine, Clean Catch     Color, UA Yellow Yellow, Straw, Marj    Appearance, UA Clear Clear    pH, UA 7.0 5.0 - 8.0    Specific Gravity, UA 1.010 1.005 - 1.030    Protein, UA Negative Negative    Glucose, UA Negative Negative    Ketones, UA Negative Negative    Bilirubin (UA) Negative Negative    Occult Blood UA Negative Negative    Nitrite, UA Negative Negative    Urobilinogen, UA Negative <2.0 EU/dL    Leukocytes, UA Negative Negative         Imaging Results:  Imaging Results    None               The Emergency Provider reviewed the vital signs and test results, which are outlined above.     ED Discussion     3:40 PM: Discussed case with Saba Nolasco NP ([facility]). Dr. Michelle agrees with current care and management of pt and accepts admission.   Admitting Service: Hospital Medicine  Admitting Physician: Dr. Michelle  Admit to: Obs    3:41 PM: Re-evaluated pt. I have discussed test  results, shared treatment plan, and the need for admission with patient and family at bedside. Pt and family express understanding at this time and agree with all information. All questions answered. Pt and family have no further questions or concerns at this time. Pt is ready for admit.       Medical Decision Making:   Clinical Tests:   Lab Tests: Ordered and Reviewed         Medical Decision Making  Patient presents with a 2 day history of wound to right dorsal forearm.  Previously underwent incision and drainage at urgent care 48 hours prior.  Treated with doxycycline.  Wound culture - staph aureus.  Family reports increasing swelling and increased redness.  Patient evaluated by allergist-Dr. Joshi.  Referred to emergency department.  Blood cultures obtained.  White cell count normal.  Minimal anemia noted.  Normal lactic acid.  As patient has had failed outpatient treatment, decision was made to talk with Hospital Medicine for admission/observation.    Differential diagnosis includes abscess, cellulitis    Amount and/or Complexity of Data Reviewed  Independent Historian: caregiver  External Data Reviewed: labs and notes.  Labs: ordered. Decision-making details documented in ED Course.    Risk  Decision regarding hospitalization.          ED Medication(s):  Medications   vancomycin - pharmacy to dose (has no administration in time range)   risperiDONE tablet 1 mg (1 mg Oral Given 3/29/23 2042)   DULoxetine DR capsule 60 mg (has no administration in time range)   memantine tablet 5 mg (5 mg Oral Given 3/29/23 2042)   donepeziL tablet 5 mg (5 mg Oral Given 3/29/23 2042)   buPROPion TBSR 12 hr tablet 150 mg (150 mg Oral Given 3/29/23 2042)   atorvastatin tablet 20 mg (20 mg Oral Given 3/29/23 2042)   amLODIPine tablet 10 mg (has no administration in time range)   sodium chloride 0.9% flush 10 mL (has no administration in time range)   naloxone 0.4 mg/mL injection 0.02 mg (has no administration in time range)    glucagon (human recombinant) injection 1 mg (has no administration in time range)   enoxaparin injection 40 mg (40 mg Subcutaneous Given 3/29/23 2042)   acetaminophen tablet 650 mg (has no administration in time range)   HYDROcodone-acetaminophen 5-325 mg per tablet 1 tablet (has no administration in time range)   ondansetron injection 4 mg (has no administration in time range)   melatonin tablet 6 mg (6 mg Oral Given 3/29/23 2204)   lactated ringers infusion ( Intravenous New Bag 3/29/23 2038)   chlorhexidine 0.12 % solution 10 mL (10 mLs Mouth/Throat Given 3/29/23 2042)   HYDROmorphone injection 0.5 mg (has no administration in time range)   HYDROcodone-acetaminophen 5-325 mg per tablet 1 tablet (has no administration in time range)   diphenhydrAMINE capsule 25 mg (has no administration in time range)   vancomycin 1.75 g in 5 % dextrose 500 mL IVPB (0 mg Intravenous Stopped 3/29/23 1752)       Current Discharge Medication List                  Scribe Attestation:   Scribe #1: I performed the above scribed service and the documentation accurately describes the services I performed. I attest to the accuracy of the note.     Attending:   Physician Attestation Statement for Scribe #1: I, Floridalma Perez DO, personally performed the services described in this documentation, as scribed by Sharonda Talavera/Yassine Porter, in my presence, and it is both accurate and complete.           Clinical Impression       ICD-10-CM ICD-9-CM   1. Wound check, abscess  Z51.89 V58.89   2. Abscess of arm  L02.419 682.3   3. Chest pain  R07.9 786.50   4. Cellulitis of right forearm  L03.113 682.3       Disposition:   Disposition: Placed in Observation  Condition: Fair       Floridalma Perez DO  03/29/23 9736

## 2023-03-29 NOTE — SUBJECTIVE & OBJECTIVE
No current facility-administered medications on file prior to encounter.     Current Outpatient Medications on File Prior to Encounter   Medication Sig    amlodipine-benazepril 10-20mg (LOTREL) 10-20 mg per capsule TAKE 1 CAPSULE BY MOUTH ONCE DAILY    atorvastatin (LIPITOR) 20 MG tablet Take 1 tablet (20 mg total) by mouth every evening.    buPROPion (WELLBUTRIN SR) 150 MG TBSR 12 hr tablet Take 1 tablet (150 mg total) by mouth 2 (two) times daily.    clotrimazole-betamethasone 1-0.05% (LOTRISONE) cream Apply topically 2 (two) times daily.    cyanocobalamin 1,000 mcg/mL injection INJECT 1 ML (1,000 MCG TOTAL) INTO THE MUSCLE EVERY 28 DAYS.    donepeziL (ARICEPT) 5 MG tablet TAKE 1 TABLET BY MOUTH EVERY DAY IN THE EVENING    DULoxetine (CYMBALTA) 60 MG capsule Take 1 capsule (60 mg total) by mouth once daily.    ergocalciferol (ERGOCALCIFEROL) 50,000 unit Cap Take 50,000 Units by mouth every 7 days.    memantine (NAMENDA) 5 MG Tab TAKE 1 TABLET BY MOUTH TWICE A DAY    mupirocin (BACTROBAN) 2 % ointment Apply topically 3 (three) times daily. for 7 days    risperiDONE (RISPERDAL) 1 MG tablet Take 1 tablet (1 mg total) by mouth 2 (two) times daily. (Patient taking differently: Take 1 mg by mouth 2 (two) times daily. Pt taking 1/2 tablet daily at night)    sulfamethoxazole-trimethoprim 800-160mg (BACTRIM DS) 800-160 mg Tab Take 1 tablet by mouth 2 (two) times daily. for 10 days    [DISCONTINUED] cholecalciferol, vitamin D3, 1,250 mcg (50,000 unit) capsule TAKE 1 CAPSULE BY MOUTH ONE TIME PER WEEK (Patient not taking: Reported on 2/8/2023)    [DISCONTINUED] doxycycline (VIBRA-TABS) 100 MG tablet Take 100 mg by mouth 2 (two) times daily.    [DISCONTINUED] hydrocortisone 2.5 % cream Apply topically 2 (two) times daily. (Patient not taking: Reported on 2/8/2023)       Review of patient's allergies indicates:  No Known Allergies    Past Medical History:   Diagnosis Date    Chronic CHF 10/2/2019    Chronic diastolic HF (heart  failure) 2018    Coronary artery calcification 2022    Dilated cardiomyopathy 2015    Enlarged prostate     Essential hypertension 2015    Gastroesophageal reflux disease without esophagitis 10/7/2015    Gastroesophageal reflux disease without esophagitis 10/7/2015    Hyperlipidemia     Personal history of colonic polyps 2016    Shortness of breath 2015     Past Surgical History:   Procedure Laterality Date    COLONOSCOPY N/A 2016    Procedure: COLONOSCOPY;  Surgeon: Demetrio Spicer MD;  Location: [facility];  Service: Endoscopy;  Laterality: N/A;     Family History       Problem Relation (Age of Onset)    Hypertension Mother, Father    Stroke Father          Tobacco Use    Smoking status: Former     Types: Cigarettes     Quit date: 1995     Years since quittin.5     Passive exposure: Never    Smokeless tobacco: Never   Substance and Sexual Activity    Alcohol use: No     Alcohol/week: 0.0 standard drinks    Drug use: No    Sexual activity: Not Currently     Partners: Female     Review of Systems   Unable to perform ROS: Dementia (Dementia)   Objective:     Vital Signs (Most Recent):  Temp: 97.8 °F (36.6 °C) (23 1004)  Pulse: 63 (23 1604)  Resp: 20 (23 1004)  BP: (!) 157/72 (23 1604)  SpO2: 96 % (23 1604)   Vital Signs (24h Range):  Temp:  [97.3 °F (36.3 °C)-97.8 °F (36.6 °C)] 97.8 °F (36.6 °C)  Pulse:  [61-63] 63  Resp:  [20] 20  SpO2:  [96 %-100 %] 96 %  BP: (121-157)/(63-72) 157/72     Weight: 86.2 kg (190 lb)  Body mass index is 25.77 kg/m².    Physical Exam  Constitutional:       General: He is not in acute distress.     Appearance: He is well-developed. Ill appearance: Chronically.   HENT:      Head: Normocephalic and atraumatic.   Eyes:      General: No scleral icterus.     Pupils: Pupils are equal, round, and reactive to light.   Neck:      Thyroid: No thyromegaly.      Vascular: No JVD.      Trachea: No tracheal deviation.   Cardiovascular:       Rate and Rhythm: Normal rate and regular rhythm.      Heart sounds: Normal heart sounds.   Pulmonary:      Effort: Pulmonary effort is normal.      Breath sounds: Normal breath sounds.   Abdominal:      General: Bowel sounds are normal. There is no distension.      Palpations: Abdomen is soft. There is no mass.      Tenderness: There is no abdominal tenderness. There is no guarding or rebound.      Comments: Slightly obese   Musculoskeletal:         General: Normal range of motion.      Cervical back: Normal range of motion and neck supple.   Lymphadenopathy:      Cervical: No cervical adenopathy.   Skin:     General: Skin is warm and dry.      Comments: There is a 3 cm open draining wound on the right forearm.  There is surrounding erythema and induration   Neurological:      Mental Status: He is alert and oriented to person, place, and time.   Psychiatric:         Behavior: Behavior normal.         Thought Content: Thought content normal.         Judgment: Judgment normal.       Significant Labs:  I have reviewed all pertinent lab results within the past 24 hours.  CBC:   Recent Labs   Lab 03/29/23  1056   WBC 9.04   RBC 3.69*   HGB 11.5*   HCT 35.3*      MCV 96   MCH 31.2*   MCHC 32.6     BMP:   Recent Labs   Lab 03/29/23  1056   GLU 80      K 4.0      CO2 27   BUN 18   CREATININE 1.3   CALCIUM 9.5     CMP:   Recent Labs   Lab 03/29/23  1056   GLU 80   CALCIUM 9.5   ALBUMIN 4.0   PROT 7.7      K 4.0   CO2 27      BUN 18   CREATININE 1.3   ALKPHOS 83   ALT 26   AST 22   BILITOT 0.4     EKG showed sinus bradycardia    Significant Diagnostics:  I have reviewed all pertinent imaging results/findings within the past 24 hours.  No new

## 2023-03-29 NOTE — OP NOTE
[facility] - [facility] ([facility])  Surgery Department  Operative Note    SUMMARY     Date of Procedure: 3/29/2023     Procedure: Procedure(s) (LRB):  INCISION AND DRAINAGE, ABSCESS (Right)     Surgeon(s) and Role:     * Cali Patel MD - Primary    Assisting Surgeon: None    Pre-Operative Diagnosis: Abscess of arm [L02.419]    Post-Operative Diagnosis: Post-Op Diagnosis Codes:     * Abscess of arm [L02.419]    Anesthesia: Local MAC    Operative Findings (including complications, if any):     Final wound size is 4 x 2 x 0.5 cm    Description of Technical Procedures:     Patient was brought in the operative room placed on the operative table in supine position.  IV sedation was provided by the anesthesia service.  Was receiving antibiotics.  Pneumatic compressions on the lower extremity.  The right forearm was prepped and draped in the standard fashion.  A time-out was performed.      Lidocaine was infiltrated around the area of the abscess site.  After ensuring local anesthetic has been achieved the area was debrided.      Debridement was done sharply using electrocautery.  An elliptical incision of skin and subcutaneous tissue around the abscess cavity was sharply excised and effectively drain the abscess.      Additional lidocaine and Marcaine was infiltrated.  Hemostasis was achieved.  The wound was packed with moist gauze.  Dry sterile dressings were placed.  Patient tolerated procedure well.  Final counts were correct    Significant Surgical Tasks Conducted by the Assistant(s), if Applicable:  None    Estimated Blood Loss (EBL): * 15 mL   IV fluids 1 Velasquez mL   Lidocaine 1% 20 mL   Marcaine 0.25% 10 mL     Implants: * No implants in log *    Specimens:   Specimen (24h ago, onward)       Start     Ordered    03/29/23 5541  Specimen to Pathology, Surgery General Surgery  Once        Comments: Pre-op Diagnosis: Abscess of arm [L02.419]Procedure(s):INCISION AND DRAINAGE, ABSCESS Number of specimens: 1. Name of  specimens: 1. Right Arm Abscess Site, Skin and Subcutaneous Tissue- PERM     References:    Click here for ordering Quick Tip   Question Answer Comment   Procedure Type: General Surgery    Specimen Class: Routine/Screening    Which provider would you like to cc? MARY ANN MENDOZA    Release to patient Immediate        03/29/23 5350                            Condition: Stable    Disposition: PACU - hemodynamically stable.    Attestation: I performed the procedure.

## 2023-03-29 NOTE — ANESTHESIA PREPROCEDURE EVALUATION
03/29/2023  Vishnu Dougherty Jr. is a 63 y.o., male with a PMHx of dimentia, CHF (compensated), dilated CM (previous EF 35%, recovered to low Nml 40-45%), coronary artery calcification, HTN, and HLD who presents to the Emergency Department for a wound check. Pt denies use of blood thinners. He underwent incision and drainage 48 hours prior.    Last saw Cards 11/23/22 - (Dr. Amos MD - [facility])    *STRESS TEST 2018  Negative for ischemia    Patient Active Problem List   Diagnosis    Essential hypertension    Memory loss of unknown cause    Anxiety and depression    Mild cognitive impairment with memory loss    TIA (transient ischemic attack)    Chronic congestive heart failure    Contact dermatitis due to poison ivy    Dementia    Prolonged Q-T interval on ECG    B12 deficiency    Other disorders of calcium metabolism     Dementia without behavioral disturbance    Vitamin D deficiency    Lung nodule    Anemia    Coronary artery calcification    Calcification of aorta    Tail bone pain    Coarse tremors    Dementia with behavioral disturbance    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    Hyperlipidemia    Abscess of arm     Past Medical History:   Diagnosis Date    Chronic CHF 10/2/2019    Chronic diastolic HF (heart failure) 4/17/2018    Coronary artery calcification 1/11/2022    Dilated cardiomyopathy 9/24/2015    Enlarged prostate     Essential hypertension 9/24/2015    Gastroesophageal reflux disease without esophagitis 10/7/2015    Gastroesophageal reflux disease without esophagitis 10/7/2015    Hyperlipidemia     Personal history of colonic polyps 2016    Shortness of breath 9/24/2015     Past Surgical History:   Procedure Laterality Date    COLONOSCOPY N/A 5/25/2016    Procedure: COLONOSCOPY;  Surgeon: Demetrio Spicer MD;  Location: [facility];   Service: Endoscopy;  Laterality: N/A;       ECHO (2020):  CONCLUSIONS:   1. Mildly dilated left ventricle. Mildly depressed left ventricular systolic function.   LVEF 40 - 45%.   2. Mildly dilated right ventricle.   3. There is trivial mitral regurgitation.   4. There is trivial tricuspid valve regurgitation.     Chemistry        Component Value Date/Time     03/29/2023 1056    K 4.0 03/29/2023 1056     03/29/2023 1056    CO2 27 03/29/2023 1056    BUN 18 03/29/2023 1056    CREATININE 1.3 03/29/2023 1056    GLU 80 03/29/2023 1056        Component Value Date/Time    CALCIUM 9.5 03/29/2023 1056    ALKPHOS 83 03/29/2023 1056    AST 22 03/29/2023 1056    ALT 26 03/29/2023 1056    BILITOT 0.4 03/29/2023 1056    ESTGFRAFRICA >60.0 05/16/2022 1504    EGFRNONAA >60.0 05/16/2022 1504        Lab Results   Component Value Date    WBC 9.04 03/29/2023    HGB 11.5 (L) 03/29/2023    HCT 35.3 (L) 03/29/2023    MCV 96 03/29/2023     03/29/2023           Pre-op Assessment    I have reviewed the Patient Summary Reports.    I have reviewed the NPO Status.   I have reviewed the Medications.     Review of Systems  Anesthesia Hx:  No problems with previous Anesthesia  History of prior surgery of interest to airway management or planning:  Denies Personal Hx of Anesthesia complications.   Social:  Non-Smoker    Hematology/Oncology:  Hematology Normal   Oncology Normal     Cardiovascular:   Exercise tolerance: poor Hypertension CAD   CHF ECG has been reviewed.    Pulmonary:   Shortness of breath    Renal/:  Renal/ Normal     Hepatic/GI:   GERD    Musculoskeletal:  Musculoskeletal Normal    Neurological:   TIA,    Endocrine:  Endocrine Normal    Psych:   Psychiatric History          Physical Exam  General: Well nourished, Cooperative, Alert and Oriented    Airway:  Mallampati: III   Mouth Opening: Small, but > 3cm  TM Distance: Normal  Tongue: Normal  Neck ROM: Normal ROM    Dental:  Periodontal disease  Patient denies  any currently loose or chipped teeth; Patient denies any removable dental appliances      Anesthesia Plan  Type of Anesthesia, risks & benefits discussed:    Anesthesia Type: MAC  Intra-op Monitoring Plan: Standard ASA Monitors  Post Op Pain Control Plan: multimodal analgesia and IV/PO Opioids PRN  Induction:  IV  Informed Consent: Informed consent signed with the Patient and all parties understand the risks and agree with anesthesia plan.  All questions answered.   ASA Score: 3  Day of Surgery Review of History & Physical: H&P Update referred to the surgeon/provider.    Ready For Surgery From Anesthesia Perspective.     .

## 2023-03-29 NOTE — PHARMACY MED REC
"Admission Medication History     The home medication history was taken by Jose Maria Arriaga.    You may go to "Admission" then "Reconcile Home Medications" tabs to review and/or act upon these items.     The home medication list has been updated by the Pharmacy department.   Please read ALL comments highlighted in yellow.   Please address this information as you see fit.    Feel free to contact us if you have any questions or require assistance.        Medications listed below were obtained from: Analytic software- [employer] and Medical records  (Not in a hospital admission)      Jose Maria Arriaga  [id number]    Current Outpatient Medications on File Prior to Encounter   Medication Sig Dispense Refill Last Dose    amlodipine-benazepril 10-20mg (LOTREL) 10-20 mg per capsule TAKE 1 CAPSULE BY MOUTH ONCE DAILY 90 capsule 3     atorvastatin (LIPITOR) 20 MG tablet Take 1 tablet (20 mg total) by mouth every evening. 90 tablet 3     buPROPion (WELLBUTRIN SR) 150 MG TBSR 12 hr tablet Take 1 tablet (150 mg total) by mouth 2 (two) times daily. 60 tablet 11     clotrimazole-betamethasone 1-0.05% (LOTRISONE) cream Apply topically 2 (two) times daily.       cyanocobalamin 1,000 mcg/mL injection INJECT 1 ML (1,000 MCG TOTAL) INTO THE MUSCLE EVERY 28 DAYS. 3 mL 11     donepeziL (ARICEPT) 5 MG tablet TAKE 1 TABLET BY MOUTH EVERY DAY IN THE EVENING 90 tablet 3     DULoxetine (CYMBALTA) 60 MG capsule Take 1 capsule (60 mg total) by mouth once daily. 30 capsule 3     ergocalciferol (ERGOCALCIFEROL) 50,000 unit Cap Take 50,000 Units by mouth every 7 days.       memantine (NAMENDA) 5 MG Tab TAKE 1 TABLET BY MOUTH TWICE A  tablet 3     mupirocin (BACTROBAN) 2 % ointment Apply topically 3 (three) times daily. for 7 days 30 g 1     risperiDONE (RISPERDAL) 1 MG tablet Take 1 tablet (1 mg total) by mouth 2 (two) times daily. (Patient taking differently: Take 1 mg by mouth 2 (two) times daily. Pt taking 1/2 tablet daily at night) 60 " tablet 11     sulfamethoxazole-trimethoprim 800-160mg (BACTRIM DS) 800-160 mg Tab Take 1 tablet by mouth 2 (two) times daily. for 10 days 20 tablet 0                            .

## 2023-03-29 NOTE — HPI
63-year-old male who has a abscess of his right forearm.  It presented several days ago.  He was seen on 03/27/2023 in the after hours clinic were incision and drainage was performed.  He returned to primary care today.  The area was worse.  He was sent to the emergency room.  The patient was evaluated in the emergency room, started on antibiotics and surgical consultation was obtained.      This patient has a significant past medical history including dementia so he does not recall the exact time lines of the events.      Patient last ate breakfast at 8:00 a.m.    The patient is a DNR.  I explained that this will be suspended in the operating room and reinstate postoperatively

## 2023-03-29 NOTE — ASSESSMENT & PLAN NOTE
- continue Aricept/namenda/ Risperdal  - patient sister reports, he gets agitated more at night  - delirium precuations

## 2023-03-29 NOTE — CONSULTS
Pharmacokinetic Initial Assessment: IV Vancomycin    Assessment/Plan:    Initiate intravenous vancomycin with loading dose of 1750 mg once with subsequent doses when random concentrations are less than 25 mcg/mL  Desired empiric serum trough concentration is 15 to 20 mcg/mL  Draw vancomycin random level on 0900 at 3/30.  Pharmacy will continue to follow and monitor vancomycin.      Please contact pharmacy at extension [phone] with any questions regarding this assessment.     Thank you for the consult,   Christina Joshi       Patient brief summary:  Vishnu Dougherty Jr. is a 63 y.o. male initiated on antimicrobial therapy with IV Vancomycin for treatment of suspected skin & soft tissue infection    Drug Allergies:   Review of patient's allergies indicates:  No Known Allergies    Actual Body Weight:   86.2 kg    Renal Function:   Estimated Creatinine Clearance: 62.2 mL/min (based on SCr of 1.3 mg/dL).,     Dialysis Method (if applicable):  N/A    CBC (last 72 hours):  Recent Labs   Lab Result Units 03/29/23  1056   WBC K/uL 9.04   Hemoglobin g/dL 11.5*   Hematocrit % 35.3*   Platelets K/uL 269   Gran % % 70.9   Lymph % % 14.8*   Mono % % 7.0   Eosinophil % % 5.8   Basophil % % 0.9   Differential Method  Automated       Metabolic Panel (last 72 hours):  Recent Labs   Lab Result Units 03/29/23  1056 03/29/23  1118   Sodium mmol/L 138  --    Potassium mmol/L 4.0  --    Chloride mmol/L 103  --    CO2 mmol/L 27  --    Glucose mg/dL 80  --    Glucose, UA   --  Negative   BUN mg/dL 18  --    Creatinine mg/dL 1.3  --    Albumin g/dL 4.0  --    Total Bilirubin mg/dL 0.4  --    Alkaline Phosphatase U/L 83  --    AST U/L 22  --    ALT U/L 26  --        Drug levels (last 3 results):  No results for input(s): VANCOMYCINRA, VANCORANDOM, VANCOMYCINPE, VANCOPEAK, VANCOMYCINTR, VANCOTROUGH in the last 72 hours.    Microbiologic Results:  Microbiology Results (last 7 days)       Procedure Component Value Units Date/Time    Blood culture  x two cultures. Draw prior to antibiotics. [[id number]] Collected: 03/29/23 1108    Order Status: Sent Specimen: Blood from Peripheral, Forearm, Left Updated: 03/29/23 1109    Blood culture x two cultures. Draw prior to antibiotics. [[id number]] Collected: 03/29/23 1056    Order Status: Sent Specimen: Blood from Peripheral, Antecubital, Left Updated: 03/29/23 1056

## 2023-03-29 NOTE — HPI
Mr. Dougherty is a 63 year old male with a history of dementia, HTN, HLD who resides at assisted living [facility].  He presents to ed with wrosnening of right arm abcess.  Underwent I&D Monday and started on doxycycline and Bactroban with noted worsening of redness and purulent drainage.  Symptoms are constant and moderate in severity, no relieving or exacerbating factors, no associated s/sx.  He denies fever/chills.  Patient is a poor historian d/t his dementia, sister at bedside (medical POA) who provided the majority of history.  In the ED, lab work unremarkable.  EKG with SB.  Blood cultures drawn, vanco started (previous cultures on 3/27 with staph aureus).  Dr. Patel consulted and planning to take to OR tonight for I&D.      Code Status DNR  Surrogate Decision Maker POA sister

## 2023-03-29 NOTE — CONSULTS
[facility] - Emergency Dept.  General Surgery  Consult Note    Patient Name: Vishnu Dougherty Jr.  MRN: [id number]  Code Status: Prior  Admission Date: 3/29/2023  Hospital Length of Stay: 0 days  Attending Physician: Alejandro Michelle MD  Primary Care Provider: Stephan Quiñones MD    Patient information was obtained from patient, spouse/SO, past medical records and ER records.     Inpatient consult to General Surgery  Consult performed by: Cali Patel MD  Consult ordered by: Saba Nolasco NP  Reason for consult: Right arm abscess  Assessment/Recommendations: Patient will need incision and drainage in the operating room this afternoon.        Subjective:     Principal Problem: Abscess of arm    History of Present Illness: 63-year-old male who has a abscess of his right forearm.  It presented several days ago.  He was seen on 03/27/2023 in the after hours clinic were incision and drainage was performed.  He returned to primary care today.  The area was worse.  He was sent to the emergency room.  The patient was evaluated in the emergency room, started on antibiotics and surgical consultation was obtained.      This patient has a significant past medical history including dementia so he does not recall the exact time lines of the events.      Patient last ate breakfast at 8:00 a.m.    The patient is a DNR.  I explained that this will be suspended in the operating room and reinstate postoperatively      No current facility-administered medications on file prior to encounter.     Current Outpatient Medications on File Prior to Encounter   Medication Sig    amlodipine-benazepril 10-20mg (LOTREL) 10-20 mg per capsule TAKE 1 CAPSULE BY MOUTH ONCE DAILY    atorvastatin (LIPITOR) 20 MG tablet Take 1 tablet (20 mg total) by mouth every evening.    buPROPion (WELLBUTRIN SR) 150 MG TBSR 12 hr tablet Take 1 tablet (150 mg total) by mouth 2 (two) times daily.    clotrimazole-betamethasone 1-0.05% (LOTRISONE) cream Apply topically 2  (two) times daily.    cyanocobalamin 1,000 mcg/mL injection INJECT 1 ML (1,000 MCG TOTAL) INTO THE MUSCLE EVERY 28 DAYS.    donepeziL (ARICEPT) 5 MG tablet TAKE 1 TABLET BY MOUTH EVERY DAY IN THE EVENING    DULoxetine (CYMBALTA) 60 MG capsule Take 1 capsule (60 mg total) by mouth once daily.    ergocalciferol (ERGOCALCIFEROL) 50,000 unit Cap Take 50,000 Units by mouth every 7 days.    memantine (NAMENDA) 5 MG Tab TAKE 1 TABLET BY MOUTH TWICE A DAY    mupirocin (BACTROBAN) 2 % ointment Apply topically 3 (three) times daily. for 7 days    risperiDONE (RISPERDAL) 1 MG tablet Take 1 tablet (1 mg total) by mouth 2 (two) times daily. (Patient taking differently: Take 1 mg by mouth 2 (two) times daily. Pt taking 1/2 tablet daily at night)    sulfamethoxazole-trimethoprim 800-160mg (BACTRIM DS) 800-160 mg Tab Take 1 tablet by mouth 2 (two) times daily. for 10 days    [DISCONTINUED] cholecalciferol, vitamin D3, 1,250 mcg (50,000 unit) capsule TAKE 1 CAPSULE BY MOUTH ONE TIME PER WEEK (Patient not taking: Reported on 2/8/2023)    [DISCONTINUED] doxycycline (VIBRA-TABS) 100 MG tablet Take 100 mg by mouth 2 (two) times daily.    [DISCONTINUED] hydrocortisone 2.5 % cream Apply topically 2 (two) times daily. (Patient not taking: Reported on 2/8/2023)       Review of patient's allergies indicates:  No Known Allergies    Past Medical History:   Diagnosis Date    Chronic CHF 10/2/2019    Chronic diastolic HF (heart failure) 4/17/2018    Coronary artery calcification 1/11/2022    Dilated cardiomyopathy 9/24/2015    Enlarged prostate     Essential hypertension 9/24/2015    Gastroesophageal reflux disease without esophagitis 10/7/2015    Gastroesophageal reflux disease without esophagitis 10/7/2015    Hyperlipidemia     Personal history of colonic polyps 2016    Shortness of breath 9/24/2015     Past Surgical History:   Procedure Laterality Date    COLONOSCOPY N/A 5/25/2016    Procedure: COLONOSCOPY;  Surgeon:  Demetrio Spicer MD;  [facility];  Service: Endoscopy;  Laterality: N/A;     Family History       Problem Relation (Age of Onset)    Hypertension Mother, Father    Stroke Father          Tobacco Use    Smoking status: Former     Types: Cigarettes     Quit date: 1995     Years since quittin.5     Passive exposure: Never    Smokeless tobacco: Never   Substance and Sexual Activity    Alcohol use: No     Alcohol/week: 0.0 standard drinks    Drug use: No    Sexual activity: Not Currently     Partners: Female     Review of Systems   Unable to perform ROS: Dementia (Dementia)   Objective:     Vital Signs (Most Recent):  Temp: 97.8 °F (36.6 °C) (23 1004)  Pulse: 63 (23 1604)  Resp: 20 (23 1004)  BP: (!) 157/72 (23 1604)  SpO2: 96 % (23 1604)   Vital Signs (24h Range):  Temp:  [97.3 °F (36.3 °C)-97.8 °F (36.6 °C)] 97.8 °F (36.6 °C)  Pulse:  [61-63] 63  Resp:  [20] 20  SpO2:  [96 %-100 %] 96 %  BP: (121-157)/(63-72) 157/72     Weight: 86.2 kg (190 lb)  Body mass index is 25.77 kg/m².    Physical Exam  Constitutional:       General: He is not in acute distress.     Appearance: He is well-developed. Ill appearance: Chronically.   HENT:      Head: Normocephalic and atraumatic.   Eyes:      General: No scleral icterus.     Pupils: Pupils are equal, round, and reactive to light.   Neck:      Thyroid: No thyromegaly.      Vascular: No JVD.      Trachea: No tracheal deviation.   Cardiovascular:      Rate and Rhythm: Normal rate and regular rhythm.      Heart sounds: Normal heart sounds.   Pulmonary:      Effort: Pulmonary effort is normal.      Breath sounds: Normal breath sounds.   Abdominal:      General: Bowel sounds are normal. There is no distension.      Palpations: Abdomen is soft. There is no mass.      Tenderness: There is no abdominal tenderness. There is no guarding or rebound.      Comments: Slightly obese   Musculoskeletal:         General: Normal range of motion.       Cervical back: Normal range of motion and neck supple.   Lymphadenopathy:      Cervical: No cervical adenopathy.   Skin:     General: Skin is warm and dry.      Comments: There is a 3 cm open draining wound on the right forearm.  There is surrounding erythema and induration   Neurological:      Mental Status: He is alert and oriented to person, place, and time.   Psychiatric:         Behavior: Behavior normal.         Thought Content: Thought content normal.         Judgment: Judgment normal.       Significant Labs:  I have reviewed all pertinent lab results within the past 24 hours.  CBC:   Recent Labs   Lab 03/29/23  1056   WBC 9.04   RBC 3.69*   HGB 11.5*   HCT 35.3*      MCV 96   MCH 31.2*   MCHC 32.6     BMP:   Recent Labs   Lab 03/29/23  1056   GLU 80      K 4.0      CO2 27   BUN 18   CREATININE 1.3   CALCIUM 9.5     CMP:   Recent Labs   Lab 03/29/23  1056   GLU 80   CALCIUM 9.5   ALBUMIN 4.0   PROT 7.7      K 4.0   CO2 27      BUN 18   CREATININE 1.3   ALKPHOS 83   ALT 26   AST 22   BILITOT 0.4     EKG showed sinus bradycardia    Significant Diagnostics:  I have reviewed all pertinent imaging results/findings within the past 24 hours.  No new      Assessment/Plan:     * Abscess of arm  Patient has an incomplete abscess of his right arm.  He requires incision and drainage.  This will be done this afternoon as easy NPO.  Surgery performed under sedation local anesthetic.  The DNR will be suspended and reinstate postoperatively.  Risks of surgery infection, bleeding, recurrence, need to create a large wound, and multiple procedure    Dementia  Management per Medicine    Chronic congestive heart failure  Management per Medicine.      TIA (transient ischemic attack)  Management per Medicine    Mild cognitive impairment with memory loss  Management per Medicine      VTE Risk Mitigation (From admission, onward)         Ordered     enoxaparin injection 40 mg  Daily         03/29/23 0747      IP VTE HIGH RISK PATIENT  Once         03/29/23 1612     Place sequential compression device  Until discontinued         03/29/23 1612                Thank you for your consult. I will follow-up with patient. Please contact us if you have any additional questions.    Cali Patel MD  General Surgery  [facility] - Emergency Dept.

## 2023-03-29 NOTE — ASSESSMENT & PLAN NOTE
- Echo from 2019 with Normal left ventricular systolic function (EF 60-65%),  Normal left ventricular diastolic function  - no s/sx of fluid overload, monitor

## 2023-03-29 NOTE — TRANSFER OF CARE
Anesthesia Transfer of Care Note    Patient: Vishnu Dougherty Jr.    Procedure(s) Performed: Procedure(s) (LRB):  INCISION AND DRAINAGE, ABSCESS (Right)    Patient location: PACU    Anesthesia Type: MAC    Transport from OR: Transported from OR on room air with adequate spontaneous ventilation    Post pain: adequate analgesia    Post assessment: no apparent anesthetic complications    Post vital signs: stable    Level of consciousness: responds to stimulation    Nausea/Vomiting: no nausea/vomiting    Complications: none    Transfer of care protocol was followed      Last vitals:   Visit Vitals  /73   Pulse (!) 53   Temp 36.7 °C (98 °F) (Temporal)   Resp 14   Wt 86.2 kg (190 lb)   SpO2 96%   BMI 25.77 kg/m²

## 2023-03-29 NOTE — HOSPITAL COURSE
Patient was evaluated in the emergency room found to have an incompletely drained abscess of his right arm.  He was being admitted by the medicine service for antibiotics.  Surgery was asked to see him for incision and drainage  03/30/2023.  Patient is postop day 1 from incision.  Tolerated wound care.  Likely has methicillin-resistant Staph aureus is that is our typical skin bacteria in [facility]

## 2023-03-29 NOTE — PROGRESS NOTES
Subjective:       Patient ID: Vishnu Dougherty Jr. is a 63 y.o. male.        Chief Complaint:  Rash      HPI:      3/29/2023: Accompanied by his wife. His abdominal rash has now spread to this arms and limbs. Right arm- weeping purulent discharge with 4cm area of erythema. He was seen in Urgent Care and advised that he may need IV antibiotics, if symptoms do not resolve with oral medications.      2/9/2023:   62 year old male complaining of a back rash that itches. Rash is constant. Progressively worse throughout the day. He has not placed creams or medications on the area. He reports seeing someone for it, but he is not sure who he saw.      Past Medical History:   Diagnosis Date    Chronic CHF 10/2/2019    Chronic diastolic HF (heart failure) 4/17/2018    Coronary artery calcification 1/11/2022    Dilated cardiomyopathy 9/24/2015    Enlarged prostate     Essential hypertension 9/24/2015    Gastroesophageal reflux disease without esophagitis 10/7/2015    Gastroesophageal reflux disease without esophagitis 10/7/2015    Hyperlipidemia     Personal history of colonic polyps 2016    Shortness of breath 9/24/2015        Family History   Problem Relation Age of Onset    Hypertension Mother     Hypertension Father     Stroke Father         Current Outpatient Medications on File Prior to Visit   Medication Sig Dispense Refill    amlodipine-benazepril 10-20mg (LOTREL) 10-20 mg per capsule TAKE 1 CAPSULE BY MOUTH ONCE DAILY 90 capsule 3    atorvastatin (LIPITOR) 20 MG tablet Take 1 tablet (20 mg total) by mouth every evening. 90 tablet 3    buPROPion (WELLBUTRIN SR) 150 MG TBSR 12 hr tablet Take 1 tablet (150 mg total) by mouth 2 (two) times daily. 60 tablet 11    clotrimazole-betamethasone 1-0.05% (LOTRISONE) cream Apply topically 2 (two) times daily.      cyanocobalamin 1,000 mcg/mL injection INJECT 1 ML (1,000 MCG TOTAL) INTO THE MUSCLE EVERY 28 DAYS. 3 mL 11    donepeziL (ARICEPT) 5 MG tablet TAKE 1 TABLET BY MOUTH EVERY  DAY IN THE EVENING 90 tablet 3    doxycycline (VIBRA-TABS) 100 MG tablet Take 100 mg by mouth 2 (two) times daily.      DULoxetine (CYMBALTA) 60 MG capsule Take 1 capsule (60 mg total) by mouth once daily. 30 capsule 3    ergocalciferol (ERGOCALCIFEROL) 50,000 unit Cap Take 50,000 Units by mouth every 7 days.      memantine (NAMENDA) 5 MG Tab TAKE 1 TABLET BY MOUTH TWICE A  tablet 3    mupirocin (BACTROBAN) 2 % ointment Apply topically 3 (three) times daily. for 7 days 30 g 1    risperiDONE (RISPERDAL) 1 MG tablet Take 1 tablet (1 mg total) by mouth 2 (two) times daily. (Patient taking differently: Take 1 mg by mouth 2 (two) times daily. Pt taking 1/2 tablet daily at night) 60 tablet 11    sulfamethoxazole-trimethoprim 800-160mg (BACTRIM DS) 800-160 mg Tab Take 1 tablet by mouth 2 (two) times daily. for 10 days 20 tablet 0    cholecalciferol, vitamin D3, 1,250 mcg (50,000 unit) capsule TAKE 1 CAPSULE BY MOUTH ONE TIME PER WEEK (Patient not taking: Reported on 2/8/2023) 12 capsule 3    hydrocortisone 2.5 % cream Apply topically 2 (two) times daily. (Patient not taking: Reported on 2/8/2023) 1 each 1     No current facility-administered medications on file prior to visit.        Review of patient's allergies indicates:  No Known Allergies     Environmental History: Pets in the home: none.  Tobacco Smoke in Home: no  Review of Systems   Skin:  Positive for rash. Negative for wound.   Psychiatric/Behavioral:  Negative for behavioral problems and suicidal ideas.       Objective:    Physical Exam  Vitals reviewed.   Constitutional:       General: He is not in acute distress.     Appearance: Normal appearance. He is not ill-appearing, toxic-appearing or diaphoretic.   HENT:      Head: Normocephalic and atraumatic.   Eyes:      General: No scleral icterus.        Right eye: No discharge.         Left eye: No discharge.   Neck:      Vascular: No carotid bruit.   Cardiovascular:      Rate and Rhythm: Normal rate and  regular rhythm.      Heart sounds: Normal heart sounds. No murmur heard.    No friction rub. No gallop.   Pulmonary:      Effort: Pulmonary effort is normal. No respiratory distress.      Breath sounds: Normal breath sounds. No stridor. No wheezing, rhonchi or rales.   Chest:      Chest wall: No tenderness.   Musculoskeletal:         General: No swelling, tenderness, deformity or signs of injury. Normal range of motion.      Cervical back: Normal range of motion and neck supple. No rigidity or tenderness.      Right lower leg: No edema.      Left lower leg: No edema.   Lymphadenopathy:      Cervical: No cervical adenopathy.   Skin:     General: Skin is warm.      Coloration: Skin is not jaundiced.      Findings: Rash present. No lesion.      Comments: Right arm- purulent discharge from 2 cm ovel raised lesions on a 4-5 cm erythematous base   Neurological:      General: No focal deficit present.      Mental Status: He is alert and oriented to person, place, and time.      Gait: Gait normal.   Psychiatric:         Mood and Affect: Mood normal.         Behavior: Behavior normal.         Thought Content: Thought content normal.         Judgment: Judgment normal.          Assessment:       1. Skin infection    2. Rash/skin eruption           Plan:       Skin infection    Rash/skin eruption        He was previously on Doxycyline and Bactroban. Rash has worsened.   Advised to go to the ER for IV antibiotics. Staff will contact. Likely needs admission.    RTC prn      SHAR RIEVRA                    Problems Address                                                 Amount and/or Complexity                                                                      Risk       3           [] 2 or more self-limited or minor problems                      [] Limited                                                                        [] Low                  [] 1 stable chronic illness                                                  Any  combination of the two                                               OTC drugs                  []Acute, uncomplicated illness or injury                            Review of prior external notes from unique source           Minor surgery with no risk factors                                                                                                               [] 1 []2  []3+                                                                                                              Review of results from each unique test                                                                                                               [] 1 []2  [] 3+                                                                                                              Order of each unique test                                                                                                               [] 1 []2  [] 3+                                                                                                              Or                                                                                                             [] Assessment requiring an independent historian      4            [x] One or more chronic illness with exacerbation,              [] Moderate                                                                      [] Moderate                 Progression, or side effects of treatment                            -test documents or independent historians                        Prescription drug management                []  2 or more stable chronic illnesses                                    [] Independent interpretation of tests                              Minor surgery with identifiable risk                [] 1 undiagnosed new problem with uncertain prognosis    [] Discussion or management of test results                    elective major surgery                [] 1 acute illness with                 systemic symptoms                                                                                                                                                              [] 1 acute complicated injury                                                                                                                                          Elective major surgery                                                                                                                                                                                                                                                                                                                                                                                                  5            [x] 1 or more chronic illnesses with severe exacerbation,     [] Extensive(two from below)                                         [x] High                                                                                                               [] Independent interpretation of results                         Drug therapy requiring intensive                                                                                                               []Discussion of management or test interpretation           monitoring                                                                                                                                                                                                       Decision to de-escalate care                 [] 1 acute or chronic illness or injury that poses a threat                                                                                               Decision regarding hospitalization

## 2023-03-29 NOTE — H&P
[facility] - Emergency Dept.  [facility] Medicine  History & Physical    Patient Name: Vishnu Dougherty Jr.  MRN: [id number]  Patient Class: OP- Observation  Admission Date: 3/29/2023  Attending Physician: Alejandro Michelle MD   Primary Care Provider: Stephan Quiñones MD         Patient information was obtained from patient, relative(s) and ER records.     Subjective:     Principal Problem:Abscess of arm    Chief Complaint:   Chief Complaint   Patient presents with    Wound Check     Pt family member reports that she thinks pt has staph on his right arm, was sent by pcp for iv antibiotics         HPI: Mr. Dougherty is a 63 year old male with a history of dementia, HTN, HLD who resides at assisted living [facility].  He presents to ed with wrosnening of right arm abcess.  Underwent I&D Monday and started on doxycycline and Bactroban with noted worsening of redness and purulent drainage.  Symptoms are constant and moderate in severity, no relieving or exacerbating factors, no associated s/sx.  He denies fever/chills.  Patient is a poor historian d/t his dementia, sister at bedside (medical POA) who provided the majority of history.  In the ED, lab work unremarkable.  EKG with SB.  Blood cultures drawn, vanco started (previous cultures on 3/27 with staph aureus).  Dr. Patel consulted and planning to take to OR tonight for I&D.      Code Status DNR  Surrogate Decision Maker POA sister       Past Medical History:   Diagnosis Date    Chronic CHF 10/2/2019    Chronic diastolic HF (heart failure) 4/17/2018    Coronary artery calcification 1/11/2022    Dilated cardiomyopathy 9/24/2015    Enlarged prostate     Essential hypertension 9/24/2015    Gastroesophageal reflux disease without esophagitis 10/7/2015    Gastroesophageal reflux disease without esophagitis 10/7/2015    Hyperlipidemia     Personal history of colonic polyps 2016    Shortness of breath 9/24/2015       Past Surgical History:   Procedure Laterality Date     COLONOSCOPY N/A 5/25/2016    Procedure: COLONOSCOPY;  Surgeon: Demetrio Spicer MD;  Location: [facility];  Service: Endoscopy;  Laterality: N/A;       Review of patient's allergies indicates:  No Known Allergies    No current facility-administered medications on file prior to encounter.     Current Outpatient Medications on File Prior to Encounter   Medication Sig    amlodipine-benazepril 10-20mg (LOTREL) 10-20 mg per capsule TAKE 1 CAPSULE BY MOUTH ONCE DAILY    atorvastatin (LIPITOR) 20 MG tablet Take 1 tablet (20 mg total) by mouth every evening.    buPROPion (WELLBUTRIN SR) 150 MG TBSR 12 hr tablet Take 1 tablet (150 mg total) by mouth 2 (two) times daily.    clotrimazole-betamethasone 1-0.05% (LOTRISONE) cream Apply topically 2 (two) times daily.    cyanocobalamin 1,000 mcg/mL injection INJECT 1 ML (1,000 MCG TOTAL) INTO THE MUSCLE EVERY 28 DAYS.    donepeziL (ARICEPT) 5 MG tablet TAKE 1 TABLET BY MOUTH EVERY DAY IN THE EVENING    DULoxetine (CYMBALTA) 60 MG capsule Take 1 capsule (60 mg total) by mouth once daily.    ergocalciferol (ERGOCALCIFEROL) 50,000 unit Cap Take 50,000 Units by mouth every 7 days.    memantine (NAMENDA) 5 MG Tab TAKE 1 TABLET BY MOUTH TWICE A DAY    mupirocin (BACTROBAN) 2 % ointment Apply topically 3 (three) times daily. for 7 days    risperiDONE (RISPERDAL) 1 MG tablet Take 1 tablet (1 mg total) by mouth 2 (two) times daily. (Patient taking differently: Take 1 mg by mouth 2 (two) times daily. Pt taking 1/2 tablet daily at night)    sulfamethoxazole-trimethoprim 800-160mg (BACTRIM DS) 800-160 mg Tab Take 1 tablet by mouth 2 (two) times daily. for 10 days    [DISCONTINUED] cholecalciferol, vitamin D3, 1,250 mcg (50,000 unit) capsule TAKE 1 CAPSULE BY MOUTH ONE TIME PER WEEK (Patient not taking: Reported on 2/8/2023)    [DISCONTINUED] doxycycline (VIBRA-TABS) 100 MG tablet Take 100 mg by mouth 2 (two) times daily.    [DISCONTINUED] hydrocortisone 2.5 % cream Apply topically 2  (two) times daily. (Patient not taking: Reported on 2023)     Family History       Problem Relation (Age of Onset)    Hypertension Mother, Father    Stroke Father          Tobacco Use    Smoking status: Former     Types: Cigarettes     Quit date: 1995     Years since quittin.5     Passive exposure: Never    Smokeless tobacco: Never   Substance and Sexual Activity    Alcohol use: No     Alcohol/week: 0.0 standard drinks    Drug use: No    Sexual activity: Not Currently     Partners: Female     Review of Systems   Skin:  Positive for wound.   Objective:     Vital Signs (Most Recent):  Temp: 97.8 °F (36.6 °C) (23 1004)  Pulse: 63 (23 1604)  Resp: 20 (23 1004)  BP: (!) 157/72 (23 1604)  SpO2: 96 % (23 1604)   Vital Signs (24h Range):  Temp:  [97.3 °F (36.3 °C)-97.8 °F (36.6 °C)] 97.8 °F (36.6 °C)  Pulse:  [61-63] 63  Resp:  [20] 20  SpO2:  [96 %-100 %] 96 %  BP: (121-157)/(63-72) 157/72     Weight: 86.2 kg (190 lb)  Body mass index is 25.77 kg/m².    Physical Exam  Vitals and nursing note reviewed.   Cardiovascular:      Rate and Rhythm: Regular rhythm. Bradycardia present.      Pulses: Normal pulses.      Heart sounds: Normal heart sounds.   Pulmonary:      Effort: Pulmonary effort is normal.      Breath sounds: Normal breath sounds. No wheezing.   Abdominal:      General: Bowel sounds are normal. There is no distension.      Palpations: Abdomen is soft.      Tenderness: There is no abdominal tenderness.   Musculoskeletal:         General: No swelling.   Skin:     Comments: Abscess to right arm with surrounding redness, large area of fluctuance noted   Neurological:      Mental Status: He is alert.      Comments: At baseline mental status per sister; oriented to person, follows some commands, does not answer questions appropriately, moves all extremities spontaneously; easily agitated           Significant Labs: All pertinent labs within the past 24 hours have been  reviewed.    Significant Imaging: I have reviewed all pertinent imaging results/findings within the past 24 hours.    Assessment/Plan:     * Abscess of arm  - previous I&D 3/27 with cultures + staph aureus   - start vanco  - follow-up blood cultures  - dr. prakash  Planning to take to OR for repeat I&D    Dementia  - continue Aricept/namenda/ Risperdal  - patient sister reports, he gets agitated more at night  - delirium precuations       Chronic congestive heart failure  - Echo from 2019 with Normal left ventricular systolic function (EF 60-65%),  Normal left ventricular diastolic function  - no s/sx of fluid overload, monitor        Essential hypertension  - continue home norvasc  - trend bp and adjust meds as necessary         VTE Risk Mitigation (From admission, onward)         Ordered     enoxaparin injection 40 mg  Daily         03/29/23 1612     IP VTE HIGH RISK PATIENT  Once         03/29/23 1612     Place sequential compression device  Until discontinued         03/29/23 1612                     On 03/29/2023, patient should be placed in hospital observation services under my care in collaboration with Dr. Ang.      Saba Nolasco NP  Department of Hospital Medicine  '[facility] - Emergency Dept.

## 2023-03-29 NOTE — FIRST PROVIDER EVALUATION
Medical screening examination initiated.  I have conducted a focused provider triage encounter, findings are as follows:    Brief history of present illness:  Abscess and cellulitis to right forearm.  Had I&D Monday and is taking Bactrim but it has worsened.  PCP sent to ER for IV antibiotics.  Patient has dementia.  Sister is with patient as historian.    There were no vitals filed for this visit.    Pertinent physical exam:  Cellulitis and draining abscess to right forearm    Brief workup plan:  Lab work initiated    Preliminary workup initiated; this workup will be continued and followed by the physician or advanced practice provider that is assigned to the patient when roomed.

## 2023-03-29 NOTE — ASSESSMENT & PLAN NOTE
Patient has an incomplete abscess of his right arm.  He requires incision and drainage.  This will be done this afternoon as easy NPO.  Surgery performed under sedation local anesthetic.  The DNR will be suspended and reinstate postoperatively.  Risks of surgery infection, bleeding, recurrence, need to create a large wound, and multiple procedure

## 2023-03-30 VITALS
SYSTOLIC BLOOD PRESSURE: 128 MMHG | HEART RATE: 70 BPM | RESPIRATION RATE: 16 BRPM | TEMPERATURE: 97 F | WEIGHT: 190 LBS | OXYGEN SATURATION: 93 % | BODY MASS INDEX: 25.73 KG/M2 | HEIGHT: 72 IN | DIASTOLIC BLOOD PRESSURE: 61 MMHG

## 2023-03-30 LAB
ANION GAP SERPL CALC-SCNC: 11 MMOL/L (ref 8–16)
BACTERIA SPEC AEROBE CULT: ABNORMAL
BASOPHILS # BLD AUTO: 0.08 K/UL (ref 0–0.2)
BASOPHILS NFR BLD: 1.1 % (ref 0–1.9)
BUN SERPL-MCNC: 15 MG/DL (ref 8–23)
CALCIUM SERPL-MCNC: 8.9 MG/DL (ref 8.7–10.5)
CHLORIDE SERPL-SCNC: 108 MMOL/L (ref 95–110)
CO2 SERPL-SCNC: 21 MMOL/L (ref 23–29)
CREAT SERPL-MCNC: 1 MG/DL (ref 0.5–1.4)
DIFFERENTIAL METHOD: ABNORMAL
EOSINOPHIL # BLD AUTO: 0.5 K/UL (ref 0–0.5)
EOSINOPHIL NFR BLD: 7.1 % (ref 0–8)
ERYTHROCYTE [DISTWIDTH] IN BLOOD BY AUTOMATED COUNT: 12.8 % (ref 11.5–14.5)
EST. GFR  (NO RACE VARIABLE): >60 ML/MIN/1.73 M^2
GLUCOSE SERPL-MCNC: 79 MG/DL (ref 70–110)
HCT VFR BLD AUTO: 33.3 % (ref 40–54)
HGB BLD-MCNC: 10.9 G/DL (ref 14–18)
IMM GRANULOCYTES # BLD AUTO: 0.03 K/UL (ref 0–0.04)
IMM GRANULOCYTES NFR BLD AUTO: 0.4 % (ref 0–0.5)
LYMPHOCYTES # BLD AUTO: 1.3 K/UL (ref 1–4.8)
LYMPHOCYTES NFR BLD: 18.9 % (ref 18–48)
MCH RBC QN AUTO: 31.9 PG (ref 27–31)
MCHC RBC AUTO-ENTMCNC: 32.7 G/DL (ref 32–36)
MCV RBC AUTO: 97 FL (ref 82–98)
MONOCYTES # BLD AUTO: 0.6 K/UL (ref 0.3–1)
MONOCYTES NFR BLD: 8.6 % (ref 4–15)
NEUTROPHILS # BLD AUTO: 4.5 K/UL (ref 1.8–7.7)
NEUTROPHILS NFR BLD: 63.9 % (ref 38–73)
NRBC BLD-RTO: 0 /100 WBC
PLATELET # BLD AUTO: 220 K/UL (ref 150–450)
PMV BLD AUTO: 11.8 FL (ref 9.2–12.9)
POTASSIUM SERPL-SCNC: 4.2 MMOL/L (ref 3.5–5.1)
RBC # BLD AUTO: 3.42 M/UL (ref 4.6–6.2)
SODIUM SERPL-SCNC: 140 MMOL/L (ref 136–145)
VANCOMYCIN SERPL-MCNC: 8.1 UG/ML
WBC # BLD AUTO: 7.09 K/UL (ref 3.9–12.7)

## 2023-03-30 PROCEDURE — 36415 COLL VENOUS BLD VENIPUNCTURE: CPT | Performed by: SURGERY

## 2023-03-30 PROCEDURE — 94799 UNLISTED PULMONARY SVC/PX: CPT

## 2023-03-30 PROCEDURE — 94761 N-INVAS EAR/PLS OXIMETRY MLT: CPT

## 2023-03-30 PROCEDURE — 85025 COMPLETE CBC W/AUTO DIFF WBC: CPT | Performed by: SURGERY

## 2023-03-30 PROCEDURE — 80202 ASSAY OF VANCOMYCIN: CPT | Performed by: SURGERY

## 2023-03-30 PROCEDURE — 25000003 PHARM REV CODE 250: Performed by: FAMILY MEDICINE

## 2023-03-30 PROCEDURE — 80048 BASIC METABOLIC PNL TOTAL CA: CPT | Performed by: SURGERY

## 2023-03-30 PROCEDURE — 25000003 PHARM REV CODE 250: Performed by: SURGERY

## 2023-03-30 RX ORDER — CLINDAMYCIN HYDROCHLORIDE 150 MG/1
150 CAPSULE ORAL 3 TIMES DAILY
Qty: 42 CAPSULE | Refills: 0 | Status: SHIPPED | OUTPATIENT
Start: 2023-03-30 | End: 2023-04-13

## 2023-03-30 RX ORDER — HYDROCODONE BITARTRATE AND ACETAMINOPHEN 5; 325 MG/1; MG/1
1 TABLET ORAL EVERY 6 HOURS PRN
Qty: 15 TABLET | Refills: 0 | Status: SHIPPED | OUTPATIENT
Start: 2023-03-30 | End: 2023-04-05

## 2023-03-30 RX ORDER — AMOXICILLIN AND CLAVULANATE POTASSIUM 875; 125 MG/1; MG/1
1 TABLET, FILM COATED ORAL EVERY 12 HOURS
Status: DISCONTINUED | OUTPATIENT
Start: 2023-03-30 | End: 2023-03-30 | Stop reason: HOSPADM

## 2023-03-30 RX ADMIN — CHLORHEXIDINE GLUCONATE 0.12% ORAL RINSE 10 ML: 1.2 LIQUID ORAL at 08:03

## 2023-03-30 RX ADMIN — MEMANTINE HYDROCHLORIDE 5 MG: 5 TABLET ORAL at 08:03

## 2023-03-30 RX ADMIN — BUPROPION HYDROCHLORIDE 150 MG: 150 TABLET, EXTENDED RELEASE ORAL at 08:03

## 2023-03-30 RX ADMIN — AMOXICILLIN AND CLAVULANATE POTASSIUM 1 TABLET: 875; 125 TABLET ORAL at 10:03

## 2023-03-30 RX ADMIN — AMLODIPINE BESYLATE 10 MG: 10 TABLET ORAL at 08:03

## 2023-03-30 RX ADMIN — RISPERIDONE 1 MG: 1 TABLET ORAL at 08:03

## 2023-03-30 RX ADMIN — DULOXETINE 60 MG: 30 CAPSULE, DELAYED RELEASE ORAL at 08:03

## 2023-03-30 NOTE — PLAN OF CARE
[facility] - Telemetry (Hospital)  Initial Discharge Assessment       Primary Care Provider: Stephan Quiñones MD    Admission Diagnosis: Chest pain [R07.9]  Abscess of arm [L02.419]    Admission Date: 3/29/2023  Expected Discharge Date:     Discharge Barriers Identified: None    Payor: [employer]ARE / Plan: [employer] SECURE COMPLETE / Product Type: Medicare Advantage /     Extended Emergency Contact Information  Primary Emergency Contact: Rach Myers  Address: [address] [place] [address] [id number] [place] [address]  Mobile Phone: [phone]  Relation: Sister  Secondary Emergency Contact: Faviola Conrad  Home Phone: [phone]  Mobile Phone: [phone]  Relation: Daughter   needed? No    Discharge Plan A: Assisted Living  Discharge Plan B: Home Health      [id number] - [place], [place] - [address] AT [facility] [place]  [address] [facility] [address]  Phone: [phone] Fax: [phone]    [facility] [address] [facility], [place] - [address] [facility]  [address] [facility] [address]  Phone: [phone] Fax: [phone]      Initial Assessment (most recent)       Adult Discharge Assessment - 03/30/23 1006          Discharge Assessment    Assessment Type Discharge Planning Assessment     Confirmed/corrected address, phone number and insurance Yes     Confirmed Demographics Correct on Facesheet     Source of Information family     Communicated CELIO with patient/caregiver Date not available/Unable to determine     Reason For Admission Abscess of arm     People in Home facility resident     Facility Arrived From: [facility] - [facility]     Do you expect to return to your current living situation? Yes     Do you have help at home or someone to help you manage your care at home? Yes     Who are your caregiver(s) and their phone number(s)? facility staff     Prior to hospitilization  cognitive status: Alert/Oriented     Current cognitive status: Alert/Oriented     Home Accessibility wheelchair accessible     Equipment Currently Used at Home none     Readmission within 30 days? No     Patient currently being followed by outpatient case management? No     Do you currently have service(s) that help you manage your care at home? No     Do you take prescription medications? Yes     Do you have prescription coverage? Yes     Coverage [employer]     Do you have any problems affording any of your prescribed medications? No     Is the patient taking medications as prescribed? yes     Who is going to help you get home at discharge? facility staff     How do you get to doctors appointments? family or friend will provide     Are you on dialysis? No     Do you take coumadin? No     Discharge Plan A Assisted Living     Discharge Plan B Home Health     DME Needed Upon Discharge  none     Discharge Plan discussed with: Spouse/sig other     Name(s) and Number(s) Rach Myers - sister     Discharge Barriers Identified None                   Anticipated DC dispo: home health for Wound Care   Prior Level of Function: independent with ADLs  PCP: Stephan Quiñones MD    Comments:  Swer met with patient and sister at bedside to introduce role and discuss discharge planning. Patient lives at [facility] who will also be help and can provide transport at time of discharge. CM discharge needs depends on hospital progress. Swer will continue following to assist with other needs.

## 2023-03-30 NOTE — PROGRESS NOTES
Therapy with vancomycin complete and/or consult discontinued by provider.      Pharmacy will sign off, please re-consult as needed.    /Yulissa Odom [facility] 3/30/2023 9:49 AM

## 2023-03-30 NOTE — SUBJECTIVE & OBJECTIVE
Interval History:  Patient offers no complaints, tolerated wound care   Can be discharged from the surgical point of view    Medications:  Continuous Infusions:   lactated ringers Stopped (03/29/23 2252)     Scheduled Meds:   amLODIPine  10 mg Oral Daily    atorvastatin  20 mg Oral QHS    buPROPion  150 mg Oral BID    chlorhexidine  10 mL Mouth/Throat BID    donepeziL  5 mg Oral QHS    DULoxetine  60 mg Oral Daily    enoxaparin  40 mg Subcutaneous Daily    memantine  5 mg Oral BID    risperiDONE  1 mg Oral BID     PRN Meds:acetaminophen, diphenhydrAMINE, glucagon (human recombinant), HYDROcodone-acetaminophen, HYDROcodone-acetaminophen, HYDROmorphone, melatonin, naloxone, ondansetron, sodium chloride 0.9%, Pharmacy to dose Vancomycin consult **AND** vancomycin - pharmacy to dose     Review of patient's allergies indicates:  No Known Allergies  Objective:     Vital Signs (Most Recent):  Temp: 98.2 °F (36.8 °C) (03/30/23 0707)  Pulse: (!) 59 (03/30/23 0707)  Resp: 18 (03/30/23 0707)  BP: 120/67 (03/30/23 0707)  SpO2: (!) 92 % (03/30/23 0707)   Vital Signs (24h Range):  Temp:  [97.1 °F (36.2 °C)-98.2 °F (36.8 °C)] 98.2 °F (36.8 °C)  Pulse:  [51-75] 59  Resp:  [13-23] 18  SpO2:  [91 %-100 %] 92 %  BP: (116-188)/(58-82) 120/67     Weight: 86.2 kg (190 lb)  Body mass index is 25.77 kg/m².    Intake/Output - Last 3 Shifts         03/28 0700 03/29 0659 03/29 0700 03/30 0659 03/30 0700 03/31 0659    I.V. (mL/kg)  111.8 (1.3)     IV Piggyback  500     Total Intake(mL/kg)  611.8 (7.1)     Urine (mL/kg/hr)  700     Total Output  700     Net  -88.2            Urine Occurrence  3 x             Physical Exam  Constitutional:       General: He is not in acute distress.     Appearance: He is well-developed. Ill appearance: Chronically.   HENT:      Head: Normocephalic and atraumatic.   Eyes:      General: No scleral icterus.     Pupils: Pupils are equal, round, and reactive to light.   Neck:      Thyroid: No thyromegaly.       Vascular: No JVD.      Trachea: No tracheal deviation.   Cardiovascular:      Rate and Rhythm: Normal rate and regular rhythm.      Heart sounds: Normal heart sounds.   Pulmonary:      Effort: Pulmonary effort is normal.      Breath sounds: Normal breath sounds.   Abdominal:      General: Bowel sounds are normal. There is no distension.      Palpations: Abdomen is soft. There is no mass.      Tenderness: There is no abdominal tenderness. There is no guarding or rebound.   Musculoskeletal:         General: Normal range of motion.      Cervical back: Normal range of motion and neck supple.   Lymphadenopathy:      Cervical: No cervical adenopathy.   Skin:     General: Skin is warm and dry.      Comments: Right forearm wound is clean.  There is mild surrounding erythema.  There is mild edema.  There is no drainage.  There was no bleeding   Neurological:      Mental Status: He is alert and oriented to person, place, and time.   Psychiatric:         Behavior: Behavior normal.         Thought Content: Thought content normal.         Judgment: Judgment normal.       Significant Labs:  I have reviewed all pertinent lab results within the past 24 hours.  CBC:   Recent Labs   Lab 03/30/23  0436   WBC 7.09   RBC 3.42*   HGB 10.9*   HCT 33.3*      MCV 97   MCH 31.9*   MCHC 32.7     BMP:   Recent Labs   Lab 03/30/23  0436   GLU 79      K 4.2      CO2 21*   BUN 15   CREATININE 1.0   CALCIUM 8.9     Wound culture from 03/20 7th shows staph aureus, likely methicillin-resistant given bacterial patterns    Significant Diagnostics:  I have reviewed all pertinent imaging results/findings within the past 24 hours.  No new

## 2023-03-30 NOTE — PLAN OF CARE
[facility] - Telemetry (Hospital)  Discharge Final Note    Primary Care Provider: Stephan Quiñones MD    Expected Discharge Date: 3/30/2023    Final Discharge Note (most recent)       Final Note - 03/30/23 1348          Final Note    Assessment Type Final Discharge Note     Anticipated Dis[place] Disposition [place] [facility] Resources/Appts/Education Provided Appointments scheduled and added to AVS        Post-Acute Status    Post-Acute Authorization Home Health     Home Health Status Set-up Complete/Auth obtained     Coverage [employer]     Discharge Delays None known at this time                     Important Message from Medicare             DC Disposition: [facility]  Family Notified: Patient's sister  Transportation: Sitter @ bedside with Patient    Patient is from assisted living facility and did not need placement or equipment from .  Patient did need Home Health care Services set up for Patient. Patient and family did not have preference, Swer sent referral to [facility]. [facility] was able to accept and was preferred by [facility].     Swer schedule patient surgery follow up with Fidel Velez MD on 4/3/23 at 10:20 am.

## 2023-03-30 NOTE — PLAN OF CARE
Patient discharge instructions reviewed. LDAs removed. Discharge medication delivered to bedside/ pharmacy. Telemetry discontinued. Pt remained free from falls.  Pt being discharged with caregiver to [facility]. Pt medications brought to bedside.

## 2023-03-30 NOTE — ASSESSMENT & PLAN NOTE
Patient underwent incision drainage on 03/29/2023.  The wound is clean.  He has tolerated wound care   He lives in the assisted living facility   Home health orders have been placed  The patient to be discharged from the surgical point of view   Recommend antibiotics that cover methicillin-resistant Staph aureus   Follow up with surgery in 2 weeks

## 2023-03-30 NOTE — PROGRESS NOTES
[facility])  General Surgery  Progress Note    Subjective:     History of Present Illness:  63-year-old male who has a abscess of his right forearm.  It presented several days ago.  He was seen on 03/27/2023 in the after hours clinic were incision and drainage was performed.  He returned to primary care today.  The area was worse.  He was sent to the emergency room.  The patient was evaluated in the emergency room, started on antibiotics and surgical consultation was obtained.      This patient has a significant past medical history including dementia so he does not recall the exact time lines of the events.      Patient last ate breakfast at 8:00 a.m.    The patient is a DNR.  I explained that this will be suspended in the operating room and reinstate postoperatively      Post-Op Info:  Procedure(s) (LRB):  INCISION AND DRAINAGE, ABSCESS (Right)   1 Day Post-Op     Interval History:  Patient offers no complaints, tolerated wound care   Can be discharged from the surgical point of view    Medications:  Continuous Infusions:   lactated ringers Stopped (03/29/23 2252)     Scheduled Meds:   amLODIPine  10 mg Oral Daily    atorvastatin  20 mg Oral QHS    buPROPion  150 mg Oral BID    chlorhexidine  10 mL Mouth/Throat BID    donepeziL  5 mg Oral QHS    DULoxetine  60 mg Oral Daily    enoxaparin  40 mg Subcutaneous Daily    memantine  5 mg Oral BID    risperiDONE  1 mg Oral BID     PRN Meds:acetaminophen, diphenhydrAMINE, glucagon (human recombinant), HYDROcodone-acetaminophen, HYDROcodone-acetaminophen, HYDROmorphone, melatonin, naloxone, ondansetron, sodium chloride 0.9%, Pharmacy to dose Vancomycin consult **AND** vancomycin - pharmacy to dose     Review of patient's allergies indicates:  No Known Allergies  Objective:     Vital Signs (Most Recent):  Temp: 98.2 °F (36.8 °C) (03/30/23 0707)  Pulse: (!) 59 (03/30/23 0707)  Resp: 18 (03/30/23 0707)  BP: 120/67 (03/30/23 0707)  SpO2: (!) 92 %  (03/30/23 0707)   Vital Signs (24h Range):  Temp:  [97.1 °F (36.2 °C)-98.2 °F (36.8 °C)] 98.2 °F (36.8 °C)  Pulse:  [51-75] 59  Resp:  [13-23] 18  SpO2:  [91 %-100 %] 92 %  BP: (116-188)/(58-82) 120/67     Weight: 86.2 kg (190 lb)  Body mass index is 25.77 kg/m².    Intake/Output - Last 3 Shifts         03/28 0700  03/29 0659 03/29 0700 03/30 0659 03/30 0700 03/31 0659    I.V. (mL/kg)  111.8 (1.3)     IV Piggyback  500     Total Intake(mL/kg)  611.8 (7.1)     Urine (mL/kg/hr)  700     Total Output  700     Net  -88.2            Urine Occurrence  3 x             Physical Exam  Constitutional:       General: He is not in acute distress.     Appearance: He is well-developed. Ill appearance: Chronically.   HENT:      Head: Normocephalic and atraumatic.   Eyes:      General: No scleral icterus.     Pupils: Pupils are equal, round, and reactive to light.   Neck:      Thyroid: No thyromegaly.      Vascular: No JVD.      Trachea: No tracheal deviation.   Cardiovascular:      Rate and Rhythm: Normal rate and regular rhythm.      Heart sounds: Normal heart sounds.   Pulmonary:      Effort: Pulmonary effort is normal.      Breath sounds: Normal breath sounds.   Abdominal:      General: Bowel sounds are normal. There is no distension.      Palpations: Abdomen is soft. There is no mass.      Tenderness: There is no abdominal tenderness. There is no guarding or rebound.   Musculoskeletal:         General: Normal range of motion.      Cervical back: Normal range of motion and neck supple.   Lymphadenopathy:      Cervical: No cervical adenopathy.   Skin:     General: Skin is warm and dry.      Comments: Right forearm wound is clean.  There is mild surrounding erythema.  There is mild edema.  There is no drainage.  There was no bleeding   Neurological:      Mental Status: He is alert and oriented to person, place, and time.   Psychiatric:         Behavior: Behavior normal.         Thought Content: Thought content normal.          Judgment: Judgment normal.       Significant Labs:  I have reviewed all pertinent lab results within the past 24 hours.  CBC:   Recent Labs   Lab 03/30/23  0436   WBC 7.09   RBC 3.42*   HGB 10.9*   HCT 33.3*      MCV 97   MCH 31.9*   MCHC 32.7     BMP:   Recent Labs   Lab 03/30/23  0436   GLU 79      K 4.2      CO2 21*   BUN 15   CREATININE 1.0   CALCIUM 8.9     Wound culture from 03/20 7th shows staph aureus, likely methicillin-resistant given bacterial patterns    Significant Diagnostics:  I have reviewed all pertinent imaging results/findings within the past 24 hours.  No new    Assessment/Plan:     * Abscess of arm  Patient underwent incision drainage on 03/29/2023.  The wound is clean.  He has tolerated wound care   He lives in the assisted living facility   Home health orders have been placed  The patient to be discharged from the surgical point of view   Recommend antibiotics that cover methicillin-resistant Staph aureus   Follow up with surgery in 2 weeks    Dementia  Management per Medicine    Chronic congestive heart failure  Management per Medicine.      TIA (transient ischemic attack)  Management per Medicine    Mild cognitive impairment with memory loss  Management per Medicine        Cali Patel MD  General Surgery  [facility] - Telemetry ([facility])

## 2023-03-30 NOTE — DISCHARGE SUMMARY
[facility] Medicine  Discharge Summary      Patient Name: Vishnu Dougherty Jr.  MRN: [id number]  [facility]: [id number]  Patient Class: OP- Outpatient Recovery  Admission Date: 3/29/2023  Hospital Length of Stay: 0 days  Discharge Date and Time:  03/30/2023 5:19 PM  Attending Physician: No att. providers found   Discharging Provider: Kevin Bejarano MD  Primary Care Provider: Stephan Quiñones MD    Primary Care Team: Networked reference to record PCT     HPI:   Mr. Dougherty is a 63 year old male with a history of dementia, HTN, HLD who resides at assisted [facility].  He presents to ed with wrosnening of right arm abcess.  Underwent I&D Monday and started on doxycycline and Bactroban with noted worsening of redness and purulent drainage.  Symptoms are constant and moderate in severity, no relieving or exacerbating factors, no associated s/sx.  He denies fever/chills.  Patient is a poor historian d/t his dementia, sister at bedside (medical POA) who provided the majority of history.  In the ED, lab work unremarkable.  EKG with SB.  Blood cultures drawn, vanco started (previous cultures on 3/27 with staph aureus).  Dr. Patel consulted and planning to take to OR tonight for I&D.      Code Status DNR  Surrogate Decision Maker POA sister       Procedure(s) (LRB):  INCISION AND DRAINAGE, ABSCESS (Right)      Hospital Course:   Mr. Dougherty is a 62 yo male presented from an assisted living facility with a right arm abscess.  He was previously seen and cultures were taken on 3/27 which returned as Staph Aureus.  Dr. Patel took the patient for an I&D last night.  Cultures and sensitivities returned with MRSA and Vancomycin was transitioned to PO clindamycin x 14 days.  Patient had received 2 days of Bactrim prior to admission.  Patient was also set up with  for wound care and follow up with Dr. Patel in 1 week.  POC dw patient and sitter at bedside.        Goals of Care Treatment  Preferences:  Code Status: DNR      Consults:   Consults (From admission, onward)        Status Ordering Provider     Inpatient consult to Social Work  Once        Provider:  (Not yet assigned)    Completed MARY ANN MENDOZA     Inpatient consult to General Surgery  Once        Provider:  (Not yet assigned)    Completed XAVI CARMICHAEL new Assessment & Plan notes have been filed under this hospital service since the last note was generated.  Service: Hospital Medicine    Final Active Diagnoses:    Diagnosis Date Noted POA    PRINCIPAL PROBLEM:  Abscess of arm [L02.419] 03/29/2023 Yes    Dementia [F03.90] 08/03/2020 Yes    Chronic congestive heart failure [I50.9] 10/02/2019 Yes    Essential hypertension [I10] 09/24/2015 Yes      Problems Resolved During this Admission:       Discharged Condition: stable    Disposition: Home or Self Care    Follow Up:    Patient Instructions:      Diet Adult Regular     Activity as tolerated       Significant Diagnostic Studies: Labs:   BMP:   Recent Labs   Lab 03/29/23  1056 03/30/23  0436   GLU 80 79    140   K 4.0 4.2    108   CO2 27 21*   BUN 18 15   CREATININE 1.3 1.0   CALCIUM 9.5 8.9    and CMP   Recent Labs   Lab 03/29/23  1056 03/30/23  0436    140   K 4.0 4.2    108   CO2 27 21*   GLU 80 79   BUN 18 15   CREATININE 1.3 1.0   CALCIUM 9.5 8.9   PROT 7.7  --    ALBUMIN 4.0  --    BILITOT 0.4  --    ALKPHOS 83  --    AST 22  --    ALT 26  --    ANIONGAP 8 11     Microbiology: Wound Culture: positive for MRSA     Radiology:   No orders to display       Pending Diagnostic Studies:     Procedure Component Value Units Date/Time    Specimen to Pathology, Surgery General Surgery [[id number]] Collected: 03/29/23 9279    Order Status: Sent Lab Status: In process Updated: 03/30/23 1130    Specimen: Tissue          Medications:  Reconciled Home Medications:      Medication List      START taking these medications    clindamycin 150 MG  capsule  Commonly known as: CLEOCIN  Take 1 capsule (150 mg total) by mouth 3 (three) times daily. for 14 days     HYDROcodone-acetaminophen 5-325 mg per tablet  Commonly known as: NORCO  Take 1 tablet by mouth every 6 (six) hours as needed for Pain.        CONTINUE taking these medications    amlodipine-benazepril 10-20mg 10-20 mg per capsule  Commonly known as: LOTREL  TAKE 1 CAPSULE BY MOUTH ONCE DAILY     atorvastatin 20 MG tablet  Commonly known as: LIPITOR  Take 1 tablet (20 mg total) by mouth every evening.     buPROPion 150 MG TBSR 12 hr tablet  Commonly known as: WELLBUTRIN SR  Take 1 tablet (150 mg total) by mouth 2 (two) times daily.     clotrimazole-betamethasone 1-0.05% cream  Commonly known as: LOTRISONE  Apply topically 2 (two) times daily.     cyanocobalamin 1,000 mcg/mL injection  INJECT 1 ML (1,000 MCG TOTAL) INTO THE MUSCLE EVERY 28 DAYS.     donepeziL 5 MG tablet  Commonly known as: ARICEPT  TAKE 1 TABLET BY MOUTH EVERY DAY IN THE EVENING     DULoxetine 60 MG capsule  Commonly known as: CYMBALTA  Take 1 capsule (60 mg total) by mouth once daily.     ergocalciferol 50,000 unit Cap  Commonly known as: ERGOCALCIFEROL  Take 50,000 Units by mouth every 7 days.     memantine 5 MG Tab  Commonly known as: NAMENDA  TAKE 1 TABLET BY MOUTH TWICE A DAY     mupirocin 2 % ointment  Commonly known as: BACTROBAN  Apply topically 3 (three) times daily. for 7 days        STOP taking these medications    sulfamethoxazole-trimethoprim 800-160mg 800-160 mg Tab  Commonly known as: BACTRIM DS        ASK your doctor about these medications    risperiDONE 1 MG tablet  Commonly known as: RISPERDAL  Take 1 tablet (1 mg total) by mouth 2 (two) times daily.            Indwelling Lines/Drains at time of discharge:   Lines/Drains/Airways     None                 Time spent on the discharge of patient: 40 minutes         Kevin Bejarano MD  Department of Hospital Medicine  [facility] - Telemetry ([facility])

## 2023-03-30 NOTE — DISCHARGE INSTRUCTIONS
Is okay to shower and get the area wet.      The wound needs to be dressed with a moist gauze covered by a dry gauze for 7 days and then antibiotic ointment covered by a dressing that does not stick or a large Band-Aid.      Please call for any increasing pain, redness, swelling or bleeding.      We need to see you back in the office in 2 weeks    Our office phone numbers are  [phone] and

## 2023-03-30 NOTE — PROGRESS NOTES
Pharmacokinetic Assessment Follow Up: IV Vancomycin    Vancomycin serum concentration assessment(s):    The random level was drawn correctly and can be used to guide therapy at this time. The measurement is below the desired definitive target range of 10 to 15 mcg/mL.    Vancomycin Regimen Plan:    Re-dose when the random level is less than 20 mcg/mL, next level to be drawn at 3/30 on 2100    Drug levels (last 3 results):  Recent Labs   Lab Result Units 03/30/23  0845   Vancomycin, Random ug/mL 8.1       Pharmacy will continue to follow and monitor vancomycin.    Please contact pharmacy at extension [phone] for questions regarding this assessment.    Thank you for the consult,   Yulissa Odom       Patient brief summary:  Vishnu Dougherty Jr. is a 63 y.o. male initiated on antimicrobial therapy with IV Vancomycin for treatment of skin & soft tissue infection    Drug Allergies:   Review of patient's allergies indicates:  No Known Allergies    Actual Body Weight:   86.2 kg    Renal Function:   Estimated Creatinine Clearance: 80.8 mL/min (based on SCr of 1 mg/dL).,     Dialysis Method (if applicable):  N/A    CBC (last 72 hours):  Recent Labs   Lab Result Units 03/29/23  1056 03/30/23  0436   WBC K/uL 9.04 7.09   Hemoglobin g/dL 11.5* 10.9*   Hematocrit % 35.3* 33.3*   Platelets K/uL 269 220   Gran % % 70.9 63.9   Lymph % % 14.8* 18.9   Mono % % 7.0 8.6   Eosinophil % % 5.8 7.1   Basophil % % 0.9 1.1   Differential Method  Automated Automated       Metabolic Panel (last 72 hours):  Recent Labs   Lab Result Units 03/29/23  1056 03/29/23  1118 03/30/23  0436   Sodium mmol/L 138  --  140   Potassium mmol/L 4.0  --  4.2   Chloride mmol/L 103  --  108   CO2 mmol/L 27  --  21*   Glucose mg/dL 80  --  79   Glucose, UA   --  Negative  --    BUN mg/dL 18  --  15   Creatinine mg/dL 1.3  --  1.0   Albumin g/dL 4.0  --   --    Total Bilirubin mg/dL 0.4  --   --    Alkaline Phosphatase U/L 83  --   --    AST U/L 22  --   --    ALT  U/L 26  --   --        Vancomycin Administrations:  vancomycin given in the last 96 hours                     vancomycin 1.75 g in 5 % dextrose 500 mL IVPB (mg) 1,750 mg New Bag 03/29/23 1552                    Microbiologic Results:  Microbiology Results (last 7 days)       Procedure Component Value Units Date/Time    Blood culture x two cultures. Draw prior to antibiotics. [[id number]] Collected: 03/29/23 1056    Order Status: Completed Specimen: Blood from Peripheral, Antecubital, Left Updated: 03/30/23 0515     Blood Culture, Routine No Growth to date    Narrative:      Aerobic and anaerobic    Blood culture x two cultures. Draw prior to antibiotics. [[id number]] Collected: 03/29/23 1108    Order Status: Completed Specimen: Blood from Peripheral, Forearm, Left Updated: 03/30/23 0515     Blood Culture, Routine No Growth to date    Narrative:      Aerobic and anaerobic

## 2023-03-30 NOTE — PLAN OF CARE
POC reviewed with patient and sister. No questions at this time.  AAO X 2 person and place.  Pt remains free of falls. Dependent and requires assist X 1 with ADLs.  Medications given as ordered.  PRN medication given per request.  No complaints or distress noted @ this time.  Safety measures in place.   Bed alarm initiated. Sister remained at bedside. Will continue to monitor.  Informed pt to call for assistance before getting up. Pt verbalizes understanding. Requires reinforcement.  Hourly rounding complete.

## 2023-03-30 NOTE — PLAN OF CARE
[facility] called Patient's sister, Rach Myers about Home Health agencies for wound care services. Patient's sister stated that she was not particular about agency, however the assisted living facility does prefer [facility], or [facility] for HH.   Swer informed sister that she would research and make sure these were in network with insurance. Swer inquired about if [facility] was okay for services. Patient's sister acknowledged and stated that was fine.    [facility] sent referral to [facility] via [facility].

## 2023-03-31 DIAGNOSIS — R21 RASH: Primary | ICD-10-CM

## 2023-04-02 NOTE — ANESTHESIA POSTPROCEDURE EVALUATION
Anesthesia Post Evaluation    Patient: Vishnu Dougherty Jr.    Procedure(s) Performed: Procedure(s) (LRB):  INCISION AND DRAINAGE, ABSCESS (Right)    Final Anesthesia Type: MAC      Patient location during evaluation: PACU  Patient participation: Yes- Able to Participate  Level of consciousness: awake and alert and oriented  Post-procedure vital signs: reviewed and stable  Pain management: adequate  Airway patency: patent  TOY mitigation strategies: Multimodal analgesia  PONV status at discharge: No PONV  Anesthetic complications: no      Cardiovascular status: blood pressure returned to baseline and hemodynamically stable  Respiratory status: unassisted  Hydration status: euvolemic  Follow-up not needed.          Vitals Value Taken Time   /61 03/30/23 1102   Temp 36.3 °C (97.3 °F) 03/30/23 1102   Pulse 70 03/30/23 1102   Resp 16 03/30/23 1102   SpO2 93 % 03/30/23 1102         Event Time   Out of Recovery 20:06:11         Pain/Gabino Score: No data recorded

## 2023-04-03 LAB
BACTERIA BLD CULT: NORMAL
BACTERIA BLD CULT: NORMAL
BACTERIA SPEC ANAEROBE CULT: NORMAL

## 2023-04-04 ENCOUNTER — TELEPHONE (OUTPATIENT)
Dept: SURGERY | Facility: CLINIC | Age: 63
End: 2023-04-04
Payer: MEDICARE

## 2023-04-04 NOTE — TELEPHONE ENCOUNTER
----- Message from Saundra Almeida sent at 4/4/2023 10:17 AM CDT -----  Contact: Rach/ daughter  Rach is needing a call back to see about getting the patient in sooner for a hospital follow up for a STAPH infection. Please call her at [phone].

## 2023-04-05 ENCOUNTER — OFFICE VISIT (OUTPATIENT)
Dept: SURGERY | Facility: CLINIC | Age: 63
End: 2023-04-05
Payer: MEDICARE

## 2023-04-05 ENCOUNTER — OFFICE VISIT (OUTPATIENT)
Dept: DERMATOLOGY | Facility: CLINIC | Age: 63
End: 2023-04-05
Payer: MEDICARE

## 2023-04-05 VITALS
SYSTOLIC BLOOD PRESSURE: 116 MMHG | BODY MASS INDEX: 26.4 KG/M2 | WEIGHT: 194.69 LBS | DIASTOLIC BLOOD PRESSURE: 69 MMHG | HEART RATE: 65 BPM

## 2023-04-05 DIAGNOSIS — T07.XXXA EXCORIATION OF MULTIPLE SITES: Primary | ICD-10-CM

## 2023-04-05 DIAGNOSIS — R21 RASH: ICD-10-CM

## 2023-04-05 DIAGNOSIS — L30.9 DERMATITIS: ICD-10-CM

## 2023-04-05 DIAGNOSIS — R21 RASH: Primary | ICD-10-CM

## 2023-04-05 DIAGNOSIS — L02.419 ABSCESS OF ARM: Primary | ICD-10-CM

## 2023-04-05 LAB
FINAL PATHOLOGIC DIAGNOSIS: NORMAL
Lab: NORMAL

## 2023-04-05 PROCEDURE — 99999 PR PBB SHADOW E&M-EST. PATIENT-LVL III: CPT | Mod: PBBFAC,,, | Performed by: PHYSICIAN ASSISTANT

## 2023-04-05 PROCEDURE — 1160F PR REVIEW ALL MEDS BY PRESCRIBER/CLIN PHARMACIST DOCUMENTED: ICD-10-PCS | Mod: CPTII,S$GLB,, | Performed by: PHYSICIAN ASSISTANT

## 2023-04-05 PROCEDURE — 99204 PR OFFICE/OUTPT VISIT, NEW, LEVL IV, 45-59 MIN: ICD-10-PCS | Mod: S$GLB,,, | Performed by: PHYSICIAN ASSISTANT

## 2023-04-05 PROCEDURE — 4010F ACE/ARB THERAPY RXD/TAKEN: CPT | Mod: CPTII,S$GLB,, | Performed by: PHYSICIAN ASSISTANT

## 2023-04-05 PROCEDURE — 3288F FALL RISK ASSESSMENT DOCD: CPT | Mod: CPTII,S$GLB,, | Performed by: PHYSICIAN ASSISTANT

## 2023-04-05 PROCEDURE — 99999 PR PBB SHADOW E&M-EST. PATIENT-LVL III: ICD-10-PCS | Mod: PBBFAC,,,

## 2023-04-05 PROCEDURE — 3074F SYST BP LT 130 MM HG: CPT | Mod: CPTII,S$GLB,,

## 2023-04-05 PROCEDURE — 4010F ACE/ARB THERAPY RXD/TAKEN: CPT | Mod: CPTII,S$GLB,,

## 2023-04-05 PROCEDURE — 1157F ADVNC CARE PLAN IN RCRD: CPT | Mod: CPTII,S$GLB,,

## 2023-04-05 PROCEDURE — 1101F PT FALLS ASSESS-DOCD LE1/YR: CPT | Mod: CPTII,S$GLB,, | Performed by: PHYSICIAN ASSISTANT

## 2023-04-05 PROCEDURE — 99024 POSTOP FOLLOW-UP VISIT: CPT | Mod: S$GLB,,,

## 2023-04-05 PROCEDURE — 4010F PR ACE/ARB THEARPY RXD/TAKEN: ICD-10-PCS | Mod: CPTII,S$GLB,,

## 2023-04-05 PROCEDURE — 99999 PR PBB SHADOW E&M-EST. PATIENT-LVL III: CPT | Mod: PBBFAC,,,

## 2023-04-05 PROCEDURE — 3008F BODY MASS INDEX DOCD: CPT | Mod: CPTII,S$GLB,,

## 2023-04-05 PROCEDURE — 1160F PR REVIEW ALL MEDS BY PRESCRIBER/CLIN PHARMACIST DOCUMENTED: ICD-10-PCS | Mod: CPTII,S$GLB,,

## 2023-04-05 PROCEDURE — 1157F ADVNC CARE PLAN IN RCRD: CPT | Mod: CPTII,S$GLB,, | Performed by: PHYSICIAN ASSISTANT

## 2023-04-05 PROCEDURE — 3078F DIAST BP <80 MM HG: CPT | Mod: CPTII,S$GLB,,

## 2023-04-05 PROCEDURE — 1159F MED LIST DOCD IN RCRD: CPT | Mod: CPTII,S$GLB,, | Performed by: PHYSICIAN ASSISTANT

## 2023-04-05 PROCEDURE — 99999 PR PBB SHADOW E&M-EST. PATIENT-LVL III: ICD-10-PCS | Mod: PBBFAC,,, | Performed by: PHYSICIAN ASSISTANT

## 2023-04-05 PROCEDURE — 1159F PR MEDICATION LIST DOCUMENTED IN MEDICAL RECORD: ICD-10-PCS | Mod: CPTII,S$GLB,,

## 2023-04-05 PROCEDURE — 99204 OFFICE O/P NEW MOD 45 MIN: CPT | Mod: S$GLB,,, | Performed by: PHYSICIAN ASSISTANT

## 2023-04-05 PROCEDURE — 99024 PR POST-OP FOLLOW-UP VISIT: ICD-10-PCS | Mod: S$GLB,,,

## 2023-04-05 PROCEDURE — 4010F PR ACE/ARB THEARPY RXD/TAKEN: ICD-10-PCS | Mod: CPTII,S$GLB,, | Performed by: PHYSICIAN ASSISTANT

## 2023-04-05 PROCEDURE — 1160F RVW MEDS BY RX/DR IN RCRD: CPT | Mod: CPTII,S$GLB,, | Performed by: PHYSICIAN ASSISTANT

## 2023-04-05 PROCEDURE — 1159F PR MEDICATION LIST DOCUMENTED IN MEDICAL RECORD: ICD-10-PCS | Mod: CPTII,S$GLB,, | Performed by: PHYSICIAN ASSISTANT

## 2023-04-05 PROCEDURE — 1157F PR ADVANCE CARE PLAN OR EQUIV PRESENT IN MEDICAL RECORD: ICD-10-PCS | Mod: CPTII,S$GLB,,

## 2023-04-05 PROCEDURE — 1159F MED LIST DOCD IN RCRD: CPT | Mod: CPTII,S$GLB,,

## 2023-04-05 PROCEDURE — 1101F PR PT FALLS ASSESS DOC 0-1 FALLS W/OUT INJ PAST YR: ICD-10-PCS | Mod: CPTII,S$GLB,, | Performed by: PHYSICIAN ASSISTANT

## 2023-04-05 PROCEDURE — 1157F PR ADVANCE CARE PLAN OR EQUIV PRESENT IN MEDICAL RECORD: ICD-10-PCS | Mod: CPTII,S$GLB,, | Performed by: PHYSICIAN ASSISTANT

## 2023-04-05 PROCEDURE — 1160F RVW MEDS BY RX/DR IN RCRD: CPT | Mod: CPTII,S$GLB,,

## 2023-04-05 PROCEDURE — 3078F PR MOST RECENT DIASTOLIC BLOOD PRESSURE < 80 MM HG: ICD-10-PCS | Mod: CPTII,S$GLB,,

## 2023-04-05 PROCEDURE — 3288F PR FALLS RISK ASSESSMENT DOCUMENTED: ICD-10-PCS | Mod: CPTII,S$GLB,, | Performed by: PHYSICIAN ASSISTANT

## 2023-04-05 PROCEDURE — 3074F PR MOST RECENT SYSTOLIC BLOOD PRESSURE < 130 MM HG: ICD-10-PCS | Mod: CPTII,S$GLB,,

## 2023-04-05 PROCEDURE — 3008F PR BODY MASS INDEX (BMI) DOCUMENTED: ICD-10-PCS | Mod: CPTII,S$GLB,,

## 2023-04-05 RX ORDER — CLINDAMYCIN PHOSPHATE 10 MG/ML
SOLUTION TOPICAL 2 TIMES DAILY
Qty: 60 EACH | Refills: 1 | Status: SHIPPED | OUTPATIENT
Start: 2023-04-05 | End: 2023-04-19

## 2023-04-05 RX ORDER — MUPIROCIN 20 MG/G
OINTMENT TOPICAL 3 TIMES DAILY
Qty: 30 G | Refills: 1 | Status: SHIPPED | OUTPATIENT
Start: 2023-04-05

## 2023-04-05 NOTE — PROGRESS NOTES
Subjective:      Patient ID:  Vishnu Dougherty Jr. is a 63 y.o. male who presents for   Chief Complaint   Patient presents with    Rash     C/o rash all over after shingles vaccine in October of 2022, itching and redness     History of Present Illness: The patient presents with chief complaint of rash. Here w/sister, Rach (medical contact in chart)  Location: arms, trunk  Duration: October 2022  Signs/Symptoms: scattered, red bumps, itching.  Sister states this all started after having shingles vaccination on 10/4/222.      Prior treatments: Has been seen in UC, ER, and allergy on multiple occasions for rash. Has also been diagnosed w/folliculitis by allergy (completed bactrim). Sister states Recent h/o cellulitis of right arm requiring I&D by Dr. Patel under anesthesia. Has completed prednisone, dexamethasone injection, and HC 2.5% cream. Sister is describing another rash of groin that was like jock itch that resolved.     PMHX: dementia (lives in assisted living)        Review of Systems    Objective:   Physical Exam   Constitutional: He appears well-developed and well-nourished. No distress.   Neurological: He is alert and oriented to person, place, and time. He is not disoriented.   Psychiatric: He has a normal mood and affect.   Skin:   Areas Examined (abnormalities noted in diagram):   Head / Face Inspection Performed  Neck Inspection Performed  Chest / Axilla Inspection Performed  Abdomen Inspection Performed  Back Inspection Performed  RUE Inspected  LUE Inspection Performed  RLE Inspected  LLE Inspection Performed          Diagram Legend     Erythematous scaling macule/papule c/w actinic keratosis       Vascular papule c/w angioma      Pigmented verrucoid papule/plaque c/w seborrheic keratosis      Yellow umbilicated papule c/w sebaceous hyperplasia      Irregularly shaped tan macule c/w lentigo     1-2 mm smooth white papules consistent with Milia      Movable subcutaneous cyst with punctum c/w  epidermal inclusion cyst      Subcutaneous movable cyst c/w pilar cyst      Firm pink to brown papule c/w dermatofibroma      Pedunculated fleshy papule(s) c/w skin tag(s)      Evenly pigmented macule c/w junctional nevus     Mildly variegated pigmented, slightly irregular-bordered macule c/w mildly atypical nevus      Flesh colored to evenly pigmented papule c/w intradermal nevus       Pink pearly papule/plaque c/w basal cell carcinoma      Erythematous hyperkeratotic cursted plaque c/w SCC      Surgical scar with no sign of skin cancer recurrence      Open and closed comedones      Inflammatory papules and pustules      Verrucoid papule consistent consistent with wart     Erythematous eczematous patches and plaques     Dystrophic onycholytic nail with subungual debris c/w onychomycosis     Umbilicated papule    Erythematous-base heme-crusted tan verrucoid plaque consistent with inflamed seborrheic keratosis     Erythematous Silvery Scaling Plaque c/w Psoriasis     See annotation      Assessment / Plan:        Excoriation of multiple sites  Dermatitis  -     clindamycin (CLINDACIN ETZ) 1 % Swab; Apply topically 2 (two) times daily. For pimple like flares.  Dispense: 60 each; Refill: 1  -     mupirocin (BACTROBAN) 2 % ointment; Apply topically 3 (three) times daily. For broken skin  Dispense: 30 g; Refill: 1  Ddx; chronic folliculitis vs. LSC vs. Prurigo vs. Less likely drug reaction vs. Other  Diffuse excoriations. Advise trial of above rx meds. Would reconsider biopsy of primary lesion. Recommend derm MD f/u.     Rash  -     Ambulatory referral/consult to Dermatology           No follow-ups on file.

## 2023-04-05 NOTE — PROGRESS NOTES
History & Physical    SUBJECTIVE:     History of Present Illness:  Patient is a 63 y.o. male presents for post-operative evaluation s/p right arm I&D. He was prescribed clindamycin upon discharge. He presents with his sister who assists in his history due to his dementia. He denies any pain the area. He is able to use his right hand and wrist without issue. He denies fevers, chills, nausea, and vomiting.     Chief Complaint   Patient presents with    Follow-up     Wound check// staph infection       Review of patient's allergies indicates:   Allergen Reactions    Valium [diazepam] Other (See Comments)     Patient stated Valium caused him to be confused, agitated, combative.       Current Outpatient Medications   Medication Sig Dispense Refill    amlodipine-benazepril 10-20mg (LOTREL) 10-20 mg per capsule TAKE 1 CAPSULE BY MOUTH ONCE DAILY 90 capsule 3    atorvastatin (LIPITOR) 20 MG tablet Take 1 tablet (20 mg total) by mouth every evening. 90 tablet 3    buPROPion (WELLBUTRIN SR) 150 MG TBSR 12 hr tablet Take 1 tablet (150 mg total) by mouth 2 (two) times daily. 60 tablet 11    clindamycin (CLEOCIN) 150 MG capsule Take 1 capsule (150 mg total) by mouth 3 (three) times daily. for 14 days 42 capsule 0    clotrimazole-betamethasone 1-0.05% (LOTRISONE) cream Apply topically 2 (two) times daily.      cyanocobalamin 1,000 mcg/mL injection INJECT 1 ML (1,000 MCG TOTAL) INTO THE MUSCLE EVERY 28 DAYS. 3 mL 11    donepeziL (ARICEPT) 5 MG tablet TAKE 1 TABLET BY MOUTH EVERY DAY IN THE EVENING 90 tablet 3    DULoxetine (CYMBALTA) 60 MG capsule Take 1 capsule (60 mg total) by mouth once daily. 30 capsule 3    ergocalciferol (ERGOCALCIFEROL) 50,000 unit Cap Take 50,000 Units by mouth every 7 days.      memantine (NAMENDA) 5 MG Tab TAKE 1 TABLET BY MOUTH TWICE A  tablet 3    risperiDONE (RISPERDAL) 1 MG tablet Take 1 tablet (1 mg total) by mouth 2 (two) times daily. (Patient taking differently: Take 1 mg by mouth 2 (two)  times daily. Pt taking 1/2 tablet daily at night) 60 tablet 11    HYDROcodone-acetaminophen (NORCO) 5-325 mg per tablet Take 1 tablet by mouth every 6 (six) hours as needed for Pain. (Patient not taking: Reported on 2023) 15 tablet 0     No current facility-administered medications for this visit.       Past Medical History:   Diagnosis Date    Chronic CHF 10/2/2019    Chronic diastolic HF (heart failure) 2018    Coronary artery calcification 2022    Dilated cardiomyopathy 2015    Enlarged prostate     Essential hypertension 2015    Gastroesophageal reflux disease without esophagitis 10/7/2015    Gastroesophageal reflux disease without esophagitis 10/7/2015    Hyperlipidemia     Personal history of colonic polyps 2016    Shortness of breath 2015     Past Surgical History:   Procedure Laterality Date    COLONOSCOPY N/A 2016    Procedure: COLONOSCOPY;  Surgeon: Demetrio Spicer MD;  Location: [facility] ENDO;  Service: Endoscopy;  Laterality: N/A;    INCISION AND DRAINAGE OF ABSCESS Right 3/29/2023    Procedure: INCISION AND DRAINAGE, ABSCESS;  Surgeon: Cali Patel MD;  Location: [facility] OR;  Service: General;  Laterality: Right;     Family History   Problem Relation Age of Onset    Hypertension Mother     Hypertension Father     Stroke Father      Social History     Tobacco Use    Smoking status: Former     Types: Cigarettes     Quit date: 1995     Years since quittin.5     Passive exposure: Never    Smokeless tobacco: Never   Substance Use Topics    Alcohol use: No     Alcohol/week: 0.0 standard drinks    Drug use: No        Review of Systems:  Review of Systems   Constitutional:  Negative for chills, fever and unexpected weight change.   HENT:  Negative for congestion.    Eyes:  Negative for visual disturbance.   Respiratory:  Negative for shortness of breath.    Cardiovascular:  Negative for chest pain.   Gastrointestinal:  Negative for abdominal distention, abdominal pain,  constipation, nausea, rectal pain and vomiting.   Genitourinary:  Negative for dysuria.   Musculoskeletal:  Negative for arthralgias.   Skin:  Positive for wound. Negative for rash.   Neurological:  Negative for light-headedness.   Hematological:  Negative for adenopathy.     OBJECTIVE:     Vital Signs (Most Recent)  Pulse: 65 (04/05/23 0945)  BP: 116/69 (04/05/23 0945)     88.3 kg (194 lb 10.7 oz)     Physical Exam:  Physical Exam  Vitals reviewed.   Constitutional:       General: He is not in acute distress.     Appearance: He is well-developed.   HENT:      Head: Normocephalic and atraumatic.      Right Ear: External ear normal.      Left Ear: External ear normal.      Nose: Nose normal.      Mouth/Throat:      Mouth: Mucous membranes are moist.   Eyes:      Extraocular Movements: Extraocular movements intact.      Conjunctiva/sclera: Conjunctivae normal.   Cardiovascular:      Rate and Rhythm: Normal rate.   Pulmonary:      Effort: Pulmonary effort is normal. No respiratory distress.   Musculoskeletal:      Cervical back: Normal range of motion and neck supple.   Skin:     General: Skin is warm and dry.          Neurological:      Mental Status: He is alert and oriented to person, place, and time.       Laboratory  Microbiology: Reviewed  MRSA on culture, completed/completing course of clindamycin      ASSESSMENT/PLAN:     64 y/o s/p right arm I&D who is recovering as expected.     - Continue local wound care with daily irrigation and dressing changes.   - Advised to let us know should any signs and symptoms of repeat infection occur.  - Complete course of clindamycin.  - RTC 2 weeks for wound check or sooner if needed.    Karen Gross PA-C  [facility]

## 2023-04-16 ENCOUNTER — HOSPITAL ENCOUNTER (EMERGENCY)
Facility: HOSPITAL | Age: 63
Discharge: HOME | End: 2023-04-16
Attending: EMERGENCY MEDICINE
Payer: MEDICARE

## 2023-04-16 VITALS
HEART RATE: 63 BPM | TEMPERATURE: 98 F | DIASTOLIC BLOOD PRESSURE: 83 MMHG | HEIGHT: 72 IN | WEIGHT: 194.25 LBS | BODY MASS INDEX: 26.31 KG/M2 | RESPIRATION RATE: 20 BRPM | OXYGEN SATURATION: 96 % | SYSTOLIC BLOOD PRESSURE: 149 MMHG

## 2023-04-16 DIAGNOSIS — Z78.9 DIFFICULTY WITH ACTIVITIES OF DAILY LIVING: ICD-10-CM

## 2023-04-16 DIAGNOSIS — G30.9 ALZHEIMER'S DEMENTIA WITH BEHAVIORAL DISTURBANCE: Primary | ICD-10-CM

## 2023-04-16 DIAGNOSIS — F02.818 ALZHEIMER'S DEMENTIA WITH BEHAVIORAL DISTURBANCE: Primary | ICD-10-CM

## 2023-04-16 LAB
ALBUMIN SERPL BCP-MCNC: 3.7 G/DL (ref 3.5–5.2)
ALP SERPL-CCNC: 72 U/L (ref 55–135)
ALT SERPL W/O P-5'-P-CCNC: 23 U/L (ref 10–44)
ANION GAP SERPL CALC-SCNC: 10 MMOL/L (ref 8–16)
AST SERPL-CCNC: 19 U/L (ref 10–40)
BASOPHILS # BLD AUTO: 0.1 K/UL (ref 0–0.2)
BASOPHILS NFR BLD: 1.1 % (ref 0–1.9)
BILIRUB SERPL-MCNC: 0.2 MG/DL (ref 0.1–1)
BILIRUB UR QL STRIP: NEGATIVE
BUN SERPL-MCNC: 17 MG/DL (ref 8–23)
CALCIUM SERPL-MCNC: 8.9 MG/DL (ref 8.7–10.5)
CHLORIDE SERPL-SCNC: 104 MMOL/L (ref 95–110)
CLARITY UR: CLEAR
CO2 SERPL-SCNC: 26 MMOL/L (ref 23–29)
COLOR UR: YELLOW
CREAT SERPL-MCNC: 1 MG/DL (ref 0.5–1.4)
DIFFERENTIAL METHOD: ABNORMAL
EOSINOPHIL # BLD AUTO: 0.5 K/UL (ref 0–0.5)
EOSINOPHIL NFR BLD: 4.9 % (ref 0–8)
ERYTHROCYTE [DISTWIDTH] IN BLOOD BY AUTOMATED COUNT: 13 % (ref 11.5–14.5)
EST. GFR  (NO RACE VARIABLE): >60 ML/MIN/1.73 M^2
GLUCOSE SERPL-MCNC: 100 MG/DL (ref 70–110)
GLUCOSE UR QL STRIP: NEGATIVE
HCT VFR BLD AUTO: 35.6 % (ref 40–54)
HGB BLD-MCNC: 11.6 G/DL (ref 14–18)
HGB UR QL STRIP: NEGATIVE
IMM GRANULOCYTES # BLD AUTO: 0.05 K/UL (ref 0–0.04)
IMM GRANULOCYTES NFR BLD AUTO: 0.5 % (ref 0–0.5)
KETONES UR QL STRIP: ABNORMAL
LEUKOCYTE ESTERASE UR QL STRIP: ABNORMAL
LYMPHOCYTES # BLD AUTO: 1.8 K/UL (ref 1–4.8)
LYMPHOCYTES NFR BLD: 19.8 % (ref 18–48)
MCH RBC QN AUTO: 30.9 PG (ref 27–31)
MCHC RBC AUTO-ENTMCNC: 32.6 G/DL (ref 32–36)
MCV RBC AUTO: 95 FL (ref 82–98)
MICROSCOPIC COMMENT: NORMAL
MONOCYTES # BLD AUTO: 0.7 K/UL (ref 0.3–1)
MONOCYTES NFR BLD: 7.3 % (ref 4–15)
NEUTROPHILS # BLD AUTO: 6.1 K/UL (ref 1.8–7.7)
NEUTROPHILS NFR BLD: 66.4 % (ref 38–73)
NITRITE UR QL STRIP: NEGATIVE
NRBC BLD-RTO: 0 /100 WBC
PH UR STRIP: 6 [PH] (ref 5–8)
PLATELET # BLD AUTO: 258 K/UL (ref 150–450)
PMV BLD AUTO: 10.9 FL (ref 9.2–12.9)
POCT GLUCOSE: 105 MG/DL (ref 70–110)
POTASSIUM SERPL-SCNC: 4.1 MMOL/L (ref 3.5–5.1)
PROT SERPL-MCNC: 7.1 G/DL (ref 6–8.4)
PROT UR QL STRIP: ABNORMAL
RBC # BLD AUTO: 3.75 M/UL (ref 4.6–6.2)
SODIUM SERPL-SCNC: 140 MMOL/L (ref 136–145)
SP GR UR STRIP: >1.03 (ref 1–1.03)
URN SPEC COLLECT METH UR: ABNORMAL
UROBILINOGEN UR STRIP-ACNC: ABNORMAL EU/DL
WBC # BLD AUTO: 9.24 K/UL (ref 3.9–12.7)
WBC #/AREA URNS HPF: 0 /HPF (ref 0–5)

## 2023-04-16 PROCEDURE — 80053 COMPREHEN METABOLIC PANEL: CPT | Performed by: NURSE PRACTITIONER

## 2023-04-16 PROCEDURE — 81000 URINALYSIS NONAUTO W/SCOPE: CPT | Performed by: NURSE PRACTITIONER

## 2023-04-16 PROCEDURE — 85025 COMPLETE CBC W/AUTO DIFF WBC: CPT | Performed by: EMERGENCY MEDICINE

## 2023-04-16 PROCEDURE — 82962 GLUCOSE BLOOD TEST: CPT

## 2023-04-16 PROCEDURE — 99283 EMERGENCY DEPT VISIT LOW MDM: CPT

## 2023-04-16 NOTE — FIRST PROVIDER EVALUATION
Medical screening examination initiated.  I have conducted a focused provider triage encounter, findings are as follows:    Brief history of present illness:  Family brings patient to the ER for evaluation of altered mental status.  Patient recent staph infection and treatment.    Vitals:    04/16/23 1530   BP: 138/68   BP Location: Right arm   Patient Position: Sitting   Pulse: 70   Resp: 16   Temp: 97.9 °F (36.6 °C)   TempSrc: Oral   SpO2: 97%   Weight: 88.1 kg (194 lb 3.6 oz)   Height: 6' (1.829 m)       Pertinent physical exam:  No acute distress    Brief workup plan:  Labs, further eval    Preliminary workup initiated; this workup will be continued and followed by the physician or advanced practice provider that is assigned to the patient when roomed.

## 2023-04-17 ENCOUNTER — PATIENT MESSAGE (OUTPATIENT)
Dept: INTERNAL MEDICINE | Facility: CLINIC | Age: 63
End: 2023-04-17
Payer: MEDICARE

## 2023-04-17 NOTE — ED PROVIDER NOTES
SCRIBE #1 NOTE: I, Sharonda Talavera, am scribing for, and in the presence of, Kevin London MD. I have scribed the entire note.       History     Chief Complaint   Patient presents with    Altered Mental Status     Has dementia, walked away from assisted living facility for about an hour, tried to leave again just PTA. Recent DX Staph infection. Facility thinks he may have UTI.      HPI  4/16/2023, 7:08 PM  History obtained from the patient and family member at bedside    HPI:  Vishnu Dougherty Jr. is a 63 y.o. male with a PMH of chronic CHF, essential HTN, GERD, HLD, and dementia who presents to the [facility] emergency department for evaluation following altered mental status which onset this morning. Family member reports that pt wandered away from his assisted living facility at 9:45 am. He was found on the street and brought back to the facility. He wandered off again around lunchtime, but was stopped before he wandered off too far. Associated symptoms include rash to BUE and abdomen. Exacerbating factors: none reported. Pt denies any dysuria. Pt was recently dx with Staph infection and has been prescribed steroids and abx, per family. No other complaints or concerns.     Arrival mode: Personal vehicle     PCP: Stephan Quiñones MD    Review of patient's allergies indicates:   Allergen Reactions    Valium [diazepam] Other (See Comments)     Patient stated Valium caused him to be confused, agitated, combative.      Past Medical History:   Diagnosis Date    Abscess of arm 3/29/2023    Chronic CHF 10/2/2019    Chronic diastolic HF (heart failure) 4/17/2018    Coronary artery calcification 1/11/2022    Dilated cardiomyopathy 9/24/2015    Enlarged prostate     Essential hypertension 9/24/2015    Gastroesophageal reflux disease without esophagitis 10/7/2015    Gastroesophageal reflux disease without esophagitis 10/7/2015    Hyperlipidemia     Personal history of colonic polyps 2016    Shortness of breath  2015     Past Surgical History:   Procedure Laterality Date    COLONOSCOPY N/A 2016    Procedure: COLONOSCOPY;  Surgeon: Demetrio Spicer MD;  Location: [facility] ENDO;  Service: Endoscopy;  Laterality: N/A;    INCISION AND DRAINAGE OF ABSCESS Right 3/29/2023    Procedure: INCISION AND DRAINAGE, ABSCESS;  Surgeon: Cali Patel MD;  Location: [facility] OR;  Service: General;  Laterality: Right;       Family History   Problem Relation Age of Onset    Hypertension Mother     Hypertension Father     Stroke Father      Social History     Tobacco Use    Smoking status: Former     Types: Cigarettes     Quit date: 1995     Years since quittin.5     Passive exposure: Never    Smokeless tobacco: Never   Substance and Sexual Activity    Alcohol use: No     Alcohol/week: 0.0 standard drinks    Drug use: No    Sexual activity: Not Currently     Partners: Female      Review of Systems     Review of Systems   Constitutional: Negative.    HENT: Negative.     Eyes: Negative.    Respiratory: Negative.     Cardiovascular: Negative.    Gastrointestinal: Negative.    Endocrine: Negative.    Genitourinary: Negative.  Negative for dysuria.   Musculoskeletal: Negative.    Skin:  Positive for rash (BUE, abdomen).   Allergic/Immunologic: Negative.    Neurological: Negative.    Hematological: Negative.    Psychiatric/Behavioral:  Positive for confusion.    All other systems reviewed and are negative.       Physical Exam     Initial Vitals [23 1530]   BP Pulse Resp Temp SpO2   138/68 70 16 97.9 °F (36.6 °C) 97 %      MAP       --          Physical Exam  Nursing notes and vital signs reviewed.  Constitutional: Patient is in no distress. Pleasant and interactive.  Head: Normocephalic. Atraumatic.   Eyes: Conjunctivae are not pale. No scleral icterus.   ENT: Mucous membranes moist.   Neck: Supple.   Cardiovascular: Regular rate. Regular rhythm.   Pulmonary: No respiratory distress.   Abdominal: Non-distended.   Musculoskeletal:  Moves all extremities. No obvious deformities. No edema.   Skin: Warm and dry. Scattered excoriations on skin with no new pustular lesions.  Neurological:  Alert, awake, and appropriate. Normal speech. No acute lateralizing neurologic deficits appreciated. Oriented x1  Psychiatric: Normal affect.       ED Course   Procedures  Vitals:    04/16/23 1530 04/16/23 1802 04/16/23 1831 04/16/23 1900   BP: 138/68 (!) 145/74  (!) 149/83   Pulse: 70  68 63   Resp: 16  19 20   Temp: 97.9 °F (36.6 °C)      TempSrc: Oral      SpO2: 97%  100% 96%   Weight: 88.1 kg (194 lb 3.6 oz)      Height: 6' (1.829 m)        Lab Results Interpreted as Abnormal:  Labs Reviewed   URINALYSIS, REFLEX TO URINE CULTURE - Abnormal; Notable for the following components:       Result Value    Specific Gravity, UA >1.030 (*)     Protein, UA Trace (*)     Ketones, UA Trace (*)     Urobilinogen, UA 2.0-3.0 (*)     Leukocytes, UA 1+ (*)     All other components within normal limits    Narrative:     Specimen Source->Urine   CBC W/ AUTO DIFFERENTIAL - Abnormal; Notable for the following components:    RBC 3.75 (*)     Hemoglobin 11.6 (*)     Hematocrit 35.6 (*)     Immature Grans (Abs) 0.05 (*)     All other components within normal limits   COMPREHENSIVE METABOLIC PANEL   URINALYSIS MICROSCOPIC    Narrative:     Specimen Source->Urine   POCT GLUCOSE      All Lab Results:  Results for orders placed or performed during the hospital encounter of 04/16/23   Comprehensive metabolic panel   Result Value Ref Range    Sodium 140 136 - 145 mmol/L    Potassium 4.1 3.5 - 5.1 mmol/L    Chloride 104 95 - 110 mmol/L    CO2 26 23 - 29 mmol/L    Glucose 100 70 - 110 mg/dL    BUN 17 8 - 23 mg/dL    Creatinine 1.0 0.5 - 1.4 mg/dL    Calcium 8.9 8.7 - 10.5 mg/dL    Total Protein 7.1 6.0 - 8.4 g/dL    Albumin 3.7 3.5 - 5.2 g/dL    Total Bilirubin 0.2 0.1 - 1.0 mg/dL    Alkaline Phosphatase 72 55 - 135 U/L    AST 19 10 - 40 U/L    ALT 23 10 - 44 U/L    Anion Gap 10 8 - 16  mmol/L    eGFR >60 >60 mL/min/1.73 m^2   Urinalysis, Reflex to Urine Culture Urine, Clean Catch    Specimen: Urine   Result Value Ref Range    Specimen UA Urine, Clean Catch     Color, UA Yellow Yellow, Straw, Marj    Appearance, UA Clear Clear    pH, UA 6.0 5.0 - 8.0    Specific Gravity, UA >1.030 (A) 1.005 - 1.030    Protein, UA Trace (A) Negative    Glucose, UA Negative Negative    Ketones, UA Trace (A) Negative    Bilirubin (UA) Negative Negative    Occult Blood UA Negative Negative    Nitrite, UA Negative Negative    Urobilinogen, UA 2.0-3.0 (A) <2.0 EU/dL    Leukocytes, UA 1+ (A) Negative   CBC auto differential   Result Value Ref Range    WBC 9.24 3.90 - 12.70 K/uL    RBC 3.75 (L) 4.60 - 6.20 M/uL    Hemoglobin 11.6 (L) 14.0 - 18.0 g/dL    Hematocrit 35.6 (L) 40.0 - 54.0 %    MCV 95 82 - 98 fL    MCH 30.9 27.0 - 31.0 pg    MCHC 32.6 32.0 - 36.0 g/dL    RDW 13.0 11.5 - 14.5 %    Platelets 258 150 - 450 K/uL    MPV 10.9 9.2 - 12.9 fL    Immature Granulocytes 0.5 0.0 - 0.5 %    Gran # (ANC) 6.1 1.8 - 7.7 K/uL    Immature Grans (Abs) 0.05 (H) 0.00 - 0.04 K/uL    Lymph # 1.8 1.0 - 4.8 K/uL    Mono # 0.7 0.3 - 1.0 K/uL    Eos # 0.5 0.0 - 0.5 K/uL    Baso # 0.10 0.00 - 0.20 K/uL    nRBC 0 0 /100 WBC    Gran % 66.4 38.0 - 73.0 %    Lymph % 19.8 18.0 - 48.0 %    Mono % 7.3 4.0 - 15.0 %    Eosinophil % 4.9 0.0 - 8.0 %    Basophil % 1.1 0.0 - 1.9 %    Differential Method Automated    Urinalysis Microscopic   Result Value Ref Range    WBC, UA 0 0 - 5 /hpf    Microscopic Comment SEE COMMENT    POCT glucose   Result Value Ref Range    POCT Glucose 105 70 - 110 mg/dL     Imaging Results    None        ED Physician's independent review of the above imaging: agree with radiologist        The emergency physician reviewed the vital signs and test results, which are outlined above.     ED Discussion     7:13 PM: Patient's evaluation in the ED does not suggest any emergent or life-threatening medical conditions requiring  immediate intervention beyond what was provided in the ED, and I believe patient is safe for discharge.  Regardless, an unremarkable evaluation in the ED does not preclude the development or presence of a serious or life-threatening condition. As such, patient was given return instructions for any change or worsening of symptoms.     Medical Decision Making:   Clinical Tests:   Lab Tests: Ordered and Reviewed       ED Medication(s):  Medications - No data to display  Discharge Medication List as of 4/16/2023  7:07 PM        Prescription Management: I performed a review of the patient's current Rx medication list as input by nursing staff.     Follow-up Information       Stephan Quiñones MD. Schedule an appointment as soon as possible for a visit in 1 day.    Specialty: Family Medicine  Contact information:  [address] [facility] [address]  [phone]               [facility]    Specialty: Emergency Medicine  Why: As needed, If symptoms worsen  Contact information:  [address] [facility] [id number] [phone]                           Scribe Attestation:   Scribe #1: I performed the above scribed service and the documentation accurately describes the services I performed. I attest to the accuracy of the note.     Attending:   Physician Attestation Statement for Scribe #1: I, Kevin London MD, personally performed the services described in this documentation, as scribed by Sharonda Talavera, in my presence, and it is both accurate and complete. As with other dictation methods such as dictation software, small errors or inconsistencies may be overlooked due to the goal of spending more face-to-face time with patients.      Clinical Impression       ICD-10-CM ICD-9-CM   1. Alzheimer's dementia with behavioral disturbance  G30.9 331.0    F02.818 294.11   2. Difficulty with activities of daily living  Z78.9 V49.89      ED Disposition Condition    Discharge  Stable               Kevin London MD  04/17/23 2507

## 2023-04-18 ENCOUNTER — TELEPHONE (OUTPATIENT)
Dept: INTERNAL MEDICINE | Facility: CLINIC | Age: 63
End: 2023-04-18
Payer: MEDICARE

## 2023-04-18 NOTE — TELEPHONE ENCOUNTER
----- Message from Rere Sandoval sent at 4/18/2023  9:32 AM CDT -----  Contact: pt's sister/Rach Loyd is calling in regard to the pt's appt on tomorrow and needs an order for a chest x ray.    Please call her back to discuss the xray and other messages she sent in [employer] which may not be getting to you all.  Please call her back at [phone] thanks/mpluis

## 2023-04-19 ENCOUNTER — OFFICE VISIT (OUTPATIENT)
Dept: INTERNAL MEDICINE | Facility: CLINIC | Age: 63
End: 2023-04-19
Payer: MEDICARE

## 2023-04-19 ENCOUNTER — HOSPITAL ENCOUNTER (OUTPATIENT)
Dept: RADIOLOGY | Facility: HOSPITAL | Age: 63
Discharge: HOME | End: 2023-04-19
Attending: FAMILY MEDICINE
Payer: MEDICARE

## 2023-04-19 ENCOUNTER — OFFICE VISIT (OUTPATIENT)
Dept: SURGERY | Facility: CLINIC | Age: 63
End: 2023-04-19
Payer: MEDICARE

## 2023-04-19 VITALS
DIASTOLIC BLOOD PRESSURE: 66 MMHG | SYSTOLIC BLOOD PRESSURE: 114 MMHG | BODY MASS INDEX: 25.96 KG/M2 | HEART RATE: 75 BPM | HEIGHT: 72 IN | OXYGEN SATURATION: 98 % | WEIGHT: 191.69 LBS | TEMPERATURE: 98 F

## 2023-04-19 VITALS
SYSTOLIC BLOOD PRESSURE: 114 MMHG | HEART RATE: 64 BPM | BODY MASS INDEX: 26.46 KG/M2 | DIASTOLIC BLOOD PRESSURE: 64 MMHG | WEIGHT: 195.13 LBS

## 2023-04-19 DIAGNOSIS — L02.419 ABSCESS OF ARM: Primary | ICD-10-CM

## 2023-04-19 DIAGNOSIS — F32.A ANXIETY AND DEPRESSION: ICD-10-CM

## 2023-04-19 DIAGNOSIS — G30.9 ALZHEIMER'S DEMENTIA WITH OTHER BEHAVIORAL DISTURBANCE, UNSPECIFIED DEMENTIA SEVERITY, UNSPECIFIED TIMING OF DEMENTIA ONSET: ICD-10-CM

## 2023-04-19 DIAGNOSIS — F41.9 ANXIETY AND DEPRESSION: ICD-10-CM

## 2023-04-19 DIAGNOSIS — E78.5 HYPERLIPIDEMIA, UNSPECIFIED HYPERLIPIDEMIA TYPE: ICD-10-CM

## 2023-04-19 DIAGNOSIS — Z02.9 ADMINISTRATIVE ENCOUNTER: ICD-10-CM

## 2023-04-19 DIAGNOSIS — F32.A DEPRESSION, UNSPECIFIED DEPRESSION TYPE: ICD-10-CM

## 2023-04-19 DIAGNOSIS — F02.818 ALZHEIMER'S DEMENTIA WITH OTHER BEHAVIORAL DISTURBANCE, UNSPECIFIED DEMENTIA SEVERITY, UNSPECIFIED TIMING OF DEMENTIA ONSET: ICD-10-CM

## 2023-04-19 DIAGNOSIS — D64.9 ANEMIA, UNSPECIFIED TYPE: ICD-10-CM

## 2023-04-19 DIAGNOSIS — Z02.9 ADMINISTRATIVE ENCOUNTER: Primary | ICD-10-CM

## 2023-04-19 DIAGNOSIS — Z86.73 HISTORY OF TIA (TRANSIENT ISCHEMIC ATTACK): ICD-10-CM

## 2023-04-19 DIAGNOSIS — R91.1 LUNG NODULE: ICD-10-CM

## 2023-04-19 DIAGNOSIS — R21 RASH: ICD-10-CM

## 2023-04-19 DIAGNOSIS — I10 HYPERTENSION, UNSPECIFIED TYPE: ICD-10-CM

## 2023-04-19 PROCEDURE — 3074F PR MOST RECENT SYSTOLIC BLOOD PRESSURE < 130 MM HG: ICD-10-PCS | Mod: CPTII,S$GLB,, | Performed by: FAMILY MEDICINE

## 2023-04-19 PROCEDURE — 1160F RVW MEDS BY RX/DR IN RCRD: CPT | Mod: CPTII,S$GLB,,

## 2023-04-19 PROCEDURE — 99214 PR OFFICE/OUTPT VISIT, EST, LEVL IV, 30-39 MIN: ICD-10-PCS | Mod: S$GLB,,, | Performed by: FAMILY MEDICINE

## 2023-04-19 PROCEDURE — 1159F MED LIST DOCD IN RCRD: CPT | Mod: CPTII,S$GLB,,

## 2023-04-19 PROCEDURE — 99999 PR PBB SHADOW E&M-EST. PATIENT-LVL IV: CPT | Mod: PBBFAC,,, | Performed by: FAMILY MEDICINE

## 2023-04-19 PROCEDURE — 99024 PR POST-OP FOLLOW-UP VISIT: ICD-10-PCS | Mod: S$GLB,,,

## 2023-04-19 PROCEDURE — 3008F PR BODY MASS INDEX (BMI) DOCUMENTED: ICD-10-PCS | Mod: CPTII,S$GLB,,

## 2023-04-19 PROCEDURE — 3078F DIAST BP <80 MM HG: CPT | Mod: CPTII,S$GLB,, | Performed by: FAMILY MEDICINE

## 2023-04-19 PROCEDURE — 3288F PR FALLS RISK ASSESSMENT DOCUMENTED: ICD-10-PCS | Mod: CPTII,S$GLB,, | Performed by: FAMILY MEDICINE

## 2023-04-19 PROCEDURE — 3074F SYST BP LT 130 MM HG: CPT | Mod: CPTII,S$GLB,,

## 2023-04-19 PROCEDURE — 1157F ADVNC CARE PLAN IN RCRD: CPT | Mod: CPTII,S$GLB,, | Performed by: FAMILY MEDICINE

## 2023-04-19 PROCEDURE — 3008F PR BODY MASS INDEX (BMI) DOCUMENTED: ICD-10-PCS | Mod: CPTII,S$GLB,, | Performed by: FAMILY MEDICINE

## 2023-04-19 PROCEDURE — 3288F FALL RISK ASSESSMENT DOCD: CPT | Mod: CPTII,S$GLB,, | Performed by: FAMILY MEDICINE

## 2023-04-19 PROCEDURE — 3074F PR MOST RECENT SYSTOLIC BLOOD PRESSURE < 130 MM HG: ICD-10-PCS | Mod: CPTII,S$GLB,,

## 2023-04-19 PROCEDURE — 1157F PR ADVANCE CARE PLAN OR EQUIV PRESENT IN MEDICAL RECORD: ICD-10-PCS | Mod: CPTII,S$GLB,,

## 2023-04-19 PROCEDURE — 1101F PT FALLS ASSESS-DOCD LE1/YR: CPT | Mod: CPTII,S$GLB,, | Performed by: FAMILY MEDICINE

## 2023-04-19 PROCEDURE — 99999 PR PBB SHADOW E&M-EST. PATIENT-LVL III: ICD-10-PCS | Mod: PBBFAC,,,

## 2023-04-19 PROCEDURE — 4010F PR ACE/ARB THEARPY RXD/TAKEN: ICD-10-PCS | Mod: CPTII,S$GLB,, | Performed by: FAMILY MEDICINE

## 2023-04-19 PROCEDURE — 1157F ADVNC CARE PLAN IN RCRD: CPT | Mod: CPTII,S$GLB,,

## 2023-04-19 PROCEDURE — 1159F MED LIST DOCD IN RCRD: CPT | Mod: CPTII,S$GLB,, | Performed by: FAMILY MEDICINE

## 2023-04-19 PROCEDURE — 1159F PR MEDICATION LIST DOCUMENTED IN MEDICAL RECORD: ICD-10-PCS | Mod: CPTII,S$GLB,, | Performed by: FAMILY MEDICINE

## 2023-04-19 PROCEDURE — 3078F DIAST BP <80 MM HG: CPT | Mod: CPTII,S$GLB,,

## 2023-04-19 PROCEDURE — 71046 X-RAY EXAM CHEST 2 VIEWS: CPT | Mod: TC

## 2023-04-19 PROCEDURE — 99214 OFFICE O/P EST MOD 30 MIN: CPT | Mod: S$GLB,,, | Performed by: FAMILY MEDICINE

## 2023-04-19 PROCEDURE — 71046 X-RAY EXAM CHEST 2 VIEWS: CPT | Mod: 26,,, | Performed by: RADIOLOGY

## 2023-04-19 PROCEDURE — 4010F ACE/ARB THERAPY RXD/TAKEN: CPT | Mod: CPTII,S$GLB,, | Performed by: FAMILY MEDICINE

## 2023-04-19 PROCEDURE — 1160F PR REVIEW ALL MEDS BY PRESCRIBER/CLIN PHARMACIST DOCUMENTED: ICD-10-PCS | Mod: CPTII,S$GLB,,

## 2023-04-19 PROCEDURE — 71046 XR CHEST PA AND LATERAL: ICD-10-PCS | Mod: 26,,, | Performed by: RADIOLOGY

## 2023-04-19 PROCEDURE — 1101F PR PT FALLS ASSESS DOC 0-1 FALLS W/OUT INJ PAST YR: ICD-10-PCS | Mod: CPTII,S$GLB,, | Performed by: FAMILY MEDICINE

## 2023-04-19 PROCEDURE — 4010F PR ACE/ARB THEARPY RXD/TAKEN: ICD-10-PCS | Mod: CPTII,S$GLB,,

## 2023-04-19 PROCEDURE — 3074F SYST BP LT 130 MM HG: CPT | Mod: CPTII,S$GLB,, | Performed by: FAMILY MEDICINE

## 2023-04-19 PROCEDURE — 3008F BODY MASS INDEX DOCD: CPT | Mod: CPTII,S$GLB,,

## 2023-04-19 PROCEDURE — 99999 PR PBB SHADOW E&M-EST. PATIENT-LVL III: CPT | Mod: PBBFAC,,,

## 2023-04-19 PROCEDURE — 3008F BODY MASS INDEX DOCD: CPT | Mod: CPTII,S$GLB,, | Performed by: FAMILY MEDICINE

## 2023-04-19 PROCEDURE — 99999 PR PBB SHADOW E&M-EST. PATIENT-LVL IV: ICD-10-PCS | Mod: PBBFAC,,, | Performed by: FAMILY MEDICINE

## 2023-04-19 PROCEDURE — 1157F PR ADVANCE CARE PLAN OR EQUIV PRESENT IN MEDICAL RECORD: ICD-10-PCS | Mod: CPTII,S$GLB,, | Performed by: FAMILY MEDICINE

## 2023-04-19 PROCEDURE — 3078F PR MOST RECENT DIASTOLIC BLOOD PRESSURE < 80 MM HG: ICD-10-PCS | Mod: CPTII,S$GLB,, | Performed by: FAMILY MEDICINE

## 2023-04-19 PROCEDURE — 3078F PR MOST RECENT DIASTOLIC BLOOD PRESSURE < 80 MM HG: ICD-10-PCS | Mod: CPTII,S$GLB,,

## 2023-04-19 PROCEDURE — 99024 POSTOP FOLLOW-UP VISIT: CPT | Mod: S$GLB,,,

## 2023-04-19 PROCEDURE — 1159F PR MEDICATION LIST DOCUMENTED IN MEDICAL RECORD: ICD-10-PCS | Mod: CPTII,S$GLB,,

## 2023-04-19 PROCEDURE — 4010F ACE/ARB THERAPY RXD/TAKEN: CPT | Mod: CPTII,S$GLB,,

## 2023-04-19 RX ORDER — ASPIRIN 81 MG/1
81 TABLET ORAL DAILY
COMMUNITY

## 2023-04-19 RX ORDER — RISPERIDONE 1 MG/1
1 TABLET ORAL 2 TIMES DAILY
COMMUNITY
End: 2023-05-17 | Stop reason: SDUPTHER

## 2023-04-19 NOTE — PROGRESS NOTES
Subjective:       Patient ID: Vishnu Dougherty Jr. is a 63 y.o. male.    Chief Complaint: nursing home admission    HPI    63    Patient Active Problem List   Diagnosis    Essential hypertension    Memory loss of unknown cause    Anxiety and depression    Mild cognitive impairment with memory loss    TIA (transient ischemic attack)    Chronic congestive heart failure    Contact dermatitis due to poison ivy    Dementia    Prolonged Q-T interval on ECG    B12 deficiency    Other disorders of calcium metabolism     Dementia without behavioral disturbance    Vitamin D deficiency    Lung nodule    Anemia    Coronary artery calcification    Calcification of aorta    Tail bone pain    Coarse tremors    Dementia with behavioral disturbance    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    Hyperlipidemia       Past Medical History:   Diagnosis Date    Abscess of arm 3/29/2023    Chronic CHF 10/2/2019    Chronic diastolic HF (heart failure) 4/17/2018    Coronary artery calcification 1/11/2022    Dilated cardiomyopathy 9/24/2015    Enlarged prostate     Essential hypertension 9/24/2015    Gastroesophageal reflux disease without esophagitis 10/7/2015    Gastroesophageal reflux disease without esophagitis 10/7/2015    Hyperlipidemia     Personal history of colonic polyps 2016    Shortness of breath 9/24/2015       Past Surgical History:   Procedure Laterality Date    COLONOSCOPY N/A 5/25/2016    Procedure: COLONOSCOPY;  Surgeon: Demetrio Spicer MD;  Location: [facility] ENDO;  Service: Endoscopy;  Laterality: N/A;    INCISION AND DRAINAGE OF ABSCESS Right 3/29/2023    Procedure: INCISION AND DRAINAGE, ABSCESS;  Surgeon: Cali Patel MD;  Location: [facility] OR;  Service: General;  Laterality: Right;       Family History   Problem Relation Age of Onset    Hypertension Mother     Hypertension Father     Stroke Father        Social History     Tobacco Use   Smoking Status Former    Types: Cigarettes    Quit date: 9/24/1995     Years since quittin.5    Passive exposure: Never   Smokeless Tobacco Never       Wt Readings from Last 5 Encounters:   23 87 kg (191 lb 11 oz)   23 88.1 kg (194 lb 3.6 oz)   23 88.3 kg (194 lb 10.7 oz)   23 86.2 kg (190 lb)   23 86.4 kg (190 lb 7.6 oz)       Current Outpatient Medications on File Prior to Visit   Medication Sig Dispense Refill    amlodipine-benazepril 10-20mg (LOTREL) 10-20 mg per capsule TAKE 1 CAPSULE BY MOUTH ONCE DAILY 90 capsule 3    aspirin (ECOTRIN) 81 MG EC tablet Take 81 mg by mouth once daily.      atorvastatin (LIPITOR) 20 MG tablet Take 1 tablet (20 mg total) by mouth every evening. 90 tablet 3    buPROPion (WELLBUTRIN SR) 150 MG TBSR 12 hr tablet Take 1 tablet (150 mg total) by mouth 2 (two) times daily. 60 tablet 11    cyanocobalamin 1,000 mcg/mL injection INJECT 1 ML (1,000 MCG TOTAL) INTO THE MUSCLE EVERY 28 DAYS. 3 mL 11    donepeziL (ARICEPT) 5 MG tablet TAKE 1 TABLET BY MOUTH EVERY DAY IN THE EVENING 90 tablet 3    DULoxetine (CYMBALTA) 60 MG capsule Take 1 capsule (60 mg total) by mouth once daily. 30 capsule 3    memantine (NAMENDA) 5 MG Tab TAKE 1 TABLET BY MOUTH TWICE A  tablet 3    risperiDONE (RISPERDAL) 1 MG tablet Take 1 mg by mouth 2 (two) times daily.      [DISCONTINUED] ergocalciferol (ERGOCALCIFEROL) 50,000 unit Cap Take 50,000 Units by mouth every 7 days.      [DISCONTINUED] risperiDONE (RISPERDAL) 1 MG tablet Take 1 tablet (1 mg total) by mouth 2 (two) times daily. (Patient taking differently: Take 1 mg by mouth 2 (two) times daily. Pt taking 1/2 tablet daily at night) 60 tablet 11    clindamycin (CLINDACIN ETZ) 1 % Swab Apply topically 2 (two) times daily. For pimple like flares. (Patient not taking: Reported on 2023) 60 each 1    mupirocin (BACTROBAN) 2 % ointment Apply topically 3 (three) times daily. For broken skin (Patient not taking: Reported on 2023) 30 g 1    [DISCONTINUED] clotrimazole-betamethasone  1-0.05% (LOTRISONE) cream Apply topically 2 (two) times daily.       No current facility-administered medications on file prior to visit.     Sole allergy : Valium  - went into a rage when used.    For further HPI details, see assessment and plan.    Review of Systems  no acute complaints per patient.   ROS questionable w/ cognitive status  Objective:      Vitals:    04/19/23 1009   BP: 114/66   Pulse: 75   Temp: 98 °F (36.7 °C)       Physical Exam  Constitutional:       General: He is not in acute distress.     Appearance: Normal appearance. He is well-developed.   Neck:      Trachea: Trachea normal.   Cardiovascular:      Rate and Rhythm: Normal rate and regular rhythm.      Heart sounds: Normal heart sounds.   Pulmonary:      Effort: Pulmonary effort is normal. No respiratory distress.      Breath sounds: Normal breath sounds. No decreased breath sounds.   Abdominal:      Palpations: Abdomen is soft.   Musculoskeletal:      Cervical back: Full passive range of motion without pain.   Neurological:      Mental Status: He is alert and oriented to person, place, and time.   Psychiatric:         Speech: Speech normal.         Behavior: Behavior normal.         Thought Content: Thought content normal.       Assessment:       1. Administrative encounter    2. Depression, unspecified depression type    3. Anxiety and depression    4. Rash    5. Anemia, unspecified type    6. History of TIA (transient ischemic attack)    7. Alzheimer's dementia with other behavioral disturbance, unspecified dementia severity, unspecified timing of dementia onset    8. Hyperlipidemia, unspecified hyperlipidemia type    9. Hypertension, unspecified type        Plan:       Patient has dementia.  He is currently living in assisted living.  Recently he walked away from the facility on his own.  Needs to be transition to a nursing home.  Presents today in order to obtain orders for such along with required investigations.  Patient needs a chest  x-ray.    Admission for [facility] quite reasonable and I agree.  Will provide w/ today's documentation to be used a Medical History and Physical required.    Sister brought patient to the emergency department recently out of concern of a urinary tract infection given the fact he walked away from the facility.  Reviewed those investigations.    Mild anemia noted.  But this seems to be chronic and no worse than usual.  No elevated white blood cell count to indicate infection.  No white blood cell on his urine microscopic . Not indicative of infection.  Comprehensive metabolic panel entirely normal.  Head CT was done.  No evidence of an acute intracranial process.  Atrophy was noted.    Personal hygiene is concerning.  He is not bathing, brushing his teeth, changing clothes, or keeping clean shaved.  Unable to adequately care for himself.  Dirty closer being found stuffy under things.  Needs further assistance than what he currently has.  Hygiene issue likely contributing to his recurrent skin lesions/infections.    Skin Rash  Starts as pustules - he seems to irritate the lesions and they become inflammed  - March 27 - skin infection - significant - Gen surgery involved.  Topical agents failed  Has been on oral abx lately - has been helpful (oral clindamycin)  Has further derm f/u tomorrow    Reviewed his last cardiology visit.  November 23, 2022.  Discussed dilated cardiomyopathy, hypertension, hyperlipidemia, history of TIA, dementia    Uncertain of the diagnosis code of congestive heart failure.  Last reviewable echocardiogram showed normal ejection fraction.  He is having no shortness of breath or swelling issues.    Hypertension  BP Readings from Last 3 Encounters:   04/19/23 114/66   04/16/23 (!) 149/83   04/05/23 116/69   Blood pressure is well controlled.  No change to medications.  He is currently on amlodipine 10 mg combined with benazepril at 20 mg.      Hyperlipidemia   He is on Lipitor 20  Continue  med  Lab Results   Component Value Date    LDLCALC 70.0 08/21/2020   Well controlled at last check  Will need to monitor in near future    The 10-year ASCVD risk score (Jessy COBIAN, et al., 2019) is: 9.2%    Values used to calculate the score:      Age: 63 years      Sex: Male      Is Non- : No      Diabetic: No      Tobacco smoker: No      Systolic Blood Pressure: 114 mmHg      Is BP treated: Yes      HDL Cholesterol: 48 mg/dL      Total Cholesterol: 130 mg/dL  H/o TIA  -on asa  -on statin.  Continue meds    Some question about vitamin-D.  He is what I suspect to be incorrectly listed as taking 82451 IU once weekly.  We will remove from the chart as I do not believe he is taking it.  I would be okay with daily vitamin-D supplementation.  He is taking a multivitamin.  Encouraged look at the back of it to ensure there is some vitamin-D at hopefully around a 1000 IU once a day.      Dementia w/ history behavioral disturbances in past.  Depression  Anxiety  Reviewed visit with neurology  He is taking Aricept at 5 mg, Namenda at 5 mg. Continue meds  Risperidone 1 mg BID   Cymbalta 60 mg daily.  Wellbutrin 150 BID  All meds prescribed by his neurology team, as is his b12 supplementation.  Outside of his recent episodes of wandering off from his current facility he is not been having behavioral disturbances.  No out burst or violent behavior.  No other concerning signs reported ( outside of his hygiene concerns).  Mood seems quite stable.    Tinea cruris  Sounds resolved.  Will remove the clotrimazole-betamethasone from his drug list.    H/o lung nodule  Noted 2019 CT  2mm - commentary indicated stability  POA seeks no further investigation.  Clinically stable  Reasonable to just monitor symptoms  Will get requested chest xray    POA not interested in any further screening type testing.    Will request a six-month follow-up.  If primary care services will be managed at external facility will defer  services to them.    This note was verbally dictated, please excuse any type errors.

## 2023-04-19 NOTE — PROGRESS NOTES
History & Physical    SUBJECTIVE:     History of Present Illness:  Patient is a 63 y.o. male presents for post-operative evaluation s/p right arm I&D. He was prescribed clindamycin upon discharge. He presents with his sister who assists in his history due to his dementia. He denies any pain the area. He is able to use his right hand and wrist without issue. He denies fevers, chills, nausea, and vomiting.     Interval History:  Visit, the patient has not well.  He lives at an assisted living facility where they have been helping him perform local wound care.  He had a trip to the ED a few days ago after wandering away from his facility.  Workup in the ED was unremarkable, and he was released.  He denies any pain in the area and is feeling well.  His sister accompanies him to aid in history.  He denies any fevers and chills and reports tolerating    Chief Complaint   Patient presents with    Hospital Follow Up     Hospital follow up       Review of patient's allergies indicates:   Allergen Reactions    Valium [diazepam] Other (See Comments)     Patient stated Valium caused him to be confused, agitated, combative.       Current Outpatient Medications   Medication Sig Dispense Refill    amlodipine-benazepril 10-20mg (LOTREL) 10-20 mg per capsule TAKE 1 CAPSULE BY MOUTH ONCE DAILY 90 capsule 3    aspirin (ECOTRIN) 81 MG EC tablet Take 81 mg by mouth once daily.      atorvastatin (LIPITOR) 20 MG tablet Take 1 tablet (20 mg total) by mouth every evening. 90 tablet 3    buPROPion (WELLBUTRIN SR) 150 MG TBSR 12 hr tablet Take 1 tablet (150 mg total) by mouth 2 (two) times daily. 60 tablet 11    cyanocobalamin 1,000 mcg/mL injection INJECT 1 ML (1,000 MCG TOTAL) INTO THE MUSCLE EVERY 28 DAYS. 3 mL 11    donepeziL (ARICEPT) 5 MG tablet TAKE 1 TABLET BY MOUTH EVERY DAY IN THE EVENING 90 tablet 3    DULoxetine (CYMBALTA) 60 MG capsule Take 1 capsule (60 mg total) by mouth once daily. 30 capsule 3    memantine (NAMENDA) 5 MG Tab  TAKE 1 TABLET BY MOUTH TWICE A  tablet 3    mupirocin (BACTROBAN) 2 % ointment Apply topically 3 (three) times daily. For broken skin 30 g 1    risperiDONE (RISPERDAL) 1 MG tablet Take 1 mg by mouth 2 (two) times daily.      clindamycin (CLINDACIN ETZ) 1 % Swab Apply topically 2 (two) times daily. For pimple like flares. (Patient not taking: Reported on 2023) 60 each 1     No current facility-administered medications for this visit.       Past Medical History:   Diagnosis Date    Abscess of arm 3/29/2023    Chronic CHF 10/2/2019    Chronic diastolic HF (heart failure) 2018    Coronary artery calcification 2022    Dilated cardiomyopathy 2015    Enlarged prostate     Essential hypertension 2015    Gastroesophageal reflux disease without esophagitis 10/7/2015    Gastroesophageal reflux disease without esophagitis 10/7/2015    Hyperlipidemia     Personal history of colonic polyps 2016    Shortness of breath 2015     Past Surgical History:   Procedure Laterality Date    COLONOSCOPY N/A 2016    Procedure: COLONOSCOPY;  Surgeon: Demetrio Spicer MD;  Location: [facility] ENDO;  Service: Endoscopy;  Laterality: N/A;    INCISION AND DRAINAGE OF ABSCESS Right 3/29/2023    Procedure: INCISION AND DRAINAGE, ABSCESS;  Surgeon: Cali Patel MD;  Location: [facility] OR;  Service: General;  Laterality: Right;     Family History   Problem Relation Age of Onset    Hypertension Mother     Hypertension Father     Stroke Father      Social History     Tobacco Use    Smoking status: Former     Types: Cigarettes     Quit date: 1995     Years since quittin.5     Passive exposure: Never    Smokeless tobacco: Never   Substance Use Topics    Alcohol use: No     Alcohol/week: 0.0 standard drinks    Drug use: No        Review of Systems:  Review of Systems   Constitutional:  Negative for chills, fever and unexpected weight change.   HENT:  Negative for congestion.    Eyes:  Negative for visual  disturbance.   Respiratory:  Negative for shortness of breath.    Cardiovascular:  Negative for chest pain.   Gastrointestinal:  Negative for abdominal distention, abdominal pain, constipation, nausea, rectal pain and vomiting.   Genitourinary:  Negative for dysuria.   Musculoskeletal:  Negative for arthralgias.   Skin:  Positive for wound. Negative for rash.   Neurological:  Negative for light-headedness.   Hematological:  Negative for adenopathy.     OBJECTIVE:     Vital Signs (Most Recent)  Pulse: 64 (04/19/23 1459)  BP: 114/64 (04/19/23 1459)     88.5 kg (195 lb 1.7 oz)     Physical Exam:  Physical Exam  Vitals reviewed.   Constitutional:       General: He is not in acute distress.     Appearance: He is well-developed.   HENT:      Head: Normocephalic and atraumatic.      Right Ear: External ear normal.      Left Ear: External ear normal.      Nose: Nose normal.      Mouth/Throat:      Mouth: Mucous membranes are moist.   Eyes:      Extraocular Movements: Extraocular movements intact.      Conjunctiva/sclera: Conjunctivae normal.   Cardiovascular:      Rate and Rhythm: Normal rate.   Pulmonary:      Effort: Pulmonary effort is normal. No respiratory distress.   Musculoskeletal:      Cervical back: Normal range of motion and neck supple.   Skin:     General: Skin is warm and dry.          Neurological:      Mental Status: He is alert and oriented to person, place, and time.       Laboratory  Microbiology: Reviewed  MRSA on culture, completed/completing course of clindamycin      ASSESSMENT/PLAN:     62 y/o s/p right arm I&D who is recovering as expected.     - continue local wound care a needed.  Should heal within the next week or two.  - return to clinic as needed or if any issues arise.     Karen Gross PA-C  [facility]

## 2023-04-20 ENCOUNTER — TELEPHONE (OUTPATIENT)
Dept: INTERNAL MEDICINE | Facility: CLINIC | Age: 63
End: 2023-04-20
Payer: MEDICARE

## 2023-04-20 NOTE — TELEPHONE ENCOUNTER
Returning Mally-Herita Golva call regarding level 1 form. No answer. Left message to call us back.

## 2023-04-20 NOTE — TELEPHONE ENCOUNTER
----- Message from Saundra Almeida sent at 4/20/2023  3:21 PM CDT -----  Contact: [employer]ally/ [employer] is going to be faxing a Level one form. Please call with questions at [phone]

## 2023-04-21 ENCOUNTER — TELEPHONE (OUTPATIENT)
Dept: INTERNAL MEDICINE | Facility: CLINIC | Age: 63
End: 2023-04-21
Payer: MEDICARE

## 2023-04-21 NOTE — TELEPHONE ENCOUNTER
Patient sister informed form level 1 received and she can pick-up Monday to fill up the page needed from their behalf. Verbally understood and agreed.

## 2023-04-21 NOTE — TELEPHONE ENCOUNTER
----- Message from Roque Blackman sent at 4/21/2023  2:10 PM CDT -----  Contact: [phone]  Sister is calling requesting status of orders/form for pt to be placed into nursing home that was faxed over 4/20([facility]) . Please call back at [phone] . Thanksdj

## 2023-04-25 ENCOUNTER — TELEPHONE (OUTPATIENT)
Dept: INTERNAL MEDICINE | Facility: CLINIC | Age: 63
End: 2023-04-25
Payer: MEDICARE

## 2023-04-25 NOTE — TELEPHONE ENCOUNTER
Calling the sister Rach informed forms are ready for pick-up for her to sign part of it. States just fax it over to the [employer] and they have the copy of the form to fill up. Forms faxed to [employer] @[phone] and to [place] [employer] @ [phone]. Copy sent to [facility].

## 2023-04-25 NOTE — TELEPHONE ENCOUNTER
----- Message from Consuelo Guevara sent at 4/25/2023  9:58 AM CDT -----  Contact: Rach Loyd is calling to see if paperwork is waiting for pick. Patient states that she advised office on Friday to not wait for her to  and to fax back to nursing home. Please call her back at [phone].      Thanks  DD

## 2023-04-28 ENCOUNTER — OFFICE VISIT (OUTPATIENT)
Dept: DERMATOLOGY | Facility: CLINIC | Age: 63
End: 2023-04-28
Payer: MEDICARE

## 2023-04-28 DIAGNOSIS — L30.9 DERMATITIS: Primary | ICD-10-CM

## 2023-04-28 PROCEDURE — 11104 PUNCH BX SKIN SINGLE LESION: CPT | Mod: S$GLB,,, | Performed by: STUDENT IN AN ORGANIZED HEALTH CARE EDUCATION/TRAINING PROGRAM

## 2023-04-28 PROCEDURE — 1159F MED LIST DOCD IN RCRD: CPT | Mod: CPTII,S$GLB,, | Performed by: STUDENT IN AN ORGANIZED HEALTH CARE EDUCATION/TRAINING PROGRAM

## 2023-04-28 PROCEDURE — 88305 TISSUE EXAM BY PATHOLOGIST: CPT | Mod: 26,,, | Performed by: PATHOLOGY

## 2023-04-28 PROCEDURE — 88305 TISSUE EXAM BY PATHOLOGIST: ICD-10-PCS | Mod: 26,,, | Performed by: PATHOLOGY

## 2023-04-28 PROCEDURE — 87070 CULTURE OTHR SPECIMN AEROBIC: CPT | Performed by: STUDENT IN AN ORGANIZED HEALTH CARE EDUCATION/TRAINING PROGRAM

## 2023-04-28 PROCEDURE — 88312 PR  SPECIAL STAINS,GROUP I: ICD-10-PCS | Mod: 26,,, | Performed by: PATHOLOGY

## 2023-04-28 PROCEDURE — 1159F PR MEDICATION LIST DOCUMENTED IN MEDICAL RECORD: ICD-10-PCS | Mod: CPTII,S$GLB,, | Performed by: STUDENT IN AN ORGANIZED HEALTH CARE EDUCATION/TRAINING PROGRAM

## 2023-04-28 PROCEDURE — 3288F FALL RISK ASSESSMENT DOCD: CPT | Mod: CPTII,S$GLB,, | Performed by: STUDENT IN AN ORGANIZED HEALTH CARE EDUCATION/TRAINING PROGRAM

## 2023-04-28 PROCEDURE — 4010F PR ACE/ARB THEARPY RXD/TAKEN: ICD-10-PCS | Mod: CPTII,S$GLB,, | Performed by: STUDENT IN AN ORGANIZED HEALTH CARE EDUCATION/TRAINING PROGRAM

## 2023-04-28 PROCEDURE — 99999 PR PBB SHADOW E&M-EST. PATIENT-LVL III: ICD-10-PCS | Mod: PBBFAC,,, | Performed by: STUDENT IN AN ORGANIZED HEALTH CARE EDUCATION/TRAINING PROGRAM

## 2023-04-28 PROCEDURE — 99999 PR PBB SHADOW E&M-EST. PATIENT-LVL III: CPT | Mod: PBBFAC,,, | Performed by: STUDENT IN AN ORGANIZED HEALTH CARE EDUCATION/TRAINING PROGRAM

## 2023-04-28 PROCEDURE — 99213 OFFICE O/P EST LOW 20 MIN: CPT | Mod: 25,S$GLB,, | Performed by: STUDENT IN AN ORGANIZED HEALTH CARE EDUCATION/TRAINING PROGRAM

## 2023-04-28 PROCEDURE — 88305 TISSUE EXAM BY PATHOLOGIST: CPT | Performed by: PATHOLOGY

## 2023-04-28 PROCEDURE — 88312 SPECIAL STAINS GROUP 1: CPT | Performed by: PATHOLOGY

## 2023-04-28 PROCEDURE — 3288F PR FALLS RISK ASSESSMENT DOCUMENTED: ICD-10-PCS | Mod: CPTII,S$GLB,, | Performed by: STUDENT IN AN ORGANIZED HEALTH CARE EDUCATION/TRAINING PROGRAM

## 2023-04-28 PROCEDURE — 1101F PR PT FALLS ASSESS DOC 0-1 FALLS W/OUT INJ PAST YR: ICD-10-PCS | Mod: CPTII,S$GLB,, | Performed by: STUDENT IN AN ORGANIZED HEALTH CARE EDUCATION/TRAINING PROGRAM

## 2023-04-28 PROCEDURE — 1157F ADVNC CARE PLAN IN RCRD: CPT | Mod: CPTII,S$GLB,, | Performed by: STUDENT IN AN ORGANIZED HEALTH CARE EDUCATION/TRAINING PROGRAM

## 2023-04-28 PROCEDURE — 1157F PR ADVANCE CARE PLAN OR EQUIV PRESENT IN MEDICAL RECORD: ICD-10-PCS | Mod: CPTII,S$GLB,, | Performed by: STUDENT IN AN ORGANIZED HEALTH CARE EDUCATION/TRAINING PROGRAM

## 2023-04-28 PROCEDURE — 1101F PT FALLS ASSESS-DOCD LE1/YR: CPT | Mod: CPTII,S$GLB,, | Performed by: STUDENT IN AN ORGANIZED HEALTH CARE EDUCATION/TRAINING PROGRAM

## 2023-04-28 PROCEDURE — 4010F ACE/ARB THERAPY RXD/TAKEN: CPT | Mod: CPTII,S$GLB,, | Performed by: STUDENT IN AN ORGANIZED HEALTH CARE EDUCATION/TRAINING PROGRAM

## 2023-04-28 PROCEDURE — 1160F PR REVIEW ALL MEDS BY PRESCRIBER/CLIN PHARMACIST DOCUMENTED: ICD-10-PCS | Mod: CPTII,S$GLB,, | Performed by: STUDENT IN AN ORGANIZED HEALTH CARE EDUCATION/TRAINING PROGRAM

## 2023-04-28 PROCEDURE — 87101 SKIN FUNGI CULTURE: CPT | Performed by: STUDENT IN AN ORGANIZED HEALTH CARE EDUCATION/TRAINING PROGRAM

## 2023-04-28 PROCEDURE — 11104 PR PUNCH BIOPSY, SKIN, SINGLE LESION: ICD-10-PCS | Mod: S$GLB,,, | Performed by: STUDENT IN AN ORGANIZED HEALTH CARE EDUCATION/TRAINING PROGRAM

## 2023-04-28 PROCEDURE — 88312 SPECIAL STAINS GROUP 1: CPT | Mod: 26,,, | Performed by: PATHOLOGY

## 2023-04-28 PROCEDURE — 1160F RVW MEDS BY RX/DR IN RCRD: CPT | Mod: CPTII,S$GLB,, | Performed by: STUDENT IN AN ORGANIZED HEALTH CARE EDUCATION/TRAINING PROGRAM

## 2023-04-28 PROCEDURE — 99213 PR OFFICE/OUTPT VISIT, EST, LEVL III, 20-29 MIN: ICD-10-PCS | Mod: 25,S$GLB,, | Performed by: STUDENT IN AN ORGANIZED HEALTH CARE EDUCATION/TRAINING PROGRAM

## 2023-04-28 NOTE — PATIENT INSTRUCTIONS

## 2023-04-28 NOTE — PROGRESS NOTES
Patient Information  Name: Vishnu Dougherty Jr.  : 1960  MRN: [id number]     Referring Physician:  Dr. Recinos ref. provider found   Primary Care Physician:  Dr. Stephan Quiñones MD   Date of Visit: 2023      Subjective:       Vishnu Dougherty Jr. is a 63 y.o. male who presents for   Chief Complaint   Patient presents with    Rash     Follow up, slight improvement      HPI  Patient with hx of dermatitis, here for follow up. Last seen UBALDO Spaulding. His sister Rach is present for the visit. Per his sister, rash and itching started in Oct 2022. His previous tx include oral antibiotics (clindamycin x 14 days), topical steroids, IM steroids, oral steroids, topical abx--all without improvement. His sister reports recently giving him oral benadryl which helped tremendously however spots on abdomen has not responded to the medication.     Patient was last seen:Visit date not found     Prior notes by myself reviewed.   Clinical documentation obtained by nursing staff reviewed.    Review of Systems   Skin:  Positive for itching and rash.      Objective:    Physical Exam   Constitutional: He appears well-developed and well-nourished. No distress.   Neurological: He is alert and oriented to person, place, and time. He is not disoriented.   Psychiatric: He has a normal mood and affect.   Skin:   Areas Examined (abnormalities noted in diagram):   Head / Face Inspection Performed  Neck Inspection Performed  Abdomen Inspection Performed            Diagram Legend     Erythematous scaling macule/papule c/w actinic keratosis       Vascular papule c/w angioma      Pigmented verrucoid papule/plaque c/w seborrheic keratosis      Yellow umbilicated papule c/w sebaceous hyperplasia      Irregularly shaped tan macule c/w lentigo     1-2 mm smooth white papules consistent with Milia      Movable subcutaneous cyst with punctum c/w epidermal inclusion cyst      Subcutaneous movable cyst c/w pilar cyst      Firm pink to brown papule c/w  dermatofibroma      Pedunculated fleshy papule(s) c/w skin tag(s)      Evenly pigmented macule c/w junctional nevus     Mildly variegated pigmented, slightly irregular-bordered macule c/w mildly atypical nevus      Flesh colored to evenly pigmented papule c/w intradermal nevus       Pink pearly papule/plaque c/w basal cell carcinoma      Erythematous hyperkeratotic cursted plaque c/w SCC      Surgical scar with no sign of skin cancer recurrence      Open and closed comedones      Inflammatory papules and pustules      Verrucoid papule consistent consistent with wart     Erythematous eczematous patches and plaques     Dystrophic onycholytic nail with subungual debris c/w onychomycosis     Umbilicated papule    Erythematous-base heme-crusted tan verrucoid plaque consistent with inflamed seborrheic keratosis     Erythematous Silvery Scaling Plaque c/w Psoriasis     See annotation      No images are attached to the encounter or orders placed in the encounter.    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 Area marked and prepped with alcohol. Approximately 1cc of 1% lidocaine with epinephrine injected. 4 mm disposable punch used to remove lesion. Hemostasis obtained and biopsy site closed with 1 - 2 ethilon sutures. Wound care  instructions reviewed with patient and handout given.  - Further management pending results  - Unable to prescribe diflucan due to interaction with Aricept           LOS NUMBER AND COMPLEXITY OF PROBLEMS    COMPLEXITY OF DATA RISK TOTAL TIME (m)   32260  47869 [] 1 self-limited or minor problem [x] Minimal to none [] No treatment recommended or patient to monitor 15-29  10-19   46206  30833 Low  [] 2 or > self limited or minor problems  [] 1 stable chronic illness  [] 1 acute, uncomplicated illness or injury Limited (2)  [] Prior external notes from each unique source  [] Review result of each unique test  [] Order each unique test [x]  Low  OTC medications, minor skin biopsy 30-44  20-29   77645  63471 Moderate  []  1 or > chronic illness with progression, exacerbation or SE of treatment  []  2 or more stable chronic illnesses  []  1 acute illness with systemic symptoms  []  1 acute complicated injury  [x]  1 undiagnosed new problem with uncertain prognosis Moderate (1/3 below)  []  3 or more data items        *Now includes assessment requiring independent historian  []  Independent interpretation of a test  []  Discuss management/test with another provider Moderate  []  Prescription drug mgmt  []  Minor surgery with risk discussed  []  Mgmt limited by social determinates 45-59  30-39   25560  50490 High  []  1 or more chronic illness with severe exacerbation, progression or SE of treatment  []  1 acute or chronic illness/injury that poses a threat to life or bodily function Extensive (2/3 below)  []  3 or more data items        *Now includes assessment requiring independent historian.  []  Independent interpretation of a test  []  Discuss management/test with another provider High  []  Major surgery with risk discussed  []  Drug therapy requiring intensive monitoring for toxicity  []  Hospitalization  []  Decision for DNR 60-74  40-54      No follow-ups on file.    Cassi Smith MD, FAAD [facility]

## 2023-05-01 ENCOUNTER — EXTERNAL HOME HEALTH (OUTPATIENT)
Dept: HOME HEALTH SERVICES | Facility: HOSPITAL | Age: 63
End: 2023-05-01
Payer: MEDICARE

## 2023-05-02 LAB — BACTERIA SPEC AEROBE CULT: NO GROWTH

## 2023-05-10 ENCOUNTER — CLINICAL SUPPORT (OUTPATIENT)
Dept: DERMATOLOGY | Facility: CLINIC | Age: 63
End: 2023-05-10
Payer: MEDICARE

## 2023-05-10 DIAGNOSIS — Z48.02 VISIT FOR SUTURE REMOVAL: Primary | ICD-10-CM

## 2023-05-10 LAB
FINAL PATHOLOGIC DIAGNOSIS: NORMAL
GROSS: NORMAL
Lab: NORMAL
MICROSCOPIC EXAM: NORMAL

## 2023-05-10 PROCEDURE — 99024 PR POST-OP FOLLOW-UP VISIT: ICD-10-PCS | Mod: S$GLB,,, | Performed by: STUDENT IN AN ORGANIZED HEALTH CARE EDUCATION/TRAINING PROGRAM

## 2023-05-10 PROCEDURE — 99999 PR PBB SHADOW E&M-EST. PATIENT-LVL II: CPT | Mod: PBBFAC,,,

## 2023-05-10 PROCEDURE — 99999 PR PBB SHADOW E&M-EST. PATIENT-LVL II: ICD-10-PCS | Mod: PBBFAC,,,

## 2023-05-10 PROCEDURE — 99024 POSTOP FOLLOW-UP VISIT: CPT | Mod: S$GLB,,, | Performed by: STUDENT IN AN ORGANIZED HEALTH CARE EDUCATION/TRAINING PROGRAM

## 2023-05-10 NOTE — PROGRESS NOTES
Patient presents for suture removal. The wound is well healed without signs of infection.  The 1 suture was removed. Wound care and activity instructions given. Return prn.

## 2023-05-17 ENCOUNTER — OFFICE VISIT (OUTPATIENT)
Dept: NEUROLOGY | Facility: CLINIC | Age: 63
End: 2023-05-17
Payer: MEDICARE

## 2023-05-17 VITALS
WEIGHT: 198.44 LBS | BODY MASS INDEX: 28.41 KG/M2 | SYSTOLIC BLOOD PRESSURE: 112 MMHG | HEART RATE: 65 BPM | DIASTOLIC BLOOD PRESSURE: 36 MMHG | HEIGHT: 70 IN

## 2023-05-17 DIAGNOSIS — G30.9 SEVERE MAJOR NEUROCOGNITIVE DISORDER DUE TO ALZHEIMER'S DISEASE WITH BEHAVIORAL DISTURBANCE: Primary | ICD-10-CM

## 2023-05-17 DIAGNOSIS — E78.5 HYPERLIPIDEMIA, UNSPECIFIED HYPERLIPIDEMIA TYPE: ICD-10-CM

## 2023-05-17 DIAGNOSIS — I10 ESSENTIAL HYPERTENSION: ICD-10-CM

## 2023-05-17 DIAGNOSIS — F32.A ANXIETY AND DEPRESSION: ICD-10-CM

## 2023-05-17 DIAGNOSIS — I25.84 CORONARY ARTERY CALCIFICATION: ICD-10-CM

## 2023-05-17 DIAGNOSIS — E55.9 VITAMIN D DEFICIENCY: ICD-10-CM

## 2023-05-17 DIAGNOSIS — F02.C18 SEVERE MAJOR NEUROCOGNITIVE DISORDER DUE TO ALZHEIMER'S DISEASE WITH BEHAVIORAL DISTURBANCE: Primary | ICD-10-CM

## 2023-05-17 DIAGNOSIS — D64.9 ANEMIA, UNSPECIFIED TYPE: ICD-10-CM

## 2023-05-17 DIAGNOSIS — I70.0 CALCIFICATION OF AORTA: ICD-10-CM

## 2023-05-17 DIAGNOSIS — F41.9 ANXIETY AND DEPRESSION: ICD-10-CM

## 2023-05-17 DIAGNOSIS — I50.32 CHRONIC DIASTOLIC CONGESTIVE HEART FAILURE: ICD-10-CM

## 2023-05-17 DIAGNOSIS — E53.8 B12 DEFICIENCY: ICD-10-CM

## 2023-05-17 DIAGNOSIS — I25.10 CORONARY ARTERY CALCIFICATION: ICD-10-CM

## 2023-05-17 DIAGNOSIS — R91.1 LUNG NODULE: ICD-10-CM

## 2023-05-17 DIAGNOSIS — E83.59 OTHER DISORDERS OF CALCIUM METABOLISM: ICD-10-CM

## 2023-05-17 PROBLEM — F03.918 DEMENTIA WITH BEHAVIORAL DISTURBANCE: Status: RESOLVED | Noted: 2022-10-05 | Resolved: 2023-05-17

## 2023-05-17 PROBLEM — R94.31 PROLONGED Q-T INTERVAL ON ECG: Status: RESOLVED | Noted: 2020-08-03 | Resolved: 2023-05-17

## 2023-05-17 PROBLEM — G25.2 COARSE TREMORS: Status: RESOLVED | Noted: 2022-10-05 | Resolved: 2023-05-17

## 2023-05-17 PROBLEM — B35.4 TINEA CORPORIS: Status: RESOLVED | Noted: 2023-05-17 | Resolved: 2023-05-17

## 2023-05-17 PROBLEM — F03.90 DEMENTIA: Status: RESOLVED | Noted: 2020-08-03 | Resolved: 2023-05-17

## 2023-05-17 PROBLEM — B35.4 TINEA CORPORIS: Status: ACTIVE | Noted: 2023-05-17

## 2023-05-17 PROBLEM — L23.7 CONTACT DERMATITIS DUE TO POISON IVY: Status: RESOLVED | Noted: 2020-07-29 | Resolved: 2023-05-17

## 2023-05-17 PROBLEM — F03.90 DEMENTIA WITHOUT BEHAVIORAL DISTURBANCE: Status: RESOLVED | Noted: 2021-05-12 | Resolved: 2023-05-17

## 2023-05-17 PROBLEM — G45.9 TIA (TRANSIENT ISCHEMIC ATTACK): Status: RESOLVED | Noted: 2019-10-02 | Resolved: 2023-05-17

## 2023-05-17 PROBLEM — R41.3 MEMORY LOSS OF UNKNOWN CAUSE: Status: RESOLVED | Noted: 2018-03-16 | Resolved: 2023-05-17

## 2023-05-17 PROBLEM — G31.84 MILD COGNITIVE IMPAIRMENT WITH MEMORY LOSS: Status: RESOLVED | Noted: 2018-04-18 | Resolved: 2023-05-17

## 2023-05-17 PROBLEM — M53.3 TAIL BONE PAIN: Status: RESOLVED | Noted: 2022-04-06 | Resolved: 2023-05-17

## 2023-05-17 PROCEDURE — 1157F PR ADVANCE CARE PLAN OR EQUIV PRESENT IN MEDICAL RECORD: ICD-10-PCS | Mod: CPTII,,, | Performed by: PSYCHIATRY & NEUROLOGY

## 2023-05-17 PROCEDURE — 3008F PR BODY MASS INDEX (BMI) DOCUMENTED: ICD-10-PCS | Mod: CPTII,,, | Performed by: PSYCHIATRY & NEUROLOGY

## 2023-05-17 PROCEDURE — 3078F DIAST BP <80 MM HG: CPT | Mod: CPTII,,, | Performed by: PSYCHIATRY & NEUROLOGY

## 2023-05-17 PROCEDURE — 3078F PR MOST RECENT DIASTOLIC BLOOD PRESSURE < 80 MM HG: ICD-10-PCS | Mod: CPTII,,, | Performed by: PSYCHIATRY & NEUROLOGY

## 2023-05-17 PROCEDURE — 1101F PR PT FALLS ASSESS DOC 0-1 FALLS W/OUT INJ PAST YR: ICD-10-PCS | Mod: CPTII,,, | Performed by: PSYCHIATRY & NEUROLOGY

## 2023-05-17 PROCEDURE — 99215 OFFICE O/P EST HI 40 MIN: CPT | Mod: S$GLB,,, | Performed by: PSYCHIATRY & NEUROLOGY

## 2023-05-17 PROCEDURE — 99417 PROLNG OP E/M EACH 15 MIN: CPT | Mod: S$GLB,,, | Performed by: PSYCHIATRY & NEUROLOGY

## 2023-05-17 PROCEDURE — 1157F ADVNC CARE PLAN IN RCRD: CPT | Mod: CPTII,,, | Performed by: PSYCHIATRY & NEUROLOGY

## 2023-05-17 PROCEDURE — 1101F PT FALLS ASSESS-DOCD LE1/YR: CPT | Mod: CPTII,,, | Performed by: PSYCHIATRY & NEUROLOGY

## 2023-05-17 PROCEDURE — 99499 UNLISTED E&M SERVICE: CPT | Mod: S$GLB,,, | Performed by: PSYCHIATRY & NEUROLOGY

## 2023-05-17 PROCEDURE — 99417 PR PROLONGED SVC, OUTPT, W/WO DIRECT PT CONTACT,  EA ADDTL 15 MIN: ICD-10-PCS | Mod: S$GLB,,, | Performed by: PSYCHIATRY & NEUROLOGY

## 2023-05-17 PROCEDURE — 3288F FALL RISK ASSESSMENT DOCD: CPT | Mod: CPTII,,, | Performed by: PSYCHIATRY & NEUROLOGY

## 2023-05-17 PROCEDURE — 3074F SYST BP LT 130 MM HG: CPT | Mod: CPTII,,, | Performed by: PSYCHIATRY & NEUROLOGY

## 2023-05-17 PROCEDURE — 99999 PR PBB SHADOW E&M-EST. PATIENT-LVL IV: CPT | Mod: PBBFAC,,, | Performed by: PSYCHIATRY & NEUROLOGY

## 2023-05-17 PROCEDURE — 4010F ACE/ARB THERAPY RXD/TAKEN: CPT | Mod: CPTII,,, | Performed by: PSYCHIATRY & NEUROLOGY

## 2023-05-17 PROCEDURE — 99215 PR OFFICE/OUTPT VISIT, EST, LEVL V, 40-54 MIN: ICD-10-PCS | Mod: S$GLB,,, | Performed by: PSYCHIATRY & NEUROLOGY

## 2023-05-17 PROCEDURE — 4010F PR ACE/ARB THEARPY RXD/TAKEN: ICD-10-PCS | Mod: CPTII,,, | Performed by: PSYCHIATRY & NEUROLOGY

## 2023-05-17 PROCEDURE — 3288F PR FALLS RISK ASSESSMENT DOCUMENTED: ICD-10-PCS | Mod: CPTII,,, | Performed by: PSYCHIATRY & NEUROLOGY

## 2023-05-17 PROCEDURE — 3008F BODY MASS INDEX DOCD: CPT | Mod: CPTII,,, | Performed by: PSYCHIATRY & NEUROLOGY

## 2023-05-17 PROCEDURE — 99999 PR PBB SHADOW E&M-EST. PATIENT-LVL IV: ICD-10-PCS | Mod: PBBFAC,,, | Performed by: PSYCHIATRY & NEUROLOGY

## 2023-05-17 PROCEDURE — 3074F PR MOST RECENT SYSTOLIC BLOOD PRESSURE < 130 MM HG: ICD-10-PCS | Mod: CPTII,,, | Performed by: PSYCHIATRY & NEUROLOGY

## 2023-05-17 RX ORDER — RISPERIDONE 1 MG/1
1 TABLET ORAL DAILY
Qty: 90 TABLET | Refills: 3 | Status: SHIPPED | OUTPATIENT
Start: 2023-05-17

## 2023-05-17 RX ORDER — QUETIAPINE FUMARATE 50 MG/1
50 TABLET, FILM COATED ORAL NIGHTLY
Qty: 90 TABLET | Refills: 3 | Status: SHIPPED | OUTPATIENT
Start: 2023-05-17 | End: 2024-05-16

## 2023-05-17 NOTE — PROGRESS NOTES
Subjective:       Patient ID: Vishnu Dougherty Jr. is a 63 y.o. male.    Chief Complaint: Moderate dementia with behavioral disturbance          HPI       BACKGROUND HISTORY       The patient I here for memory loss. The patient is presenting with 6-year history of memory loss since 2015 at the age of 55.The patient is accompanied by his sister.He started having significant errors while working and ended up losing his job as a  in 2018. He was evaluated by Dr. Clemente in 2015 and diagnosed with ADOLFO. He was again evaluated by Dr. Clemente in 2018 when the family noted remarkable memory loss and scored 22/30 and yet was not diagnosed with dementia. His sister moved him to Assisted Living in  after falling in the yard and it became impossible for the patient to take care of himself. He is maintained on Aricept 10 mg QHS. Takes Wellbutrin and Cymbalta for ADOLFO-MDD. No SI/HI. When I saw him first in 2021 he was oriented to person and place but not time and date. He was dependent but able to ambulate, feed himself and use the bathroom. Had good appetite and sleeps well. There were no reports of behavioral disturbances. From [id number] underwent 4 CTs and 2 MRIs of the Brain that showed progressive atrophy. Labs show NL TFT, Low B12 and Vitamin D with no replacement noted. On 05- MOCA 16/30. Ordered FA,, HC, B12, MMA, RPR, Vitamin D which showed low B12 and Vitamin D. Started Aricept and Namenda and recommended B12 and Vitamin D supplementing.        INTERVAL HISTORY       Have not seen the patient from [id number]. He is accompanied by his sister. Since 2022 the patient has been declining rapidly and lost orientation to person, place and time. He started getting aggressive with severe agitation and behavioral problems and was admitted to [facility]. On 04- he wondered away from Assisted Living and was then placed at [facility]. On 05- he presented to the ED with tremors.  "His sister moved him back home and believes the NH mismanaged his medications.  He is on Aricept 5 mg QHS, Namenda 5 mg BID. Cymbalta 60 mg QD, Wellbutrin 150 mg BID and Risperdal 1 mg BID. His "sundowning" peaks at 02:00 pm.          Review of Systems   Constitutional:  Negative for appetite change and fatigue.   HENT:  Negative for hearing loss and tinnitus.    Eyes:  Negative for photophobia and visual disturbance.   Respiratory:  Negative for apnea and shortness of breath.    Cardiovascular:  Negative for chest pain and palpitations.   Gastrointestinal:  Negative for nausea and vomiting.   Endocrine: Negative for cold intolerance and heat intolerance.   Genitourinary:  Negative for difficulty urinating and urgency.   Musculoskeletal:  Negative for arthralgias, back pain, gait problem, joint swelling, myalgias, neck pain and neck stiffness.   Skin:  Negative for color change and rash.   Allergic/Immunologic: Negative for environmental allergies and immunocompromised state.   Neurological:  Negative for dizziness, tremors, seizures, syncope, facial asymmetry, speech difficulty, weakness, light-headedness, numbness and headaches.        Memory loss    Hematological:  Negative for adenopathy. Does not bruise/bleed easily.   Psychiatric/Behavioral:  Positive for behavioral problems and confusion. Negative for agitation, decreased concentration, dysphoric mood, hallucinations, self-injury, sleep disturbance and suicidal ideas. The patient is nervous/anxious. The patient is not hyperactive.                  Current Outpatient Medications:     amlodipine-benazepril 10-20mg (LOTREL) 10-20 mg per capsule, TAKE 1 CAPSULE BY MOUTH ONCE DAILY, Disp: 90 capsule, Rfl: 3    aspirin (ECOTRIN) 81 MG EC tablet, Take 81 mg by mouth once daily., Disp: , Rfl:     atorvastatin (LIPITOR) 20 MG tablet, Take 1 tablet (20 mg total) by mouth every evening., Disp: 90 tablet, Rfl: 3    buPROPion (WELLBUTRIN SR) 150 MG TBSR 12 hr tablet, Take " 1 tablet (150 mg total) by mouth 2 (two) times daily., Disp: 60 tablet, Rfl: 11    cyanocobalamin 1,000 mcg/mL injection, INJECT 1 ML (1,000 MCG TOTAL) INTO THE MUSCLE EVERY 28 DAYS., Disp: 3 mL, Rfl: 11    donepeziL (ARICEPT) 5 MG tablet, TAKE 1 TABLET BY MOUTH EVERY DAY IN THE EVENING, Disp: 90 tablet, Rfl: 3    DULoxetine (CYMBALTA) 60 MG capsule, Take 1 capsule (60 mg total) by mouth once daily., Disp: 30 capsule, Rfl: 3    memantine (NAMENDA) 5 MG Tab, TAKE 1 TABLET BY MOUTH TWICE A DAY, Disp: 180 tablet, Rfl: 3    mupirocin (BACTROBAN) 2 % ointment, Apply topically 3 (three) times daily. For broken skin, Disp: 30 g, Rfl: 1    QUEtiapine (SEROQUEL) 50 MG tablet, Take 1 tablet (50 mg total) by mouth every evening., Disp: 90 tablet, Rfl: 3    risperiDONE (RISPERDAL) 1 MG tablet, Take 1 tablet (1 mg total) by mouth once daily., Disp: 90 tablet, Rfl: 3  Past Medical History:   Diagnosis Date    Abscess of arm 3/29/2023    Chronic CHF 10/2/2019    Chronic diastolic HF (heart failure) 4/17/2018    Coronary artery calcification 1/11/2022    Dilated cardiomyopathy 9/24/2015    Enlarged prostate     Essential hypertension 9/24/2015    Gastroesophageal reflux disease without esophagitis 10/7/2015    Gastroesophageal reflux disease without esophagitis 10/7/2015    Hyperlipidemia     Personal history of colonic polyps 2016    Shortness of breath 9/24/2015     Past Surgical History:   Procedure Laterality Date    COLONOSCOPY N/A 5/25/2016    Procedure: COLONOSCOPY;  Surgeon: Demetrio Spicer MD;  Location: [facility] ENDO;  Service: Endoscopy;  Laterality: N/A;    INCISION AND DRAINAGE OF ABSCESS Right 3/29/2023    Procedure: INCISION AND DRAINAGE, ABSCESS;  Surgeon: Cali Patel MD;  Location: [facility] OR;  Service: General;  Laterality: Right;     Social History     Socioeconomic History    Marital status:     Number of children: 3   Occupational History    Occupation: disability   Tobacco Use    Smoking status: Former      Types: Cigarettes     Quit date: 1995     Years since quittin.6     Passive exposure: Never    Smokeless tobacco: Never   Substance and Sexual Activity    Alcohol use: No     Alcohol/week: 0.0 standard drinks    Drug use: No    Sexual activity: Not Currently     Partners: Female             Past/Current Medical/Surgical History, Past/Current Social History, Past/Current Family History and Past/Current Medications were reviewed in detail.        Objective:           VITAL SIGNS WERE REVIEWED      GENERAL APPEARANCE:     The patient looks disheveled     BMI 28.47    No signs of respiratory distress.    Normal breathing pattern.    No dysmorphic features    Normal eye contact.     GENERAL MEDICAL EXAM:    HEENT:  Head is atraumatic normocephalic. Fundoscopic (Ophthalmoscopic) exam showed no disc edema.      Neck and Axillae: No JVD. No visible lesions.    Cardiopulmonary: No cyanosis. No tachypnea. Normal respiratory effort.    Gastrointestinal/Urogenital:  No jaundice. No stomas or lesions. No visible hernias. No catheters.     Skin, Hair and Nails: Rash. No neurofibromatosis. No visible lesions.No stigmata of autoimmune disease. No clubbing.    Limbs: No varicose veins. No visible swelling.    Muskoskeletal: No visible deformities.No visible lesions.           Neurologic Exam     Mental Status   Disoriented to person.   Disoriented to place.   Disoriented to time.   Follows 1 step commands.   Attention: decreased. Concentration: decreased.   Speech: speech is normal   Level of consciousness: alert  Knowledge: poor and inconsistent with education. Unable to perform simple calculations.   Able to name object. Able to repeat. Abnormal comprehension.          Cranial Nerves   Cranial nerves II through XII intact.     CN II   Visual fields full to confrontation.   Visual acuity: normal  Right visual field deficit: none  Left visual field deficit: none     CN III, IV, VI   Pupils are equal, round, and reactive to  light.  Extraocular motions are normal.   Right pupil: Size: 2 mm. Shape: regular. Reactivity: brisk. Consensual response: intact. Accommodation: intact.   Left pupil: Size: 2 mm. Shape: regular. Reactivity: brisk. Consensual response: intact. Accommodation: intact.   CN III: no CN III palsy  CN VI: no CN VI palsy  Nystagmus: none   Diplopia: none  Ophthalmoparesis: none  Upgaze: normal  Downgaze: normal  Conjugate gaze: present  Vestibulo-ocular reflex: present    CN V   Facial sensation intact.   Right facial sensation deficit: none  Left facial sensation deficit: none    CN VII   Facial expression full, symmetric.   Right facial weakness: none  Left facial weakness: none    CN VIII   CN VIII normal.   Hearing: intact    CN IX, X   CN IX normal.   CN X normal.   Palate: symmetric    CN XI   CN XI normal.   Right sternocleidomastoid strength: normal  Left sternocleidomastoid strength: normal  Right trapezius strength: normal  Left trapezius strength: normal    CN XII   CN XII normal.   Tongue: not atrophic  Fasciculations: absent  Tongue deviation: none    Motor Exam   Muscle bulk: normal  Overall muscle tone: normal  Right arm tone: normal  Left arm tone: normal  Right arm pronator drift: absent  Left arm pronator drift: absent  Right leg tone: normal  Left leg tone: normal    Strength   Right neck flexion: 5/5  Left neck flexion: 5/5  Right neck extension: 5/5  Left neck extension: 5/5  Right deltoid: 5/5  Left deltoid: 5/5  Right biceps: 5/5  Left biceps: 5/5  Right triceps: 5/5  Left triceps: 5/5  Right wrist flexion: 5/5  Left wrist flexion: 5/5  Right wrist extension: 5/5  Left wrist extension: 5/5  Right interossei: 5/5  Left interossei: 5/5  Right iliopsoas: 5/5  Left iliopsoas: 5/5  Right quadriceps: 5/5  Left quadriceps: 5/5  Right hamstrin/5  Left hamstrin/5  Right glutei: 5/5  Left glutei: 5/5  Right anterior tibial: 5/5  Left anterior tibial: 5/5  Right posterior tibial: 5/5  Left posterior  tibial: 5/5  Right peroneal: 5/5  Left peroneal: 5/5  Right gastroc: 5/5  Left gastroc: 5/5    Sensory Exam   Light touch normal.   Right arm light touch: normal  Left arm light touch: normal  Right leg light touch: normal  Left leg light touch: normal  Vibration normal.   Right arm vibration: normal  Left arm vibration: normal  Right leg vibration: normal  Left leg vibration: normal  Proprioception normal.   Right arm proprioception: normal  Left arm proprioception: normal  Right leg proprioception: normal  Left leg proprioception: normal  Pinprick normal.   Right arm pinprick: normal  Left arm pinprick: normal  Right leg pinprick: normal  Left leg pinprick: normal  Graphesthesia: normal  Stereognosis: normal    Gait, Coordination, and Reflexes     Gait  Gait: (Gait Apraxia)    Coordination   Romberg: negative  Finger to nose coordination: normal  Heel to shin coordination: normal  Tandem walking coordination: normal    Tremor   Resting tremor: absent  Intention tremor: absent  Action tremor: absent    Reflexes   Right brachioradialis: 2+  Left brachioradialis: 2+  Right biceps: 2+  Left biceps: 2+  Right triceps: 2+  Left triceps: 2+  Right patellar: 2+  Left patellar: 2+  Right achilles: 2+  Left achilles: 2+  Right plantar: normal  Left plantar: normal  Right Lee: absent  Left Lee: absent  Right ankle clonus: absent  Left ankle clonus: absent  Right pendular knee jerk: absent  Left pendular knee jerk: absent      Lab Results   Component Value Date    WBC 9.24 04/16/2023    HGB 11.6 (L) 04/16/2023    HCT 35.6 (L) 04/16/2023    MCV 95 04/16/2023     04/16/2023     Sodium   Date Value Ref Range Status   04/16/2023 140 136 - 145 mmol/L Final     Potassium   Date Value Ref Range Status   04/16/2023 4.1 3.5 - 5.1 mmol/L Final     Chloride   Date Value Ref Range Status   04/16/2023 104 95 - 110 mmol/L Final     CO2   Date Value Ref Range Status   04/16/2023 26 23 - 29 mmol/L Final     Glucose   Date  Value Ref Range Status   04/16/2023 100 70 - 110 mg/dL Final     BUN   Date Value Ref Range Status   04/16/2023 17 8 - 23 mg/dL Final     Creatinine   Date Value Ref Range Status   04/16/2023 1.0 0.5 - 1.4 mg/dL Final     Calcium   Date Value Ref Range Status   04/16/2023 8.9 8.7 - 10.5 mg/dL Final     Total Protein   Date Value Ref Range Status   04/16/2023 7.1 6.0 - 8.4 g/dL Final     Albumin   Date Value Ref Range Status   04/16/2023 3.7 3.5 - 5.2 g/dL Final     Total Bilirubin   Date Value Ref Range Status   04/16/2023 0.2 0.1 - 1.0 mg/dL Final     Comment:     For infants and newborns, interpretation of results should be based  on gestational age, weight and in agreement with clinical  observations.    Premature Infant recommended reference ranges:  Up to 24 hours.............<8.0 mg/dL  Up to 48 hours............<12.0 mg/dL  3-5 days..................<15.0 mg/dL  6-29 days.................<15.0 mg/dL       Alkaline Phosphatase   Date Value Ref Range Status   04/16/2023 72 55 - 135 U/L Final     AST   Date Value Ref Range Status   04/16/2023 19 10 - 40 U/L Final     ALT   Date Value Ref Range Status   04/16/2023 23 10 - 44 U/L Final     Anion Gap   Date Value Ref Range Status   04/16/2023 10 8 - 16 mmol/L Final     eGFR if    Date Value Ref Range Status   05/16/2022 >60.0 >60 mL/min/1.73 m^2 Final     eGFR if non    Date Value Ref Range Status   05/16/2022 >60.0 >60 mL/min/1.73 m^2 Final     Comment:     Calculation used to obtain the estimated glomerular filtration  rate (eGFR) is the CKD-EPI equation.        Lab Results   Component Value Date    LKSQMBXZ18 210 09/09/2021     Lab Results   Component Value Date    TSH 1.208 10/06/2022    FREET4 1.09 11/17/2017         10-    CT Atrophy       10-    CT Atrophy      [id number]    CTH Atrophy      10-    CT Atrophy       04-     Brain MRI Atrophy      10-     Brain MRI Atrophy  "      [id number]    TFT (TSH, T4) NL    B12 Low 227>208      05-    Labs B12-MMA, FA-HC, RPR, Vitamin D    Severe B12 Deficiency <148 and Vitamin D insufficient <25          Reviewed the neuroimaging independently         Assessment:           1. 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 Essential hypertension    3. Anxiety and depression    4. Chronic diastolic congestive heart failure    5. Hyperlipidemia, unspecified hyperlipidemia type    6. B12 deficiency    7. Other disorders of calcium metabolism     8. Vitamin D deficiency    9. Lung nodule    10. Anemia, unspecified type    11. Coronary artery calcification    12. Calcification of aorta          Plan:       SEVERE DEMENTIA, ALZHEIMER'S TYPE WITH BEHAVIORAL DISTURBANCES, EARLY ONSET                  DISEASE-MODIFYING AGENTS       The benefit for Aricept and Namenda at this juncture is questionable but will continue both in case they are helping with behavioral problems.    Needs to restart B12 and Vitamin D supplementation.       SYMPTOMATIC MANAGEMENT-BEHAVIORAL SYMPTOMS AND NON-COGNITIVE SYMPTOMS         Continue Cymbalta 60 mg QD.    Continue Wellbutrin 150 mg BID.    Change Risperdal 1 mg Q Noon (His "sundowning" peaks at 02:00 pm).    Add Seroquel 50 mg QHS.          HOME CARE AND IN FACILITY CARE         Falling Down Precautions. Gait is affected by the disease as well.     Social work consult for NH placement.     Avoid driving and access to firearms     Continue 24/Care     Help with finances and decision making.    Join support group.    Proofing the house and use labeling.    Avoid antihistamines and anticholinergics.    Avoid changing routine.    Use written reminders.    Healthy diet, exercise (physical and cognitive).    Good sleep hygiene.          CAREGIVERS COUNSELING     Recommend reading the following books:     The 36-Hour Day and there are many online and printed resources to " "help caregivers as well.     Alzheimer's Through the Stages.     Dementia with G.R.A.C.E.            PREVENTION OF DELIRIUM       1. Good hydration and avoid electrolyte imbalance  2. Recognize and treat infections immediately especially UTI.  3. Bladder emptying and prevent constipation.   4. Provide stimulating activities and familiar objects  5. Use eyeglasses and hearing aids if needed.   6. Use simple and regular communication about people, current place, and time  7. Mobility and range-of-motion exercises  8. Reduce noise, lighting and avoid sleep interruptions  9. Non-narcotic pain management.  10.Nondrug treatment for sleep problems or anxiety  11. Avoid antihistamines.  12. Avoid narcotics.  13. Avoid benzodiazepines.            HELPFUL STRATEGIES FOR THE FAMILY AND CAREGIVERS        BEHAVIORAL MANAGEMENT STRATEGIES     Remember that you cannot reason with acute psychosis or confusion. Unless there is an immediate safety issue, there is no need to challenge or correct the person.  For example, if a pt is hallucinating, do not argue with the person that what they are seeing is not real. If they are expressing paranoia, empathize gently with their fear, and attempt to redirect them to a different activity.   If the person is particularly stuck on a topic or activity, as long as the activity is safe and is not worsening their agitation, you don't need to intervene.     Communicate calmly, simply, and concisely    Reassure the person that they are safe and you are here to help  Try not to express irritation or anger  Speak quietly and calmly, do not shout or threaten the person. Avoid provocation.   Establish verbal contact and provide orientation and reassurance.  Attempt to identify the patient's wants and feelings, listen to what they are saying, and reflect those wants and feelings back to the patient.  Dont use sarcasm as a weapon    Set clear limits on behavior in a calm voice ("You cannot hit the " "nurse.")  Offer choices and optimism ("Which toothpaste would you like to use today?")    Redirect the person to an alternative behavior ("Can you come help me with this?)    Avoid saying things like, "We already went over this!" "You can't keep doing this." "I already explained this to you"  Instead, simply nod calmly and acknowledge what the person is saying ("Yes, that makes sense."), and then request their help or company in a different behavior.   Having a mental list of activities the patient enjoys can be helpful with redirection. For example, do they enjoy going for walks? Eating a piece of candy?   If redirection becomes challenging, you can place objects that your family member enjoys strategically in the home in order to use for distraction. This is particularly useful if there are items that they often talk about or frequently ask you questions about; or if they are visually striking. Once you focus the person on this object, talk about it briefly until they are calm and then attempt to redirect to a new behavior.   Sometimes activities which are repetitive can be calming, particularly if there is a comforting sensory element. For example, pt may enjoy folding soft towels or laundry.    Limit overstimulation    Decrease distractions by turning off the TV, computer, any fluorescent lights that hum, etc.  Ask any casual visitors to leave--the fewer people the better  If the person is very agitated, avoid touching them, and respect their personal space  Sit down and ask the person to sit down also     PREVENTATIVE STRATEGIES     Try to help the patient develop a routine and stick to it  Address all immediate safety issues  Does the person still have access to the car and keys? Take the keys away, or disconnect the car battery.  Are they wandering at night? Install locks on the outside doors. A keypad lock works well. Alarms on bedroom doors can also be helpful.   Are they turning on the stove and risking a " "fire? If you cannot limit their access to the kitchen, you may need to unplug dangerous devices any time you are not in the room.   If the person is exhibiting poor judgment throughout the day, they likely need someone supervising them at all times.   Do they still have access to significant financial assets? Limit their access to accounts, and consult with a  if necessary.   Identify situations that seem to trigger agitation or a problematic behavior, and modify the person's environment to minimize or eliminate that trigger.   For example, is their behavior worse if they don't get a good night's sleep? Or if they don't eat or drink enough? If so, prioritize those activities and build behavior plans to support them.  Do they get upset about not being allowed to answer the phone when it rings? Put all of the phones in the house on "silent."   Do they become paranoid that certain family members are trying to take advantage of them? Limit contact with the targeted family member as much as possible, and re-introduce them slowly after some time has passed.   Encourage independence in daily living whenever safely possible  Encourage collaborative decision-making regarding his care as well as daily activities  Encourage regular physical exercise  Address issues that may be impacting sleep   Ex: Urology consult for frequent nighttime urination, address chronic pain that may be interfering with sleep, moving to a different room if his roommate snores loudly, make sure the room is a comfortable temperature and he has adequate pillows and blankets  Ensure he gets out into the sunlight at least once per day to encourage regular circadian cycle  No caffeine, sugar, or large meals within two hours of bedtime   Make sure room at night is dark and quiet and minimize nighttime interruptions from staff unless medically necessary   Try to help pt go to sleep and wake up at the same time every day  Monitor closely for worsening " psychiatric symptoms (insomnia, social withdrawal, deterioration of personal hygiene, hostility, confusing or nonsensical speech, paranoia, hallucinations) and intervene promptly. Assess for possible antecedents, modify environment appropriately.            SLEEP HYGIENE     Poor sleep has a negative effect on cognition. Several strategies have been shown to improve sleep:     Caffeine intake in the afternoon and evening, as well as stuffing oneself at supper, can decrease the quality of restful sleep throughout the night.   Bedtime and wake-up times should be consistent every night and morning so the body becomes used to a single routine, even on the weekends.  Engage in daily physical activity, but not 2-3 hours before bedtime.   No technology use (television, computer, iPad) 1-2 hours before bed.   Have a wind down routine (e.g., soft lights in the house, bath before bed, reduced fluid intake, songs, reading, less noise) to promote sleep readiness.   Visit the www.sleepfoundation.org for more strategies.      COGNITIVE HYGIENE     Engage in regular exercise, which increases alertness and arousal and can improve attention and focus.  Consider lower impact exercises, such as yoga or light walking.  Get a good nights sleep, as this can enhance alertness and cognition.  Eat healthy foods and balanced meals. It is notable that research indicates certain nutrients may aid in brain function, such as B vitamins (especially B6, B12, and folic acid), antioxidants (such as vitamins C and E, and beta carotene), and Omega-3 fatty acids. Talk with your physician or nutritionist about whats right for you.   Keep your brain active. Find activities to stay mentally active, such as reading, games (cards, checkers), puzzles (crosswords, Sudoku, jig saw), crafts ([employer], woodworking), gardening, or participating in activities in the community.  Stay socially engaged. Continue staying active with your family and  friends.      FUTURE PLANNING     The patient and caregivers should consider formal arrangements to allow a designated person to make medical and financial decisions for the pt, should he/she become unable to do so.  Options to consider include designating a healthcare proxy, medical and/or financial power of , and completing advanced directives for healthcare decisions and estate planning (e.g., finalizing a will).  If cost is prohibitive, [facility] [employer] (https://[employer].org/) provides free  for individuals with low income.       ADDITIONAL RESOURCES     [facility] of the [facility] (www.http://alzbr.org) have good local caregiver and patient resources.   Consider resources for support through the [facility] Office of Elderly Affairs (http://goea.[place].gov/), [place] Chapter of the Alzheimers Association (www.alz.org/[place]/), the Family Caregiver [place] (www.caregiver.org), and the American Psychological Association (http://www.apa.org/pi/about/publications/caregivers/consumers/index.aspxconsumers/index.aspx).  For More Information About Hallucinations, Delusions, and Paranoia in Alzheimer's [facility] Alzheimer's and related Dementias Education and Referral (ADEAR) Center 1-671.526.2192 (toll-free) adear@anel.nih.gov www.anel.nih.gov/alzheimers The National [place] on Aging's ADEAR Center offers information and free print publications about Alzheimer's disease and related dementias for families, caregivers, and health professionals. ADEAR Center staff answer telephone, email, and written requests and make referrals to local and national resources            MEDICAL/SURGICAL COMORBIDITIES     All relevant medical comorbidities noted and managed by primary care physician and medical care team.          MISCELLANEOUS MEDICAL PROBLEMS       HEALTHY LIFESTYLE AND PREVENTATIVE CARE    The patient to adhere to the age-appropriate health maintenance guidelines including  screening tests and vaccinations. The patient to adhere to  healthy lifestyle, optimal weight, exercise, healthy diet, good sleep hygiene and avoiding drugs including smoking, alcohol and recreational drugs.        RTC in 6 months         Benjy Cameron MD, FAAN    Attending Neurologist/Epileptologist         Diplomate, American Board of Psychiatry and Neurology    Diplomate, American Board of Clinical Neurophysiology     Fellow, American Academy of Neurology         I spent a total of 98 minutes on the day of the visit.  This includes face to face time and non-face to face time preparing to see the patient (eg, review of tests), obtaining and/or reviewing separately obtained history, documenting clinical information in the electronic or other health record, independently interpreting results and communicating results to the patient/family/caregiver, or care coordinator.

## 2023-05-18 RX ORDER — DONEPEZIL HYDROCHLORIDE 5 MG/1
TABLET, FILM COATED ORAL
Qty: 90 TABLET | Refills: 3 | Status: SHIPPED | OUTPATIENT
Start: 2023-05-18

## 2023-05-22 ENCOUNTER — TELEPHONE (OUTPATIENT)
Dept: INTERNAL MEDICINE | Facility: CLINIC | Age: 63
End: 2023-05-22
Payer: MEDICARE

## 2023-05-22 NOTE — TELEPHONE ENCOUNTER
----- Message from Babar Blackman sent at 5/22/2023  3:50 PM CDT -----  Contact: Rach/Sister  Rach is needing a call back in regards to some paperwork that was sent from [facility]. She is calling to confirm that status of that. Please give her a call back at [phone]

## 2023-05-22 NOTE — TELEPHONE ENCOUNTER
----- Message from Mirtha Resendiz sent at 5/22/2023  1:34 PM CDT -----  Contact: [facility]--[phone] ext [phone]  1MEDICALADVICE     Patient is calling for Medical Advice regarding:    Requesting a new 142 and PASS R   Order for nursing home placement.   Medication list     Would like response via [employer]:  call back     Comments:  Lizett requesting to have the above faxed to her at faxed to [phone].  If any questions or concerns please call Lizett.

## 2023-05-22 NOTE — TELEPHONE ENCOUNTER
Spoke with [facility] stating patient wanted to get admitted to their. Informed I will call the patient to have a visit for nursing home admission. Verbally understood and agreed.

## 2023-05-23 NOTE — TELEPHONE ENCOUNTER
Spoke with Ms. Loyd, patient sister stating she need patient to get admitted to another nursing home facility. Informed he needs to coem see provider to get nursing order sign prior to admission. Verbally agreed and scheduled tomorrow to see the PA.

## 2023-05-24 ENCOUNTER — OFFICE VISIT (OUTPATIENT)
Dept: INTERNAL MEDICINE | Facility: CLINIC | Age: 63
End: 2023-05-24
Payer: MEDICARE

## 2023-05-24 ENCOUNTER — TELEPHONE (OUTPATIENT)
Dept: INTERNAL MEDICINE | Facility: CLINIC | Age: 63
End: 2023-05-24

## 2023-05-24 DIAGNOSIS — I50.32 CHRONIC DIASTOLIC CONGESTIVE HEART FAILURE: ICD-10-CM

## 2023-05-24 DIAGNOSIS — G30.0 EARLY ONSET ALZHEIMER'S DEMENTIA WITH BEHAVIORAL DISTURBANCE: Primary | ICD-10-CM

## 2023-05-24 DIAGNOSIS — F02.818 EARLY ONSET ALZHEIMER'S DEMENTIA WITH BEHAVIORAL DISTURBANCE: Primary | ICD-10-CM

## 2023-05-24 PROCEDURE — 99499 NO LOS: ICD-10-PCS | Mod: 95,,, | Performed by: PHYSICIAN ASSISTANT

## 2023-05-24 PROCEDURE — 99499 UNLISTED E&M SERVICE: CPT | Mod: 95,,, | Performed by: PHYSICIAN ASSISTANT

## 2023-05-24 NOTE — TELEPHONE ENCOUNTER
----- Message from Kelsey Wilkinson sent at 5/24/2023  2:12 PM CDT -----  Contact: Rach Loyd stated Kassie and the pt were unable to hear on the virtual visit. Can someone please call them directly to speak to the pt? Please call Kassie [phone].    Thanks  TS

## 2023-05-24 NOTE — PROGRESS NOTES
Nursing staff attempted to contact patient when logged in to virtual but no answer and left a voicemail. Provider logged in at 1413 and stayed logged on until 1425 with no one logging in for patient. Will reach out again and attempt to reschedule if can connect with patient/family.

## 2023-05-25 ENCOUNTER — TELEPHONE (OUTPATIENT)
Dept: INTERNAL MEDICINE | Facility: CLINIC | Age: 63
End: 2023-05-25
Payer: MEDICARE

## 2023-05-25 ENCOUNTER — OFFICE VISIT (OUTPATIENT)
Dept: INTERNAL MEDICINE | Facility: CLINIC | Age: 63
End: 2023-05-25
Payer: MEDICARE

## 2023-05-25 VITALS
SYSTOLIC BLOOD PRESSURE: 120 MMHG | HEART RATE: 73 BPM | TEMPERATURE: 98 F | RESPIRATION RATE: 18 BRPM | DIASTOLIC BLOOD PRESSURE: 70 MMHG | OXYGEN SATURATION: 97 % | WEIGHT: 199.06 LBS | BODY MASS INDEX: 28.5 KG/M2 | HEIGHT: 70 IN

## 2023-05-25 DIAGNOSIS — I10 HYPERTENSION, UNSPECIFIED TYPE: ICD-10-CM

## 2023-05-25 DIAGNOSIS — F02.818 EARLY ONSET ALZHEIMER'S DEMENTIA WITH BEHAVIORAL DISTURBANCE: Primary | ICD-10-CM

## 2023-05-25 DIAGNOSIS — F32.A DEPRESSION, UNSPECIFIED DEPRESSION TYPE: ICD-10-CM

## 2023-05-25 DIAGNOSIS — G30.0 EARLY ONSET ALZHEIMER'S DEMENTIA WITH BEHAVIORAL DISTURBANCE: Primary | ICD-10-CM

## 2023-05-25 DIAGNOSIS — I50.32 CHRONIC DIASTOLIC CONGESTIVE HEART FAILURE: ICD-10-CM

## 2023-05-25 DIAGNOSIS — E78.5 HYPERLIPIDEMIA, UNSPECIFIED HYPERLIPIDEMIA TYPE: ICD-10-CM

## 2023-05-25 DIAGNOSIS — Z86.73 HISTORY OF TIA (TRANSIENT ISCHEMIC ATTACK): ICD-10-CM

## 2023-05-25 PROCEDURE — 1101F PT FALLS ASSESS-DOCD LE1/YR: CPT | Mod: CPTII,S$GLB,, | Performed by: PHYSICIAN ASSISTANT

## 2023-05-25 PROCEDURE — 3288F PR FALLS RISK ASSESSMENT DOCUMENTED: ICD-10-PCS | Mod: CPTII,S$GLB,, | Performed by: PHYSICIAN ASSISTANT

## 2023-05-25 PROCEDURE — 3078F PR MOST RECENT DIASTOLIC BLOOD PRESSURE < 80 MM HG: ICD-10-PCS | Mod: CPTII,S$GLB,, | Performed by: PHYSICIAN ASSISTANT

## 2023-05-25 PROCEDURE — 3074F PR MOST RECENT SYSTOLIC BLOOD PRESSURE < 130 MM HG: ICD-10-PCS | Mod: CPTII,S$GLB,, | Performed by: PHYSICIAN ASSISTANT

## 2023-05-25 PROCEDURE — 4010F ACE/ARB THERAPY RXD/TAKEN: CPT | Mod: CPTII,S$GLB,, | Performed by: PHYSICIAN ASSISTANT

## 2023-05-25 PROCEDURE — 3288F FALL RISK ASSESSMENT DOCD: CPT | Mod: CPTII,S$GLB,, | Performed by: PHYSICIAN ASSISTANT

## 2023-05-25 PROCEDURE — 3078F DIAST BP <80 MM HG: CPT | Mod: CPTII,S$GLB,, | Performed by: PHYSICIAN ASSISTANT

## 2023-05-25 PROCEDURE — 1157F ADVNC CARE PLAN IN RCRD: CPT | Mod: CPTII,S$GLB,, | Performed by: PHYSICIAN ASSISTANT

## 2023-05-25 PROCEDURE — 99214 OFFICE O/P EST MOD 30 MIN: CPT | Mod: S$GLB,,, | Performed by: PHYSICIAN ASSISTANT

## 2023-05-25 PROCEDURE — 3008F PR BODY MASS INDEX (BMI) DOCUMENTED: ICD-10-PCS | Mod: CPTII,S$GLB,, | Performed by: PHYSICIAN ASSISTANT

## 2023-05-25 PROCEDURE — 99214 PR OFFICE/OUTPT VISIT, EST, LEVL IV, 30-39 MIN: ICD-10-PCS | Mod: S$GLB,,, | Performed by: PHYSICIAN ASSISTANT

## 2023-05-25 PROCEDURE — 99999 PR PBB SHADOW E&M-EST. PATIENT-LVL IV: ICD-10-PCS | Mod: PBBFAC,,, | Performed by: PHYSICIAN ASSISTANT

## 2023-05-25 PROCEDURE — 1157F PR ADVANCE CARE PLAN OR EQUIV PRESENT IN MEDICAL RECORD: ICD-10-PCS | Mod: CPTII,S$GLB,, | Performed by: PHYSICIAN ASSISTANT

## 2023-05-25 PROCEDURE — 1159F PR MEDICATION LIST DOCUMENTED IN MEDICAL RECORD: ICD-10-PCS | Mod: CPTII,S$GLB,, | Performed by: PHYSICIAN ASSISTANT

## 2023-05-25 PROCEDURE — 1101F PR PT FALLS ASSESS DOC 0-1 FALLS W/OUT INJ PAST YR: ICD-10-PCS | Mod: CPTII,S$GLB,, | Performed by: PHYSICIAN ASSISTANT

## 2023-05-25 PROCEDURE — 3074F SYST BP LT 130 MM HG: CPT | Mod: CPTII,S$GLB,, | Performed by: PHYSICIAN ASSISTANT

## 2023-05-25 PROCEDURE — 1160F PR REVIEW ALL MEDS BY PRESCRIBER/CLIN PHARMACIST DOCUMENTED: ICD-10-PCS | Mod: CPTII,S$GLB,, | Performed by: PHYSICIAN ASSISTANT

## 2023-05-25 PROCEDURE — 1159F MED LIST DOCD IN RCRD: CPT | Mod: CPTII,S$GLB,, | Performed by: PHYSICIAN ASSISTANT

## 2023-05-25 PROCEDURE — 1160F RVW MEDS BY RX/DR IN RCRD: CPT | Mod: CPTII,S$GLB,, | Performed by: PHYSICIAN ASSISTANT

## 2023-05-25 PROCEDURE — 3008F BODY MASS INDEX DOCD: CPT | Mod: CPTII,S$GLB,, | Performed by: PHYSICIAN ASSISTANT

## 2023-05-25 PROCEDURE — 4010F PR ACE/ARB THEARPY RXD/TAKEN: ICD-10-PCS | Mod: CPTII,S$GLB,, | Performed by: PHYSICIAN ASSISTANT

## 2023-05-25 PROCEDURE — 99999 PR PBB SHADOW E&M-EST. PATIENT-LVL IV: CPT | Mod: PBBFAC,,, | Performed by: PHYSICIAN ASSISTANT

## 2023-05-25 NOTE — PROGRESS NOTES
Subjective:       Patient ID: Vishnu Dougherty Jr. is a 63 y.o. male.    Chief Complaint: Dementia      HPI  Sister presents 62 y/o male with Alzheimer's disease with behavioral disturbance for nursing home placement. Pt was placed in a different nursing home last month but had medication administration issues and subsequently hospitalized and family wishes to change facilities.     Review of Systems   Reason unable to perform ROS: patient with moderate cognitive impairment.     Objective:      Physical Exam  Vitals and nursing note reviewed.   Constitutional:       General: He is not in acute distress.     Appearance: He is well-developed.   HENT:      Head: Normocephalic and atraumatic.   Eyes:      General: Lids are normal. No scleral icterus.     Extraocular Movements: Extraocular movements intact.      Conjunctiva/sclera: Conjunctivae normal.   Pulmonary:      Effort: Pulmonary effort is normal.   Neurological:      Mental Status: He is alert.      Cranial Nerves: No cranial nerve deficit.   Psychiatric:         Mood and Affect: Mood and affect normal.       Assessment:       1. 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 Chronic diastolic congestive heart failure    3. Depression, unspecified depression type    4. History of TIA (transient ischemic attack)    5. Hyperlipidemia, unspecified hyperlipidemia type    6. Hypertension, unspecified type        Plan:   1. 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 Chronic diastolic congestive heart failure    3. Depression, unspecified depression type    4. History of TIA (transient ischemic attack)    5. Hyperlipidemia, unspecified hyperlipidemia type    6. Hypertension, unspecified type      Patient has dementia.  He is currently living in assisted living.  Recently he walked away from the facility on his own.  Needs to be transition  to a nursing home.  Presents today in order to obtain orders for such along with required investigations. Chest Xray done 4/19/23.     Admission to [facility] [place] quite reasonable and I agree.     X-Ray Chest PA And Lateral  Narrative: EXAM:  XR CHEST PA AND LATERAL    CLINICAL HISTORY: Encounter for administrative examinations    COMPARISON: Chest radiograph 10/28/2020    TECHNIQUE: PA and lateral views of the chest    FINDINGS: Minor bandlike scar atelectasis the right lung base.  No new pulmonary infiltrate or consolidation.  There is no pleural effusion or pneumothorax.  The cardiomediastinal silhouette is within normal size limits.  The hilar and mediastinal structures are within normal limits.  Mild degenerative changes of the thoracic spine.  The visualized osseous structures appear intact.  Impression:  No acute radiographic abnormality in the chest.    Finalized on: 4/19/2023 11:26 AM By:  Ash Jim MD  BRRG# [id number]      2023-04-19 11:28:31.594    WAYNEG

## 2023-05-25 NOTE — TELEPHONE ENCOUNTER
----- Message from Kasey Sanchez sent at 5/25/2023  8:57 AM CDT -----  Contact: bhavik/   .Type:  Patient Returning Call    Who Called:bhavik/    Who Left Message for Patient:nurse  Does the patient know what this is regarding?:unknown  Would the patient rather a call back or a response via [facility]? Call back  Best Call Back Number:[phone]  Additional Information: patients sister returning call

## 2023-05-25 NOTE — TELEPHONE ENCOUNTER
Called sister, Kassie, back and she was advising that during the visit that was scheduled for yesterday the phones went down.     Rescheduled for an in office visit today.

## 2023-05-29 ENCOUNTER — TELEPHONE (OUTPATIENT)
Dept: INTERNAL MEDICINE | Facility: CLINIC | Age: 63
End: 2023-05-29
Payer: MEDICARE

## 2023-05-29 ENCOUNTER — TELEPHONE (OUTPATIENT)
Dept: FAMILY MEDICINE | Facility: CLINIC | Age: 63
End: 2023-05-29
Payer: MEDICARE

## 2023-05-29 LAB — FUNGUS BLD CULT: NORMAL

## 2023-05-29 NOTE — TELEPHONE ENCOUNTER
Spoke with pts daughter, who expressed upset that rehab center had not received paperwork.  Informed pts sister that PCP staff had spoken with Rehab center and would be sending paperwork tomorrow.  Pts sister verbalized understanding.

## 2023-05-29 NOTE — TELEPHONE ENCOUNTER
Spoke with Lizett, informed I will send it over tomoorow once I get back to the clinic and have Dr. Quiñones signed the L1 form, last OV notes, currently medication and medical history. Lizett verbally agreed.

## 2023-05-29 NOTE — TELEPHONE ENCOUNTER
----- Message from Huyen Banuelos sent at 5/29/2023  3:46 PM CDT -----  Pts sister is requesting the nurse give her a call back at [phone]  Thx jm

## 2023-05-29 NOTE — TELEPHONE ENCOUNTER
----- Message from Abram Calles sent at 5/29/2023 11:10 AM CDT -----  Lizett with [facility] would like to consult with a nurse regarding to information pertaining to the patient fax. Please call to advise [phone]. Thanks

## 2023-05-30 ENCOUNTER — TELEPHONE (OUTPATIENT)
Dept: INTERNAL MEDICINE | Facility: CLINIC | Age: 63
End: 2023-05-30
Payer: MEDICARE

## 2023-05-30 NOTE — TELEPHONE ENCOUNTER
----- Message from Kasey Sanchez sent at 5/30/2023  2:14 PM CDT -----  Contact: negrito/ sister  Patients sister is calling to speak with the nurse regarding questions an concerns. Reports needing the paperwork sent to the nursing home for the patient or stats she is needing it so she can turn it in herself. Please give the patient a call back at [phone]   Thanks issac

## 2023-05-30 NOTE — TELEPHONE ENCOUNTER
Level 1 form, demographic, last OV notes. Skilled nursing order, xray and current med [facility] [phone].

## 2023-05-31 ENCOUNTER — TELEPHONE (OUTPATIENT)
Dept: INTERNAL MEDICINE | Facility: CLINIC | Age: 63
End: 2023-05-31
Payer: MEDICARE

## 2023-05-31 NOTE — TELEPHONE ENCOUNTER
----- Message from Juani Sharma sent at 5/31/2023  3:31 PM CDT -----  Patient sister is calling requesting chest XRAY, TB skin test and there is Level 1 paperwork where the provider didn't sign that needs to be signed. Please call back [phone]

## 2023-06-01 ENCOUNTER — TELEPHONE (OUTPATIENT)
Dept: INTERNAL MEDICINE | Facility: CLINIC | Age: 63
End: 2023-06-01
Payer: MEDICARE

## 2023-06-01 DIAGNOSIS — Z02.9 ADMINISTRATIVE ENCOUNTER: Primary | ICD-10-CM

## 2023-06-01 NOTE — TELEPHONE ENCOUNTER
Spoke with Ms. Loyd-sister asking a chest xray that required for nursing home admission and TB skin test order. Please advise.

## 2023-06-01 NOTE — TELEPHONE ENCOUNTER
----- Message from Carmencita Wren sent at 6/1/2023  7:47 AM CDT -----  Regarding: returning call  Contact: Rach/sister  Type:  Patient Returning Call    Who Called: Rach/sister   Who Left Message for Patient: Floridalma  Does the patient know what this is regarding?: pt advice   Would the patient rather a call back or a response via [facility]?  Call back   Best Call Back Number: [phone]  Additional Information:

## 2023-06-02 ENCOUNTER — APPOINTMENT (OUTPATIENT)
Dept: RADIOLOGY | Facility: HOSPITAL | Age: 63
End: 2023-06-02
Attending: FAMILY MEDICINE
Payer: MEDICARE

## 2023-06-02 DIAGNOSIS — Z02.9 ADMINISTRATIVE ENCOUNTER: ICD-10-CM

## 2023-06-02 PROCEDURE — 71046 X-RAY EXAM CHEST 2 VIEWS: CPT | Mod: TC,PO

## 2023-06-02 PROCEDURE — 71046 XR CHEST PA AND LATERAL: ICD-10-PCS | Mod: 26,,, | Performed by: RADIOLOGY

## 2023-06-02 PROCEDURE — 71046 X-RAY EXAM CHEST 2 VIEWS: CPT | Mod: 26,,, | Performed by: RADIOLOGY

## 2023-06-04 ENCOUNTER — PATIENT MESSAGE (OUTPATIENT)
Dept: NEUROLOGY | Facility: CLINIC | Age: 63
End: 2023-06-04
Payer: MEDICARE

## 2023-06-05 ENCOUNTER — TELEPHONE (OUTPATIENT)
Dept: INTERNAL MEDICINE | Facility: CLINIC | Age: 63
End: 2023-06-05
Payer: MEDICARE

## 2023-06-05 ENCOUNTER — TELEPHONE (OUTPATIENT)
Dept: NEUROLOGY | Facility: CLINIC | Age: 63
End: 2023-06-05
Payer: MEDICARE

## 2023-06-05 ENCOUNTER — PATIENT MESSAGE (OUTPATIENT)
Dept: INTERNAL MEDICINE | Facility: CLINIC | Age: 63
End: 2023-06-05
Payer: MEDICARE

## 2023-06-05 NOTE — TELEPHONE ENCOUNTER
----- Message from Sarah Ahumada sent at 6/5/2023  8:39 AM CDT -----  Contact: Rach sister [phone]  1MEDICALADVICE     Patient is calling for Medical Advice regarding:    How long has patient had these symptoms:    Pharmacy name and phone#:    Would like response via [employer]:call back    Comments: Pt's sister is calling because she would like the provider to have pt admitted into the hospital so that he can be sent to a nursing home because pt has dementia and they can no longer care for him

## 2023-06-05 NOTE — TELEPHONE ENCOUNTER
"Called Rach back and she was wanting Dr. Quiñones to admit patient to the hospital so that they could discharge him to a nursing home. She explained that she wanted him admitted b/c he had dementia. Explained to her that we are unable to admit merely with a dx of dementia. She became angry and screamed, "you are no help either." Disconnected.   "

## 2023-06-05 NOTE — TELEPHONE ENCOUNTER
----- Message from Sarah Ahumada sent at 6/5/2023  8:37 AM CDT -----  Contact: Rach sister [phone]  1MEDICALADVICE     Patient is calling for Medical Advice regarding:    How long has patient had these symptoms:    Pharmacy name and phone#:    Would like response via [employer]:call back    Comments: Pt's sister is calling because she would like the provider to have pt admitted into the hospital so that he can be sent to a nursing home because pt has dementia and they can no longer care for him

## 2023-06-05 NOTE — TELEPHONE ENCOUNTER
Contact Rach  she stated that the change in pt medication form last visit was not a good change . She has been calling around to nursing homes and they informed her that he will need to be admitted to the hospital . Advised her that we do not do direct admit . Advised pt that Dr. Cameron recommended [facility]. She verbalized understanding .

## 2023-06-06 ENCOUNTER — TELEPHONE (OUTPATIENT)
Dept: INTERNAL MEDICINE | Facility: CLINIC | Age: 63
End: 2023-06-06
Payer: MEDICARE

## 2023-06-06 ENCOUNTER — PATIENT MESSAGE (OUTPATIENT)
Dept: INTERNAL MEDICINE | Facility: CLINIC | Age: 63
End: 2023-06-06
Payer: MEDICARE

## 2023-06-06 NOTE — TELEPHONE ENCOUNTER
Your fax has been successfully sent to [phone] at [phone].  ------------------------------------------------------------  From: [id number]  ------------------------------------------------------------  6/6/2023 10:12:01 AM Transmission Record   Sent to [phone] with remote ID "   Result: (0/339;0/0) Success   Page record: 1 - 7   Elapsed time: 03:37 on channel 52

## 2023-08-10 ENCOUNTER — PATIENT OUTREACH (OUTPATIENT)
Dept: ADMINISTRATIVE | Facility: CLINIC | Age: 63
End: 2023-08-10
Payer: MEDICARE

## 2023-08-10 ENCOUNTER — EXTERNAL HOSPITAL ADMISSION (OUTPATIENT)
Dept: ADMINISTRATIVE | Facility: CLINIC | Age: 63
End: 2023-08-10
Payer: MEDICARE
